# Patient Record
Sex: MALE | Race: WHITE | NOT HISPANIC OR LATINO
[De-identification: names, ages, dates, MRNs, and addresses within clinical notes are randomized per-mention and may not be internally consistent; named-entity substitution may affect disease eponyms.]

---

## 2017-03-08 ENCOUNTER — APPOINTMENT (OUTPATIENT)
Dept: OPHTHALMOLOGY | Facility: CLINIC | Age: 82
End: 2017-03-08

## 2017-09-06 ENCOUNTER — APPOINTMENT (OUTPATIENT)
Dept: OPHTHALMOLOGY | Facility: CLINIC | Age: 82
End: 2017-09-06
Payer: MEDICARE

## 2017-09-06 DIAGNOSIS — H25.13 AGE-RELATED NUCLEAR CATARACT, BILATERAL: ICD-10-CM

## 2017-09-06 PROCEDURE — 92012 INTRM OPH EXAM EST PATIENT: CPT

## 2018-01-03 ENCOUNTER — APPOINTMENT (OUTPATIENT)
Dept: OPHTHALMOLOGY | Facility: CLINIC | Age: 83
End: 2018-01-03
Payer: MEDICARE

## 2018-01-03 PROCEDURE — 92014 COMPRE OPH EXAM EST PT 1/>: CPT

## 2018-03-14 ENCOUNTER — APPOINTMENT (OUTPATIENT)
Dept: OPHTHALMOLOGY | Facility: CLINIC | Age: 83
End: 2018-03-14
Payer: MEDICARE

## 2018-03-14 DIAGNOSIS — H01.025 SQUAMOUS BLEPHARITIS LEFT UPPER EYELID: ICD-10-CM

## 2018-03-14 DIAGNOSIS — H01.022 SQUAMOUS BLEPHARITIS RIGHT UPPER EYELID: ICD-10-CM

## 2018-03-14 DIAGNOSIS — H01.021 SQUAMOUS BLEPHARITIS RIGHT UPPER EYELID: ICD-10-CM

## 2018-03-14 DIAGNOSIS — H01.024 SQUAMOUS BLEPHARITIS LEFT UPPER EYELID: ICD-10-CM

## 2018-03-14 PROCEDURE — 92012 INTRM OPH EXAM EST PATIENT: CPT

## 2018-09-12 ENCOUNTER — APPOINTMENT (OUTPATIENT)
Dept: OPHTHALMOLOGY | Facility: CLINIC | Age: 83
End: 2018-09-12
Payer: MEDICARE

## 2018-09-12 DIAGNOSIS — H25.13 AGE-RELATED NUCLEAR CATARACT, BILATERAL: ICD-10-CM

## 2018-09-12 PROCEDURE — 92012 INTRM OPH EXAM EST PATIENT: CPT

## 2019-03-06 ENCOUNTER — APPOINTMENT (OUTPATIENT)
Dept: OPHTHALMOLOGY | Facility: CLINIC | Age: 84
End: 2019-03-06

## 2020-07-10 ENCOUNTER — INPATIENT (INPATIENT)
Facility: HOSPITAL | Age: 85
LOS: 6 days | Discharge: NURSING, REHAB OR LONG-TERM CARE | DRG: 871 | End: 2020-07-17
Attending: INTERNAL MEDICINE | Admitting: HOSPITALIST
Payer: MEDICARE

## 2020-07-10 VITALS
RESPIRATION RATE: 20 BRPM | TEMPERATURE: 99 F | HEIGHT: 70 IN | WEIGHT: 177.03 LBS | SYSTOLIC BLOOD PRESSURE: 95 MMHG | DIASTOLIC BLOOD PRESSURE: 56 MMHG | OXYGEN SATURATION: 96 % | HEART RATE: 85 BPM

## 2020-07-10 LAB
ALBUMIN SERPL ELPH-MCNC: 2.9 G/DL — LOW (ref 3.3–5)
ALP SERPL-CCNC: 70 U/L — SIGNIFICANT CHANGE UP (ref 40–120)
ALT FLD-CCNC: 46 U/L — HIGH (ref 10–45)
ANION GAP SERPL CALC-SCNC: 15 MMOL/L — SIGNIFICANT CHANGE UP (ref 5–17)
APPEARANCE UR: ABNORMAL
APTT BLD: 32.1 SEC — SIGNIFICANT CHANGE UP (ref 27.5–35.5)
AST SERPL-CCNC: 204 U/L — HIGH (ref 10–40)
BILIRUB SERPL-MCNC: 0.7 MG/DL — SIGNIFICANT CHANGE UP (ref 0.2–1.2)
BILIRUB UR-MCNC: NEGATIVE — SIGNIFICANT CHANGE UP
BUN SERPL-MCNC: 42 MG/DL — HIGH (ref 7–23)
CALCIUM SERPL-MCNC: 8.8 MG/DL — SIGNIFICANT CHANGE UP (ref 8.4–10.5)
CHLORIDE SERPL-SCNC: 101 MMOL/L — SIGNIFICANT CHANGE UP (ref 96–108)
CO2 SERPL-SCNC: 20 MMOL/L — LOW (ref 22–31)
COLOR SPEC: ABNORMAL
CREAT SERPL-MCNC: 1.66 MG/DL — HIGH (ref 0.5–1.3)
DIFF PNL FLD: ABNORMAL
GAS PNL BLDV: SIGNIFICANT CHANGE UP
GLUCOSE SERPL-MCNC: 161 MG/DL — HIGH (ref 70–99)
GLUCOSE UR QL: NEGATIVE — SIGNIFICANT CHANGE UP
HCT VFR BLD CALC: 38.3 % — LOW (ref 39–50)
HGB BLD-MCNC: 12.7 G/DL — LOW (ref 13–17)
INR BLD: 1.5 RATIO — HIGH (ref 0.88–1.16)
KETONES UR-MCNC: NEGATIVE — SIGNIFICANT CHANGE UP
LEUKOCYTE ESTERASE UR-ACNC: ABNORMAL
MCHC RBC-ENTMCNC: 29.6 PG — SIGNIFICANT CHANGE UP (ref 27–34)
MCHC RBC-ENTMCNC: 33.2 GM/DL — SIGNIFICANT CHANGE UP (ref 32–36)
MCV RBC AUTO: 89.3 FL — SIGNIFICANT CHANGE UP (ref 80–100)
NITRITE UR-MCNC: NEGATIVE — SIGNIFICANT CHANGE UP
PH UR: 6 — SIGNIFICANT CHANGE UP (ref 5–8)
PLATELET # BLD AUTO: 87 K/UL — LOW (ref 150–400)
POTASSIUM SERPL-MCNC: 4.8 MMOL/L — SIGNIFICANT CHANGE UP (ref 3.5–5.3)
POTASSIUM SERPL-SCNC: 4.8 MMOL/L — SIGNIFICANT CHANGE UP (ref 3.5–5.3)
PROT SERPL-MCNC: 5.8 G/DL — LOW (ref 6–8.3)
PROT UR-MCNC: ABNORMAL
PROTHROM AB SERPL-ACNC: 16.9 SEC — HIGH (ref 10.6–13.6)
RBC # BLD: 4.29 M/UL — SIGNIFICANT CHANGE UP (ref 4.2–5.8)
RBC # FLD: 14.8 % — HIGH (ref 10.3–14.5)
SODIUM SERPL-SCNC: 136 MMOL/L — SIGNIFICANT CHANGE UP (ref 135–145)
SP GR SPEC: 1.02 — SIGNIFICANT CHANGE UP (ref 1.01–1.02)
UROBILINOGEN FLD QL: NEGATIVE — SIGNIFICANT CHANGE UP
WBC # BLD: 30.07 K/UL — HIGH (ref 3.8–10.5)
WBC # FLD AUTO: 30.07 K/UL — HIGH (ref 3.8–10.5)

## 2020-07-10 PROCEDURE — 99285 EMERGENCY DEPT VISIT HI MDM: CPT | Mod: CS,GC

## 2020-07-10 RX ORDER — CEFEPIME 1 G/1
2000 INJECTION, POWDER, FOR SOLUTION INTRAMUSCULAR; INTRAVENOUS ONCE
Refills: 0 | Status: COMPLETED | OUTPATIENT
Start: 2020-07-10 | End: 2020-07-10

## 2020-07-10 RX ORDER — SODIUM CHLORIDE 9 MG/ML
1000 INJECTION, SOLUTION INTRAVENOUS ONCE
Refills: 0 | Status: COMPLETED | OUTPATIENT
Start: 2020-07-10 | End: 2020-07-10

## 2020-07-10 RX ORDER — SODIUM CHLORIDE 9 MG/ML
1600 INJECTION, SOLUTION INTRAVENOUS ONCE
Refills: 0 | Status: COMPLETED | OUTPATIENT
Start: 2020-07-10 | End: 2020-07-10

## 2020-07-10 RX ORDER — PIPERACILLIN AND TAZOBACTAM 4; .5 G/20ML; G/20ML
3.38 INJECTION, POWDER, LYOPHILIZED, FOR SOLUTION INTRAVENOUS ONCE
Refills: 0 | Status: DISCONTINUED | OUTPATIENT
Start: 2020-07-10 | End: 2020-07-10

## 2020-07-10 RX ORDER — ACETAMINOPHEN 500 MG
650 TABLET ORAL ONCE
Refills: 0 | Status: COMPLETED | OUTPATIENT
Start: 2020-07-10 | End: 2020-07-10

## 2020-07-10 RX ORDER — ACETAMINOPHEN 500 MG
975 TABLET ORAL ONCE
Refills: 0 | Status: DISCONTINUED | OUTPATIENT
Start: 2020-07-10 | End: 2020-07-10

## 2020-07-10 RX ORDER — VANCOMYCIN HCL 1 G
1000 VIAL (EA) INTRAVENOUS ONCE
Refills: 0 | Status: COMPLETED | OUTPATIENT
Start: 2020-07-10 | End: 2020-07-10

## 2020-07-10 RX ADMIN — Medication 650 MILLIGRAM(S): at 22:26

## 2020-07-10 RX ADMIN — CEFEPIME 100 MILLIGRAM(S): 1 INJECTION, POWDER, FOR SOLUTION INTRAMUSCULAR; INTRAVENOUS at 22:27

## 2020-07-10 RX ADMIN — CEFEPIME 2000 MILLIGRAM(S): 1 INJECTION, POWDER, FOR SOLUTION INTRAMUSCULAR; INTRAVENOUS at 23:00

## 2020-07-10 RX ADMIN — Medication 250 MILLIGRAM(S): at 23:04

## 2020-07-10 RX ADMIN — SODIUM CHLORIDE 1000 MILLILITER(S): 9 INJECTION, SOLUTION INTRAVENOUS at 22:25

## 2020-07-10 RX ADMIN — SODIUM CHLORIDE 1000 MILLILITER(S): 9 INJECTION, SOLUTION INTRAVENOUS at 23:25

## 2020-07-10 NOTE — ED PROVIDER NOTE - CARE PLAN
Principal Discharge DX:	Sepsis, due to unspecified organism, unspecified whether acute organ dysfunction present  Secondary Diagnosis:	Urinary tract infection without hematuria, site unspecified

## 2020-07-10 NOTE — ED PROVIDER NOTE - NS ED ROS FT
CONSTITUTIONAL: +Warm  PSYCHIATRIC: no known mental health issues.    Unable to hear does not having hearing aid here, unable to obtain ROS

## 2020-07-10 NOTE — ED ADULT NURSE NOTE - OBJECTIVE STATEMENT
89y male being brought in by EMS s/p fall last evening around 8pm. Pt was found face down on floor by family today after not hearing from patient. Pt lives alone normally independently ambulatory. Pt told family he was down on the ground for hours, unknown if LOC or hit head. Family members noted a changed in mental status after getting to patient was lethargic and altered. Pt is now AOx1 to self, Hard of hearing, pt is poor historian, unknown of any AC use. PERRLA. Pt has full range of motion in all extremities. Breathing is clear and unlabored. Crackles heard in lower lobes CHLOE. Pt is no signs of distress. Bowel sounds present, abdomen is soft nontender. No skin break down noted. Unkown if any COVID or sick contact. 89y male being brought in by EMS s/p fall last evening around 8pm. Pt was found face down on floor by family today after not hearing from patient. Pt lives alone normally independently ambulatory. Pt told family he was down on the ground for hours, unknown if LOC or hit head. Family members noted a changed in mental status after getting to patient was lethargic and altered. Pt is now AOx1 to self, Hard of hearing, pt is poor historian, Pt states he has dementia. Unknown of any AC use. PERRLA. Pt has full range of motion in all extremities. Breathing is clear and unlabored. Crackles heard in lower lobes CHLOE. Pt is no signs of distress. Bowel sounds present, abdomen is soft nontender. No skin break down noted. Unkown if any COVID or sick contact.

## 2020-07-10 NOTE — ED ADULT NURSE REASSESSMENT NOTE - NS ED NURSE REASSESS COMMENT FT1
Septic work up started. Blood cultures drawn. Pt straight cathed for urine culture. Antibiotics started after drawing culture.

## 2020-07-10 NOTE — ED PROVIDER NOTE - PROGRESS NOTE DETAILS
Lea Mancilla MD, PGY3: Patient received at resident sign out. Pt received CT head, no evidence of intracranial bleeding. abx given, admission for UTI, ams. Based on patient's history and physical exam, as well as the results of today's workup, I feel that patient warrants admission to the hospital for further workup/evaluation and continued management. I discussed the findings of today's workup with the patient and addressed the patient's questions and concerns. The patient was agreeable with admission. I spoke with the hospitalist who accepted the patient for admission and subsequently took over the patient's care.

## 2020-07-10 NOTE — ED PROVIDER NOTE - PHYSICAL EXAMINATION
General: Shaking, Alert and oriented to self  HEENT: pupils equal and reactive, normal external ears bilaterally   Cardiac: RRR, no MRG appreciated  Resp: RLL crackle, symmetric chest wall rise  Abd: soft, nontender, nondistended,   : no CVA tenderness  Neuro: Moving all extremities  Skin:  normal color for race, +Warm   MSK: Able to passively and actively range UE and LE without issue

## 2020-07-10 NOTE — ED PROVIDER NOTE - ATTENDING CONTRIBUTION TO CARE
Nemes - 89yo M h/o CAD/stents on ASA, Afib on Eliquis, s/p PPM, HLD, HTN, hearing difficulty, BPH here w/ weakness and AMS as per EMS and daughter. He was on found on the ground unable to get up yesterday, son-in-law went to house and put to bed. Today slept until 1pm, was confused and weak prompting daughter to call EMS. Pt declines any symptoms at this time. VS wnl except febrile to 101, well appearing, in NAD, lungs clear, cardiac wnl, no JVD. DRY mucosae, pink conjunctivae. Abdomen soft/NT, no CVAT. No pedal edema, no calf TTP. Confused but neuro intact. No signs of trauma. Will get sepsis labs, CT head/spine, hydrate, give abx, admit

## 2020-07-10 NOTE — ED PROVIDER NOTE - CLINICAL SUMMARY MEDICAL DECISION MAKING FREE TEXT BOX
Jasbir PGY-3:  89yo M w/ pmhx as described in HPI here w/ weakness, found to be febrile 101F rectally currant presentation likely 2/2 infection, will give empiric abx and IVF, tylenol, cxr/ua/bloodwork for infectious workup. CT head/c-spine as unclear if fall and on AC.   Will admit for further management

## 2020-07-10 NOTE — ED PROVIDER NOTE - OBJECTIVE STATEMENT
89yo M h/o 5x stents on ASA, afib on eliquis, s/p PPM, HLD, HTN, hearing difficulty, BPH here w/ weakness    As per daughter was on ground unable to get up yesterday, son-in-law went to house and put to bed. Today slept until 1pm and was weak prompting daughter to call EMS. +Pt is warm. No N/V/D. Denies head trauma. On AC.    Has no PCP  Social: Lives alone, does all ADL's, does not need assistance when walking month(s)

## 2020-07-11 DIAGNOSIS — M79.602 PAIN IN LEFT ARM: ICD-10-CM

## 2020-07-11 DIAGNOSIS — I25.10 ATHEROSCLEROTIC HEART DISEASE OF NATIVE CORONARY ARTERY WITHOUT ANGINA PECTORIS: ICD-10-CM

## 2020-07-11 DIAGNOSIS — Z98.890 OTHER SPECIFIED POSTPROCEDURAL STATES: Chronic | ICD-10-CM

## 2020-07-11 DIAGNOSIS — N39.0 URINARY TRACT INFECTION, SITE NOT SPECIFIED: ICD-10-CM

## 2020-07-11 DIAGNOSIS — I48.91 UNSPECIFIED ATRIAL FIBRILLATION: ICD-10-CM

## 2020-07-11 DIAGNOSIS — A41.9 SEPSIS, UNSPECIFIED ORGANISM: ICD-10-CM

## 2020-07-11 DIAGNOSIS — Z00.00 ENCOUNTER FOR GENERAL ADULT MEDICAL EXAMINATION WITHOUT ABNORMAL FINDINGS: ICD-10-CM

## 2020-07-11 DIAGNOSIS — D72.9 DISORDER OF WHITE BLOOD CELLS, UNSPECIFIED: ICD-10-CM

## 2020-07-11 DIAGNOSIS — N17.9 ACUTE KIDNEY FAILURE, UNSPECIFIED: ICD-10-CM

## 2020-07-11 DIAGNOSIS — E78.5 HYPERLIPIDEMIA, UNSPECIFIED: ICD-10-CM

## 2020-07-11 DIAGNOSIS — M62.82 RHABDOMYOLYSIS: ICD-10-CM

## 2020-07-11 DIAGNOSIS — I10 ESSENTIAL (PRIMARY) HYPERTENSION: ICD-10-CM

## 2020-07-11 DIAGNOSIS — Z02.9 ENCOUNTER FOR ADMINISTRATIVE EXAMINATIONS, UNSPECIFIED: ICD-10-CM

## 2020-07-11 LAB
ALBUMIN SERPL ELPH-MCNC: 2.6 G/DL — LOW (ref 3.3–5)
ALP SERPL-CCNC: 74 U/L — SIGNIFICANT CHANGE UP (ref 40–120)
ALT FLD-CCNC: 48 U/L — HIGH (ref 10–45)
ANION GAP SERPL CALC-SCNC: 11 MMOL/L — SIGNIFICANT CHANGE UP (ref 5–17)
ANION GAP SERPL CALC-SCNC: 9 MMOL/L — SIGNIFICANT CHANGE UP (ref 5–17)
AST SERPL-CCNC: 176 U/L — HIGH (ref 10–40)
BASOPHILS # BLD AUTO: 0.27 K/UL — HIGH (ref 0–0.2)
BASOPHILS NFR BLD AUTO: 0.9 % — SIGNIFICANT CHANGE UP (ref 0–2)
BILIRUB SERPL-MCNC: 0.5 MG/DL — SIGNIFICANT CHANGE UP (ref 0.2–1.2)
BUN SERPL-MCNC: 42 MG/DL — HIGH (ref 7–23)
BUN SERPL-MCNC: 44 MG/DL — HIGH (ref 7–23)
CALCIUM SERPL-MCNC: 8.1 MG/DL — LOW (ref 8.4–10.5)
CALCIUM SERPL-MCNC: 8.4 MG/DL — SIGNIFICANT CHANGE UP (ref 8.4–10.5)
CHLORIDE SERPL-SCNC: 104 MMOL/L — SIGNIFICANT CHANGE UP (ref 96–108)
CHLORIDE SERPL-SCNC: 105 MMOL/L — SIGNIFICANT CHANGE UP (ref 96–108)
CK SERPL-CCNC: 1337 U/L — HIGH (ref 30–200)
CK SERPL-CCNC: 2838 U/L — HIGH (ref 30–200)
CO2 SERPL-SCNC: 20 MMOL/L — LOW (ref 22–31)
CO2 SERPL-SCNC: 22 MMOL/L — SIGNIFICANT CHANGE UP (ref 22–31)
CREAT SERPL-MCNC: 1.49 MG/DL — HIGH (ref 0.5–1.3)
CREAT SERPL-MCNC: 1.52 MG/DL — HIGH (ref 0.5–1.3)
E COLI DNA BLD POS QL NAA+NON-PROBE: SIGNIFICANT CHANGE UP
EOSINOPHIL # BLD AUTO: 0 K/UL — SIGNIFICANT CHANGE UP (ref 0–0.5)
EOSINOPHIL NFR BLD AUTO: 0 % — SIGNIFICANT CHANGE UP (ref 0–6)
GAS PNL BLDV: SIGNIFICANT CHANGE UP
GLUCOSE SERPL-MCNC: 128 MG/DL — HIGH (ref 70–99)
GLUCOSE SERPL-MCNC: 157 MG/DL — HIGH (ref 70–99)
GRAM STN FLD: SIGNIFICANT CHANGE UP
HCT VFR BLD CALC: 36.5 % — LOW (ref 39–50)
HGB BLD-MCNC: 12.1 G/DL — LOW (ref 13–17)
LYMPHOCYTES # BLD AUTO: 0.78 K/UL — LOW (ref 1–3.3)
LYMPHOCYTES # BLD AUTO: 2.6 % — LOW (ref 13–44)
MAGNESIUM SERPL-MCNC: 1.9 MG/DL — SIGNIFICANT CHANGE UP (ref 1.6–2.6)
MCHC RBC-ENTMCNC: 29.5 PG — SIGNIFICANT CHANGE UP (ref 27–34)
MCHC RBC-ENTMCNC: 33.2 GM/DL — SIGNIFICANT CHANGE UP (ref 32–36)
MCV RBC AUTO: 89 FL — SIGNIFICANT CHANGE UP (ref 80–100)
METHOD TYPE: SIGNIFICANT CHANGE UP
MONOCYTES # BLD AUTO: 1.05 K/UL — HIGH (ref 0–0.9)
MONOCYTES NFR BLD AUTO: 3.5 % — SIGNIFICANT CHANGE UP (ref 2–14)
NEUTROPHILS # BLD AUTO: 21.71 K/UL — HIGH (ref 1.8–7.4)
NEUTROPHILS NFR BLD AUTO: 68.7 % — SIGNIFICANT CHANGE UP (ref 43–77)
NRBC # BLD: 0 /100 WBCS — SIGNIFICANT CHANGE UP (ref 0–0)
PHOSPHATE SERPL-MCNC: 3.4 MG/DL — SIGNIFICANT CHANGE UP (ref 2.5–4.5)
PLATELET # BLD AUTO: 70 K/UL — LOW (ref 150–400)
POTASSIUM SERPL-MCNC: 4.3 MMOL/L — SIGNIFICANT CHANGE UP (ref 3.5–5.3)
POTASSIUM SERPL-MCNC: 4.5 MMOL/L — SIGNIFICANT CHANGE UP (ref 3.5–5.3)
POTASSIUM SERPL-SCNC: 4.3 MMOL/L — SIGNIFICANT CHANGE UP (ref 3.5–5.3)
POTASSIUM SERPL-SCNC: 4.5 MMOL/L — SIGNIFICANT CHANGE UP (ref 3.5–5.3)
PROT SERPL-MCNC: 5.4 G/DL — LOW (ref 6–8.3)
RBC # BLD: 4.1 M/UL — LOW (ref 4.2–5.8)
RBC # FLD: 14.8 % — HIGH (ref 10.3–14.5)
SARS-COV-2 RNA SPEC QL NAA+PROBE: SIGNIFICANT CHANGE UP
SODIUM SERPL-SCNC: 135 MMOL/L — SIGNIFICANT CHANGE UP (ref 135–145)
SODIUM SERPL-SCNC: 136 MMOL/L — SIGNIFICANT CHANGE UP (ref 135–145)
SPECIMEN SOURCE: SIGNIFICANT CHANGE UP
SPECIMEN SOURCE: SIGNIFICANT CHANGE UP
TSH SERPL-MCNC: 2.75 UIU/ML — SIGNIFICANT CHANGE UP (ref 0.27–4.2)
WBC # BLD: 23.85 K/UL — HIGH (ref 3.8–10.5)
WBC # FLD AUTO: 23.85 K/UL — HIGH (ref 3.8–10.5)

## 2020-07-11 PROCEDURE — 71045 X-RAY EXAM CHEST 1 VIEW: CPT | Mod: 26

## 2020-07-11 PROCEDURE — 72125 CT NECK SPINE W/O DYE: CPT | Mod: 26

## 2020-07-11 PROCEDURE — 99223 1ST HOSP IP/OBS HIGH 75: CPT | Mod: GC

## 2020-07-11 PROCEDURE — 72170 X-RAY EXAM OF PELVIS: CPT | Mod: 26

## 2020-07-11 PROCEDURE — 12345: CPT | Mod: NC,GC

## 2020-07-11 PROCEDURE — 73030 X-RAY EXAM OF SHOULDER: CPT | Mod: 26,LT

## 2020-07-11 PROCEDURE — 70450 CT HEAD/BRAIN W/O DYE: CPT | Mod: 26

## 2020-07-11 RX ORDER — TAMSULOSIN HYDROCHLORIDE 0.4 MG/1
0.8 CAPSULE ORAL AT BEDTIME
Refills: 0 | Status: DISCONTINUED | OUTPATIENT
Start: 2020-07-11 | End: 2020-07-17

## 2020-07-11 RX ORDER — PREGABALIN 225 MG/1
1000 CAPSULE ORAL DAILY
Refills: 0 | Status: DISCONTINUED | OUTPATIENT
Start: 2020-07-11 | End: 2020-07-17

## 2020-07-11 RX ORDER — SODIUM CHLORIDE 9 MG/ML
1000 INJECTION, SOLUTION INTRAVENOUS
Refills: 0 | Status: DISCONTINUED | OUTPATIENT
Start: 2020-07-11 | End: 2020-07-17

## 2020-07-11 RX ORDER — SENNA PLUS 8.6 MG/1
2 TABLET ORAL AT BEDTIME
Refills: 0 | Status: DISCONTINUED | OUTPATIENT
Start: 2020-07-11 | End: 2020-07-17

## 2020-07-11 RX ORDER — POLYETHYLENE GLYCOL 3350 17 G/17G
17 POWDER, FOR SOLUTION ORAL
Refills: 0 | Status: DISCONTINUED | OUTPATIENT
Start: 2020-07-11 | End: 2020-07-17

## 2020-07-11 RX ORDER — ASPIRIN/CALCIUM CARB/MAGNESIUM 324 MG
81 TABLET ORAL DAILY
Refills: 0 | Status: DISCONTINUED | OUTPATIENT
Start: 2020-07-11 | End: 2020-07-17

## 2020-07-11 RX ORDER — SIMVASTATIN 20 MG/1
20 TABLET, FILM COATED ORAL AT BEDTIME
Refills: 0 | Status: DISCONTINUED | OUTPATIENT
Start: 2020-07-11 | End: 2020-07-13

## 2020-07-11 RX ORDER — ACETAMINOPHEN 500 MG
1000 TABLET ORAL EVERY 8 HOURS
Refills: 0 | Status: DISCONTINUED | OUTPATIENT
Start: 2020-07-11 | End: 2020-07-17

## 2020-07-11 RX ORDER — LIDOCAINE 4 G/100G
1 CREAM TOPICAL DAILY
Refills: 0 | Status: DISCONTINUED | OUTPATIENT
Start: 2020-07-11 | End: 2020-07-17

## 2020-07-11 RX ORDER — SODIUM CHLORIDE 9 MG/ML
1000 INJECTION, SOLUTION INTRAVENOUS
Refills: 0 | Status: DISCONTINUED | OUTPATIENT
Start: 2020-07-11 | End: 2020-07-11

## 2020-07-11 RX ORDER — CEFTRIAXONE 500 MG/1
1000 INJECTION, POWDER, FOR SOLUTION INTRAMUSCULAR; INTRAVENOUS EVERY 24 HOURS
Refills: 0 | Status: DISCONTINUED | OUTPATIENT
Start: 2020-07-11 | End: 2020-07-15

## 2020-07-11 RX ORDER — TAMSULOSIN HYDROCHLORIDE 0.4 MG/1
0.4 CAPSULE ORAL AT BEDTIME
Refills: 0 | Status: DISCONTINUED | OUTPATIENT
Start: 2020-07-11 | End: 2020-07-11

## 2020-07-11 RX ORDER — APIXABAN 2.5 MG/1
2.5 TABLET, FILM COATED ORAL EVERY 12 HOURS
Refills: 0 | Status: DISCONTINUED | OUTPATIENT
Start: 2020-07-11 | End: 2020-07-17

## 2020-07-11 RX ORDER — FOLIC ACID 0.8 MG
1 TABLET ORAL DAILY
Refills: 0 | Status: DISCONTINUED | OUTPATIENT
Start: 2020-07-11 | End: 2020-07-17

## 2020-07-11 RX ADMIN — PREGABALIN 1000 MICROGRAM(S): 225 CAPSULE ORAL at 09:39

## 2020-07-11 RX ADMIN — LIDOCAINE 1 PATCH: 4 CREAM TOPICAL at 09:37

## 2020-07-11 RX ADMIN — Medication 1000 MILLIGRAM(S): at 00:05

## 2020-07-11 RX ADMIN — LIDOCAINE 1 PATCH: 4 CREAM TOPICAL at 21:27

## 2020-07-11 RX ADMIN — APIXABAN 2.5 MILLIGRAM(S): 2.5 TABLET, FILM COATED ORAL at 18:50

## 2020-07-11 RX ADMIN — Medication 81 MILLIGRAM(S): at 09:39

## 2020-07-11 RX ADMIN — SENNA PLUS 2 TABLET(S): 8.6 TABLET ORAL at 22:19

## 2020-07-11 RX ADMIN — LIDOCAINE 1 PATCH: 4 CREAM TOPICAL at 19:01

## 2020-07-11 RX ADMIN — Medication 1000 MILLIGRAM(S): at 15:38

## 2020-07-11 RX ADMIN — Medication 1 MILLIGRAM(S): at 09:39

## 2020-07-11 RX ADMIN — SODIUM CHLORIDE 150 MILLILITER(S): 9 INJECTION, SOLUTION INTRAVENOUS at 09:36

## 2020-07-11 RX ADMIN — CEFTRIAXONE 100 MILLIGRAM(S): 500 INJECTION, POWDER, FOR SOLUTION INTRAMUSCULAR; INTRAVENOUS at 05:24

## 2020-07-11 RX ADMIN — SODIUM CHLORIDE 1600 MILLILITER(S): 9 INJECTION, SOLUTION INTRAVENOUS at 00:45

## 2020-07-11 RX ADMIN — APIXABAN 2.5 MILLIGRAM(S): 2.5 TABLET, FILM COATED ORAL at 05:24

## 2020-07-11 RX ADMIN — TAMSULOSIN HYDROCHLORIDE 0.8 MILLIGRAM(S): 0.4 CAPSULE ORAL at 22:19

## 2020-07-11 RX ADMIN — POLYETHYLENE GLYCOL 3350 17 GRAM(S): 17 POWDER, FOR SOLUTION ORAL at 18:50

## 2020-07-11 RX ADMIN — SIMVASTATIN 20 MILLIGRAM(S): 20 TABLET, FILM COATED ORAL at 22:20

## 2020-07-11 RX ADMIN — SODIUM CHLORIDE 75 MILLILITER(S): 9 INJECTION, SOLUTION INTRAVENOUS at 05:24

## 2020-07-11 NOTE — H&P ADULT - NSHPLABSRESULTS_GEN_ALL_CORE
12.7   30.07 )-----------( 87       ( 10 Jul 2020 22:19 )             38.3   07-10    136  |  101  |  42<H>  ----------------------------<  161<H>  4.8   |  20<L>  |  1.66<H>    Ca    8.8      10 Jul 2020 22:19    TPro  5.8<L>  /  Alb  2.9<L>  /  TBili  0.7  /  DBili  x   /  AST  204<H>  /  ALT  46<H>  /  AlkPhos  70  07-10    Blood Gas Venous - Lactate (07.11.20 @ 01:56)    Blood Gas Venous - Lactate: 3.7: Elevated lactate. Consider ordering follow-up lactate to trend. mmoL/L      PT/INR - ( 10 Jul 2020 22:19 )   PT: 16.9 sec;   INR: 1.50 ratio         PTT - ( 10 Jul 2020 22:19 )  PTT:32.1 sec 12.7   30.07 )-----------( 87       ( 10 Jul 2020 22:19 )             38.3   07-10    136  |  101  |  42<H>  ----------------------------<  161<H>  4.8   |  20<L>  |  1.66<H>    Ca    8.8      10 Jul 2020 22:19    TPro  5.8<L>  /  Alb  2.9<L>  /  TBili  0.7  /  DBili  x   /  AST  204<H>  /  ALT  46<H>  /  AlkPhos  70  07-10    Blood Gas Venous - Lactate (20 @ 01:56)    Blood Gas Venous - Lactate: 3.7: Elevated lactate. Consider ordering follow-up lactate to trend. mmoL/L    Blood Gas Profile - Venous (20 @ 01:56)    pH, Venous: 7.28    pCO2, Venous: 48 mmHg    pO2, Venous: 28 mmHg    HCO3, Venous: 21 mmol/L    Base Excess, Venous: -4.9 mmol/L    Oxygen Saturation, Venous: 43 %    Total CO2, Venous: 23 mmol/L    Blood Gas Source Venous: Venous    Urinalysis Basic - ( 10 Jul 2020 22:28 )    Color: Dark Yellow / Appearance: Slightly Turbid / S.018 / pH: x  Gluc: x / Ketone: Negative  / Bili: Negative / Urobili: Negative   Blood: x / Protein: 300 mg/dL / Nitrite: Negative   Leuk Esterase: Large / RBC: 4 /hpf / WBC 64 /HPF   Sq Epi: x / Non Sq Epi: 1 /hpf / Bacteria: Many    COVID-19 PCR: NotDetec (10 Jul 2020 22:19)    < from: CT Head No Cont (20 @ 00:35) >    CT Head: No acute intracranial hemorrhage or calvarial fracture.    CT cervical spine: No acute cervical spine fracture or evidence of traumatic malalignment.    < end of copied text >            PT/INR - ( 10 Jul 2020 22:19 )   PT: 16.9 sec;   INR: 1.50 ratio         PTT - ( 10 Jul 2020 22:19 )  PTT:32.1 sec

## 2020-07-11 NOTE — PROGRESS NOTE ADULT - PROBLEM SELECTOR PLAN 4
-unknown if acute or chronic, however likely an acute exacerbation in setting of prolonged time without PO intake, earlier dry oral mucosa, might be exacerbated by rhabdomolysis as well  -on  cc/hr for 6 hours, continue with fluids as needed

## 2020-07-11 NOTE — H&P ADULT - PROBLEM SELECTOR PLAN 4
-on Eliquis (c/w home med) and pacemaker -unknown if acute or chronic, however likely an acute exacerbation in setting of prolonged time without PO intake, earlier dry oral mucosa, might be exacerbated by rhabdomolysis as well  -on  cc/hr for 6 hours, continue with fluids as needed

## 2020-07-11 NOTE — ED ADULT NURSE REASSESSMENT NOTE - NS ED NURSE REASSESS COMMENT FT1
Pt resting comfortably in the stretcher, rectal temp rechecked, pt afebrile. Pt awaiting dispo. Pt given call bell and instructed her is a fall risk and needs to call for assistance if needed. Stretcher is in lowest position, wheels locked, yellow gown on, red socks on

## 2020-07-11 NOTE — PROGRESS NOTE ADULT - ATTENDING COMMENTS
-Patient seen/examined on 7/11/20. Case/plan discussed with the intern and resident as reviewed/edited by me above and in any comments below.  -F/u left shoulder xray. Tylenol PRN.   -C/w CTX for now pending UCx. Increase Flomax for BPH symptoms.   -Hold home ramipril for now given renal dysfunction. Check bladder scans.   -Will need heme consult.   -Conservative diet for now pending formal swallow eval. -Patient seen/examined on 7/11/20. Case/plan discussed with the intern and resident as reviewed/edited by me above and in any comments below.  -F/u left shoulder xray. Tylenol PRN.   -C/w CTX for now pending final blood and UCx. Increase Flomax for BPH symptoms.   -Hold home ramipril for now given renal dysfunction. Check bladder scans.   -Will need heme consult.   -Conservative diet for now pending formal swallow eval.  -Discussed with patient's daughter at bedside.

## 2020-07-11 NOTE — PROGRESS NOTE ADULT - PROBLEM SELECTOR PLAN 2
-leukocytosis 30k, febrile to 101, RR 20-22 in the setting of UTI  -treat UTI as above  -f/u AM blood gas, lactate, CBC w/differential, CMP  -f/u BCx -leukocytosis 30k, febrile to 101, RR 20-22 in the setting of UTI  -treat UTI as above  -trend WBC w/diff  -trend Cr, replete fluids as necessary  -Blood cx x2 positive for gnr

## 2020-07-11 NOTE — H&P ADULT - NSHPREVIEWOFSYSTEMS_GEN_ALL_CORE
REVIEW OF SYSTEMS:    CONSTITUTIONAL: +weakness, denies fevers or endorses chills  EYES/ENT: No visual changes, no throat pain   NECK: No pain  RESPIRATORY: No cough, no shortness of breath  CARDIOVASCULAR: No chest pain or palpitations  GASTROINTESTINAL: No abdominal pain. No nausea, vomiting, no diarrhea or constipation.   BACK: endorses lower back pain after fall  GENITOURINARY: No dysuria, frequency or hematuria  NEURO: no numbness  SKIN: No itching, rashes

## 2020-07-11 NOTE — PROGRESS NOTE ADULT - PROBLEM SELECTOR PLAN 6
-elevated myelocytes, metamyelocytes, and promyelocytes with elevated WBC concerning for possible CML  -trend CBC with diff as infection is treated, if WBC count and myelocytes are persistently elevated despite treatment and improvement of infection, workup for possible malignancy -elevated myelocytes, metamyelocytes, and promyelocytes with elevated WBC concerning for possible CML  -trend CBC with diff as infection is treated, if WBC count and myelocytes are persistently elevated despite treatment and improvement of infection, workup for possible malignancy  - Heme w/u while inpt would be helpful

## 2020-07-11 NOTE — CHART NOTE - NSCHARTNOTEFT_GEN_A_CORE
TO BE COMPLETED WITHIN 6 HOURS OF INITIAL ASSESSMENT:    For use in patients that have 2 sepsis criteria and new organ dysfunction   •	New or increased oxygen requirement  •	Creatinine >2mg/dL  •	Bilirubin>2mg/dL  •	Platelet <100,00/mm3  •	INR >1.5, PTT>60  •	Lactate >2    If patient persistent hypotension (SBP<90) or any lactate >4 then provider evaluation (Physician/PA/NP) within 30 minutes of bolus completion is required.    Vital Signs Last 24 Hrs  T(C): 38.3 (10 Jul 2020 21:45), Max: 38.3 (10 Jul 2020 21:45)  T(F): 101 (10 Jul 2020 21:45), Max: 101 (10 Jul 2020 21:45)  HR: 86 (10 Jul 2020 21:45) (85 - 86)  BP: 105/58 (10 Jul 2020 21:45) (95/56 - 105/58)  BP(mean): --  RR: 20 (10 Jul 2020 21:45) (20 - 20)  SpO2: 98% (10 Jul 2020 21:45) (96% - 98%)  		  LUNGS:  [ X ]Clear bilaterally [  ] Wheeze [  ] Rhonchi [  ] Rales [  ] Crackles; Other:  HEART: [ X ]RRR [  ] No murmur[  ]  Normal S1S2[  ] Tachycardia;  Other:  CAPILLARY REFULL:  	Fingers: [X  ] less than 2 seconds [  ] more than 2 seconds                                           Toes: [  ]  less than 2 seconds [  ] more than 2 seconds   PERIPHERAL PULSES:  Radial: [X  ] Palpable  [  ]  non-palpable                                         Dorsalis Pedis: [  ] Palpable  [  ] non-palpable                                         Posterior Tibial: [  ] Palpable  [  ] non-palpable                                          Other:  SKIN:   [  ]  Diaphoretic  [  ]  mottling  [  ]  Cold extremities  [  ]  Warm [  ]  Dry                      Other:    BEDSIDE ULTRASOUND FINDINGS (IF APPLICABLE):    Labs:  10 Jul 2020 22:19    136    |  101    |  42     ----------------------------<  161    4.8     |  20     |  1.66     Ca    8.8        10 Jul 2020 22:19    TPro  5.8    /  Alb  2.9    /  TBili  0.7    /  DBili  x      /  AST  204    /  ALT  46     /  AlkPhos  70     10 Jul 2020 22:19                          12.7   30.07 )-----------( 87       ( 10 Jul 2020 22:19 )             38.3     PT/INR - ( 10 Jul 2020 22:19 )   PT: 16.9 sec;   INR: 1.50 ratio         PTT - ( 10 Jul 2020 22:19 )  PTT:32.1 sec  Lactate:    Plan (orders must be placed in EMR):     [ X ]  Check Repeat Lactate   [  ]  No change in current plan  [  ]  Start Vasopressors:  [  ]  Repeat Fluid Bolus:  [  ] other:    Care Discussed with Consultants/Other Providers [ ] YES  [ ] NO

## 2020-07-11 NOTE — H&P ADULT - PROBLEM SELECTOR PLAN 1
What Is The Reason For Today's Visit?: Full Body Skin Examination What Is The Reason For Today's Visit? (Being Monitored For X): concerning skin lesions on an annual basis -UA+ in setting of fever, leukocytosis increased lactate  -s/p broad spectrum vanc and cefepime in ED  -c/w 1g ceftriaxone for at least 5 (initiated 7/11)  -f/u UA -UA+ in setting of fever, leukocytosis increased lactate  -s/p broad spectrum vanc and cefepime in ED  -c/w 1g ceftriaxone for at least 5 days (initiated 7/11)  -f/u UA  -f/u post void bladder scan

## 2020-07-11 NOTE — H&P ADULT - PROBLEM SELECTOR PLAN 2
-leukocytosis 30k, febrile to 101, RR 20-22, -leukocytosis 30k, febrile to 101, RR 20-22 in the setting of UTI  -treat UTI  -f/u AM blood gas -leukocytosis 30k, febrile to 101, RR 20-22 in the setting of UTI  -treat UTI  -f/u AM blood gas, lactate  -f/u BCx -leukocytosis 30k, febrile to 101, RR 20-22 in the setting of UTI  -treat UTI as above  -f/u AM blood gas, lactate, CBC w/differential, CMP  -f/u BCx

## 2020-07-11 NOTE — PROGRESS NOTE ADULT - PROBLEM SELECTOR PLAN 1
-UA+ in setting of fever, leukocytosis increased lactate  -s/p broad spectrum vanc and cefepime in ED  -c/w 1g ceftriaxone for at least 5 days (initiated 7/11)  -f/u UA  -f/u post void bladder scan -UA+ in setting of fever, leukocytosis increased lactate  -s/p broad spectrum vanc and cefepime in ED  -c/w 1g ceftriaxone for at least 5 days (initiated 7/11)  -f/u bladder scan, if >300ml need to place Bautista to avoid retention

## 2020-07-11 NOTE — H&P ADULT - PROBLEM SELECTOR PLAN 9
Transitions of Care Status:  1.  Name of PCP:  2.  PCP Contacted on Admission: [ ] Y    [ ] N    3.  PCP contacted at Discharge: [ ] Y    [ ] N    [ ] N/A  4.  Post-Discharge Appointment Date and Location:  5.  Summary of Handoff given to PCP: -on eliquis for afib

## 2020-07-11 NOTE — PROGRESS NOTE ADULT - PROBLEM SELECTOR PLAN 9
-on eliquis for afib -on eliquis for afib  -dysphagia diet, f/u speech and swallow eval   -fall and aspiration precautions

## 2020-07-11 NOTE — H&P ADULT - NSICDXPASTMEDICALHX_GEN_ALL_CORE_FT
PAST MEDICAL HISTORY:  Atrial fibrillation     BPH (benign prostatic hyperplasia)     Coronary artery disease     Hyperlipidemia     Hypertension

## 2020-07-11 NOTE — H&P ADULT - PROBLEM SELECTOR PLAN 5
-c/w home medication -elevated myelocytes, metamyelocytes, and promyelocytes with elevated WBC concerning for possible CML  -trend CBC with diff as infection is treated, if WBC count and myelocytes are persistently elevated despite treatment and improvement of infection, workup for possible malignancy -cardiac w/o benign  -c/o left arm pain after fall  -f/u x-ray of left arm and chest to rule out clavicle, shoulder, or humeral fracture

## 2020-07-11 NOTE — H&P ADULT - NSHPSOCIALHISTORY_GEN_ALL_CORE
Never smoker, denies alcohol or illicit substance use.    Lives alone in house, able to attend to ADLs, drives, strong support network (daughter and son-in-law).   Wife passed away this year.

## 2020-07-11 NOTE — H&P ADULT - NSHPPHYSICALEXAM_GEN_ALL_CORE
PHYSICAL EXAM:  GENERAL: Appears stated age, resting comfortably in bed, confused at times.   HEAD:  Atraumatic, Normocephalic  EYES: EOMI, conjunctiva and sclera clear  NECK:  no lymphadenopathy, no JVD  CHEST/LUNG: CTAB; No wheezes  HEART: Regular rate and rhythm; No murmurs  ABDOMEN: Soft, non-tender, non-distended  EXTREMITIES:  No edema   NEUROLOGY: A&O x 2-3, confused, tangential, no focal deficits.   SKIN: No rashes, diffuse senile purpura (on hands, arms, etc) Vital Signs Last 24 Hrs  T(C): 36.7 (11 Jul 2020 03:12), Max: 38.3 (10 Jul 2020 21:45)  T(F): 98 (11 Jul 2020 03:12), Max: 101 (10 Jul 2020 21:45)  HR: 86 (11 Jul 2020 03:12) (79 - 86)  BP: 125/80 (11 Jul 2020 03:12) (95/56 - 125/80)  BP(mean): --  RR: 18 (11 Jul 2020 03:12) (18 - 22)  SpO2: 95% (11 Jul 2020 03:12) (95% - 98%)    PHYSICAL EXAM:  GENERAL: Appears stated age, resting comfortably in bed, confused at times.   HEAD:  Atraumatic, Normocephalic  EYES: EOMI, conjunctiva and sclera clear  NECK:  no lymphadenopathy, no JVD  CHEST/LUNG: CTAB; No wheezes  HEART: Regular rate and rhythm; No murmurs  ABDOMEN: Soft, non-tender, non-distended  : no suprapubic tenderness  EXTREMITIES:  No edema   NEUROLOGY: A&O x 2-3, confused, tangential, no focal deficits.   SKIN: No rashes, diffuse senile purpura (on hands, arms, etc)

## 2020-07-11 NOTE — PROGRESS NOTE ADULT - SUBJECTIVE AND OBJECTIVE BOX
*************************************  Daniella Navarro M.D. | PGY-1  Department of Internal Medicine  [phone] ([facility]) | [id number] ([facility])  *************************************      Patient is a 89y old  Male who presents with a chief complaint of weakness, fall (2020 02:23)      SUBJECTIVE / OVERNIGHT EVENTS:    MEDICATIONS  (STANDING):  apixaban 2.5 milliGRAM(s) Oral every 12 hours  aspirin  chewable 81 milliGRAM(s) Oral daily  cefTRIAXone   IVPB 1000 milliGRAM(s) IV Intermittent every 24 hours  cyanocobalamin 1000 MICROGram(s) Oral daily  folic acid 1 milliGRAM(s) Oral daily  lactated ringers. 1000 milliLiter(s) (150 mL/Hr) IV Continuous <Continuous>  simvastatin 20 milliGRAM(s) Oral at bedtime  tamsulosin 0.4 milliGRAM(s) Oral at bedtime    MEDICATIONS  (PRN):      CAPILLARY BLOOD GLUCOSE        I&O's Summary    10 Jul 2020 07:01  -  2020 07:00  --------------------------------------------------------  IN: 3200 mL / OUT: 400 mL / NET: 2800 mL        Vital Signs Last 24 Hrs  T(C): 36.7 (2020 08:13), Max: 38.3 (10 Jul 2020 21:45)  T(F): 98.1 (2020 08:13), Max: 101 (10 Jul 2020 21:45)  HR: 69 (2020 08:13) (69 - 89)  BP: 116/57 (2020 08:13) (95/56 - 125/80)  BP(mean): --  RR: 18 (2020 08:13) (18 - 22)  SpO2: 94% (2020 08:13) (94% - 98%)    PHYSICAL EXAM:  GENERAL: NAD, well-developed, well-nourished  HEAD: Atraumatic, Normocephalic  EYES: EOMI, PERRLA, conjunctiva and sclera clear  NECK: Supple, No JVD  CHEST/LUNG: Clear to auscultation bilaterally; No wheezes or crackles  HEART: Normal S1/S2; Regular rate and rhythm; No murmurs, rubs, or gallops  ABDOMEN: Soft, Nontender, Nondistended; Bowel sounds present  EXTREMITIES: 2+ Peripheral Pulses; No clubbing, cyanosis, or edema  PSYCH: A&Ox3  NEUROLOGY: no focal neurologic deficit  SKIN: No rashes or lesions    LABS:                        12.1   23.85 )-----------( 70       ( 2020 07:04 )             36.5      07-11    135  |  104  |  42<H>  ----------------------------<  128<H>  4.5   |  20<L>  |  1.52<H>    Ca    8.1<L>      2020 07:04  Phos  3.4     07-11  Mg     1.9     07-11    TPro  5.4<L>  /  Alb  2.6<L>  /  TBili  0.5  /  DBili  x   /  AST  176<H>  /  ALT  48<H>  /  AlkPhos  74  07-11    PT/INR - ( 10 Jul 2020 22:19 )   PT: 16.9 sec;   INR: 1.50 ratio         PTT - ( 10 Jul 2020 22:19 )  PTT:32.1 sec  CARDIAC MARKERS ( 2020 07:04 )  x     / x     / 2838 U/L / x     / x          Urinalysis Basic - ( 10 Jul 2020 22:28 )    Color: Dark Yellow / Appearance: Slightly Turbid / S.018 / pH: x  Gluc: x / Ketone: Negative  / Bili: Negative / Urobili: Negative   Blood: x / Protein: 300 mg/dL / Nitrite: Negative   Leuk Esterase: Large / RBC: 4 /hpf / WBC 64 /HPF   Sq Epi: x / Non Sq Epi: 1 /hpf / Bacteria: Many        RADIOLOGY & ADDITIONAL TESTS:    Imaging Personally Reviewed:    Consultant(s) Notes Reviewed:      Care Discussed with Consultants/Other Providers: *************************************  Daniella Navarro M.D. | PGY-1  Department of Internal Medicine  [phone] ([facility]) | [id number] ([facility])  *************************************      Patient is a 89y old  Male who presents with a chief complaint of weakness, fall (2020 02:23)      SUBJECTIVE / OVERNIGHT EVENTS: BAILEY events overnight. Pt is complaining of L shoulder pain w/movement    MEDICATIONS  (STANDING):  apixaban 2.5 milliGRAM(s) Oral every 12 hours  aspirin  chewable 81 milliGRAM(s) Oral daily  cefTRIAXone   IVPB 1000 milliGRAM(s) IV Intermittent every 24 hours  cyanocobalamin 1000 MICROGram(s) Oral daily  folic acid 1 milliGRAM(s) Oral daily  lactated ringers. 1000 milliLiter(s) (150 mL/Hr) IV Continuous <Continuous>  simvastatin 20 milliGRAM(s) Oral at bedtime  tamsulosin 0.4 milliGRAM(s) Oral at bedtime    MEDICATIONS  (PRN):      CAPILLARY BLOOD GLUCOSE        I&O's Summary    10 Jul 2020 07:01  -  2020 07:00  --------------------------------------------------------  IN: 3200 mL / OUT: 400 mL / NET: 2800 mL        Vital Signs Last 24 Hrs  T(C): 36.7 (2020 08:13), Max: 38.3 (10 Jul 2020 21:45)  T(F): 98.1 (2020 08:13), Max: 101 (10 Jul 2020 21:45)  HR: 69 (2020 08:13) (69 - 89)  BP: 116/57 (2020 08:13) (95/56 - 125/80)  BP(mean): --  RR: 18 (2020 08:13) (18 - 22)  SpO2: 94% (2020 08:13) (94% - 98%)    PHYSICAL EXAM:  GENERAL: NAD, lying in bed, leaning towards his right side, pillow support for L shoulder  HEAD: Atraumatic, Normocephalic  EYES: EOMI, PERRLA, conjunctiva and sclera clear  NECK: Supple, No JVD  CHEST/LUNG: Clear to auscultation bilaterally; No wheezes or crackles  HEART: Normal S1/S2; Regular rate and rhythm; No murmurs, rubs, or gallops  ABDOMEN: Soft, Nontender, Nondistended; Bowel sounds present  EXTREMITIES: 2+ Peripheral Pulses; No clubbing, cyanosis, or edema  PSYCH: A&Ox3  NEUROLOGY: no focal neurologic deficit  SKIN: No rashes or lesions    LABS:                        12.1   23.85 )-----------( 70       ( 2020 07:04 )             36.5      07-11    135  |  104  |  42<H>  ----------------------------<  128<H>  4.5   |  20<L>  |  1.52<H>    Ca    8.1<L>      2020 07:04  Phos  3.4     07-11  Mg     1.9     07-11    TPro  5.4<L>  /  Alb  2.6<L>  /  TBili  0.5  /  DBili  x   /  AST  176<H>  /  ALT  48<H>  /  AlkPhos  74  07-11    PT/INR - ( 10 Jul 2020 22:19 )   PT: 16.9 sec;   INR: 1.50 ratio         PTT - ( 10 Jul 2020 22:19 )  PTT:32.1 sec  CARDIAC MARKERS ( 2020 07:04 )  x     / x     / 2838 U/L / x     / x          Creatine Kinase, Serum (20 @ 07:04)    Creatine Kinase, Serum: 2838: TEST REPEATED. U/L        Urinalysis Basic - ( 10 Jul 2020 22:28 )    Color: Dark Yellow / Appearance: Slightly Turbid / S.018 / pH: x  Gluc: x / Ketone: Negative  / Bili: Negative / Urobili: Negative   Blood: x / Protein: 300 mg/dL / Nitrite: Negative   Leuk Esterase: Large / RBC: 4 /hpf / WBC 64 /HPF   Sq Epi: x / Non Sq Epi: 1 /hpf / Bacteria: Many    Culture - Blood (20 @ 00:37)    -  Escherichia coli: Detec    Gram Stain:   Growth in aerobic bottle: Gram Negative Rods    Specimen Source: .Blood Blood-Peripheral    Organism: Blood Culture PCR    Culture Results:   Growth in aerobic bottle: Gram Negative Rods  "Due to technical problems, Proteus sp. will Not be reported as part of  the BCID panel until further notice" ***Blood Panel PCR results on this  specimen are available  approximately 3 hours after the Gram stain result.***  Gram stain, PCR, and/or culture results may not always  correspond due to difference in methodologies.  ************************************************************  This PCR assay was performed using [employer].  The following targets are tested for: Enterococcus,  vancomycin resistant enterococci, Listeria monocytogenes,  coagulase negative staphylococci, S. aureus,  methicillin resistant S. aureus, Streptococcus agalactiae  (Group B), S. pneumoniae, S. pyogenes (Group A),  Acinetobacter baumannii, Enterobacter cloacae, E. coli,  Klebsiella oxytoca, K. pneumoniae, Proteus sp.,  Serratia marcescens, Haemophilus influenzae,  Neisseria meningitidis, Pseudomonas aeruginosa, Candida  albicans, C. glabrata, C krusei, C parapsilosis,  C. tropicalis and the KPC resistance gene.    Organism Identification: Blood Culture PCR    Method Type: PCR        RADIOLOGY & ADDITIONAL TESTS:    XR L shoulder: No acute displaced fracture. No dislocation. Mild acromioclavicular arthrosis. Left chest wall pacemaker is visualized.      Imaging Personally Reviewed:    Consultant(s) Notes Reviewed:      Care Discussed with Consultants/Other Providers:

## 2020-07-11 NOTE — H&P ADULT - PROBLEM SELECTOR PLAN 7
A/Ox4. VSS on 1L O2, satting low 90s. Ax1 gb/walker. Ambulated in halls on 2L O2, tolerated well and denied SOB, but was satting 83% upon return to bed, recovered quickly to low 90s. PT consulted today and worked with pt - see note. Up in chair. Using IS independently, reaching 1250. Infrequent dry cough. BM today. Regular diet, good appetite. L PIV SL + Int abx. WBC 15.5. Voiding adequately. Denies pain. LS dim with exp wheezes. Plan for possible discharge home tomorrow, may need O2.   -s/p 5 stents  -c/w aspirin -hold amlodipine in setting of sepsis, continue with flomax to prevent urinary retention -on Eliquis (c/w home med) and pacemaker

## 2020-07-11 NOTE — H&P ADULT - ATTENDING COMMENTS
Pt seen and examined, in addition to above pt c/o L shoulder pain s/p fall->L shoulder TTP on my exam and ROM is limited by pain.  Will obtain L shoulder x-ray and depending on findings may need ortho consult.  EKG personally reviewed-bifascicular block-no older EKG available for comparison.  Case discussed with resident physician.

## 2020-07-11 NOTE — H&P ADULT - PROBLEM SELECTOR PLAN 3
-unknown if acute or chronic, however likely an acute exacerbation in setting of prolonged time without PO intake, earlier dry oral mucosa  -on LR 75cc/hr for 6 hours, continue with fluids as needed -highly likely d/t prolonged time lying on floor, large blood on UA despite no RBCs, significant AST and ALT elevation.    -check CK, start IV fluids 150 cc/hr -highly likely given prolonged time lying on floor, large blood on UA despite few RBCs, significant AST elevation.    -check CK, start IV fluids 150 cc/hr

## 2020-07-11 NOTE — ED ADULT NURSE REASSESSMENT NOTE - NS ED NURSE REASSESS COMMENT FT1
When being repositioned pt expressed pain in left shoulder. No notable hematomas or bruising. Md Mancilla made aware. Will follow up with patient, no further intervention at this time

## 2020-07-11 NOTE — H&P ADULT - HISTORY OF PRESENT ILLNESS
89 year old man, history of afib on eliquis s/p pacemaker, CAD s/p five stents, HTN, HLD, BPH, who presents after fall. Patient states he was walking in his kitchen and suddenly fell, landing on his back. Patient denies dizziness, palpitations, or chest pain prior to the fall, and denies loss of consciousness. Patient was stuck on the floor for several hours. Patient's daughter said he was found     	As per daughter was on ground unable to get up yesterday, son-in-law went to house and put to bed. Today slept until 1pm and was weak prompting daughter to call EMS. +Pt is warm. No N/V/D. Denies head trauma. On AC. 89 year old man, history of afib on eliquis s/p pacemaker, CAD s/p five stents, HTN, HLD, BPH, who presents after fall. Patient states yesterday morning, he was walking in his kitchen and suddenly fell, landing on his back. Patient denies dizziness, palpitations, or chest pain prior to the fall, and denies loss of consciousness. Patient's daughter said he was found awake on the floor, and believed he was down for several hours.  After the fall, patient tolerated PO intake, and slept twelve hours, waking only to drink water. Patient's daughter was concerned by her father's increased lethargy and weakness, noting that he had difficulty standing and balancing, which pro    	As per daughter was on ground unable to get up yesterday, son-in-law went to house and put to bed. Today slept until 1pm and was weak prompting daughter to call EMS. +Pt is warm. No N/V/D. Denies head trauma. On AC. 89 year old man, history of afib on eliquis s/p pacemaker, CAD s/p five stents, HTN, HLD, BPH, who presents after fall. Patient states yesterday morning, he was walking in his kitchen and suddenly fell, landing on his back. Patient denies dizziness, palpitations, or chest pain prior to the fall, and denies loss of consciousness. Patient's daughter said he was found awake on the floor, and believed he was down for several hours.  After the fall, patient tolerated PO intake, and slept twelve hours, waking only to drink water. Patient's daughter was concerned by her father's increased lethargy and weakness, noting that he had difficulty standing and balancing, which prompted her to come to the hospital.   At baseline patient live alone, and is independent in all ADLs. Patient is able to ambulate without assistance. Prior to the fall, patient was in his usual state of health.  On review of systems, patient denies fever, endorses chills, denies chest pain, cough, shortness of breath, abdominal pain, urinary frequency, hematuria, diarrhea    	As per daughter was on ground unable to get up yesterday, son-in-law went to house and put to bed. Today slept until 1pm and was weak prompting daughter to call EMS. +Pt is warm. No N/V/D. Denies head trauma. On AC. 89 year old man, history of afib on eliquis s/p pacemaker, CAD s/p five stents, HTN, HLD, BPH, who presents after fall and increasing weakness. Patient states yesterday morning, he was walking in his kitchen and suddenly fell, landing on his back. Patient denies dizziness, palpitations, or chest pain prior to the fall, and denies loss of consciousness. Patient's daughter said he was found awake on the floor, and believed he was down for several hours.  After the fall, patient tolerated PO intake, and slept twelve hours, waking only to drink water. Patient's daughter was concerned by her father's increased lethargy and weakness, noting that he had difficulty standing and balancing, which prompted her to come to the hospital. At baseline, patient is able to ambulate without assistance. Patient lives alone and independent in all ADLs. Prior to the fall, patient was in his usual state of health.    On review of systems, patient endorses chills, denies fever, chest pain, cough, shortness of breath, nausea, vomiting, abdominal pain, urinary frequency, hematuria, diarrhea.     In the ED, patient was febrile, Tmax 101, HR 79-86, BP /49-58, RR 18-22, O2 sat 98% on RA. Labs significant for WBC 30K, lactate 3.5, UA revealed large leuk esterase and bacteria. CT head and spine revealed no hemorrhage or fracture. Patient was given 2 boluses of LR, and acetaminophen with resolution of fever, as well as 1g vancomycin and 2g cefepime. 89 year old man, history of afib on eliquis s/p pacemaker, CAD s/p five stents, HTN, HLD, BPH, who presents after fall and increasing weakness. Patient states yesterday morning, he was walking in his kitchen and suddenly fell, landing on his back. Patient denies dizziness, palpitations, or chest pain prior to the fall, and denies loss of consciousness. Patient's daughter said he was found awake on the floor, and believed he was down for several hours. Patient's daughter notes he was found in his own feces, no urinary incontinence. After the fall, patient tolerated PO intake, and slept twelve hours, waking only to drink water. Patient's daughter was concerned by her father's increased lethargy and weakness, noting that he had difficulty standing and balancing, which prompted her to come to the hospital. At baseline, patient is able to ambulate without assistance. Patient lives alone and independent in all ADLs. Prior to the fall, patient was in his usual state of health.    On review of systems, patient endorses chills, denies fever, chest pain, cough, shortness of breath, nausea, vomiting, abdominal pain, urinary frequency, hematuria, diarrhea.     In the ED, patient was febrile, Tmax 101, HR 79-86, BP /49-58, RR 18-22, O2 sat 98% on RA. Labs significant for WBC 30K, lactate 3.5, UA revealed large leuk esterase and bacteria. CT head and spine revealed no hemorrhage or fracture. Patient was given 2 boluses of LR, and acetaminophen with resolution of fever, as well as 1g vancomycin and 2g cefepime. 89 year old man, history of afib on eliquis s/p pacemaker, CAD s/p five stents, HTN, HLD, BPH, who presents after fall and increasing weakness. Patient states yesterday morning, he was walking in his kitchen and suddenly fell, landing on his back. Patient denies dizziness, palpitations, or chest pain prior to the fall, and denies loss of consciousness. Patient's daughter said he was found awake on the floor, and believed he was down for several hours. Patient's daughter notes he was found in his own feces, no urinary incontinence. After the fall, patient tolerated PO intake, and slept twelve hours, waking only to drink water. Patient's daughter was concerned by her father's increased lethargy and weakness, noting that he had difficulty standing and balancing, which prompted her to come to the hospital. At baseline, patient is able to ambulate without assistance. Patient lives alone and independent in all ADLs. Prior to the fall, patient was in his usual state of health.    On review of systems, patient endorses chills, left arm pain which worsens with movement (was not present before the fall), denies fever, chest pain, cough, shortness of breath, nausea, vomiting, abdominal pain, urinary frequency, hematuria, diarrhea.     In the ED, patient was febrile, Tmax 101, HR 79-86, BP /49-58, RR 18-22, O2 sat 98% on RA. Labs significant for WBC 30K, lactate 3.5, UA revealed large leuk esterase and bacteria. CT head and spine revealed no hemorrhage or fracture. Patient was given 2 boluses of LR, and acetaminophen with resolution of fever, as well as 1g vancomycin and 2g cefepime.

## 2020-07-11 NOTE — PROGRESS NOTE ADULT - PROBLEM SELECTOR PLAN 5
-cardiac w/o benign  -c/o left arm pain after fall  -f/u x-ray of left arm and chest to rule out clavicle, shoulder, or humeral fracture -cardiac w/u benign  -c/o left arm pain after fall  -XR shoulder 2view shows no fracture/dislocation  -PT consult placed

## 2020-07-11 NOTE — H&P ADULT - ASSESSMENT
89 year old man, history of afib on eliquis s/p pacemaker, CAD s/p five stents, HTN, HLD, BPH, who presents after mechanical fall and sepsis 2/2 UTI. 89 year old man, history of afib on eliquis s/p pacemaker, CAD s/p five stents, HTN, HLD, BPH, who presents after sepsis 2/2 UTI and likely rhabdomyolysis after mechanical fall.

## 2020-07-11 NOTE — H&P ADULT - PROBLEM SELECTOR PLAN 6
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

## 2020-07-11 NOTE — H&P ADULT - PROBLEM SELECTOR PLAN 8
-on eliquis for afib -s/p 5 stents  -c/w aspirin -hold amlodipine in setting of sepsis, continue with flomax to prevent urinary retention

## 2020-07-11 NOTE — PROGRESS NOTE ADULT - ASSESSMENT
89 year old man, history of afib on eliquis s/p pacemaker, CAD s/p five stents, HTN, HLD, BPH, who presents after sepsis 2/2 UTI and likely rhabdomyolysis after mechanical fall. 89 year old man, history of afib on eliquis s/p pacemaker, CAD s/p five stents, HTN, HLD, BPH, who presents after sepsis 2/2 UTI and likely rhabdomyolysis after mechanical fall. Clinically improved, afebrile with decreased WBC, Cr.

## 2020-07-11 NOTE — PROGRESS NOTE ADULT - PROBLEM SELECTOR PLAN 3
-highly likely given prolonged time lying on floor, large blood on UA despite few RBCs, significant AST elevation.    -check CK, start IV fluids 150 cc/hr -highly likely given prolonged time lying on floor, large blood on UA despite few RBCs, significant AST elevation.    -Creatine Kinase, Serum: 2838: TEST REPEATED. U/L (07.11.20 @ 07:04)

## 2020-07-12 LAB
24R-OH-CALCIDIOL SERPL-MCNC: 38.7 NG/ML — SIGNIFICANT CHANGE UP (ref 30–80)
ALBUMIN SERPL ELPH-MCNC: 2.4 G/DL — LOW (ref 3.3–5)
ALP SERPL-CCNC: 88 U/L — SIGNIFICANT CHANGE UP (ref 40–120)
ALT FLD-CCNC: 59 U/L — HIGH (ref 10–45)
ANION GAP SERPL CALC-SCNC: 12 MMOL/L — SIGNIFICANT CHANGE UP (ref 5–17)
AST SERPL-CCNC: 133 U/L — HIGH (ref 10–40)
BILIRUB SERPL-MCNC: 0.4 MG/DL — SIGNIFICANT CHANGE UP (ref 0.2–1.2)
BUN SERPL-MCNC: 43 MG/DL — HIGH (ref 7–23)
CALCIUM SERPL-MCNC: 8.7 MG/DL — SIGNIFICANT CHANGE UP (ref 8.4–10.5)
CHLORIDE SERPL-SCNC: 105 MMOL/L — SIGNIFICANT CHANGE UP (ref 96–108)
CK SERPL-CCNC: 857 U/L — HIGH (ref 30–200)
CO2 SERPL-SCNC: 23 MMOL/L — SIGNIFICANT CHANGE UP (ref 22–31)
CREAT SERPL-MCNC: 1.38 MG/DL — HIGH (ref 0.5–1.3)
GLUCOSE SERPL-MCNC: 131 MG/DL — HIGH (ref 70–99)
HCT VFR BLD CALC: 37.9 % — LOW (ref 39–50)
HGB BLD-MCNC: 12.4 G/DL — LOW (ref 13–17)
MAGNESIUM SERPL-MCNC: 2 MG/DL — SIGNIFICANT CHANGE UP (ref 1.6–2.6)
MCHC RBC-ENTMCNC: 29.3 PG — SIGNIFICANT CHANGE UP (ref 27–34)
MCHC RBC-ENTMCNC: 32.7 GM/DL — SIGNIFICANT CHANGE UP (ref 32–36)
MCV RBC AUTO: 89.6 FL — SIGNIFICANT CHANGE UP (ref 80–100)
NRBC # BLD: 0 /100 WBCS — SIGNIFICANT CHANGE UP (ref 0–0)
PHOSPHATE SERPL-MCNC: 2.7 MG/DL — SIGNIFICANT CHANGE UP (ref 2.5–4.5)
PLATELET # BLD AUTO: 73 K/UL — LOW (ref 150–400)
POTASSIUM SERPL-MCNC: 4.9 MMOL/L — SIGNIFICANT CHANGE UP (ref 3.5–5.3)
POTASSIUM SERPL-SCNC: 4.9 MMOL/L — SIGNIFICANT CHANGE UP (ref 3.5–5.3)
PROT SERPL-MCNC: 5.3 G/DL — LOW (ref 6–8.3)
RBC # BLD: 4.23 M/UL — SIGNIFICANT CHANGE UP (ref 4.2–5.8)
RBC # FLD: 14.9 % — HIGH (ref 10.3–14.5)
SODIUM SERPL-SCNC: 140 MMOL/L — SIGNIFICANT CHANGE UP (ref 135–145)
WBC # BLD: 17.28 K/UL — HIGH (ref 3.8–10.5)
WBC # FLD AUTO: 17.28 K/UL — HIGH (ref 3.8–10.5)

## 2020-07-12 PROCEDURE — 99233 SBSQ HOSP IP/OBS HIGH 50: CPT | Mod: GC

## 2020-07-12 NOTE — PHYSICAL THERAPY INITIAL EVALUATION ADULT - ADDITIONAL COMMENTS
As per CM note: Pt was living alone and was independent in all ADL. Since falling he is very confused and too weak to ambulate. Pt lives in private home with 11 steps to enter alone. Pt very [place].

## 2020-07-12 NOTE — PROGRESS NOTE ADULT - PROBLEM SELECTOR PLAN 2
-leukocytosis 30k, febrile to 101, RR 20-22 in the setting of UTI  -treat UTI as above  -trend WBC w/diff  -trend Cr, replete fluids as necessary  -Blood cx x2 positive for gnr -leukocytosis 30k, febrile to 101, RR 20-22 in the setting of UTI, on admission  -treat UTI as above  -trend WBC w/diff  -trend Cr, replete fluids as necessary  -Blood cx growing E coli

## 2020-07-12 NOTE — PROGRESS NOTE ADULT - PROBLEM SELECTOR PLAN 4
-unknown if acute or chronic, however likely an acute exacerbation in setting of prolonged time without PO intake, earlier dry oral mucosa, might be exacerbated by rhabdomolysis as well  -on  cc/hr for 6 hours, continue with fluids as needed -unknown if acute or chronic, however likely an acute exacerbation in setting of prolonged time without PO intake, earlier dry oral mucosa, might be exacerbated by rhabdomolysis as well  -on  cc/hr -unknown if acute or chronic, however likely an acute exacerbation in setting of prolonged time without PO intake, earlier dry oral mucosa, might be exacerbated by rhabdomyolysis as well  -s/p  cc/hr

## 2020-07-12 NOTE — CONSULT NOTE ADULT - ATTENDING COMMENTS
Admitted s/p fall with Gram negative peg bacteremia with leucocytosis and thrombocytopenia. Pt with B hearing aids but still not able to hear well. Wrote questions on paper for patient to answer. He did not object to us contacting his daughter. Sitting comfortably in chair with ecchymotic areas over extremities. No prior blood work for baseline prior to this admission. Platelet count low with downtrending leukocytosis. We reviewed the peripheral smear with some myelocytes/ metamyelocytes seen in setting of increased neutrophils, decreased platelets with few large platelets, rbc morphology: ashia cells. We will see if prior CBC can be obtained from his PCP for comparison. Would continue to trend blood counts for now.

## 2020-07-12 NOTE — PROGRESS NOTE ADULT - ATTENDING COMMENTS
-Patient seen/examined on 7/12/20. Case/plan discussed with the resident as reviewed/edited by me above and in any comments below.  -F/u final cultures.   -Heme recs appreciated.

## 2020-07-12 NOTE — PROGRESS NOTE ADULT - PROBLEM SELECTOR PLAN 5
-cardiac w/u benign  -c/o left arm pain after fall  -XR shoulder 2view shows no fracture/dislocation  -PT consult placed -cardiac w/u benign  -c/o left arm pain after fall  -XR shoulder 2view shows no fracture/dislocation  -PT consult appreciated -cardiac w/u benign  -c/o left arm pain after fall  -XR shoulder 2view shows no fracture/dislocation  -PT consult appreciated  -Tylenol PRN.

## 2020-07-12 NOTE — PROGRESS NOTE ADULT - PROBLEM SELECTOR PLAN 3
-highly likely given prolonged time lying on floor, large blood on UA despite few RBCs, significant AST elevation.    -Creatine Kinase, Serum: 2838: TEST REPEATED. U/L (07.11.20 @ 07:04) -highly likely given prolonged time lying on floor, large blood on UA despite few RBCs, significant AST elevation.    -Creatine Kinase, Serum: 2838 on admission -highly likely given prolonged time lying on floor, large blood on UA despite few RBCs, significant AST elevation.    -Creatine Kinase, Serum: 2838 on admission  -CK now trending down.

## 2020-07-12 NOTE — PROGRESS NOTE ADULT - PROBLEM SELECTOR PLAN 9
-on eliquis for afib  -dysphagia diet, f/u speech and swallow eval   -fall and aspiration precautions -on eliquis for afib  -dysphagia diet. Patient did not participate during S+S  -fall and aspiration precautions -on eliquis for afib  -dysphagia diet. Swallow eval appreciated.   -fall and aspiration precautions

## 2020-07-12 NOTE — PROGRESS NOTE ADULT - ASSESSMENT
89 year old man, history of afib on eliquis s/p pacemaker, CAD s/p five stents, HTN, HLD, BPH, who presents after sepsis 2/2 UTI and likely rhabdomyolysis after mechanical fall. Clinically improved, afebrile with decreased WBC, Cr.

## 2020-07-12 NOTE — SWALLOW BEDSIDE ASSESSMENT ADULT - SLP GENERAL OBSERVATIONS
Encountered awake/alert OOB in js-chair. Headphones (+). Hearing aids(+). Pt is A0x2. Suspect hearing deficit may be attributing to comprehension issues, at times.

## 2020-07-12 NOTE — PHYSICAL THERAPY INITIAL EVALUATION ADULT - PLANNED THERAPY INTERVENTIONS, PT EVAL
GOAL: Pt will perform 11 stairs with or without U HR as needed within 4weeks./bed mobility training/strengthening/balance training/gait training/transfer training

## 2020-07-12 NOTE — CONSULT NOTE ADULT - ASSESSMENT
** Patient still needs to be staffed with Attending Physician**  Assessment:   1. Reactive Leukocytosis with left shift secondary to UTI; Peripheral smear showing:   3. Thrombocytopenia; unknown baseline (? myelosuppression in setting of active infection)  2. Fall in setting of weakness from ongoing infection; resultant Rhabdomyolysis with JAMIE  3. Afib on Elqiuis 2.5 mg BID  4. CAD    Recommendations:  1. Continue to monitor white count and platelets with daily CBC with diff  2. Recommend repeat outpatient CBC with Diff in 2-4 weeks after resolution of infection  2. Your care for patient's active UTI, Rhabdomyolysis, JAMIE and co-morbidites      Thank you for allowing us to participate in this patient's care. Please call/page if questions.     Dean Monroy, PGY-4  Hematology-Oncology Weekend on-call Fellow   [phone] ([place]) [phone] ([facility]) ** Patient still needs to be staffed with Attending Physician**    Impression:   1. Leukocytosis- reactive with left shift secondary to UTI vs MDS  	- Peripheral smear showing: hypolobulated neutrophils (possibly suggestive of MDS) and increased neutrophils, thrombocytopenia with giant platelets and ashia cells  	- Talked to daughter in detail who states patient completely independent at baseline. She understands that counts could be abnormal in setting of infection but MDS is not completely ruled out. Has no knowledge of prior abnormal counts. Wants workup done in case MDS is possibility.   3. Thrombocytopenia; unknown baseline; ? myelosuppression in setting of active infection vs MDS  2. Fall in setting of weakness from ongoing infection; resultant Rhabdomyolysis with JAMIE  3. Afib on Elqiuis 2.5 mg BID  4. CAD    Recommendations:  1. Continue to monitor white count and platelets with daily CBC with diff  2. Get prior records from patient's primary care, Dr. Perez from [place]  3. If in the next 1-2 days, counts   2. Your care for patient's active UTI, Rhabdomyolysis, JAMIE and co-morbidites      Thank you for allowing us to participate in this patient's care. Please call/page if questions.     Dean Monroy, PGY-4  Hematology-Oncology Weekend on-call Fellow   [phone] ([place]) [phone] ([facility]) Impression:   1. Leukocytosis- reactive with left shift secondary to UTI vs MDS  	- Peripheral smear showing: hypolobulated neutrophils (possibly suggestive of MDS) and increased neutrophils, thrombocytopenia with giant platelets and ashia cells  	- Talked to daughter in detail about goals of care who states patient completely independent at baseline. She understands that counts could be abnormal in setting of infection but MDS is not completely ruled out. Has no knowledge of prior abnormal counts. Wants workup done in case MDS is possibility.   3. Thrombocytopenia; unknown baseline; ? myelosuppression in setting of active infection vs MDS  2. Fall in setting of weakness from ongoing infection; resultant Rhabdomyolysis with JAMIE  3. Afib on Elqiuis 2.5 mg BID  4. CAD    Recommendations:  1. Continue to monitor white count and platelets with daily CBC with diff  2. Get prior records from patient's primary care, Dr. Perez from [place]  3. If in the next 1-2 days, counts continue to show immature cell forms in patient's differential, will pursue further workup for MDS, including peripheral blood flowcytometry +/- BM Bx  2. Your care for patient's active UTI, Rhabdomyolysis, JAMIE and co-morbidites      Thank you for allowing us to participate in this patient's care. Please call/page Hematology team if questions.       Dean Monroy, PGY-4  Hematology-Oncology Weekend on-call Fellow   [phone] ([place]) [phone] ([facility]) Impression:   1. Leukocytosis with elevated promyelocytes, myelocytes and metamyelocytes on Diff- reactive with left shift secondary to UTI vs MDS  	- Peripheral smear showing: hypolobulated neutrophils (possibly suggestive of MDS) and increased neutrophils, thrombocytopenia with giant platelets and ashia cells  	- Talked to daughter in detail about goals of care who states patient completely independent at baseline. She understands that counts could be abnormal in setting of infection but MDS is not completely ruled out. Has no knowledge of prior abnormal counts. Wants workup done in case MDS is possibility.   3. Thrombocytopenia; unknown baseline; ? myelosuppression in setting of active infection vs MDS  2. Fall in setting of weakness from ongoing infection; resultant Rhabdomyolysis with JAMIE  3. Afib on Elqiuis 2.5 mg BID  4. CAD    Recommendations:  1. Continue to monitor white count and platelets with daily CBC with diff  2. Get prior records from patient's primary care, Dr. Perez from [place]  3. If in the next 1-2 days, counts continue to show immature cell forms in patient's differential, will pursue further workup for MDS, including peripheral blood flowcytometry +/- BM Bx  2. Your care for patient's active UTI, Rhabdomyolysis, JAMIE and co-morbidites      Thank you for allowing us to participate in this patient's care. Please call/page Hematology team if questions.       Dean Monroy, PGY-4  Hematology-Oncology Weekend on-call Fellow   [phone] ([place]) [phone] ([facility])

## 2020-07-12 NOTE — SWALLOW BEDSIDE ASSESSMENT ADULT - PHARYNGEAL PHASE
Delayed pharyngeal swallow/Delayed throat clear post oral intake Delayed pharyngeal swallow/suspected

## 2020-07-12 NOTE — PROGRESS NOTE ADULT - SUBJECTIVE AND OBJECTIVE BOX
AUBREY CARRERA  89y Male      Patient is a 89y old  Male who presents with a chief complaint of weakness, fall (12 Jul 2020 08:06)    INTERVAL HPI/OVERNIGHT EVENTS:    VITALS    MEDICATION    PHYSICAL EXAM:    LABS    Consultant(s) Notes Reviewed:  [x ] YES  [ ] NO  Care Discussed with Consultants/Other Providers [ x] YES  [ ] NO    RADIOLOGY & ADDITIONAL TESTS:    Imaging Personally Reviewed:  [x ] YES  [ ] NO AUBREY CARRERA  89y Male      Patient is a 89y old  Male who presents with a chief complaint of weakness, fall (12 Jul 2020 08:06)    INTERVAL HPI/OVERNIGHT EVENTS: No acute events overnight. Blood cx growing E coli, sensitivity pending. L shoulder xray without fracture.    Vital Signs Last 24 Hrs  T(C): 36.4 (12 Jul 2020 13:48), Max: 36.6 (11 Jul 2020 16:54)  T(F): 97.6 (12 Jul 2020 13:48), Max: 97.8 (11 Jul 2020 16:54)  HR: 78 (12 Jul 2020 13:48) (74 - 99)  BP: 111/64 (12 Jul 2020 13:48) (111/64 - 148/76)  BP(mean): --  RR: 18 (12 Jul 2020 13:48) (17 - 18)  SpO2: 95% (12 Jul 2020 13:48) (93% - 95%)    MEDICATIONS  (STANDING):  apixaban 2.5 milliGRAM(s) Oral every 12 hours  aspirin  chewable 81 milliGRAM(s) Oral daily  cefTRIAXone   IVPB 1000 milliGRAM(s) IV Intermittent every 24 hours  cyanocobalamin 1000 MICROGram(s) Oral daily  folic acid 1 milliGRAM(s) Oral daily  lactated ringers. 1000 milliLiter(s) (150 mL/Hr) IV Continuous <Continuous>  lidocaine   Patch 1 Patch Transdermal daily  polyethylene glycol 3350 17 Gram(s) Oral two times a day  senna 2 Tablet(s) Oral at bedtime  simvastatin 20 milliGRAM(s) Oral at bedtime  tamsulosin 0.8 milliGRAM(s) Oral at bedtime    MEDICATIONS  (PRN):  acetaminophen   Tablet .. 1000 milliGRAM(s) Oral every 8 hours PRN Mild Pain (1 - 3), Moderate Pain (4 - 6)    PHYSICAL EXAM:  GENERAL: NAD, well-developed, elderly man, very hard of hearing, pleasant  HEAD:  Atraumatic, Normocephalic  EYES: conjunctiva and sclera clear  NECK: Supple, No JVD  CHEST/LUNG: Clear to auscultation bilaterally; No wheeze, ronchi or rales  HEART: Regular rate and rhythm; No murmurs, rubs, or gallops  ABDOMEN: Soft, Nontender, Nondistended; Bowel sounds present  EXTREMITIES:  2+ Peripheral Pulses, No clubbing, cyanosis, or edema  PSYCH: AAOx3  NEUROLOGY: non-focal  SKIN: No rashes or lesions    LABS  CBC Full  -  ( 12 Jul 2020 06:34 )  WBC Count : 17.28 K/uL  RBC Count : 4.23 M/uL  Hemoglobin : 12.4 g/dL  Hematocrit : 37.9 %  Platelet Count - Automated : 73 K/uL  Mean Cell Volume : 89.6 fl  Mean Cell Hemoglobin : 29.3 pg  Mean Cell Hemoglobin Concentration : 32.7 gm/dL  Auto Neutrophil # : x  Auto Lymphocyte # : x  Auto Monocyte # : x  Auto Eosinophil # : x  Auto Basophil # : x  Auto Neutrophil % : x  Auto Lymphocyte % : x  Auto Monocyte % : x  Auto Eosinophil % : x  Auto Basophil % : x    07-12    140  |  105  |  43<H>  ----------------------------<  131<H>  4.9   |  23  |  1.38<H>    Ca    8.7      12 Jul 2020 06:34  Phos  2.7     07-12  Mg     2.0     07-12    TPro  5.3<L>  /  Alb  2.4<L>  /  TBili  0.4  /  DBili  x   /  AST  133<H>  /  ALT  59<H>  /  AlkPhos  88  07-12      Consultant(s) Notes Reviewed:  [x ] YES  [ ] NO  Care Discussed with Consultants/Other Providers [ x] YES  [ ] NO    RADIOLOGY & ADDITIONAL TESTS:    Imaging Personally Reviewed:  [x ] YES  [ ] NO AUBREY CARRERA  89y Male      Patient is a 89y old  Male who presents with a chief complaint of weakness, fall (12 Jul 2020 08:06)    INTERVAL HPI/OVERNIGHT EVENTS: No acute events overnight. Blood cx growing E coli, sensitivity pending.    Vital Signs Last 24 Hrs  T(C): 36.4 (12 Jul 2020 13:48), Max: 36.6 (11 Jul 2020 16:54)  T(F): 97.6 (12 Jul 2020 13:48), Max: 97.8 (11 Jul 2020 16:54)  HR: 78 (12 Jul 2020 13:48) (74 - 99)  BP: 111/64 (12 Jul 2020 13:48) (111/64 - 148/76)  BP(mean): --  RR: 18 (12 Jul 2020 13:48) (17 - 18)  SpO2: 95% (12 Jul 2020 13:48) (93% - 95%)    MEDICATIONS  (STANDING):  apixaban 2.5 milliGRAM(s) Oral every 12 hours  aspirin  chewable 81 milliGRAM(s) Oral daily  cefTRIAXone   IVPB 1000 milliGRAM(s) IV Intermittent every 24 hours  cyanocobalamin 1000 MICROGram(s) Oral daily  folic acid 1 milliGRAM(s) Oral daily  lactated ringers. 1000 milliLiter(s) (150 mL/Hr) IV Continuous <Continuous>  lidocaine   Patch 1 Patch Transdermal daily  polyethylene glycol 3350 17 Gram(s) Oral two times a day  senna 2 Tablet(s) Oral at bedtime  simvastatin 20 milliGRAM(s) Oral at bedtime  tamsulosin 0.8 milliGRAM(s) Oral at bedtime    MEDICATIONS  (PRN):  acetaminophen   Tablet .. 1000 milliGRAM(s) Oral every 8 hours PRN Mild Pain (1 - 3), Moderate Pain (4 - 6)    PHYSICAL EXAM:  GENERAL: NAD, well-developed, elderly man, very hard of hearing, pleasant  HEAD:  Atraumatic, Normocephalic  EYES: conjunctiva and sclera clear  NECK: Supple, No JVD  CHEST/LUNG: Clear to auscultation bilaterally; No wheeze, ronchi or rales  HEART: Regular rate and rhythm; No murmurs, rubs, or gallops  ABDOMEN: Soft, Nontender, Nondistended; Bowel sounds present  EXTREMITIES:  2+ Peripheral Pulses, No clubbing, cyanosis, or edema  PSYCH: AAOx3  NEUROLOGY: non-focal  SKIN: No rashes or lesions    LABS  CBC Full  -  ( 12 Jul 2020 06:34 )  WBC Count : 17.28 K/uL  RBC Count : 4.23 M/uL  Hemoglobin : 12.4 g/dL  Hematocrit : 37.9 %  Platelet Count - Automated : 73 K/uL  Mean Cell Volume : 89.6 fl  Mean Cell Hemoglobin : 29.3 pg  Mean Cell Hemoglobin Concentration : 32.7 gm/dL  Auto Neutrophil # : x  Auto Lymphocyte # : x  Auto Monocyte # : x  Auto Eosinophil # : x  Auto Basophil # : x  Auto Neutrophil % : x  Auto Lymphocyte % : x  Auto Monocyte % : x  Auto Eosinophil % : x  Auto Basophil % : x    07-12    140  |  105  |  43<H>  ----------------------------<  131<H>  4.9   |  23  |  1.38<H>    Ca    8.7      12 Jul 2020 06:34  Phos  2.7     07-12  Mg     2.0     07-12    TPro  5.3<L>  /  Alb  2.4<L>  /  TBili  0.4  /  DBili  x   /  AST  133<H>  /  ALT  59<H>  /  AlkPhos  88  07-12      Consultant(s) Notes Reviewed:  [x ] YES  [ ] NO  Care Discussed with Consultants/Other Providers [ x] YES  [ ] NO    RADIOLOGY & ADDITIONAL TESTS:    Imaging Personally Reviewed:  [x ] YES  [ ] NO

## 2020-07-12 NOTE — PROGRESS NOTE ADULT - PROBLEM SELECTOR PLAN 1
-UA+ in setting of fever, leukocytosis increased lactate  -s/p broad spectrum vanc and cefepime in ED  -c/w 1g ceftriaxone for at least 5 days (initiated 7/11)  -f/u bladder scan, if >300ml need to place [facility] to avoid retention -UA+ in setting of fever, leukocytosis increased lactate  -s/p broad spectrum vanc and cefepime in ED  -c/w 1g ceftriaxone for at least 5 days (initiated 7/11)  - Blood cx growing E coli, f/u susceptibilities.

## 2020-07-12 NOTE — PHYSICAL THERAPY INITIAL EVALUATION ADULT - STRENGTHENING, PT EVAL
GOAL: Pt will improve LE strength by at least 1/2 MMT grade  to assist with performing functional mobility and ADLs within 4 weeks.

## 2020-07-12 NOTE — SWALLOW BEDSIDE ASSESSMENT ADULT - SWALLOW EVAL: DIAGNOSIS
Oropharyngeal swallow function c/b adequate orientation/reception, prolonged however effective manipulation and transport of bolus, suspected mild delay in the swallow function leading to throat clearing w/ thin liquids. Improvement w/ more viscous consistency.

## 2020-07-12 NOTE — PROGRESS NOTE ADULT - PROBLEM SELECTOR PLAN 6
-elevated myelocytes, metamyelocytes, and promyelocytes with elevated WBC concerning for possible CML  -trend CBC with diff as infection is treated, if WBC count and myelocytes are persistently elevated despite treatment and improvement of infection, workup for possible malignancy  - Heme w/u while inpt would be helpful -elevated myelocytes, metamyelocytes, and promyelocytes with elevated WBC concerning for possible CML  -trend CBC with diff as infection is treated  - Appreciate heme recs: plan for bone marrow bx and flow cytometry. Plan to obtain outpatient CBC -elevated myelocytes, metamyelocytes, and promyelocytes with elevated WBC concerning for MDS  - trend CBC with diff as infection is treated  - Appreciate heme recs: plan for possible bone marrow bx and flow cytometry. Plan to obtain outpatient CBC

## 2020-07-12 NOTE — PHYSICAL THERAPY INITIAL EVALUATION ADULT - IMPAIRMENTS CONTRIBUTING TO GAIT DEVIATIONS, PT EVAL
cognition/impaired motor control/decreased ROM/pain/impaired balance/+knee buckling, decreased endurance/decreased strength

## 2020-07-12 NOTE — PHYSICAL THERAPY INITIAL EVALUATION ADULT - PERTINENT HX OF CURRENT PROBLEM, REHAB EVAL
89 year old man, history of afib on eliquis s/p pacemaker, CAD s/p five stents, HTN, HLD, BPH, who presents after sepsis 2/2 UTI and likely rhabdomyolysis after mechanical fall.

## 2020-07-12 NOTE — SWALLOW BEDSIDE ASSESSMENT ADULT - ORAL PHASE
suspected reduced control of the bolus leading to premature spillage suspected mild reduced control of bolus

## 2020-07-12 NOTE — PHYSICAL THERAPY INITIAL EVALUATION ADULT - IMPAIRED TRANSFERS: SIT/STAND, REHAB EVAL
pain/decreased ROM/cognition/impaired motor control/+knee buckling/impaired balance/decreased strength

## 2020-07-12 NOTE — SWALLOW BEDSIDE ASSESSMENT ADULT - SLP PERTINENT HISTORY OF CURRENT PROBLEM
This is a 89 year old man, history of afib on eliquis s/p pacemaker, CAD s/p five stents, HTN, HLD, BPH, who presents after fall and increasing weakness. Patient states yesterday morning, he was walking in his kitchen and suddenly fell, landing on his back. Patient denies dizziness, palpitations, or chest pain prior to the fall, and denies loss of consciousness. Patient's daughter said he was found awake on the floor, and believed he was down for several hours. Patient's daughter notes he was found in his own feces, no urinary incontinence. After the fall, patient tolerated PO intake, and slept twelve hours, waking only to drink water. Patient's daughter was concerned by her father's increased lethargy and weakness, noting that he had difficulty standing and balancing, which prompted her to come to the hospital. At baseline, patient is able to ambulate without assistance.

## 2020-07-12 NOTE — SWALLOW BEDSIDE ASSESSMENT ADULT - COMMENTS
Patient lives alone and independent in all ADLs. Prior to the fall, patient was in his usual state of health. In the ED, patient was febrile, Tmax 101, HR 79-86, BP /49-58, RR 18-22, O2 sat 98% on RA. Labs significant for WBC 30K, lactate 3.5, UA revealed large leuk esterase and bacteria. CT head and spine revealed no hemorrhage or fracture. Patient was given 2 boluses of LR, and acetaminophen with resolution of fever, as well as 1g vancomycin and 2g cefepime.  X-ray Chest: Thoracic aortic atheromatous changes and ectasia are present. Left-sided AICD in situ.  The cardiomediastinal silhouette is within normal limits for technique.  The lungs are clear. No pleural effusion or pneumothorax. There are degenerative changes of the thoracic spine.  X-ray shoulder: No acute displaced fracture. No dislocation. Mild acromioclavicular arthrosis. Left chest wall pacemaker is visualized.  Dysphagia history; Pt is new to this service. No overt s/s of aspiration Pt refused solids, as he indicated that he has a 'crown' that is loose and he does not want something 'sticky' or 'hard' to chew which may 'bother it.'

## 2020-07-12 NOTE — CONSULT NOTE ADULT - SUBJECTIVE AND OBJECTIVE BOX
*Incomplete note*     HPI:  89 year old man with PMH including AFib on eliquis s/p PPM and CAD s/p PCI who presented on 7/11/20 after a fall and increasing weakness. History mostly obtained from chart review. Prior to this patient was independent at baseline and in usual state of health. Patient was walking in his kitchen and suddenly fell, landing on his back. Patient denied dizziness, palpitations, or chest pain prior to the fall, and denies loss of consciousness. Per H&P, patient's daughter said he was found awake on the floor, and believed he was down for several hours. After the fall, patient tolerated PO intake, and slept twelve hours, waking only to drink water. Patient's daughter was concerned by her father's increased lethargy and weakness, noting that he had difficulty standing and balancing, which prompted her to come to the hospital.     On initial evaluation in the ER, , patient was febrile and labs were significant for WBC 30K, lactate 3.5, UA revealed large leuk esterase and bacteria. Patient has been admitted to the medicine service for management of weakness secondary to a UTI and Rhabdomyolysis.    Hematology team has been consulted to comment on blood work and ensure that leukocytosis is mostly reactive with no malignant process. It is also noted that patient has thrombocytopenia.    Upon my assessment, patient was a difficult historian, possibly because of being extremely hard of hearing. He was able to let me know about the fall and stated he feels like he is having vertigo and bad shoulder pain at site of fall. He wasn't able to elaborate further, tell me about his medical history or who his primary physician is. He was unaware if he has ever been told about abnormal counts.       14 point ROS otherwise negative    PAST MEDICAL & SURGICAL HISTORY:  Atrial fibrillation  Coronary artery disease  Hyperlipidemia  Hypertension  BPH (benign prostatic hyperplasia)  H/O removal of cyst: cyst removal off tailbone  H/O hernia repair: 2018      Allergies    penicillin (Rash)    Intolerances        MEDICATIONS  (STANDING):  apixaban 2.5 milliGRAM(s) Oral every 12 hours  aspirin  chewable 81 milliGRAM(s) Oral daily  cefTRIAXone   IVPB 1000 milliGRAM(s) IV Intermittent every 24 hours  cyanocobalamin 1000 MICROGram(s) Oral daily  folic acid 1 milliGRAM(s) Oral daily  lactated ringers. 1000 milliLiter(s) (150 mL/Hr) IV Continuous <Continuous>  lidocaine   Patch 1 Patch Transdermal daily  polyethylene glycol 3350 17 Gram(s) Oral two times a day  senna 2 Tablet(s) Oral at bedtime  simvastatin 20 milliGRAM(s) Oral at bedtime  tamsulosin 0.8 milliGRAM(s) Oral at bedtime    MEDICATIONS  (PRN):  acetaminophen   Tablet .. 1000 milliGRAM(s) Oral every 8 hours PRN Mild Pain (1 - 3), Moderate Pain (4 - 6)      FAMILY HISTORY:  No pertinent family history in first degree relatives      SOCIAL HISTORY: (from H&P)  Lives alone in house, able to attend to ADLs, drives, strong support network (daughter and son-in-law).   Wife passed away this year.        VITALS:   T(F): 97.6 (07-12-20 @ 05:15), Max: 98.2 (07-11-20 @ 13:44)  HR: 87 (07-12-20 @ 05:15)  BP: 123/74 (07-12-20 @ 05:15)  RR: 18 (07-12-20 @ 05:15)  SpO2: 93% (07-12-20 @ 05:15)  Wt(kg): --    PHYSICAL EXAM    GENERAL: Frail appearing elderly male with multiple ecchymosis on limbs  HEAD:  Atraumatic, Normocephalic  EYES: conjunctiva and sclera clear  NECK: Supple, No JVD  CHEST/LUNG: Clear to auscultation bilaterally; No wheeze  HEART: Regular rate and rhythm; No murmurs, rubs, or gallops  ABDOMEN: Soft, Nontender, Nondistended; Bowel sounds present  EXTREMITIES:  2+ Peripheral Pulses, No clubbing, cyanosis, or edema  NEUROLOGY: non-focal  SKIN: Multiple ecchymoses and bruises on B/L UE    LABS:                         12.4   17.28 )-----------( 73       ( 12 Jul 2020 06:34 )             37.9     07-12    140  |  105  |  43<H>  ----------------------------<  131<H>  4.9   |  23  |  1.38<H>    Ca    8.7      12 Jul 2020 06:34  Phos  2.7     07-12  Mg     2.0     07-12    TPro  5.3<L>  /  Alb  2.4<L>  /  TBili  0.4  /  DBili  x   /  AST  133<H>  /  ALT  59<H>  /  AlkPhos  88  07-12    Magnesium, Serum: 2.0 mg/dL (07-12 @ 06:34)  Phosphorus Level, Serum: 2.7 mg/dL (07-12 @ 06:34)    PT/INR - ( 10 Jul 2020 22:19 )   PT: 16.9 sec;   INR: 1.50 ratio         PTT - ( 10 Jul 2020 22:19 )  PTT:32.1 sec  .Blood Blood-Peripheral  07-11 @ 00:37   Growth in aerobic bottle: Gram Negative Rods  Growth in anaerobic bottle: Gram Negative Rods  --  Blood Culture PCR          IMAGING: *Incomplete note*     HPI:  89 year old man with PMH including AFib on eliquis s/p PPM and CAD s/p PCI who presented on 7/11/20 after a fall and increasing weakness. History mostly obtained from chart review. Prior to this patient was independent at baseline and in usual state of health. Patient was walking in his kitchen and suddenly fell, landing on his back. Patient denied dizziness, palpitations, or chest pain prior to the fall, and denies loss of consciousness. Per H&P, patient's daughter said he was found awake on the floor, and believed he was down for several hours. After the fall, patient tolerated PO intake, and slept twelve hours, waking only to drink water. Patient's daughter was concerned by her father's increased lethargy and weakness, noting that he had difficulty standing and balancing, which prompted her to come to the hospital.     On initial evaluation in the ER, , patient was febrile and labs were significant for WBC 30K, lactate 3.5, UA revealed large leuk esterase and bacteria. Patient has been admitted to the medicine service for management of weakness secondary to a UTI and Rhabdomyolysis.    Hematology team has been consulted to comment on blood work and ensure that leukocytosis is mostly reactive with no malignant process. It is also noted that patient has thrombocytopenia.    Upon my assessment, patient was a difficult historian, possibly because of being extremely hard of hearing. He was able to let me know about the fall and stated he feels like he is having vertigo and bad shoulder pain at site of fall. He wasn't able to elaborate further, tell me about his medical history or who his primary physician is. He was unaware if he has ever been told about abnormal counts.       14 point ROS otherwise negative    PAST MEDICAL & SURGICAL HISTORY:  Atrial fibrillation  Coronary artery disease  Hyperlipidemia  Hypertension  BPH (benign prostatic hyperplasia)  H/O removal of cyst: cyst removal off tailbone  H/O hernia repair: 2018      Allergies    penicillin (Rash)    Intolerances        MEDICATIONS  (STANDING):  apixaban 2.5 milliGRAM(s) Oral every 12 hours  aspirin  chewable 81 milliGRAM(s) Oral daily  cefTRIAXone   IVPB 1000 milliGRAM(s) IV Intermittent every 24 hours  cyanocobalamin 1000 MICROGram(s) Oral daily  folic acid 1 milliGRAM(s) Oral daily  lactated ringers. 1000 milliLiter(s) (150 mL/Hr) IV Continuous <Continuous>  lidocaine   Patch 1 Patch Transdermal daily  polyethylene glycol 3350 17 Gram(s) Oral two times a day  senna 2 Tablet(s) Oral at bedtime  simvastatin 20 milliGRAM(s) Oral at bedtime  tamsulosin 0.8 milliGRAM(s) Oral at bedtime    MEDICATIONS  (PRN):  acetaminophen   Tablet .. 1000 milliGRAM(s) Oral every 8 hours PRN Mild Pain (1 - 3), Moderate Pain (4 - 6)      FAMILY HISTORY:  No pertinent family history in first degree relatives      SOCIAL HISTORY: (from H&P)  Lives alone in house, able to attend to ADLs, drives, strong support network (daughter and son-in-law).   Wife passed away this year.        VITALS:   T(F): 97.6 (07-12-20 @ 05:15), Max: 98.2 (07-11-20 @ 13:44)  HR: 87 (07-12-20 @ 05:15)  BP: 123/74 (07-12-20 @ 05:15)  RR: 18 (07-12-20 @ 05:15)  SpO2: 93% (07-12-20 @ 05:15)  Wt(kg): --    PHYSICAL EXAM    GENERAL: Frail appearing elderly male with multiple ecchymosis on limbs  HEAD:  Atraumatic, Normocephalic  EYES: conjunctiva and sclera clear  NECK: Supple, No JVD  CHEST/LUNG: Clear to auscultation bilaterally; No wheeze  HEART: Regular rate and rhythm; No murmurs, rubs, or gallops  ABDOMEN: Soft, Nontender, Nondistended; Bowel sounds present  EXTREMITIES:  2+ Peripheral Pulses, No clubbing, cyanosis, or edema  NEUROLOGY: non-focal  SKIN: Multiple ecchymoses and bruises on B/L UE    LABS:                         12.4   17.28 )-----------( 73       ( 12 Jul 2020 06:34 )             37.9     07-12    140  |  105  |  43<H>  ----------------------------<  131<H>  4.9   |  23  |  1.38<H>    Ca    8.7      12 Jul 2020 06:34  Phos  2.7     07-12  Mg     2.0     07-12    TPro  5.3<L>  /  Alb  2.4<L>  /  TBili  0.4  /  DBili  x   /  AST  133<H>  /  ALT  59<H>  /  AlkPhos  88  07-12    Magnesium, Serum: 2.0 mg/dL (07-12 @ 06:34)  Phosphorus Level, Serum: 2.7 mg/dL (07-12 @ 06:34)    PT/INR - ( 10 Jul 2020 22:19 )   PT: 16.9 sec;   INR: 1.50 ratio         PTT - ( 10 Jul 2020 22:19 )  PTT:32.1 sec  .Blood Blood-Peripheral  07-11 @ 00:37   Growth in aerobic bottle: Gram Negative Rods  Growth in anaerobic bottle: Gram Negative Rods  --  Blood Culture PCR    Peripheral smear:  - Neutrophilia with hypolobulated neutrophils (Pseudo pelger-huet cells)  - Thrombocytopenia; giant platelets seen  - Dyer cells       IMAGING: HPI:  89 year old man with PMH including AFib on eliquis s/p PPM and CAD s/p PCI who presented on 7/11/20 after a fall and increasing weakness. History mostly obtained from chart review. Prior to this patient was independent at baseline and in usual state of health. Patient was walking in his kitchen and suddenly fell, landing on his back. Patient denied dizziness, palpitations, or chest pain prior to the fall, and denies loss of consciousness. Per H&P, patient's daughter said he was found awake on the floor, and believed he was down for several hours. After the fall, patient tolerated PO intake, and slept twelve hours, waking only to drink water. Patient's daughter was concerned by her father's increased lethargy and weakness, noting that he had difficulty standing and balancing, which prompted her to come to the hospital.     On initial evaluation in the ER, , patient was febrile and labs were significant for WBC 30K, lactate 3.5, UA revealed large leuk esterase and bacteria. Patient has been admitted to the medicine service for management of weakness secondary to a UTI and Rhabdomyolysis.    Hematology team has been consulted to comment on blood work and ensure that leukocytosis is mostly reactive with no malignant process. It is also noted that patient has thrombocytopenia.    Upon my assessment, patient was a difficult historian, possibly because of being extremely hard of hearing. He was able to let me know about the fall and stated he feels like he is having vertigo and bad shoulder pain at site of fall. He wasn't able to elaborate further, tell me about his medical history or who his primary physician is. He was unaware if he has ever been told about abnormal counts.       14 point ROS otherwise negative    PAST MEDICAL & SURGICAL HISTORY:  Atrial fibrillation  Coronary artery disease  Hyperlipidemia  Hypertension  BPH (benign prostatic hyperplasia)  H/O removal of cyst: cyst removal off tailbone  H/O hernia repair: 2018      Allergies    penicillin (Rash)    Intolerances        MEDICATIONS  (STANDING):  apixaban 2.5 milliGRAM(s) Oral every 12 hours  aspirin  chewable 81 milliGRAM(s) Oral daily  cefTRIAXone   IVPB 1000 milliGRAM(s) IV Intermittent every 24 hours  cyanocobalamin 1000 MICROGram(s) Oral daily  folic acid 1 milliGRAM(s) Oral daily  lactated ringers. 1000 milliLiter(s) (150 mL/Hr) IV Continuous <Continuous>  lidocaine   Patch 1 Patch Transdermal daily  polyethylene glycol 3350 17 Gram(s) Oral two times a day  senna 2 Tablet(s) Oral at bedtime  simvastatin 20 milliGRAM(s) Oral at bedtime  tamsulosin 0.8 milliGRAM(s) Oral at bedtime    MEDICATIONS  (PRN):  acetaminophen   Tablet .. 1000 milliGRAM(s) Oral every 8 hours PRN Mild Pain (1 - 3), Moderate Pain (4 - 6)      FAMILY HISTORY:  No pertinent family history in first degree relatives      SOCIAL HISTORY: (from H&P)  Lives alone in house, able to attend to ADLs, drives, strong support network (daughter and son-in-law).   Wife passed away this year.        VITALS:   T(F): 97.6 (07-12-20 @ 05:15), Max: 98.2 (07-11-20 @ 13:44)  HR: 87 (07-12-20 @ 05:15)  BP: 123/74 (07-12-20 @ 05:15)  RR: 18 (07-12-20 @ 05:15)  SpO2: 93% (07-12-20 @ 05:15)  Wt(kg): --    PHYSICAL EXAM    GENERAL: Frail appearing elderly male with multiple ecchymosis on limbs  HEAD:  Atraumatic, Normocephalic  EYES: conjunctiva and sclera clear  NECK: Supple, No JVD  CHEST/LUNG: Clear to auscultation bilaterally; No wheeze  HEART: Regular rate and rhythm; No murmurs, rubs, or gallops  ABDOMEN: Soft, Nontender, Nondistended; Bowel sounds present  EXTREMITIES:  2+ Peripheral Pulses, No clubbing, cyanosis, or edema  NEUROLOGY: non-focal  SKIN: Multiple ecchymoses and bruises on B/L UE    LABS:                         12.4   17.28 )-----------( 73       ( 12 Jul 2020 06:34 )             37.9     07-12    140  |  105  |  43<H>  ----------------------------<  131<H>  4.9   |  23  |  1.38<H>    Ca    8.7      12 Jul 2020 06:34  Phos  2.7     07-12  Mg     2.0     07-12    TPro  5.3<L>  /  Alb  2.4<L>  /  TBili  0.4  /  DBili  x   /  AST  133<H>  /  ALT  59<H>  /  AlkPhos  88  07-12    Magnesium, Serum: 2.0 mg/dL (07-12 @ 06:34)  Phosphorus Level, Serum: 2.7 mg/dL (07-12 @ 06:34)    PT/INR - ( 10 Jul 2020 22:19 )   PT: 16.9 sec;   INR: 1.50 ratio         PTT - ( 10 Jul 2020 22:19 )  PTT:32.1 sec  .Blood Blood-Peripheral  07-11 @ 00:37   Growth in aerobic bottle: Gram Negative Rods  Growth in anaerobic bottle: Gram Negative Rods  --  Blood Culture PCR    Peripheral smear:  - Neutrophilia with hypolobulated neutrophils (Pseudo pelger-huet cells)  - Thrombocytopenia; giant platelets seen  - Ana cells       IMAGING:

## 2020-07-12 NOTE — PHYSICAL THERAPY INITIAL EVALUATION ADULT - GENERAL OBSERVATIONS, REHAB EVAL
t seen for 45min PT Eval. Pt s/p fall, xray neg. Pt rec'd semi supine in bed in NAD, c/o L shoulder pain, +IV, A&Ox1-2, confused. Pt willing to work with PT.

## 2020-07-12 NOTE — PROGRESS NOTE ADULT - PROBLEM SELECTOR PLAN 8
-hold amlodipine in setting of sepsis, continue with flomax to prevent urinary retention -hold amlodipine in setting of sepsis. Increased flomax.

## 2020-07-13 LAB
-  AMIKACIN: SIGNIFICANT CHANGE UP
-  AMOXICILLIN/CLAVULANIC ACID: SIGNIFICANT CHANGE UP
-  AMPICILLIN/SULBACTAM: SIGNIFICANT CHANGE UP
-  AMPICILLIN: SIGNIFICANT CHANGE UP
-  AZTREONAM: SIGNIFICANT CHANGE UP
-  CEFAZOLIN: SIGNIFICANT CHANGE UP
-  CEFEPIME: SIGNIFICANT CHANGE UP
-  CEFOXITIN: SIGNIFICANT CHANGE UP
-  CEFTRIAXONE: SIGNIFICANT CHANGE UP
-  CIPROFLOXACIN: SIGNIFICANT CHANGE UP
-  ERTAPENEM: SIGNIFICANT CHANGE UP
-  GENTAMICIN: SIGNIFICANT CHANGE UP
-  IMIPENEM: SIGNIFICANT CHANGE UP
-  LEVOFLOXACIN: SIGNIFICANT CHANGE UP
-  MEROPENEM: SIGNIFICANT CHANGE UP
-  NITROFURANTOIN: SIGNIFICANT CHANGE UP
-  PIPERACILLIN/TAZOBACTAM: SIGNIFICANT CHANGE UP
-  TIGECYCLINE: SIGNIFICANT CHANGE UP
-  TOBRAMYCIN: SIGNIFICANT CHANGE UP
-  TRIMETHOPRIM/SULFAMETHOXAZOLE: SIGNIFICANT CHANGE UP
ALBUMIN SERPL ELPH-MCNC: 2.5 G/DL — LOW (ref 3.3–5)
ALP SERPL-CCNC: 148 U/L — HIGH (ref 40–120)
ALT FLD-CCNC: 111 U/L — HIGH (ref 10–45)
ANION GAP SERPL CALC-SCNC: 10 MMOL/L — SIGNIFICANT CHANGE UP (ref 5–17)
AST SERPL-CCNC: 132 U/L — HIGH (ref 10–40)
BASOPHILS # BLD AUTO: 0.02 K/UL — SIGNIFICANT CHANGE UP (ref 0–0.2)
BASOPHILS NFR BLD AUTO: 0.1 % — SIGNIFICANT CHANGE UP (ref 0–2)
BILIRUB SERPL-MCNC: 0.5 MG/DL — SIGNIFICANT CHANGE UP (ref 0.2–1.2)
BUN SERPL-MCNC: 43 MG/DL — HIGH (ref 7–23)
CALCIUM SERPL-MCNC: 9.1 MG/DL — SIGNIFICANT CHANGE UP (ref 8.4–10.5)
CHLORIDE SERPL-SCNC: 105 MMOL/L — SIGNIFICANT CHANGE UP (ref 96–108)
CK SERPL-CCNC: 299 U/L — HIGH (ref 30–200)
CO2 SERPL-SCNC: 24 MMOL/L — SIGNIFICANT CHANGE UP (ref 22–31)
CREAT SERPL-MCNC: 1.23 MG/DL — SIGNIFICANT CHANGE UP (ref 0.5–1.3)
CULTURE RESULTS: SIGNIFICANT CHANGE UP
EOSINOPHIL # BLD AUTO: 0.03 K/UL — SIGNIFICANT CHANGE UP (ref 0–0.5)
EOSINOPHIL NFR BLD AUTO: 0.2 % — SIGNIFICANT CHANGE UP (ref 0–6)
GLUCOSE SERPL-MCNC: 184 MG/DL — HIGH (ref 70–99)
HCT VFR BLD CALC: 37.3 % — LOW (ref 39–50)
HGB BLD-MCNC: 12.5 G/DL — LOW (ref 13–17)
IMM GRANULOCYTES NFR BLD AUTO: 0.5 % — SIGNIFICANT CHANGE UP (ref 0–1.5)
LACTATE SERPL-SCNC: 1.7 MMOL/L — SIGNIFICANT CHANGE UP (ref 0.7–2)
LDH SERPL L TO P-CCNC: 310 U/L — HIGH (ref 50–242)
LYMPHOCYTES # BLD AUTO: 0.44 K/UL — LOW (ref 1–3.3)
LYMPHOCYTES # BLD AUTO: 2.6 % — LOW (ref 13–44)
MAGNESIUM SERPL-MCNC: 2.3 MG/DL — SIGNIFICANT CHANGE UP (ref 1.6–2.6)
MCHC RBC-ENTMCNC: 29.3 PG — SIGNIFICANT CHANGE UP (ref 27–34)
MCHC RBC-ENTMCNC: 33.5 GM/DL — SIGNIFICANT CHANGE UP (ref 32–36)
MCV RBC AUTO: 87.4 FL — SIGNIFICANT CHANGE UP (ref 80–100)
METHOD TYPE: SIGNIFICANT CHANGE UP
MONOCYTES # BLD AUTO: 0.85 K/UL — SIGNIFICANT CHANGE UP (ref 0–0.9)
MONOCYTES NFR BLD AUTO: 5 % — SIGNIFICANT CHANGE UP (ref 2–14)
NEUTROPHILS # BLD AUTO: 15.74 K/UL — HIGH (ref 1.8–7.4)
NEUTROPHILS NFR BLD AUTO: 91.6 % — HIGH (ref 43–77)
NRBC # BLD: 0 /100 WBCS — SIGNIFICANT CHANGE UP (ref 0–0)
ORGANISM # SPEC MICROSCOPIC CNT: SIGNIFICANT CHANGE UP
ORGANISM # SPEC MICROSCOPIC CNT: SIGNIFICANT CHANGE UP
PHOSPHATE SERPL-MCNC: 2.2 MG/DL — LOW (ref 2.5–4.5)
PLATELET # BLD AUTO: 89 K/UL — LOW (ref 150–400)
POTASSIUM SERPL-MCNC: 4.4 MMOL/L — SIGNIFICANT CHANGE UP (ref 3.5–5.3)
POTASSIUM SERPL-SCNC: 4.4 MMOL/L — SIGNIFICANT CHANGE UP (ref 3.5–5.3)
PROT SERPL-MCNC: 6.1 G/DL — SIGNIFICANT CHANGE UP (ref 6–8.3)
RBC # BLD: 4.27 M/UL — SIGNIFICANT CHANGE UP (ref 4.2–5.8)
RBC # FLD: 15.2 % — HIGH (ref 10.3–14.5)
SARS-COV-2 IGG SERPL QL IA: NEGATIVE — SIGNIFICANT CHANGE UP
SARS-COV-2 IGM SERPL IA-ACNC: <0.1 INDEX — SIGNIFICANT CHANGE UP
SODIUM SERPL-SCNC: 139 MMOL/L — SIGNIFICANT CHANGE UP (ref 135–145)
SPECIMEN SOURCE: SIGNIFICANT CHANGE UP
WBC # BLD: 17.17 K/UL — HIGH (ref 3.8–10.5)
WBC # FLD AUTO: 17.17 K/UL — HIGH (ref 3.8–10.5)

## 2020-07-13 PROCEDURE — 99233 SBSQ HOSP IP/OBS HIGH 50: CPT | Mod: GC

## 2020-07-13 RX ORDER — AMLODIPINE BESYLATE 2.5 MG/1
5 TABLET ORAL ONCE
Refills: 0 | Status: COMPLETED | OUTPATIENT
Start: 2020-07-13 | End: 2020-07-13

## 2020-07-13 RX ADMIN — Medication 62.5 MILLIMOLE(S): at 08:21

## 2020-07-13 RX ADMIN — AMLODIPINE BESYLATE 5 MILLIGRAM(S): 2.5 TABLET ORAL at 06:09

## 2020-07-13 NOTE — PROGRESS NOTE ADULT - PROBLEM SELECTOR PLAN 4
-unknown if acute or chronic, however likely an acute exacerbation in setting of prolonged time without PO intake, earlier dry oral mucosa, might be exacerbated by rhabdomyolysis as well  -s/p  cc/hr -unknown if acute or chronic, however likely an acute exacerbation in setting of prolonged time without PO intake, earlier dry oral mucosa, might be exacerbated by rhabdomyolysis as well  -s/p  cc/hr  -monitor outputs

## 2020-07-13 NOTE — PROGRESS NOTE ADULT - PROBLEM SELECTOR PLAN 3
-highly likely given prolonged time lying on floor, large blood on UA despite few RBCs, significant AST elevation.    -Creatine Kinase, Serum: 2838 on admission  -CK now trending down. -highly likely given prolonged time lying on floor, large blood on UA despite few RBCs, significant AST elevation.    -Creatine Kinase, Serum: 2838 on admission  -CK continues to trending down.

## 2020-07-13 NOTE — CHART NOTE - NSCHARTNOTEFT_GEN_A_CORE
This is a 89 year old man, history of afib on eliquis s/p pacemaker, CAD s/p five stents, HTN, HLD, BPH, who presents after fall and increasing weakness. Patient states yesterday morning, he was walking in his kitchen and suddenly fell, landing on his back. Patient denies dizziness, palpitations, or chest pain prior to the fall, and denies loss of consciousness. Patient's daughter said he was found awake on the floor, and believed he was down for several hours. Patient's daughter notes he was found in his own feces, no urinary incontinence. After the fall, patient tolerated PO intake, and slept twelve hours, waking only to drink water. Patient's daughter was concerned by her father's increased lethargy and weakness, noting that he had difficulty standing and balancing, which prompted her to come to the hospital. At baseline, patient is able to ambulate without assistance. Patient lives alone and independent in all ADLs. Prior to the fall, patient was in his usual state of health. In the ED, patient was febrile, Tmax 101, HR 79-86, BP /49-58, RR 18-22, O2 sat 98% on RA. Labs significant for WBC 30K, lactate 3.5, UA revealed large leuk esterase and bacteria. CT head and spine revealed no hemorrhage or fracture. Patient was given 2 boluses of LR, and acetaminophen with resolution of fever, as well as 1g vancomycin and 2g cefepime.  X-ray Chest: Thoracic aortic atheromatous changes and ectasia are present. Left-sided AICD in situ.  The cardiomediastinal silhouette is within normal limits for technique.  The lungs are clear. No pleural effusion or pneumothorax. There are degenerative changes of the thoracic spine.  X-ray shoulder: No acute displaced fracture. No dislocation. Mild acromioclavicular arthrosis. Left chest wall pacemaker is visualized.  Dysphagia history; Pt is new to this service. BSE completed 7/12 with recommendations for nectar thick liquids/ dysphagia 1 solids/puree. Pt did not trial solids w/ SLP, as he was concerned about his 'cap coming out' with solids. Daughter and SLP set up time for re-assessment 7/13.     Re-assessment for swallowing ; Daughter present.     Ronnie was encountered resting in bed, awoke easily to verbal stimuli. Hearing aids in place; working. He remained alert and cooperative throughout. Offered pt thin liquids, nectar thick liquids, and regular solids/cookies x4. He self present all consistencies without assistance. Orientation/reception and labial seal for cup drinking was effective. No anterior spillage. Daughter indicated at times that pt will "slurp" and not get adequate labial seal, which she attributes to 'coughing sometimes'.  Pt accepted solid trials following support from daughter. Pt did express concern for his dentition (cap becoming lose w/ solids), however he denied any difficulty during mastication. Eventually effective mastication/manipulation. Of note, pt with verbosity during po trials, with need for instruction, from daughter and slp, to chew/swallow prior to speaking. This instruction was partially effective.  Pharyngeal phase c/b suspected mild delay in swallow function, however no s/s of aspiration with 4oz of liquids consumed. Pt drank via single and serial sips (3 oz cup) with no s/s of aspiration, no change in RR, and no change in vocal quality. Can no rule out silent aspiration or micro-aspirations at the bedside. Discussion was held w/ the patient and his daughter, Catherine, with regards to possible penetration/aspiration and if instrumental assessment would be a route they would like to taking during h-course. As per daughter, instrumental assessment is not something they wish to pursue, nor do they wish to modify his diet.  Pt presents with an sophia-pharyngeal dysphagia. Purposeful proactive rounding reinforced and 5 Ps addressed. Pt left in no distress. Call placed to team to discuss wishes of family and patient. D/w CHELA Webster.        Given pt and family wishes. This service will sign off. Contact information was provided to patient/daughter.   Please re-consult for change in GOC.     Recommendations:  Regular solids/ thin liquids  Monitor tolerance   Encourage single sips and bites   NO STRAWS  Aspiration precautions   Will need tray prep and intermittent supervision w/ meals  Meds w/ applesauce- not water/thin liquids    Aby Castañeda MS CCC-SLP pager [phone]

## 2020-07-13 NOTE — PROGRESS NOTE ADULT - ASSESSMENT
89 year old man, history of afib on eliquis s/p pacemaker, CAD s/p five stents, HTN, HLD, BPH, who presents after sepsis 2/2 UTI and likely rhabdomyolysis after mechanical fall. Clinically improved, afebrile with decreased WBC, Cr. NPO, with non-oral nutrition/hydration/medications. 89 year old man, history of afib on eliquis s/p pacemaker, CAD s/p five stents, HTN, HLD, BPH, who presents after sepsis 2/2 UTI and likely rhabdomyolysis after mechanical fall. Clinically improved, afebrile with decreasing WBC, CK.

## 2020-07-13 NOTE — PROGRESS NOTE ADULT - PROBLEM SELECTOR PLAN 1
-UA+ in setting of fever, leukocytosis increased lactate  -s/p broad spectrum vanc and cefepime in ED  -c/w 1g ceftriaxone for at least 5 days (initiated 7/11)  - Blood cx growing E coli, f/u susceptibilities. -UA+ in setting of fever, leukocytosis increased lactate  -s/p broad spectrum vanc and cefepime in ED  -c/w 1g ceftriaxone for at least 5 days (initiated 7/11)  - Blood cx growing E coli.  - Repeat blood cx, f/u susceptibilities.

## 2020-07-13 NOTE — PROGRESS NOTE ADULT - PROBLEM SELECTOR PLAN 6
-elevated myelocytes, metamyelocytes, and promyelocytes with elevated WBC concerning for MDS  - trend CBC with diff as infection is treated  - Appreciate heme recs: plan for possible bone marrow bx and flow cytometry. Plan to obtain outpatient CBC -elevated myelocytes, metamyelocytes, and promyelocytes with elevated WBC concerning for MDS  - Diff today normalized.   - Appreciate heme recs: plan for possible bone marrow bx and flow cytometry. Plan to obtain outpatient CBC

## 2020-07-13 NOTE — PROGRESS NOTE ADULT - SUBJECTIVE AND OBJECTIVE BOX
*************************************  Daniella Navarro M.D. | PGY-1  Department of Internal Medicine  [phone] ([facility]) | [address] ([facility])  *************************************      Patient is a 89y old  Male who presents with a chief complaint of weakness, fall (12 Jul 2020 14:25)      SUBJECTIVE / OVERNIGHT EVENTS:    MEDICATIONS  (STANDING):  apixaban 2.5 milliGRAM(s) Oral every 12 hours  aspirin  chewable 81 milliGRAM(s) Oral daily  cefTRIAXone   IVPB 1000 milliGRAM(s) IV Intermittent every 24 hours  cyanocobalamin 1000 MICROGram(s) Oral daily  folic acid 1 milliGRAM(s) Oral daily  lactated ringers. 1000 milliLiter(s) (150 mL/Hr) IV Continuous <Continuous>  lidocaine   Patch 1 Patch Transdermal daily  polyethylene glycol 3350 17 Gram(s) Oral two times a day  senna 2 Tablet(s) Oral at bedtime  simvastatin 20 milliGRAM(s) Oral at bedtime  tamsulosin 0.8 milliGRAM(s) Oral at bedtime    MEDICATIONS  (PRN):  acetaminophen   Tablet .. 1000 milliGRAM(s) Oral every 8 hours PRN Mild Pain (1 - 3), Moderate Pain (4 - 6)      CAPILLARY BLOOD GLUCOSE        I&O's Summary    12 Jul 2020 07:01  -  13 Jul 2020 07:00  --------------------------------------------------------  IN: 790 mL / OUT: 0 mL / NET: 790 mL        Vital Signs Last 24 Hrs  T(C): 36.6 (13 Jul 2020 05:20), Max: 36.7 (12 Jul 2020 18:37)  T(F): 97.9 (13 Jul 2020 05:20), Max: 98 (12 Jul 2020 18:37)  HR: 93 (13 Jul 2020 05:20) (76 - 99)  BP: 156/83 (13 Jul 2020 05:20) (111/64 - 156/83)  BP(mean): --  RR: 17 (13 Jul 2020 05:20) (17 - 18)  SpO2: 93% (13 Jul 2020 05:20) (93% - 95%)    PHYSICAL EXAM:  GENERAL: NAD, well-developed, well-nourished  HEAD: Atraumatic, Normocephalic  EYES: EOMI, PERRLA, conjunctiva and sclera clear  NECK: Supple, No JVD  CHEST/LUNG: Clear to auscultation bilaterally; No wheezes or crackles  HEART: Normal S1/S2; Regular rate and rhythm; No murmurs, rubs, or gallops  ABDOMEN: Soft, Nontender, Nondistended; Bowel sounds present  EXTREMITIES: 2+ Peripheral Pulses; No clubbing, cyanosis, or edema  PSYCH: A&Ox3  NEUROLOGY: no focal neurologic deficit  SKIN: No rashes or lesions    LABS:                        12.4   17.28 )-----------( 73       ( 12 Jul 2020 06:34 )             37.9      07-12    140  |  105  |  43<H>  ----------------------------<  131<H>  4.9   |  23  |  1.38<H>    Ca    8.7      12 Jul 2020 06:34  Phos  2.7     07-12  Mg     2.0     07-12    TPro  5.3<L>  /  Alb  2.4<L>  /  TBili  0.4  /  DBili  x   /  AST  133<H>  /  ALT  59<H>  /  AlkPhos  88  07-12      CARDIAC MARKERS ( 12 Jul 2020 06:34 )  x     / x     / 857 U/L / x     / x      CARDIAC MARKERS ( 11 Jul 2020 19:13 )  x     / x     / 1337 U/L / x     / x              RADIOLOGY & ADDITIONAL TESTS:    Imaging Personally Reviewed:    Consultant(s) Notes Reviewed:      Care Discussed with Consultants/Other Providers: *************************************  Daniella Navarro M.D. | PGY-1  Department of Internal Medicine  [phone] ([facility]) | [id number] ([facility])  *************************************      Patient is a 89y old  Male who presents with a chief complaint of weakness, fall (12 Jul 2020 14:25)      SUBJECTIVE / OVERNIGHT EVENTS: BAILEY overnight.     MEDICATIONS  (STANDING):  apixaban 2.5 milliGRAM(s) Oral every 12 hours  aspirin  chewable 81 milliGRAM(s) Oral daily  cefTRIAXone   IVPB 1000 milliGRAM(s) IV Intermittent every 24 hours  cyanocobalamin 1000 MICROGram(s) Oral daily  folic acid 1 milliGRAM(s) Oral daily  lactated ringers. 1000 milliLiter(s) (150 mL/Hr) IV Continuous <Continuous>  lidocaine   Patch 1 Patch Transdermal daily  polyethylene glycol 3350 17 Gram(s) Oral two times a day  senna 2 Tablet(s) Oral at bedtime  simvastatin 20 milliGRAM(s) Oral at bedtime  tamsulosin 0.8 milliGRAM(s) Oral at bedtime    MEDICATIONS  (PRN):  acetaminophen   Tablet .. 1000 milliGRAM(s) Oral every 8 hours PRN Mild Pain (1 - 3), Moderate Pain (4 - 6)      CAPILLARY BLOOD GLUCOSE        I&O's Summary    12 Jul 2020 07:01  -  13 Jul 2020 07:00  --------------------------------------------------------  IN: 790 mL / OUT: 0 mL / NET: 790 mL        Vital Signs Last 24 Hrs  T(C): 36.6 (13 Jul 2020 05:20), Max: 36.7 (12 Jul 2020 18:37)  T(F): 97.9 (13 Jul 2020 05:20), Max: 98 (12 Jul 2020 18:37)  HR: 93 (13 Jul 2020 05:20) (76 - 99)  BP: 156/83 (13 Jul 2020 05:20) (111/64 - 156/83)  BP(mean): --  RR: 17 (13 Jul 2020 05:20) (17 - 18)  SpO2: 93% (13 Jul 2020 05:20) (93% - 95%)    PHYSICAL EXAM:  GENERAL: NAD, well-developed, well-nourished  HEAD: Atraumatic, Normocephalic  EYES: EOMI, PERRLA, conjunctiva and sclera clear  NECK: Supple, No JVD  CHEST/LUNG: Clear to auscultation bilaterally; No wheezes or crackles  HEART: Normal S1/S2; Regular rate and rhythm; No murmurs, rubs, or gallops  ABDOMEN: Soft, Nontender, Nondistended; Bowel sounds present  EXTREMITIES: 2+ Peripheral Pulses; No clubbing, cyanosis, or edema. L shoulder still TTP, but improved.   PSYCH: A&Ox3  NEUROLOGY: no focal neurologic deficit  SKIN: No rashes or lesions    LABS:                        12.4   17.28 )-----------( 73       ( 12 Jul 2020 06:34 )             37.9      07-12    140  |  105  |  43<H>  ----------------------------<  131<H>  4.9   |  23  |  1.38<H>    Ca    8.7      12 Jul 2020 06:34  Phos  2.7     07-12  Mg     2.0     07-12    TPro  5.3<L>  /  Alb  2.4<L>  /  TBili  0.4  /  DBili  x   /  AST  133<H>  /  ALT  59<H>  /  AlkPhos  88  07-12      CARDIAC MARKERS ( 12 Jul 2020 06:34 )  x     / x     / 857 U/L / x     / x      CARDIAC MARKERS ( 11 Jul 2020 19:13 )  x     / x     / 1337 U/L / x     / x              RADIOLOGY & ADDITIONAL TESTS:    Imaging Personally Reviewed:    Consultant(s) Notes Reviewed:      Care Discussed with Consultants/Other Providers:

## 2020-07-13 NOTE — PROGRESS NOTE ADULT - PROBLEM SELECTOR PLAN 8
-hold amlodipine in setting of sepsis. Increased flomax. -amlodipine 5mg x1 given overnight for /83. Hold in the setting of sepsis.   -Increased flomax.

## 2020-07-13 NOTE — PROGRESS NOTE ADULT - PROBLEM SELECTOR PLAN 5
-cardiac w/u benign  -c/o left arm pain after fall  -XR shoulder 2view shows no fracture/dislocation  -PT consult appreciated  -Tylenol PRN.

## 2020-07-13 NOTE — PROGRESS NOTE ADULT - PROBLEM SELECTOR PLAN 9
-on eliquis for afib  -dysphagia diet. Swallow eval appreciated.   -fall and aspiration precautions -on eliquis for afib  -regular diet   -fall and aspiration precautions -on eliquis for afib  -regular diet   -fall and aspiration precautions  -PT recommends CHENG, need to discuss w/daughter.

## 2020-07-13 NOTE — PROGRESS NOTE ADULT - ATTENDING COMMENTS
UTI with bacteremia- pansensitive E coli- repeat blood cultures pending- cont CTX  PT eval recommending CHENG

## 2020-07-13 NOTE — PROGRESS NOTE ADULT - PROBLEM SELECTOR PLAN 2
-leukocytosis 30k, febrile to 101, RR 20-22 in the setting of UTI, on admission  -treat UTI as above  -trend WBC w/diff  -trend Cr, replete fluids as necessary  -Blood cx growing E coli -leukocytosis 30k, febrile to 101, RR 20-22 in the setting of UTI, on admission  -treat UTI as above  -trend WBC  -trend Cr, replete fluids as necessary  -Blood cx growing E coli

## 2020-07-14 LAB
ALBUMIN SERPL ELPH-MCNC: 2.4 G/DL — LOW (ref 3.3–5)
ALP SERPL-CCNC: 170 U/L — HIGH (ref 40–120)
ALT FLD-CCNC: 111 U/L — HIGH (ref 10–45)
ANION GAP SERPL CALC-SCNC: 11 MMOL/L — SIGNIFICANT CHANGE UP (ref 5–17)
AST SERPL-CCNC: 91 U/L — HIGH (ref 10–40)
BASE EXCESS BLDV CALC-SCNC: 0.1 MMOL/L — SIGNIFICANT CHANGE UP (ref -2–2)
BASOPHILS # BLD AUTO: 0.02 K/UL — SIGNIFICANT CHANGE UP (ref 0–0.2)
BASOPHILS NFR BLD AUTO: 0.2 % — SIGNIFICANT CHANGE UP (ref 0–2)
BILIRUB SERPL-MCNC: 0.5 MG/DL — SIGNIFICANT CHANGE UP (ref 0.2–1.2)
BUN SERPL-MCNC: 38 MG/DL — HIGH (ref 7–23)
CALCIUM SERPL-MCNC: 8.9 MG/DL — SIGNIFICANT CHANGE UP (ref 8.4–10.5)
CHLORIDE SERPL-SCNC: 107 MMOL/L — SIGNIFICANT CHANGE UP (ref 96–108)
CK SERPL-CCNC: 181 U/L — SIGNIFICANT CHANGE UP (ref 30–200)
CO2 BLDV-SCNC: 25 MMOL/L — SIGNIFICANT CHANGE UP (ref 22–30)
CO2 SERPL-SCNC: 22 MMOL/L — SIGNIFICANT CHANGE UP (ref 22–31)
CREAT SERPL-MCNC: 1.04 MG/DL — SIGNIFICANT CHANGE UP (ref 0.5–1.3)
EOSINOPHIL # BLD AUTO: 0.03 K/UL — SIGNIFICANT CHANGE UP (ref 0–0.5)
EOSINOPHIL NFR BLD AUTO: 0.3 % — SIGNIFICANT CHANGE UP (ref 0–6)
GAS PNL BLDV: SIGNIFICANT CHANGE UP
GLUCOSE BLDC GLUCOMTR-MCNC: 177 MG/DL — HIGH (ref 70–99)
GLUCOSE SERPL-MCNC: 196 MG/DL — HIGH (ref 70–99)
HCO3 BLDV-SCNC: 24 MMOL/L — SIGNIFICANT CHANGE UP (ref 21–29)
HCT VFR BLD CALC: 37.3 % — LOW (ref 39–50)
HGB BLD-MCNC: 12.2 G/DL — LOW (ref 13–17)
IMM GRANULOCYTES NFR BLD AUTO: 0.6 % — SIGNIFICANT CHANGE UP (ref 0–1.5)
INR BLD: 1.11 RATIO — SIGNIFICANT CHANGE UP (ref 0.88–1.16)
LACTATE SERPL-SCNC: 1.1 MMOL/L — SIGNIFICANT CHANGE UP (ref 0.7–2)
LYMPHOCYTES # BLD AUTO: 0.25 K/UL — LOW (ref 1–3.3)
LYMPHOCYTES # BLD AUTO: 2.2 % — LOW (ref 13–44)
MAGNESIUM SERPL-MCNC: 2.3 MG/DL — SIGNIFICANT CHANGE UP (ref 1.6–2.6)
MCHC RBC-ENTMCNC: 29 PG — SIGNIFICANT CHANGE UP (ref 27–34)
MCHC RBC-ENTMCNC: 32.7 GM/DL — SIGNIFICANT CHANGE UP (ref 32–36)
MCV RBC AUTO: 88.6 FL — SIGNIFICANT CHANGE UP (ref 80–100)
MONOCYTES # BLD AUTO: 0.81 K/UL — SIGNIFICANT CHANGE UP (ref 0–0.9)
MONOCYTES NFR BLD AUTO: 7.3 % — SIGNIFICANT CHANGE UP (ref 2–14)
NEUTROPHILS # BLD AUTO: 9.94 K/UL — HIGH (ref 1.8–7.4)
NEUTROPHILS NFR BLD AUTO: 89.4 % — HIGH (ref 43–77)
NRBC # BLD: 0 /100 WBCS — SIGNIFICANT CHANGE UP (ref 0–0)
PCO2 BLDV: 38 MMHG — SIGNIFICANT CHANGE UP (ref 35–50)
PH BLDV: 7.42 — SIGNIFICANT CHANGE UP (ref 7.35–7.45)
PHOSPHATE SERPL-MCNC: 2.7 MG/DL — SIGNIFICANT CHANGE UP (ref 2.5–4.5)
PLATELET # BLD AUTO: 92 K/UL — LOW (ref 150–400)
PO2 BLDV: 64 MMHG — HIGH (ref 25–45)
POTASSIUM SERPL-MCNC: 3.9 MMOL/L — SIGNIFICANT CHANGE UP (ref 3.5–5.3)
POTASSIUM SERPL-SCNC: 3.9 MMOL/L — SIGNIFICANT CHANGE UP (ref 3.5–5.3)
PROT SERPL-MCNC: 5.5 G/DL — LOW (ref 6–8.3)
PROTHROM AB SERPL-ACNC: 13.1 SEC — SIGNIFICANT CHANGE UP (ref 10.6–13.6)
RBC # BLD: 4.21 M/UL — SIGNIFICANT CHANGE UP (ref 4.2–5.8)
RBC # FLD: 15.1 % — HIGH (ref 10.3–14.5)
SAO2 % BLDV: 92 % — HIGH (ref 67–88)
SODIUM SERPL-SCNC: 140 MMOL/L — SIGNIFICANT CHANGE UP (ref 135–145)
WBC # BLD: 11.12 K/UL — HIGH (ref 3.8–10.5)
WBC # FLD AUTO: 11.12 K/UL — HIGH (ref 3.8–10.5)

## 2020-07-14 PROCEDURE — 99233 SBSQ HOSP IP/OBS HIGH 50: CPT | Mod: GC

## 2020-07-14 PROCEDURE — 93010 ELECTROCARDIOGRAM REPORT: CPT

## 2020-07-14 PROCEDURE — 99291 CRITICAL CARE FIRST HOUR: CPT

## 2020-07-14 RX ORDER — SODIUM CHLORIDE 9 MG/ML
1000 INJECTION INTRAMUSCULAR; INTRAVENOUS; SUBCUTANEOUS ONCE
Refills: 0 | Status: DISCONTINUED | OUTPATIENT
Start: 2020-07-14 | End: 2020-07-17

## 2020-07-14 RX ORDER — LANOLIN ALCOHOL/MO/W.PET/CERES
3 CREAM (GRAM) TOPICAL AT BEDTIME
Refills: 0 | Status: DISCONTINUED | OUTPATIENT
Start: 2020-07-14 | End: 2020-07-14

## 2020-07-14 RX ORDER — METOPROLOL TARTRATE 50 MG
12.5 TABLET ORAL
Refills: 0 | Status: DISCONTINUED | OUTPATIENT
Start: 2020-07-14 | End: 2020-07-14

## 2020-07-14 RX ORDER — METOPROLOL TARTRATE 50 MG
12.5 TABLET ORAL EVERY 12 HOURS
Refills: 0 | Status: DISCONTINUED | OUTPATIENT
Start: 2020-07-14 | End: 2020-07-17

## 2020-07-14 RX ADMIN — Medication 12.5 MILLIGRAM(S): at 17:32

## 2020-07-14 NOTE — PROGRESS NOTE ADULT - ATTENDING COMMENTS
Metabolic encephalopathy due to UTI with bacteremia- pansensitive E coli- repeat blood cultures prelim negative - cont CTX  Will cont treatment for 7 days from negative blood culture - will transition to oral at discharge  AF with RVR- resolved- start low dose beta-blocker  PT eval recommending CHENG

## 2020-07-14 NOTE — PROGRESS NOTE ADULT - PROBLEM SELECTOR PLAN 2
-leukocytosis 30k, febrile to 101, RR 20-22 in the setting of UTI, on admission  -treat UTI as above  -trend WBC  -trend Cr, replete fluids as necessary  -Blood cx growing E coli

## 2020-07-14 NOTE — RAPID RESPONSE TEAM SUMMARY - NSSITUATIONBACKGROUNDRRT_GEN_ALL_CORE
89 year old man, history of afib on eliquis s/p pacemaker, CAD s/p five stents, HTN, HLD, BPH, who presents after sepsis 2/2 UTI and likely rhabdomyolysis after mechanical fall. RRT called for afib with RVR. Patient mentating but appears lethargic. HR 140s to 150s with SBP in 140s. Rectal temp 99.1F. EKG showed Afib with RVR. Gave metoprolol 5mg IVP x 2 with improvement in HR to 120s. Patient placed on tele. Gave 500cc bolus of LR. Mental status and lethargy improved after improvement in HR. Started on 12.5mg BID PO Metoprolol for rate control with hold parameters. On ABG pO2 64 with SpO2 high 80s to low 90s placed on 2L NC. Continue antibiotics (Ceftriaxone) for GNR bacteremia. If needed may need to increase rate control therapy with goal HR < 110. Nursing staff agreeable to plan.

## 2020-07-14 NOTE — PROGRESS NOTE ADULT - PROBLEM SELECTOR PLAN 9
-on eliquis for afib  -regular diet   -fall and aspiration precautions  -PT recommends CHENG, need to discuss w/daughter.

## 2020-07-14 NOTE — PROGRESS NOTE ADULT - PROBLEM SELECTOR PLAN 3
-highly likely given prolonged time lying on floor, large blood on UA despite few RBCs, significant AST elevation.    -Creatine Kinase, Serum: 2838 on admission  -CK continues to trending down. -highly likely given prolonged time lying on floor, large blood on UA despite few RBCs, significant AST elevation.    -Creatine Kinase, Serum: 2838 on admission   -CK continues to trending down.  -zocor held.

## 2020-07-14 NOTE — PROGRESS NOTE ADULT - PROBLEM SELECTOR PLAN 8
-amlodipine 5mg x1 given overnight for /83. Hold in the setting of sepsis.   -Increased flomax. -continue to hold amlodipine  -per daughter, pt also takes ramipril 10mg qd. can hold for now.   -Increased flomax.

## 2020-07-14 NOTE — PROGRESS NOTE ADULT - SUBJECTIVE AND OBJECTIVE BOX
*************************************  Daniella Navarro M.D. | PGY-1  Department of Internal Medicine  [phone] ([facility]) | [address] ([facility])  *************************************      Patient is a 89y old  Male who presents with a chief complaint of weakness, fall (13 Jul 2020 07:48)      SUBJECTIVE / OVERNIGHT EVENTS:    MEDICATIONS  (STANDING):  apixaban 2.5 milliGRAM(s) Oral every 12 hours  aspirin  chewable 81 milliGRAM(s) Oral daily  cefTRIAXone   IVPB 1000 milliGRAM(s) IV Intermittent every 24 hours  cyanocobalamin 1000 MICROGram(s) Oral daily  folic acid 1 milliGRAM(s) Oral daily  lactated ringers. 1000 milliLiter(s) (150 mL/Hr) IV Continuous <Continuous>  lidocaine   Patch 1 Patch Transdermal daily  metoprolol succinate ER 12.5 milliGRAM(s) Oral two times a day  polyethylene glycol 3350 17 Gram(s) Oral two times a day  senna 2 Tablet(s) Oral at bedtime  sodium chloride 0.9% Bolus 1000 milliLiter(s) IV Bolus once  tamsulosin 0.8 milliGRAM(s) Oral at bedtime    MEDICATIONS  (PRN):  acetaminophen   Tablet .. 1000 milliGRAM(s) Oral every 8 hours PRN Mild Pain (1 - 3), Moderate Pain (4 - 6)      CAPILLARY BLOOD GLUCOSE      POCT Blood Glucose.: 177 mg/dL (14 Jul 2020 06:06)    I&O's Summary    12 Jul 2020 07:01  -  13 Jul 2020 07:00  --------------------------------------------------------  IN: 790 mL / OUT: 0 mL / NET: 790 mL    13 Jul 2020 07:01  -  14 Jul 2020 06:58  --------------------------------------------------------  IN: 660 mL / OUT: 100 mL / NET: 560 mL        Vital Signs Last 24 Hrs  T(C): 36.7 (14 Jul 2020 06:00), Max: 36.8 (13 Jul 2020 14:24)  T(F): 98 (14 Jul 2020 06:00), Max: 98.3 (13 Jul 2020 14:24)  HR: 109 (14 Jul 2020 06:46) (80 - 145)  BP: 145/77 (14 Jul 2020 06:00) (133/72 - 150/84)  BP(mean): --  RR: 16 (14 Jul 2020 06:46) (16 - 18)  SpO2: 97% (14 Jul 2020 06:46) (94% - 97%)    PHYSICAL EXAM:  GENERAL: NAD, well-developed, well-nourished  HEAD: Atraumatic, Normocephalic  EYES: EOMI, PERRLA, conjunctiva and sclera clear  NECK: Supple, No JVD  CHEST/LUNG: Clear to auscultation bilaterally; No wheezes or crackles  HEART: Normal S1/S2; Regular rate and rhythm; No murmurs, rubs, or gallops  ABDOMEN: Soft, Nontender, Nondistended; Bowel sounds present  EXTREMITIES: 2+ Peripheral Pulses; No clubbing, cyanosis, or edema  PSYCH: A&Ox3  NEUROLOGY: no focal neurologic deficit  SKIN: No rashes or lesions    LABS:                        12.5   17.17 )-----------( 89       ( 13 Jul 2020 08:54 )             37.3      07-13    139  |  105  |  43<H>  ----------------------------<  184<H>  4.4   |  24  |  1.23    Ca    9.1      13 Jul 2020 07:16  Phos  2.2     07-13  Mg     2.3     07-13    TPro  6.1  /  Alb  2.5<L>  /  TBili  0.5  /  DBili  x   /  AST  132<H>  /  ALT  111<H>  /  AlkPhos  148<H>  07-13      CARDIAC MARKERS ( 13 Jul 2020 07:16 )  x     / x     / 299 U/L / x     / x              RADIOLOGY & ADDITIONAL TESTS:    Imaging Personally Reviewed:    Consultant(s) Notes Reviewed:      Care Discussed with Consultants/Other Providers: *************************************  Daniella Navarro M.D. | PGY-1  Department of Internal Medicine  [phone] ([facility]) | [address] ([facility])  *************************************      Patient is a 89y old  Male who presents with a chief complaint of weakness, fall (13 Jul 2020 07:48)      SUBJECTIVE / OVERNIGHT EVENTS: RRT called in AM due to afib w/rvr on telemetry. IV metoprolol given (5mg push x2), w/1L bolus.     MEDICATIONS  (STANDING):  apixaban 2.5 milliGRAM(s) Oral every 12 hours  aspirin  chewable 81 milliGRAM(s) Oral daily  cefTRIAXone   IVPB 1000 milliGRAM(s) IV Intermittent every 24 hours  cyanocobalamin 1000 MICROGram(s) Oral daily  folic acid 1 milliGRAM(s) Oral daily  lactated ringers. 1000 milliLiter(s) (150 mL/Hr) IV Continuous <Continuous>  lidocaine   Patch 1 Patch Transdermal daily  metoprolol succinate ER 12.5 milliGRAM(s) Oral two times a day  polyethylene glycol 3350 17 Gram(s) Oral two times a day  senna 2 Tablet(s) Oral at bedtime  sodium chloride 0.9% Bolus 1000 milliLiter(s) IV Bolus once  tamsulosin 0.8 milliGRAM(s) Oral at bedtime    MEDICATIONS  (PRN):  acetaminophen   Tablet .. 1000 milliGRAM(s) Oral every 8 hours PRN Mild Pain (1 - 3), Moderate Pain (4 - 6)      CAPILLARY BLOOD GLUCOSE      POCT Blood Glucose.: 177 mg/dL (14 Jul 2020 06:06)    I&O's Summary    12 Jul 2020 07:01  -  13 Jul 2020 07:00  --------------------------------------------------------  IN: 790 mL / OUT: 0 mL / NET: 790 mL    13 Jul 2020 07:01  -  14 Jul 2020 06:58  --------------------------------------------------------  IN: 660 mL / OUT: 100 mL / NET: 560 mL        Vital Signs Last 24 Hrs  T(C): 36.7 (14 Jul 2020 06:00), Max: 36.8 (13 Jul 2020 14:24)  T(F): 98 (14 Jul 2020 06:00), Max: 98.3 (13 Jul 2020 14:24)  HR: 109 (14 Jul 2020 06:46) (80 - 145)  BP: 145/77 (14 Jul 2020 06:00) (133/72 - 150/84)  BP(mean): --  RR: 16 (14 Jul 2020 06:46) (16 - 18)  SpO2: 97% (14 Jul 2020 06:46) (94% - 97%)    PHYSICAL EXAM:  GENERAL: NAD, well-developed, well-nourished  HEAD: Atraumatic, Normocephalic  EYES: EOMI, PERRLA, conjunctiva and sclera clear  NECK: Supple, No JVD  CHEST/LUNG: Clear to auscultation bilaterally; No wheezes or crackles  HEART: Normal S1/S2; Irregularly irregular rate and rhythm; No murmurs, rubs, or gallops  ABDOMEN: Soft, Nontender, Nondistended; Bowel sounds present  EXTREMITIES: 2+ Peripheral Pulses; No clubbing, cyanosis, or edema  PSYCH: A&Ox3  NEUROLOGY: no focal neurologic deficit  SKIN: No rashes or lesions    LABS:                        12.5   17.17 )-----------( 89       ( 13 Jul 2020 08:54 )             37.3      07-13    139  |  105  |  43<H>  ----------------------------<  184<H>  4.4   |  24  |  1.23    Ca    9.1      13 Jul 2020 07:16  Phos  2.2     07-13  Mg     2.3     07-13    TPro  6.1  /  Alb  2.5<L>  /  TBili  0.5  /  DBili  x   /  AST  132<H>  /  ALT  111<H>  /  AlkPhos  148<H>  07-13      CARDIAC MARKERS ( 13 Jul 2020 07:16 )  x     / x     / 299 U/L / x     / x              RADIOLOGY & ADDITIONAL TESTS:    Imaging Personally Reviewed:    Consultant(s) Notes Reviewed:      Care Discussed with Consultants/Other Providers:

## 2020-07-14 NOTE — PROGRESS NOTE ADULT - PROBLEM SELECTOR PLAN 7
-on Eliquis (c/w home med) and pacemaker -on Eliquis (c/w home med) and pacemaker  -pt is not on rate-control medication currently, likely the cause of his episode this morning  -start metoprolol tartrate 12.5mg bid, titrate to 25mg bid as tolerated  -pt sees Dr. Sherman Brian ([facility]) for outpt cardio

## 2020-07-14 NOTE — CDI QUERY NOTE - NSCDIOTHERTXTBX_GEN_ALL_CORE_HH
- Per daughter, pt lives alone, independent, walks without assistance, pt was found down on ground after suspected fall  - pt admitted w/sepsis 2/2 UTI, rhabdo, JAMIE  - ER noted pt here w/weakness and AMS, RN notes documented: per family, pt had change in mental status, A&Ox1, pt stated he has dementia  - H&P noted pt A&Ox 2-3, confused at times  - 7/12 and 7/13 notes indicate the pt is A&Ox3  - 7/14 RRT called for rapid AF, -150's, pt lethargic. Mental status and lethargy improved after improvement in HR  - on admission: pt febrile, Cr- 1.66, BCx/UCx + E. Coli    Based on your judgement and the clinical findings above, please further specify the AMS:    > AMS due to sepsis encephalopathy  > AMS due to dementia  > AMS due to dementia and sepsis encephalopathy  > Other, please specify_______  > Clinically undetermined

## 2020-07-14 NOTE — PROGRESS NOTE ADULT - PROBLEM SELECTOR PLAN 4
-unknown if acute or chronic, however likely an acute exacerbation in setting of prolonged time without PO intake, earlier dry oral mucosa, might be exacerbated by rhabdomyolysis as well  -s/p  cc/hr  -monitor outputs

## 2020-07-14 NOTE — PROGRESS NOTE ADULT - PROBLEM SELECTOR PLAN 1
-UA+ in setting of fever, leukocytosis increased lactate  -s/p broad spectrum vanc and cefepime in ED  -c/w 1g ceftriaxone for at least 5 days (initiated 7/11)  - Blood cx growing E coli.  - Repeat blood cx, f/u susceptibilities. -UA+ in setting of fever, leukocytosis increased lactate  -s/p broad spectrum vanc and cefepime in ED  -c/w 1g ceftriaxone for at least 5 days (initiated 7/11)  - Blood cx growing E coli, pan-sensitive  - Repeat blood cx 7/13: NGTD

## 2020-07-14 NOTE — PROGRESS NOTE ADULT - PROBLEM SELECTOR PLAN 6
-elevated myelocytes, metamyelocytes, and promyelocytes with elevated WBC concerning for MDS  - Diff today normalized.   - Appreciate heme recs: plan for possible bone marrow bx and flow cytometry. Plan to obtain outpatient CBC

## 2020-07-14 NOTE — PROGRESS NOTE ADULT - ASSESSMENT
89 year old man, history of afib on eliquis s/p pacemaker, CAD s/p five stents, HTN, HLD, BPH, who presents after sepsis 2/2 UTI and likely rhabdomyolysis after mechanical fall. Clinically improved, afebrile with decreasing WBC, CK. 89 year old man, history of afib on eliquis s/p pacemaker, CAD s/p five stents, HTN, HLD, BPH, who presents after sepsis 2/2 UTI and likely rhabdomyolysis after mechanical fall. Clinically improved, afebrile with decreasing WBC, CK. Today w/episode of afib w/rvr.

## 2020-07-14 NOTE — CHART NOTE - NSCHARTNOTEFT_GEN_A_CORE
I personally provided 40 minutes of critical care for the patient. See RRT sheet for details. I personally provided 40 minutes of critical care for the patient. See RRT sheet for details.    In short:    89M with PMHx of atrial fibrillation (on Eliquis and with PPM), CAD, HTN, and BPH initially presented to [facility] with concern for UTI and rhabdomyolysis after suffering mechanical fall. Patient has been on Ceftriaxone and urine cultures are growing pan-sensitive E. Coli and Blood cultures are growing gram negative rods (sensitivities & speciation are pending). Patient has been afebrile and his white count has slowly decreased. There is also concern for MDS given thrombocytopenia and hematology was consulted.     Rapid called for atrial fibrillation with RVR with rates in the 140-150s. BP was approximately 140s/60-70s, not tachypneic with appropriate saturation. . Patient the night before agitated, restless, possible sundowning, but did not get anything for sedation. Patient is baseline hard of hearing, but lethargic during rapid. Given overall change, decision made to push metoprolol 5 mg IV to break RVR. 12 lead EKG prior showing afib in 140s. After pushing metoprolol HR improved to 120s. It started creeping back up again so another 5 was pushed. Patient mentation improved and kept stating he had to urinate.     Overall, possibly his infection is driving his atrial fibrillation. He was not on any AV lisa blocking agent during this admission. I think his hemodynamics are favorable, so decision made to start standing metoprolol at low dose. Patient placed on telemetry for further monitoring. Continue with ceftriaxone as he has defervesced nicely. His CK is downtrending, his creatinine is improving, but transaminitis remains lateral. Follow up blood culture sensitivities. Follow up I personally provided 40 minutes of critical care for the patient. See RRT sheet for details.    In short:    89M with PMHx of atrial fibrillation (on Eliquis and with PPM), CAD, HTN, and BPH initially presented to [facility] with concern for UTI and rhabdomyolysis after suffering mechanical fall. Patient has been on Ceftriaxone and urine cultures are growing pan-sensitive E. Coli and Blood cultures are growing gram negative rods (sensitivities & speciation are pending). Patient has been afebrile and his white count has slowly decreased. There is also concern for MDS given thrombocytopenia and hematology was consulted.     Rapid called for atrial fibrillation with RVR with rates in the 140-150s. BP was approximately 140s/60-70s, not tachypneic with appropriate saturation. . Patient the night before agitated, restless, possible sundowning, but did not get anything for sedation. Patient is baseline hard of hearing, but lethargic during rapid. Given overall change, decision made to push metoprolol 5 mg IV to break RVR. 12 lead EKG prior showing afib in 140s. After pushing metoprolol HR improved to 120s. It started creeping back up again so another 5 was pushed. Patient mentation improved and kept stating he had to urinate.     Overall, possibly his infection is driving his atrial fibrillation. He was not on any AV lisa blocking agent during this admission. I think his hemodynamics are favorable, so decision made to start standing metoprolol at low dose. Patient placed on telemetry for further monitoring. Continue with ceftriaxone as he has defervesced nicely. His CK is downtrending, his creatinine is improving, but transaminitis remains lateral. Follow up blood culture sensitivities. Follow up repeat blood cultures done on 7/13 to ensure clearance. I would do 10-14 days of antibiotics from first cleared cultures given bacteremia. Monitor hemodynamics. Frequently orient patient. He seems to sundown, would try to maintain diurnal cycle.     Plan discussed with MAR.

## 2020-07-15 LAB
-  AMIKACIN: SIGNIFICANT CHANGE UP
-  AMPICILLIN/SULBACTAM: SIGNIFICANT CHANGE UP
-  AMPICILLIN: SIGNIFICANT CHANGE UP
-  AZTREONAM: SIGNIFICANT CHANGE UP
-  CEFAZOLIN: SIGNIFICANT CHANGE UP
-  CEFEPIME: SIGNIFICANT CHANGE UP
-  CEFOXITIN: SIGNIFICANT CHANGE UP
-  CEFTRIAXONE: SIGNIFICANT CHANGE UP
-  CIPROFLOXACIN: SIGNIFICANT CHANGE UP
-  ERTAPENEM: SIGNIFICANT CHANGE UP
-  GENTAMICIN: SIGNIFICANT CHANGE UP
-  IMIPENEM: SIGNIFICANT CHANGE UP
-  LEVOFLOXACIN: SIGNIFICANT CHANGE UP
-  MEROPENEM: SIGNIFICANT CHANGE UP
-  PIPERACILLIN/TAZOBACTAM: SIGNIFICANT CHANGE UP
-  TOBRAMYCIN: SIGNIFICANT CHANGE UP
-  TRIMETHOPRIM/SULFAMETHOXAZOLE: SIGNIFICANT CHANGE UP
ANION GAP SERPL CALC-SCNC: 9 MMOL/L — SIGNIFICANT CHANGE UP (ref 5–17)
BUN SERPL-MCNC: 33 MG/DL — HIGH (ref 7–23)
CALCIUM SERPL-MCNC: 9.1 MG/DL — SIGNIFICANT CHANGE UP (ref 8.4–10.5)
CHLORIDE SERPL-SCNC: 107 MMOL/L — SIGNIFICANT CHANGE UP (ref 96–108)
CO2 SERPL-SCNC: 27 MMOL/L — SIGNIFICANT CHANGE UP (ref 22–31)
CREAT SERPL-MCNC: 1.12 MG/DL — SIGNIFICANT CHANGE UP (ref 0.5–1.3)
CULTURE RESULTS: SIGNIFICANT CHANGE UP
GLUCOSE SERPL-MCNC: 174 MG/DL — HIGH (ref 70–99)
HCT VFR BLD CALC: 40.8 % — SIGNIFICANT CHANGE UP (ref 39–50)
HGB BLD-MCNC: 13.4 G/DL — SIGNIFICANT CHANGE UP (ref 13–17)
MAGNESIUM SERPL-MCNC: 2.2 MG/DL — SIGNIFICANT CHANGE UP (ref 1.6–2.6)
MCHC RBC-ENTMCNC: 28.9 PG — SIGNIFICANT CHANGE UP (ref 27–34)
MCHC RBC-ENTMCNC: 32.8 GM/DL — SIGNIFICANT CHANGE UP (ref 32–36)
MCV RBC AUTO: 87.9 FL — SIGNIFICANT CHANGE UP (ref 80–100)
METHOD TYPE: SIGNIFICANT CHANGE UP
NRBC # BLD: 0 /100 WBCS — SIGNIFICANT CHANGE UP (ref 0–0)
ORGANISM # SPEC MICROSCOPIC CNT: SIGNIFICANT CHANGE UP
PHOSPHATE SERPL-MCNC: 2.3 MG/DL — LOW (ref 2.5–4.5)
PLATELET # BLD AUTO: 130 K/UL — LOW (ref 150–400)
POTASSIUM SERPL-MCNC: 4 MMOL/L — SIGNIFICANT CHANGE UP (ref 3.5–5.3)
POTASSIUM SERPL-SCNC: 4 MMOL/L — SIGNIFICANT CHANGE UP (ref 3.5–5.3)
RBC # BLD: 4.64 M/UL — SIGNIFICANT CHANGE UP (ref 4.2–5.8)
RBC # FLD: 15.2 % — HIGH (ref 10.3–14.5)
SARS-COV-2 RNA SPEC QL NAA+PROBE: SIGNIFICANT CHANGE UP
SODIUM SERPL-SCNC: 143 MMOL/L — SIGNIFICANT CHANGE UP (ref 135–145)
SPECIMEN SOURCE: SIGNIFICANT CHANGE UP
WBC # BLD: 8.11 K/UL — SIGNIFICANT CHANGE UP (ref 3.8–10.5)
WBC # FLD AUTO: 8.11 K/UL — SIGNIFICANT CHANGE UP (ref 3.8–10.5)

## 2020-07-15 PROCEDURE — 99233 SBSQ HOSP IP/OBS HIGH 50: CPT | Mod: GC

## 2020-07-15 RX ORDER — CIPROFLOXACIN LACTATE 400MG/40ML
500 VIAL (ML) INTRAVENOUS EVERY 12 HOURS
Refills: 0 | Status: DISCONTINUED | OUTPATIENT
Start: 2020-07-15 | End: 2020-07-15

## 2020-07-15 RX ORDER — CEFPODOXIME PROXETIL 100 MG
200 TABLET ORAL EVERY 12 HOURS
Refills: 0 | Status: DISCONTINUED | OUTPATIENT
Start: 2020-07-15 | End: 2020-07-17

## 2020-07-15 RX ADMIN — Medication 200 MILLIGRAM(S): at 17:16

## 2020-07-15 RX ADMIN — Medication 12.5 MILLIGRAM(S): at 06:35

## 2020-07-15 RX ADMIN — Medication 12.5 MILLIGRAM(S): at 17:14

## 2020-07-15 NOTE — PROGRESS NOTE ADULT - ASSESSMENT
89 year old man, history of afib on eliquis s/p pacemaker, CAD s/p five stents, HTN, HLD, BPH, who presents after sepsis 2/2 UTI and likely rhabdomyolysis after mechanical fall. Clinically improved, afebrile with decreasing WBC, CK. Today w/episode of afib w/rvr. 89 year old man, history of afib on eliquis s/p pacemaker, CAD s/p five stents, HTN, HLD, BPH, who presents after sepsis 2/2 UTI and likely rhabdomyolysis after mechanical fall. Clinically improved, afebrile with decreasing WBC, CK. Clinically stable.

## 2020-07-15 NOTE — PROGRESS NOTE ADULT - SUBJECTIVE AND OBJECTIVE BOX
*************************************  Daniella Navarro M.D. | PGY-1  Department of Internal Medicine  [phone] ([facility]) | [id number] ([facility])  *************************************      Patient is a 89y old  Male who presents with a chief complaint of weakness, fall (14 Jul 2020 06:57)      SUBJECTIVE / OVERNIGHT EVENTS:    MEDICATIONS  (STANDING):  apixaban 2.5 milliGRAM(s) Oral every 12 hours  aspirin  chewable 81 milliGRAM(s) Oral daily  cefTRIAXone   IVPB 1000 milliGRAM(s) IV Intermittent every 24 hours  cyanocobalamin 1000 MICROGram(s) Oral daily  folic acid 1 milliGRAM(s) Oral daily  lactated ringers. 1000 milliLiter(s) (150 mL/Hr) IV Continuous <Continuous>  lidocaine   Patch 1 Patch Transdermal daily  metoprolol tartrate 12.5 milliGRAM(s) Oral every 12 hours  polyethylene glycol 3350 17 Gram(s) Oral two times a day  senna 2 Tablet(s) Oral at bedtime  sodium chloride 0.9% Bolus 1000 milliLiter(s) IV Bolus once  tamsulosin 0.8 milliGRAM(s) Oral at bedtime    MEDICATIONS  (PRN):  acetaminophen   Tablet .. 1000 milliGRAM(s) Oral every 8 hours PRN Mild Pain (1 - 3), Moderate Pain (4 - 6)      CAPILLARY BLOOD GLUCOSE        I&O's Summary    14 Jul 2020 07:01  -  15 Jul 2020 07:00  --------------------------------------------------------  IN: 620 mL / OUT: 0 mL / NET: 620 mL        Vital Signs Last 24 Hrs  T(C): 37.4 (15 Jul 2020 06:38), Max: 37.4 (15 Jul 2020 06:38)  T(F): 99.4 (15 Jul 2020 06:38), Max: 99.4 (15 Jul 2020 06:38)  HR: 86 (15 Jul 2020 06:38) (86 - 100)  BP: 150/75 (15 Jul 2020 06:38) (128/72 - 150/75)  BP(mean): --  RR: 16 (15 Jul 2020 06:38) (16 - 18)  SpO2: 93% (15 Jul 2020 06:38) (92% - 96%)    PHYSICAL EXAM:  GENERAL: NAD, well-developed, well-nourished  HEAD: Atraumatic, Normocephalic  EYES: EOMI, PERRLA, conjunctiva and sclera clear  NECK: Supple, No JVD  CHEST/LUNG: Clear to auscultation bilaterally; No wheezes or crackles  HEART: Normal S1/S2; Regular rate and rhythm; No murmurs, rubs, or gallops  ABDOMEN: Soft, Nontender, Nondistended; Bowel sounds present  EXTREMITIES: 2+ Peripheral Pulses; No clubbing, cyanosis, or edema  PSYCH: A&Ox3  NEUROLOGY: no focal neurologic deficit  SKIN: No rashes or lesions    LABS:                        12.2   11.12 )-----------( 92       ( 14 Jul 2020 06:55 )             37.3      07-14    140  |  107  |  38<H>  ----------------------------<  196<H>  3.9   |  22  |  1.04    Ca    8.9      14 Jul 2020 06:46  Phos  2.7     07-14  Mg     2.3     07-14    TPro  5.5<L>  /  Alb  2.4<L>  /  TBili  0.5  /  DBili  x   /  AST  91<H>  /  ALT  111<H>  /  AlkPhos  170<H>  07-14    PT/INR - ( 14 Jul 2020 06:55 )   PT: 13.1 sec;   INR: 1.11 ratio           CARDIAC MARKERS ( 14 Jul 2020 06:46 )  x     / x     / 181 U/L / x     / x      CARDIAC MARKERS ( 13 Jul 2020 07:16 )  x     / x     / 299 U/L / x     / x              RADIOLOGY & ADDITIONAL TESTS:    Imaging Personally Reviewed:    Consultant(s) Notes Reviewed:      Care Discussed with Consultants/Other Providers: *************************************  Daniella Navarro M.D. | PGY-1  Department of Internal Medicine  [phone] ([facility]) | [address] ([facility])  *************************************      Patient is a 89y old  Male who presents with a chief complaint of weakness, fall (14 Jul 2020 06:57)      SUBJECTIVE / OVERNIGHT EVENTS: BAILEY o/n. Pt has no complaints today.     MEDICATIONS  (STANDING):  apixaban 2.5 milliGRAM(s) Oral every 12 hours  aspirin  chewable 81 milliGRAM(s) Oral daily  cefTRIAXone   IVPB 1000 milliGRAM(s) IV Intermittent every 24 hours  cyanocobalamin 1000 MICROGram(s) Oral daily  folic acid 1 milliGRAM(s) Oral daily  lactated ringers. 1000 milliLiter(s) (150 mL/Hr) IV Continuous <Continuous>  lidocaine   Patch 1 Patch Transdermal daily  metoprolol tartrate 12.5 milliGRAM(s) Oral every 12 hours  polyethylene glycol 3350 17 Gram(s) Oral two times a day  senna 2 Tablet(s) Oral at bedtime  sodium chloride 0.9% Bolus 1000 milliLiter(s) IV Bolus once  tamsulosin 0.8 milliGRAM(s) Oral at bedtime    MEDICATIONS  (PRN):  acetaminophen   Tablet .. 1000 milliGRAM(s) Oral every 8 hours PRN Mild Pain (1 - 3), Moderate Pain (4 - 6)      CAPILLARY BLOOD GLUCOSE        I&O's Summary    14 Jul 2020 07:01  -  15 Jul 2020 07:00  --------------------------------------------------------  IN: 620 mL / OUT: 0 mL / NET: 620 mL        Vital Signs Last 24 Hrs  T(C): 37.4 (15 Jul 2020 06:38), Max: 37.4 (15 Jul 2020 06:38)  T(F): 99.4 (15 Jul 2020 06:38), Max: 99.4 (15 Jul 2020 06:38)  HR: 86 (15 Jul 2020 06:38) (86 - 100)  BP: 150/75 (15 Jul 2020 06:38) (128/72 - 150/75)  BP(mean): --  RR: 16 (15 Jul 2020 06:38) (16 - 18)  SpO2: 93% (15 Jul 2020 06:38) (92% - 96%)    PHYSICAL EXAM:  GENERAL: NAD, well-developed, well-nourished  HEAD: Atraumatic, Normocephalic  EYES: EOMI, PERRLA, conjunctiva and sclera clear  NECK: Supple, No JVD  CHEST/LUNG: Clear to auscultation bilaterally; No wheezes or crackles  HEART: Normal S1/S2; Regular rate and rhythm; No murmurs, rubs, or gallops  ABDOMEN: Soft, Nontender, Nondistended; Bowel sounds present  EXTREMITIES: 2+ Peripheral Pulses; No clubbing, cyanosis, or edema  PSYCH: A&Ox3  NEUROLOGY: no focal neurologic deficit  SKIN: No rashes or lesions    LABS:                        12.2   11.12 )-----------( 92       ( 14 Jul 2020 06:55 )             37.3      07-14    140  |  107  |  38<H>  ----------------------------<  196<H>  3.9   |  22  |  1.04    Ca    8.9      14 Jul 2020 06:46  Phos  2.7     07-14  Mg     2.3     07-14    TPro  5.5<L>  /  Alb  2.4<L>  /  TBili  0.5  /  DBili  x   /  AST  91<H>  /  ALT  111<H>  /  AlkPhos  170<H>  07-14    PT/INR - ( 14 Jul 2020 06:55 )   PT: 13.1 sec;   INR: 1.11 ratio           CARDIAC MARKERS ( 14 Jul 2020 06:46 )  x     / x     / 181 U/L / x     / x      CARDIAC MARKERS ( 13 Jul 2020 07:16 )  x     / x     / 299 U/L / x     / x        7/13 blood cx NGTD      RADIOLOGY & ADDITIONAL TESTS:    Imaging Personally Reviewed:    Consultant(s) Notes Reviewed:      Care Discussed with Consultants/Other Providers:

## 2020-07-15 NOTE — PROGRESS NOTE ADULT - PROBLEM SELECTOR PLAN 6
-elevated myelocytes, metamyelocytes, and promyelocytes with elevated WBC concerning for MDS  - Diff today normalized.   - Appreciate heme recs: plan for possible bone marrow bx and flow cytometry. Plan to obtain outpatient CBC this admission

## 2020-07-15 NOTE — PROGRESS NOTE ADULT - PROBLEM SELECTOR PLAN 1
-UA+ in setting of fever, leukocytosis increased lactate  -s/p broad spectrum vanc and cefepime in ED  -c/w 1g ceftriaxone for at least 5 days (initiated 7/11)  - Blood cx growing E coli, pan-sensitive  - Repeat blood cx 7/13: NGTD -UA+ in setting of fever, leukocytosis increased lactate  -s/p broad spectrum vanc and cefepime in ED  - Blood cx growing E coli, pan-sensitive  - Repeat blood cx 7/13 showing NGTD. Switch to PO cefpodoxime (Vantin) 200mg po bid.

## 2020-07-15 NOTE — PROGRESS NOTE ADULT - PROBLEM SELECTOR PLAN 8
-continue to hold amlodipine  -per daughter, pt also takes ramipril 10mg qd. can hold for now.   -Increased flomax.

## 2020-07-15 NOTE — PROGRESS NOTE ADULT - PROBLEM SELECTOR PLAN 3
-highly likely given prolonged time lying on floor, large blood on UA despite few RBCs, significant AST elevation.    -Creatine Kinase, Serum: 2838 on admission   -CK continues to trending down.  -zocor held.

## 2020-07-15 NOTE — PHARMACOTHERAPY INTERVENTION NOTE - COMMENTS
AUBREY CARRERA, 89y Male with E. coli bacteremia, positive culture on 7/11, repeat BCx neg to date from 7/13, likely urine source per team, UCx sensitivities showing ceftriaxone and cipro sensitive.    Recommendation(s):  1) Patient's recent EKG showing prolonged QTc (518 ms), would suggest D/C cipro and change to cefpodoxime 200 mg PO BID to complete 10 day course  2) Can give first dose cefpodoxime starting tomorrow morning (patient received dose of ceftriaxone today in AM)    With kind regards,  Lucian Horne, ValorieD  PGY-2 Infectious Diseases Pharmacy Resident  Spectra [phone]  .

## 2020-07-15 NOTE — PROGRESS NOTE ADULT - PROBLEM SELECTOR PLAN 10
Transitions of Care Status:  1.  Name of PCP:  2.  PCP Contacted on Admission: [ ] Y    [ ] N    3.  PCP contacted at Discharge: [ ] Y    [ ] N    [ ] N/A  4.  Post-Discharge Appointment Date and Location:  5.  Summary of Handoff given to PCP: Plan to discharge to [facility]. Daughter requests Friday transfer at earliest.   Transitions of Care Status:  1.  Name of PCP:  2.  PCP Contacted on Admission: [ ] Y    [ ] N    3.  PCP contacted at Discharge: [ ] Y    [ ] N    [ ] N/A  4.  Post-Discharge Appointment Date and Location:  5.  Summary of Handoff given to PCP:

## 2020-07-15 NOTE — PROGRESS NOTE ADULT - ATTENDING COMMENTS
Metabolic encephalopathy due to UTI with bacteremia- pansensitive E coli with negative repeat blood cultures  Switch to PO Vantin to complete 7 days from negative blood cultures  AF with RVR- resolved- cont low dose beta-blocker  D/C to [facility] when bed available

## 2020-07-15 NOTE — PROGRESS NOTE ADULT - PROBLEM SELECTOR PLAN 7
-on Eliquis (c/w home med) and pacemaker  -pt is not on rate-control medication currently, likely the cause of his episode this morning  -start metoprolol tartrate 12.5mg bid, titrate to 25mg bid as tolerated  -pt sees Dr. Sherman Brian ([place]) for outpt cardio -on Eliquis (c/w home med) and pacemaker  -continue metoprolol tartrate 12.5mg bid, titrate to 25mg bid as tolerated  -pt sees Dr. Sherman Brian ([place]) for outpt cardio

## 2020-07-15 NOTE — PROGRESS NOTE ADULT - PROBLEM SELECTOR PLAN 2
-leukocytosis 30k, febrile to 101, RR 20-22 in the setting of UTI, on admission  -treat UTI as above  -trend WBC  -trend Cr, replete fluids as necessary  -Blood cx growing E coli -leukocytosis 30k, febrile to 101, RR 20-22 in the setting of UTI, on admission, blood cx growing E.coli  -treat UTI as above  -trend WBC  -trend Cr, replete fluids as necessary

## 2020-07-16 ENCOUNTER — TRANSCRIPTION ENCOUNTER (OUTPATIENT)
Age: 85
End: 2020-07-16

## 2020-07-16 DIAGNOSIS — R06.02 SHORTNESS OF BREATH: ICD-10-CM

## 2020-07-16 LAB
ANION GAP SERPL CALC-SCNC: 11 MMOL/L — SIGNIFICANT CHANGE UP (ref 5–17)
BUN SERPL-MCNC: 30 MG/DL — HIGH (ref 7–23)
CALCIUM SERPL-MCNC: 8.5 MG/DL — SIGNIFICANT CHANGE UP (ref 8.4–10.5)
CHLORIDE SERPL-SCNC: 104 MMOL/L — SIGNIFICANT CHANGE UP (ref 96–108)
CO2 SERPL-SCNC: 25 MMOL/L — SIGNIFICANT CHANGE UP (ref 22–31)
CREAT SERPL-MCNC: 0.97 MG/DL — SIGNIFICANT CHANGE UP (ref 0.5–1.3)
GLUCOSE SERPL-MCNC: 174 MG/DL — HIGH (ref 70–99)
HCT VFR BLD CALC: 36.4 % — LOW (ref 39–50)
HGB BLD-MCNC: 12 G/DL — LOW (ref 13–17)
MCHC RBC-ENTMCNC: 28.9 PG — SIGNIFICANT CHANGE UP (ref 27–34)
MCHC RBC-ENTMCNC: 33 GM/DL — SIGNIFICANT CHANGE UP (ref 32–36)
MCV RBC AUTO: 87.7 FL — SIGNIFICANT CHANGE UP (ref 80–100)
NRBC # BLD: 0 /100 WBCS — SIGNIFICANT CHANGE UP (ref 0–0)
PHOSPHATE SERPL-MCNC: 3.1 MG/DL — SIGNIFICANT CHANGE UP (ref 2.5–4.5)
PLATELET # BLD AUTO: 172 K/UL — SIGNIFICANT CHANGE UP (ref 150–400)
POTASSIUM SERPL-MCNC: 4.1 MMOL/L — SIGNIFICANT CHANGE UP (ref 3.5–5.3)
POTASSIUM SERPL-SCNC: 4.1 MMOL/L — SIGNIFICANT CHANGE UP (ref 3.5–5.3)
RBC # BLD: 4.15 M/UL — LOW (ref 4.2–5.8)
RBC # FLD: 15 % — HIGH (ref 10.3–14.5)
SODIUM SERPL-SCNC: 140 MMOL/L — SIGNIFICANT CHANGE UP (ref 135–145)
WBC # BLD: 12.06 K/UL — HIGH (ref 3.8–10.5)
WBC # FLD AUTO: 12.06 K/UL — HIGH (ref 3.8–10.5)

## 2020-07-16 PROCEDURE — 71045 X-RAY EXAM CHEST 1 VIEW: CPT | Mod: 26

## 2020-07-16 PROCEDURE — 99233 SBSQ HOSP IP/OBS HIGH 50: CPT | Mod: GC

## 2020-07-16 RX ORDER — SENNA PLUS 8.6 MG/1
2 TABLET ORAL
Qty: 0 | Refills: 0 | DISCHARGE
Start: 2020-07-16

## 2020-07-16 RX ORDER — ACETAMINOPHEN 500 MG
2 TABLET ORAL
Qty: 0 | Refills: 0 | DISCHARGE
Start: 2020-07-16

## 2020-07-16 RX ORDER — POLYETHYLENE GLYCOL 3350 17 G/17G
17 POWDER, FOR SOLUTION ORAL
Qty: 0 | Refills: 0 | DISCHARGE
Start: 2020-07-16

## 2020-07-16 RX ORDER — LIDOCAINE 4 G/100G
1 CREAM TOPICAL
Qty: 0 | Refills: 0 | DISCHARGE
Start: 2020-07-16

## 2020-07-16 RX ORDER — CEFPODOXIME PROXETIL 100 MG
1 TABLET ORAL
Qty: 0 | Refills: 0 | DISCHARGE
Start: 2020-07-16 | End: 2020-07-20

## 2020-07-16 RX ADMIN — Medication 12.5 MILLIGRAM(S): at 18:13

## 2020-07-16 RX ADMIN — Medication 200 MILLIGRAM(S): at 06:10

## 2020-07-16 RX ADMIN — Medication 12.5 MILLIGRAM(S): at 06:09

## 2020-07-16 RX ADMIN — Medication 200 MILLIGRAM(S): at 18:14

## 2020-07-16 NOTE — PROGRESS NOTE ADULT - PROBLEM SELECTOR PLAN 3
-highly likely given prolonged time lying on floor, large blood on UA despite few RBCs, significant AST elevation.    -Creatine Kinase, Serum: 2838 on admission   -CK continues to trending down.  -zocor held. -highly likely given prolonged time lying on floor, large blood on UA despite few RBCs, significant AST elevation.    -Creatine Kinase, Serum: 2838 on admission   -CK continues to trending down. Currently normalized.  -zocor held inpatient -leukocytosis 30k, febrile to 101, RR 20-22 in the setting of UTI, on admission, blood cx growing E.coli  -treat UTI as above  -trend WBC  -trend Cr, replete fluids as necessary  - Improved

## 2020-07-16 NOTE — PROGRESS NOTE ADULT - PROBLEM SELECTOR PLAN 2
-leukocytosis 30k, febrile to 101, RR 20-22 in the setting of UTI, on admission, blood cx growing E.coli  -treat UTI as above  -trend WBC  -trend Cr, replete fluids as necessary -leukocytosis 30k, febrile to 101, RR 20-22 in the setting of UTI, on admission, blood cx growing E.coli  -treat UTI as above  -trend WBC  -trend Cr, replete fluids as necessary  - Improved - Patient has been placed on regular diet per patient's request, knowing the risk of possible aspiration  - Noted to have intermittent SOB while speaking  - S/p ceftriaxone, currently being treated with Vantin  - Monitor for VS

## 2020-07-16 NOTE — CHART NOTE - NSCHARTNOTEFT_GEN_A_CORE
HEMATOLOGY FELLOW NOTE    89M w/ AFib on eliquis s/p PPM and CAD s/p PCI who presented on 7/11/20 after a fall and increasing weakness. Hematology consulted for leukocytosis and thrombocytopenia.  Given findings of E. coli bacteremia now on treatment with cefpodoxime and then improvement in CBC, likely all reactive.   Peripheral smear reviewed at time of consult, showing: hypolobulated neutrophils, increased neutrophils, thrombocytopenia with giant platelets and ashia cells.     Hematology to sign off at this time, please reconsult with new questions.     Germania Menendez MD  Hematology-Oncology Fellow, PGY-6  pager: [phone]  After 5pm or on weekends, please page the on-call fellow.

## 2020-07-16 NOTE — PROGRESS NOTE ADULT - SUBJECTIVE AND OBJECTIVE BOX
*************************************  Ricky Malone M.D., Ph.D. | PGY-3  Department of Internal Medicine  [phone] ([facility]) | [id number] ([facility])  *************************************      Patient is a 89y old  Male who presents with a chief complaint of weakness, fall (16 Jul 2020 07:17)      SUBJECTIVE / OVERNIGHT EVENTS:  No acute overnight event notified. Patient was seen and examined at bedside. Wishes to go home. Does not recall if he aspirated. No diarrhea. Denies fever, chills, nausea, vomiting, chest pain, coughs, or dizziness.    MEDICATIONS  (STANDING):  apixaban 2.5 milliGRAM(s) Oral every 12 hours  aspirin  chewable 81 milliGRAM(s) Oral daily  cefpodoxime 200 milliGRAM(s) Oral every 12 hours  cyanocobalamin 1000 MICROGram(s) Oral daily  folic acid 1 milliGRAM(s) Oral daily  lactated ringers. 1000 milliLiter(s) (150 mL/Hr) IV Continuous <Continuous>  lidocaine   Patch 1 Patch Transdermal daily  metoprolol tartrate 12.5 milliGRAM(s) Oral every 12 hours  polyethylene glycol 3350 17 Gram(s) Oral two times a day  senna 2 Tablet(s) Oral at bedtime  sodium chloride 0.9% Bolus 1000 milliLiter(s) IV Bolus once  tamsulosin 0.8 milliGRAM(s) Oral at bedtime    MEDICATIONS  (PRN):  acetaminophen   Tablet .. 1000 milliGRAM(s) Oral every 8 hours PRN Mild Pain (1 - 3), Moderate Pain (4 - 6)      CAPILLARY BLOOD GLUCOSE        I&O's Summary    15 Jul 2020 07:01  -  16 Jul 2020 07:00  --------------------------------------------------------  IN: 1360 mL / OUT: 0 mL / NET: 1360 mL        Vital Signs Last 24 Hrs  T(C): 37 (16 Jul 2020 04:59), Max: 37.2 (15 Jul 2020 17:11)  T(F): 98.6 (16 Jul 2020 04:59), Max: 99 (15 Jul 2020 17:11)  HR: 96 (16 Jul 2020 04:59) (73 - 96)  BP: 133/65 (16 Jul 2020 04:59) (133/65 - 148/68)  BP(mean): --  RR: 18 (16 Jul 2020 04:59) (17 - 18)  SpO2: 93% (16 Jul 2020 04:59) (93% - 95%)    PHYSICAL EXAM:  GENERAL: NAD, well-developed, well-nourished  HEAD: Atraumatic, Normocephalic  EYES: EOMI, PERRLA, conjunctiva and sclera clear  NECK: Supple, No JVD  CHEST/LUNG: Clear to auscultation bilaterally; No wheezes or crackles  HEART: Normal S1/S2; Regular rate and rhythm; No murmurs, rubs, or gallops  ABDOMEN: Soft, Nontender, Nondistended; Bowel sounds present  EXTREMITIES: 2+ Peripheral Pulses; No clubbing, cyanosis, or edema  PSYCH: A&Ox3  NEUROLOGY: no focal neurologic deficit  SKIN: No rashes or lesions    LABS:                        12.0   12.06 )-----------( 172      ( 16 Jul 2020 06:35 )             36.4      07-16    140  |  104  |  30<H>  ----------------------------<  174<H>  4.1   |  25  |  0.97    Ca    8.5      16 Jul 2020 06:33  Phos  3.1     07-16  Mg     2.2     07-15                RADIOLOGY & ADDITIONAL TESTS:    Imaging Personally Reviewed:    Consultant(s) Notes Reviewed:      Care Discussed with Consultants/Other Providers: *************************************  Ricky Malone M.D., Ph.D. | PGY-3  Department of Internal Medicine  [phone] ([facility]) | [address] ([facility])  *************************************      Patient is a 89y old  Male who presents with a chief complaint of weakness, fall (16 Jul 2020 07:17)      SUBJECTIVE / OVERNIGHT EVENTS:  No acute overnight event notified. Patient was seen and examined at bedside. Wishes to go home. Does not recall if he aspirated, however patient appears more short of breath when speaking. No diarrhea. Denies fever, chills, nausea, vomiting, chest pain, coughs, or dizziness.    MEDICATIONS  (STANDING):  apixaban 2.5 milliGRAM(s) Oral every 12 hours  aspirin  chewable 81 milliGRAM(s) Oral daily  cefpodoxime 200 milliGRAM(s) Oral every 12 hours  cyanocobalamin 1000 MICROGram(s) Oral daily  folic acid 1 milliGRAM(s) Oral daily  lactated ringers. 1000 milliLiter(s) (150 mL/Hr) IV Continuous <Continuous>  lidocaine   Patch 1 Patch Transdermal daily  metoprolol tartrate 12.5 milliGRAM(s) Oral every 12 hours  polyethylene glycol 3350 17 Gram(s) Oral two times a day  senna 2 Tablet(s) Oral at bedtime  sodium chloride 0.9% Bolus 1000 milliLiter(s) IV Bolus once  tamsulosin 0.8 milliGRAM(s) Oral at bedtime    MEDICATIONS  (PRN):  acetaminophen   Tablet .. 1000 milliGRAM(s) Oral every 8 hours PRN Mild Pain (1 - 3), Moderate Pain (4 - 6)      CAPILLARY BLOOD GLUCOSE        I&O's Summary    15 Jul 2020 07:01  -  16 Jul 2020 07:00  --------------------------------------------------------  IN: 1360 mL / OUT: 0 mL / NET: 1360 mL        Vital Signs Last 24 Hrs  T(C): 37 (16 Jul 2020 04:59), Max: 37.2 (15 Jul 2020 17:11)  T(F): 98.6 (16 Jul 2020 04:59), Max: 99 (15 Jul 2020 17:11)  HR: 96 (16 Jul 2020 04:59) (73 - 96)  BP: 133/65 (16 Jul 2020 04:59) (133/65 - 148/68)  BP(mean): --  RR: 18 (16 Jul 2020 04:59) (17 - 18)  SpO2: 93% (16 Jul 2020 04:59) (93% - 95%)    PHYSICAL EXAM:  GENERAL: NAD, well-developed, well-nourished; intermittent pauses when speaking  HEAD: Atraumatic, Normocephalic  EYES: EOMI, PERRLA, conjunctiva and sclera clear  NECK: Supple, No JVD  CHEST/LUNG: Clear to auscultation bilaterally; No wheezes or crackles  HEART: Normal S1/S2; Regular rate and rhythm; No murmurs, rubs, or gallops  ABDOMEN: Soft, Nontender, Nondistended; Bowel sounds present  EXTREMITIES: 2+ Peripheral Pulses; No clubbing, cyanosis, or edema  PSYCH: A&Ox3, irritable mood  NEUROLOGY: no focal neurologic deficit  SKIN: No rashes or lesions    LABS:                        12.0   12.06 )-----------( 172      ( 16 Jul 2020 06:35 )             36.4      07-16    140  |  104  |  30<H>  ----------------------------<  174<H>  4.1   |  25  |  0.97    Ca    8.5      16 Jul 2020 06:33  Phos  3.1     07-16  Mg     2.2     07-15                RADIOLOGY & ADDITIONAL TESTS:    Imaging Personally Reviewed:    Consultant(s) Notes Reviewed:      Care Discussed with Consultants/Other Providers:

## 2020-07-16 NOTE — DISCHARGE NOTE PROVIDER - NSDCMRMEDTOKEN_GEN_ALL_CORE_FT
acetaminophen 500 mg oral tablet: 2 tab(s) orally every 8 hours, As needed, Mild Pain (1 - 3), Moderate Pain (4 - 6)  amLODIPine 5 mg oral tablet: 1 tab(s) orally once a day  aspirin 81 mg oral tablet, chewable: 1 tab(s) orally once a day  cefpodoxime 200 mg oral tablet: 1 tab(s) orally every 12 hours until 7/20  Eliquis 2.5 mg oral tablet: 1 tab(s) orally 2 times a day  Flomax 0.4 mg oral capsule: 1 cap(s) orally once a day  folic acid 0.4 mg oral tablet: 1 tab(s) orally once a day  lidocaine 5% topical film: Apply topically to affected area once a day  Metoprolol Tartrate 25 mg oral tablet: 0.5 tab(s) orally every 12 hours  polyethylene glycol 3350 oral powder for reconstitution: 17 gram(s) orally 2 times a day  senna oral tablet: 2 tab(s) orally once a day (at bedtime)  simvastatin 20 mg oral tablet: 1 tab(s) orally once a day (at bedtime)  Vitamin B12 50 mcg oral tablet: 1 tab(s) orally once a day

## 2020-07-16 NOTE — PROGRESS NOTE ADULT - PROBLEM SELECTOR PLAN 5
-cardiac w/u benign  -c/o left arm pain after fall  -XR shoulder 2view shows no fracture/dislocation  -PT consult appreciated  -Tylenol PRN. -elevated myelocytes, metamyelocytes, and promyelocytes with elevated WBC concerning for MDS  - Diff today normalized.   - Appreciate heme recs. Likely reactive, improved with Abx. No further intervention. -cardiac w/u benign  -c/o left arm pain after fall  -XR shoulder 2view shows no fracture/dislocation  -PT consult appreciated  -Tylenol PRN.  - Currently improved.

## 2020-07-16 NOTE — DISCHARGE NOTE PROVIDER - HOSPITAL COURSE
HPI:    89 year old man, history of afib on eliquis s/p pacemaker, CAD s/p five stents, HTN, HLD, BPH, who presents after fall and increasing weakness. Patient states that, the morning prior to presentation, he was walking in his kitchen and suddenly fell, landing on his back. Patient denies dizziness, palpitations, or chest pain prior to the fall, and denies loss of consciousness. Patient's daughter said he was found awake on the floor, and believed he was down for several hours. Patient's daughter notes he was found in his own feces, no urinary incontinence. After the fall, patient tolerated PO intake, and slept twelve hours, waking only to drink water. Patient's daughter was concerned by her father's increased lethargy and weakness, noting that he had difficulty standing and balancing, which prompted her to come to the hospital. At baseline, patient is able to ambulate without assistance. Patient lives alone and independent in all ADLs. Prior to the fall, patient was in his usual state of health. On review of systems, patient endorses chills, left arm pain which worsens with movement (was not present before the fall), denies fever, chest pain, cough, shortness of breath, nausea, vomiting, abdominal pain, urinary frequency, hematuria, diarrhea.         Hospital Course:    In the ED, patient was febrile, Tmax 101, HR 79-86, BP /49-58, RR 18-22, O2 sat 98% on RA. Labs significant for WBC 30K, lactate 3.5, UA revealed large leuk esterase and bacteria. CT head and spine revealed no hemorrhage or fracture. Patient was given 2 boluses of LR, and acetaminophen with resolution of fever, as well as 1g vancomycin and 2g cefepime. Patient was admitted for medicine floor for further management. Blood culture and urine culture grew pansensitive E coli. Patient was continued on ceftriaxone. CK was found to be 2838, for which patient was given IVF hydration. CK downtrended appropriately, and WNL to the date of discharge. Diet was liberalized per patient's request. His course was complicated by an RRT for afib w/ RVR, for which metoprolol tartrate was started. Patient's repeat BCx was negative, and ABx was switched to Vantin. PT recommended [facility]. Patient was deemed medically stable for discharge and was sent to [facility].

## 2020-07-16 NOTE — DISCHARGE NOTE PROVIDER - CARE PROVIDER_API CALL
Sherman Brina  CARDIOLOGY  [address] [place] [address]  Phone: [phone]  Fax: [phone]  Follow Up Time:

## 2020-07-16 NOTE — PROGRESS NOTE ADULT - ASSESSMENT
89 year old man, history of afib on eliquis s/p pacemaker, CAD s/p five stents, HTN, HLD, BPH, who presents after sepsis 2/2 UTI and likely rhabdomyolysis after mechanical fall. Clinically improved, afebrile with decreasing WBC, CK. Clinically stable. 89 year old man, history of afib on eliquis s/p pacemaker, CAD s/p five stents, HTN, HLD, BPH, who presents after sepsis 2/2 UTI and likely rhabdomyolysis after mechanical fall. Clinically improved, afebrile with decreasing WBC and CK, today with elevated WBC.

## 2020-07-16 NOTE — PROGRESS NOTE ADULT - PROBLEM SELECTOR PLAN 1
-UA+ in setting of fever, leukocytosis increased lactate  -s/p broad spectrum vanc and cefepime in ED  - Blood cx growing E coli, pan-sensitive  - Repeat blood cx 7/13 showing NGTD. Switch to PO cefpodoxime (Vantin) 200mg po bid.

## 2020-07-16 NOTE — PROGRESS NOTE ADULT - PROBLEM SELECTOR PLAN 7
-on Eliquis (c/w home med) and pacemaker  -continue metoprolol tartrate 12.5mg bid, titrate to 25mg bid as tolerated  -pt sees Dr. Sherman Brian ([place]) for outpt cardio -continue to hold amlodipine  -per daughter, pt also takes ramipril 10mg qd. can hold for now.   -Increased flomax. -on Eliquis (c/w home med) and pacemaker  -continue metoprolol tartrate 12.5mg bid, titrate to 25mg bid as tolerated  -pt sees Dr. Sherman Brian ([place]) for outpt cardio

## 2020-07-16 NOTE — PROGRESS NOTE ADULT - PROBLEM SELECTOR PLAN 4
-unknown if acute or chronic, however likely an acute exacerbation in setting of prolonged time without PO intake, earlier dry oral mucosa, might be exacerbated by rhabdomyolysis as well  -s/p  cc/hr  -monitor outputs -cardiac w/u benign  -c/o left arm pain after fall  -XR shoulder 2view shows no fracture/dislocation  -PT consult appreciated  -Tylenol PRN.  - Currently improved. -highly likely given prolonged time lying on floor, large blood on UA despite few RBCs, significant AST elevation.    -Creatine Kinase, Serum: 2838 on admission   -CK continues to trending down. Currently normalized.  -zocor held inpatient

## 2020-07-16 NOTE — PROGRESS NOTE ADULT - ATTENDING COMMENTS
Metabolic encephalopathy due to UTI with bacteremia- pansensitive E coli with negative repeat blood cultures  Switch to PO Vantin to complete 7 days from negative blood cultures  AF with RVR- resolved- cont low dose beta-blocker  D/C to [facility] when bed available- likely 7/17

## 2020-07-16 NOTE — PROGRESS NOTE ADULT - PROBLEM SELECTOR PLAN 8
-continue to hold amlodipine  -per daughter, pt also takes ramipril 10mg qd. can hold for now.   -Increased flomax. -on eliquis for afib  -regular diet   -fall and aspiration precautions  -PT recommends [facility], need to discuss w/daughter.

## 2020-07-16 NOTE — PROGRESS NOTE ADULT - PROBLEM SELECTOR PLAN 9
-on eliquis for afib  -regular diet   -fall and aspiration precautions  -PT recommends [facility], need to discuss w/daughter. Plan to discharge to [facility]. Daughter requests Friday transfer at earliest.   Transitions of Care Status:  1.  Name of PCP:  2.  PCP Contacted on Admission: [ ] Y    [ ] N    3.  PCP contacted at Discharge: [ ] Y    [ ] N    [ ] N/A  4.  Post-Discharge Appointment Date and Location:  5.  Summary of Handoff given to PCP:

## 2020-07-16 NOTE — DISCHARGE NOTE PROVIDER - NSDCCPCAREPLAN_GEN_ALL_CORE_FT
PRINCIPAL DISCHARGE DIAGNOSIS  Diagnosis: Sepsis due to urinary tract infection  Assessment and Plan of Treatment: You presented with urinary tract infection that infected your blood. You were treated with IV antibiotics, switched to oral antibiotics. Please continue to take Vantin after discharge as prescribed until 7/20/20. Please follow up with your primary physician within 1-2 weeks of discharge.      SECONDARY DISCHARGE DIAGNOSES  Diagnosis: Rhabdomyolysis  Assessment and Plan of Treatment: You presented with signs of muscle damange secondary from a fall and being on the floor for a prolonged period of time prior to hospitalization. You were treated with IV fluid hydration. Please follow up with your primary physician within 1-2 weeks of discharge.    Diagnosis: Arm pain, left  Assessment and Plan of Treatment: You presented with severe arm pain, likely from a fall before coming to the hospital. X-rays revealed no fracture or dislocation. You were treated with lidocaine patch and Tylenol. Please follow up with your primary care physician within 1-2 weeks of discharge.    Diagnosis: Atrial fibrillation  Assessment and Plan of Treatment: You presented with history of atrial fibrillation, previously not on rate control. Your hospitalization was complicated by an episode of transient atrial fibrillation. You were started on metoprolol tartrate twice a day. Please take as prescribed. Also continue with Eliquis. Please follow up with your primary care physician within 1-2 weeks of discharge.

## 2020-07-16 NOTE — PROGRESS NOTE ADULT - PROBLEM SELECTOR PLAN 6
-elevated myelocytes, metamyelocytes, and promyelocytes with elevated WBC concerning for MDS  - Diff today normalized.   - Appreciate heme recs: plan for possible bone marrow bx and flow cytometry. Plan to obtain outpatient CBC -on Eliquis (c/w home med) and pacemaker  -continue metoprolol tartrate 12.5mg bid, titrate to 25mg bid as tolerated  -pt sees Dr. Sherman Brian ([place]) for outpt cardio -elevated myelocytes, metamyelocytes, and promyelocytes with elevated WBC concerning for MDS  - Diff today normalized.   - Appreciate heme recs. Likely reactive, improved with Abx. No further intervention.

## 2020-07-16 NOTE — PROGRESS NOTE ADULT - PROBLEM SELECTOR PLAN 10
Plan to discharge to [facility]. Daughter requests Friday transfer at earliest.   Transitions of Care Status:  1.  Name of PCP:  2.  PCP Contacted on Admission: [ ] Y    [ ] N    3.  PCP contacted at Discharge: [ ] Y    [ ] N    [ ] N/A  4.  Post-Discharge Appointment Date and Location:  5.  Summary of Handoff given to PCP: Plan to discharge to [facility]. Daughter requests Friday transfer at earliest.   Transitions of Care Status:  1.  Name of PCP:  2.  PCP Contacted on Admission: [ ] Y    [ ] N    3.  PCP contacted at Discharge: [ ] Y    [ ] N    [ ] N/A  4.  Post-Discharge Appointment Date and Location:  5.  Summary of Handoff given to PCP:

## 2020-07-17 ENCOUNTER — TRANSCRIPTION ENCOUNTER (OUTPATIENT)
Age: 85
End: 2020-07-17

## 2020-07-17 VITALS
HEART RATE: 70 BPM | RESPIRATION RATE: 18 BRPM | OXYGEN SATURATION: 94 % | TEMPERATURE: 98 F | DIASTOLIC BLOOD PRESSURE: 55 MMHG | SYSTOLIC BLOOD PRESSURE: 120 MMHG

## 2020-07-17 LAB
ALBUMIN SERPL ELPH-MCNC: 2.3 G/DL — LOW (ref 3.3–5)
ALP SERPL-CCNC: 132 U/L — HIGH (ref 40–120)
ALT FLD-CCNC: 116 U/L — HIGH (ref 10–45)
ANION GAP SERPL CALC-SCNC: 9 MMOL/L — SIGNIFICANT CHANGE UP (ref 5–17)
AST SERPL-CCNC: 52 U/L — HIGH (ref 10–40)
BASOPHILS # BLD AUTO: 0 K/UL — SIGNIFICANT CHANGE UP (ref 0–0.2)
BASOPHILS NFR BLD AUTO: 0 % — SIGNIFICANT CHANGE UP (ref 0–2)
BILIRUB SERPL-MCNC: 0.5 MG/DL — SIGNIFICANT CHANGE UP (ref 0.2–1.2)
BUN SERPL-MCNC: 34 MG/DL — HIGH (ref 7–23)
CALCIUM SERPL-MCNC: 8.5 MG/DL — SIGNIFICANT CHANGE UP (ref 8.4–10.5)
CHLORIDE SERPL-SCNC: 103 MMOL/L — SIGNIFICANT CHANGE UP (ref 96–108)
CO2 SERPL-SCNC: 25 MMOL/L — SIGNIFICANT CHANGE UP (ref 22–31)
CREAT SERPL-MCNC: 1.19 MG/DL — SIGNIFICANT CHANGE UP (ref 0.5–1.3)
CULTURE RESULTS: SIGNIFICANT CHANGE UP
EOSINOPHIL # BLD AUTO: 0 K/UL — SIGNIFICANT CHANGE UP (ref 0–0.5)
EOSINOPHIL NFR BLD AUTO: 0 % — SIGNIFICANT CHANGE UP (ref 0–6)
GLUCOSE SERPL-MCNC: 226 MG/DL — HIGH (ref 70–99)
HCT VFR BLD CALC: 33.7 % — LOW (ref 39–50)
HGB BLD-MCNC: 11.2 G/DL — LOW (ref 13–17)
LYMPHOCYTES # BLD AUTO: 0.32 K/UL — LOW (ref 1–3.3)
LYMPHOCYTES # BLD AUTO: 2.6 % — LOW (ref 13–44)
MAGNESIUM SERPL-MCNC: 2.1 MG/DL — SIGNIFICANT CHANGE UP (ref 1.6–2.6)
MCHC RBC-ENTMCNC: 29.2 PG — SIGNIFICANT CHANGE UP (ref 27–34)
MCHC RBC-ENTMCNC: 33.2 GM/DL — SIGNIFICANT CHANGE UP (ref 32–36)
MCV RBC AUTO: 88 FL — SIGNIFICANT CHANGE UP (ref 80–100)
MONOCYTES # BLD AUTO: 0.53 K/UL — SIGNIFICANT CHANGE UP (ref 0–0.9)
MONOCYTES NFR BLD AUTO: 4.3 % — SIGNIFICANT CHANGE UP (ref 2–14)
NEUTROPHILS # BLD AUTO: 11.1 K/UL — HIGH (ref 1.8–7.4)
NEUTROPHILS NFR BLD AUTO: 89.6 % — HIGH (ref 43–77)
PHOSPHATE SERPL-MCNC: 3.3 MG/DL — SIGNIFICANT CHANGE UP (ref 2.5–4.5)
PLATELET # BLD AUTO: 226 K/UL — SIGNIFICANT CHANGE UP (ref 150–400)
POTASSIUM SERPL-MCNC: 4.1 MMOL/L — SIGNIFICANT CHANGE UP (ref 3.5–5.3)
POTASSIUM SERPL-SCNC: 4.1 MMOL/L — SIGNIFICANT CHANGE UP (ref 3.5–5.3)
PROT SERPL-MCNC: 5.4 G/DL — LOW (ref 6–8.3)
RBC # BLD: 3.83 M/UL — LOW (ref 4.2–5.8)
RBC # FLD: 15 % — HIGH (ref 10.3–14.5)
SODIUM SERPL-SCNC: 137 MMOL/L — SIGNIFICANT CHANGE UP (ref 135–145)
SPECIMEN SOURCE: SIGNIFICANT CHANGE UP
WBC # BLD: 12.39 K/UL — HIGH (ref 3.8–10.5)
WBC # FLD AUTO: 12.39 K/UL — HIGH (ref 3.8–10.5)

## 2020-07-17 PROCEDURE — 87186 SC STD MICRODIL/AGAR DIL: CPT

## 2020-07-17 PROCEDURE — 71045 X-RAY EXAM CHEST 1 VIEW: CPT

## 2020-07-17 PROCEDURE — 82306 VITAMIN D 25 HYDROXY: CPT

## 2020-07-17 PROCEDURE — 86769 SARS-COV-2 COVID-19 ANTIBODY: CPT

## 2020-07-17 PROCEDURE — 87086 URINE CULTURE/COLONY COUNT: CPT

## 2020-07-17 PROCEDURE — 82550 ASSAY OF CK (CPK): CPT

## 2020-07-17 PROCEDURE — 80053 COMPREHEN METABOLIC PANEL: CPT

## 2020-07-17 PROCEDURE — 93005 ELECTROCARDIOGRAM TRACING: CPT | Mod: XU

## 2020-07-17 PROCEDURE — 82962 GLUCOSE BLOOD TEST: CPT

## 2020-07-17 PROCEDURE — 82565 ASSAY OF CREATININE: CPT

## 2020-07-17 PROCEDURE — 97116 GAIT TRAINING THERAPY: CPT

## 2020-07-17 PROCEDURE — 83615 LACTATE (LD) (LDH) ENZYME: CPT

## 2020-07-17 PROCEDURE — 85730 THROMBOPLASTIN TIME PARTIAL: CPT

## 2020-07-17 PROCEDURE — 97162 PT EVAL MOD COMPLEX 30 MIN: CPT

## 2020-07-17 PROCEDURE — 87150 DNA/RNA AMPLIFIED PROBE: CPT

## 2020-07-17 PROCEDURE — 82803 BLOOD GASES ANY COMBINATION: CPT

## 2020-07-17 PROCEDURE — 84132 ASSAY OF SERUM POTASSIUM: CPT

## 2020-07-17 PROCEDURE — 70450 CT HEAD/BRAIN W/O DYE: CPT

## 2020-07-17 PROCEDURE — 96365 THER/PROPH/DIAG IV INF INIT: CPT | Mod: XU

## 2020-07-17 PROCEDURE — 82947 ASSAY GLUCOSE BLOOD QUANT: CPT

## 2020-07-17 PROCEDURE — 73030 X-RAY EXAM OF SHOULDER: CPT

## 2020-07-17 PROCEDURE — 72125 CT NECK SPINE W/O DYE: CPT

## 2020-07-17 PROCEDURE — 87040 BLOOD CULTURE FOR BACTERIA: CPT

## 2020-07-17 PROCEDURE — 92526 ORAL FUNCTION THERAPY: CPT

## 2020-07-17 PROCEDURE — 96367 TX/PROPH/DG ADDL SEQ IV INF: CPT | Mod: XU

## 2020-07-17 PROCEDURE — U0003: CPT

## 2020-07-17 PROCEDURE — 81001 URINALYSIS AUTO W/SCOPE: CPT

## 2020-07-17 PROCEDURE — 80048 BASIC METABOLIC PNL TOTAL CA: CPT

## 2020-07-17 PROCEDURE — 84295 ASSAY OF SERUM SODIUM: CPT

## 2020-07-17 PROCEDURE — 82330 ASSAY OF CALCIUM: CPT

## 2020-07-17 PROCEDURE — 84100 ASSAY OF PHOSPHORUS: CPT

## 2020-07-17 PROCEDURE — 85610 PROTHROMBIN TIME: CPT

## 2020-07-17 PROCEDURE — 83605 ASSAY OF LACTIC ACID: CPT

## 2020-07-17 PROCEDURE — 99239 HOSP IP/OBS DSCHRG MGMT >30: CPT

## 2020-07-17 PROCEDURE — 97530 THERAPEUTIC ACTIVITIES: CPT

## 2020-07-17 PROCEDURE — 51701 INSERT BLADDER CATHETER: CPT

## 2020-07-17 PROCEDURE — 85014 HEMATOCRIT: CPT

## 2020-07-17 PROCEDURE — 99285 EMERGENCY DEPT VISIT HI MDM: CPT | Mod: 25

## 2020-07-17 PROCEDURE — 84443 ASSAY THYROID STIM HORMONE: CPT

## 2020-07-17 PROCEDURE — 92610 EVALUATE SWALLOWING FUNCTION: CPT

## 2020-07-17 PROCEDURE — 85027 COMPLETE CBC AUTOMATED: CPT

## 2020-07-17 PROCEDURE — 83735 ASSAY OF MAGNESIUM: CPT

## 2020-07-17 PROCEDURE — 82435 ASSAY OF BLOOD CHLORIDE: CPT

## 2020-07-17 PROCEDURE — 72170 X-RAY EXAM OF PELVIS: CPT

## 2020-07-17 RX ADMIN — Medication 12.5 MILLIGRAM(S): at 05:53

## 2020-07-17 RX ADMIN — Medication 200 MILLIGRAM(S): at 05:53

## 2020-07-17 NOTE — DISCHARGE NOTE NURSING/CASE MANAGEMENT/SOCIAL WORK - PATIENT PORTAL LINK FT
You can access the FollowMyHealth Patient Portal offered by [facility] by registering at the following website: [email or web address]. By joining [facility]’s FollowMyHealth portal, you will also be able to view your health information using other applications (apps) compatible with our system.

## 2020-07-17 NOTE — PROGRESS NOTE ADULT - REASON FOR ADMISSION
weakness, fall

## 2020-07-17 NOTE — PROGRESS NOTE ADULT - PROBLEM SELECTOR PLAN 2
- Patient has been placed on regular diet per patient's request, knowing the risk of possible aspiration  - Noted to have intermittent SOB while speaking  - S/p ceftriaxone, currently being treated with Vantin  - Monitor for VS

## 2020-07-17 NOTE — PROGRESS NOTE ADULT - ASSESSMENT
89 year old man, history of afib on eliquis s/p pacemaker, CAD s/p five stents, HTN, HLD, BPH, who presents after sepsis 2/2 UTI and likely rhabdomyolysis after mechanical fall. Clinically improved, afebrile with decreasing WBC and CK, today with elevated WBC. 89 year old man, history of afib on eliquis s/p pacemaker, CAD s/p five stents, HTN, HLD, BPH, who presents after sepsis 2/2 UTI and likely rhabdomyolysis after mechanical fall. Clinically improved, afebrile with decreasing WBC and CK. On PO Abx, WBC not significantly increased from yesterday.

## 2020-07-17 NOTE — PROGRESS NOTE ADULT - ATTENDING COMMENTS
Metabolic encephalopathy due to UTI with bacteremia- pansensitive E coli with negative repeat blood cultures  Continue PO Vantin to complete 7 days from negative blood cultures  AF with RVR- resolved- cont low dose beta-blocker  D/C to [facility] today- 40 minutes spent discharging the patient

## 2020-07-17 NOTE — PROGRESS NOTE ADULT - SUBJECTIVE AND OBJECTIVE BOX
*************************************  Daniella Navarro M.D. | PGY-1  Department of Internal Medicine  [phone] ([facility]) | [id number] ([facility])  *************************************      Patient is a 89y old  Male who presents with a chief complaint of Sepsis secondary to UTI, rhabdomyolysis (16 Jul 2020 12:50)      SUBJECTIVE / OVERNIGHT EVENTS:    MEDICATIONS  (STANDING):  apixaban 2.5 milliGRAM(s) Oral every 12 hours  aspirin  chewable 81 milliGRAM(s) Oral daily  cefpodoxime 200 milliGRAM(s) Oral every 12 hours  cyanocobalamin 1000 MICROGram(s) Oral daily  folic acid 1 milliGRAM(s) Oral daily  lactated ringers. 1000 milliLiter(s) (150 mL/Hr) IV Continuous <Continuous>  lidocaine   Patch 1 Patch Transdermal daily  metoprolol tartrate 12.5 milliGRAM(s) Oral every 12 hours  polyethylene glycol 3350 17 Gram(s) Oral two times a day  senna 2 Tablet(s) Oral at bedtime  sodium chloride 0.9% Bolus 1000 milliLiter(s) IV Bolus once  tamsulosin 0.8 milliGRAM(s) Oral at bedtime    MEDICATIONS  (PRN):  acetaminophen   Tablet .. 1000 milliGRAM(s) Oral every 8 hours PRN Mild Pain (1 - 3), Moderate Pain (4 - 6)      CAPILLARY BLOOD GLUCOSE        I&O's Summary    16 Jul 2020 07:01  -  17 Jul 2020 07:00  --------------------------------------------------------  IN: 740 mL / OUT: 0 mL / NET: 740 mL        Vital Signs Last 24 Hrs  T(C): 36.9 (17 Jul 2020 05:50), Max: 37.8 (16 Jul 2020 10:10)  T(F): 98.4 (17 Jul 2020 05:50), Max: 100 (16 Jul 2020 10:10)  HR: 77 (17 Jul 2020 05:50) (77 - 97)  BP: 155/63 (17 Jul 2020 05:50) (126/70 - 155/63)  BP(mean): --  RR: 18 (17 Jul 2020 05:50) (18 - 18)  SpO2: 94% (17 Jul 2020 05:50) (93% - 95%)    PHYSICAL EXAM:  GENERAL: NAD, well-developed, well-nourished  HEAD: Atraumatic, Normocephalic  EYES: EOMI, PERRLA, conjunctiva and sclera clear  NECK: Supple, No JVD  CHEST/LUNG: Clear to auscultation bilaterally; No wheezes or crackles  HEART: Normal S1/S2; Regular rate and rhythm; No murmurs, rubs, or gallops  ABDOMEN: Soft, Nontender, Nondistended; Bowel sounds present  EXTREMITIES: 2+ Peripheral Pulses; No clubbing, cyanosis, or edema  PSYCH: A&Ox3  NEUROLOGY: no focal neurologic deficit  SKIN: No rashes or lesions    LABS:                        11.2   12.39 )-----------( 226      ( 17 Jul 2020 06:02 )             33.7      07-17    137  |  103  |  34<H>  ----------------------------<  226<H>  4.1   |  25  |  1.19    Ca    8.5      17 Jul 2020 06:02  Phos  3.3     07-17  Mg     2.1     07-17    TPro  5.4<L>  /  Alb  2.3<L>  /  TBili  0.5  /  DBili  x   /  AST  52<H>  /  ALT  116<H>  /  AlkPhos  132<H>  07-17              RADIOLOGY & ADDITIONAL TESTS:    Imaging Personally Reviewed:    Consultant(s) Notes Reviewed:      Care Discussed with Consultants/Other Providers: *************************************  Daniella Navarro M.D. | PGY-1  Department of Internal Medicine  [phone] ([facility]) | [id number] ([facility])  *************************************      SUBJECTIVE / OVERNIGHT EVENTS:  No acute events overnight. Patient had 1 large soft bowel movement this morning. Patient complained of a "rough transfer" complaining of mild back pain but overall feels well.     MEDICATIONS  (STANDING):  apixaban 2.5 milliGRAM(s) Oral every 12 hours  aspirin  chewable 81 milliGRAM(s) Oral daily  cefpodoxime 200 milliGRAM(s) Oral every 12 hours  cyanocobalamin 1000 MICROGram(s) Oral daily  folic acid 1 milliGRAM(s) Oral daily  lactated ringers. 1000 milliLiter(s) (150 mL/Hr) IV Continuous <Continuous>  lidocaine   Patch 1 Patch Transdermal daily  metoprolol tartrate 12.5 milliGRAM(s) Oral every 12 hours  polyethylene glycol 3350 17 Gram(s) Oral two times a day  senna 2 Tablet(s) Oral at bedtime  sodium chloride 0.9% Bolus 1000 milliLiter(s) IV Bolus once  tamsulosin 0.8 milliGRAM(s) Oral at bedtime    MEDICATIONS  (PRN):  acetaminophen   Tablet .. 1000 milliGRAM(s) Oral every 8 hours PRN Mild Pain (1 - 3), Moderate Pain (4 - 6)      CAPILLARY BLOOD GLUCOSE        I&O's Summary    16 Jul 2020 07:01  -  17 Jul 2020 07:00  --------------------------------------------------------  IN: 840 mL / OUT: 0 mL / NET: 840 mL    17 Jul 2020 07:01  -  17 Jul 2020 11:16  --------------------------------------------------------  IN: 250 mL / OUT: 0 mL / NET: 250 mL        Vital Signs Last 24 Hrs  T(C): 36.9 (17 Jul 2020 10:45), Max: 37 (16 Jul 2020 14:24)  T(F): 98.5 (17 Jul 2020 10:45), Max: 98.6 (16 Jul 2020 14:24)  HR: 79 (17 Jul 2020 10:45) (77 - 97)  BP: 153/66 (17 Jul 2020 10:45) (126/70 - 155/63)  BP(mean): --  RR: 18 (17 Jul 2020 10:45) (18 - 18)  SpO2: 94% (17 Jul 2020 10:45) (94% - 95%)    PHYSICAL EXAM:  GENERAL: NAD, well-developed, well-nourished  HEAD: Atraumatic, Normocephalic  EYES: EOMI, PERRLA, conjunctiva and sclera clear  NECK: Supple, No JVD  CHEST/LUNG: Clear to auscultation bilaterally; No wheezes or crackles  HEART: Normal S1/S2; Regular rate and rhythm; No murmurs, rubs, or gallops  ABDOMEN: Soft, Nontender, Nondistended; Bowel sounds present  EXTREMITIES: 2+ Peripheral Pulses; No clubbing, cyanosis, or edema. Patient's left arm appeared mildly swollen but patient reported no pain. 5Ps negative.  PSYCH: A&Ox3  NEUROLOGY: no focal neurologic deficit  SKIN: No rashes or lesions    LABS:                        11.2   12.39 )-----------( 226      ( 17 Jul 2020 06:02 )             33.7      07-17    137  |  103  |  34<H>  ----------------------------<  226<H>  4.1   |  25  |  1.19    Ca    8.5      17 Jul 2020 06:02  Phos  3.3     07-17  Mg     2.1     07-17    TPro  5.4<L>  /  Alb  2.3<L>  /  TBili  0.5  /  DBili  x   /  AST  52<H>  /  ALT  116<H>  /  AlkPhos  132<H>  07-17              RADIOLOGY & ADDITIONAL TESTS:    Imaging Personally Reviewed:    Consultant(s) Notes Reviewed:      Care Discussed with Consultants/Other Providers:

## 2020-07-17 NOTE — PROGRESS NOTE ADULT - PROBLEM SELECTOR PLAN 4
-highly likely given prolonged time lying on floor, large blood on UA despite few RBCs, significant AST elevation.    -Creatine Kinase, Serum: 2838 on admission   -CK continues to trending down. Currently normalized.  -zocor held inpatient

## 2020-07-17 NOTE — PROGRESS NOTE ADULT - PROBLEM SELECTOR PLAN 6
-elevated myelocytes, metamyelocytes, and promyelocytes with elevated WBC concerning for MDS  - Diff today normalized.   - Appreciate heme recs. Likely reactive, improved with Abx. No further intervention.

## 2020-07-17 NOTE — MEDICAL STUDENT PROGRESS NOTE(EDUCATION) - SUBJECTIVE AND OBJECTIVE BOX
Patient is a 89y old  Male who presents with a chief complaint of weakness, fall (17 Jul 2020 07:50)      SUBJECTIVE / OVERNIGHT EVENTS:  No acute events overnight. Patient had 1 large soft bowel movement this morning. Patient complained of a "rough transfer" complaining of mild back pain but overall feels well.     MEDICATIONS  (STANDING):  apixaban 2.5 milliGRAM(s) Oral every 12 hours  aspirin  chewable 81 milliGRAM(s) Oral daily  cefpodoxime 200 milliGRAM(s) Oral every 12 hours  cyanocobalamin 1000 MICROGram(s) Oral daily  folic acid 1 milliGRAM(s) Oral daily  lactated ringers. 1000 milliLiter(s) (150 mL/Hr) IV Continuous <Continuous>  lidocaine   Patch 1 Patch Transdermal daily  metoprolol tartrate 12.5 milliGRAM(s) Oral every 12 hours  polyethylene glycol 3350 17 Gram(s) Oral two times a day  senna 2 Tablet(s) Oral at bedtime  sodium chloride 0.9% Bolus 1000 milliLiter(s) IV Bolus once  tamsulosin 0.8 milliGRAM(s) Oral at bedtime    MEDICATIONS  (PRN):  acetaminophen   Tablet .. 1000 milliGRAM(s) Oral every 8 hours PRN Mild Pain (1 - 3), Moderate Pain (4 - 6)      CAPILLARY BLOOD GLUCOSE        I&O's Summary    16 Jul 2020 07:01  -  17 Jul 2020 07:00  --------------------------------------------------------  IN: 840 mL / OUT: 0 mL / NET: 840 mL    17 Jul 2020 07:01  -  17 Jul 2020 11:16  --------------------------------------------------------  IN: 250 mL / OUT: 0 mL / NET: 250 mL        Vital Signs Last 24 Hrs  T(C): 36.9 (17 Jul 2020 10:45), Max: 37 (16 Jul 2020 14:24)  T(F): 98.5 (17 Jul 2020 10:45), Max: 98.6 (16 Jul 2020 14:24)  HR: 79 (17 Jul 2020 10:45) (77 - 97)  BP: 153/66 (17 Jul 2020 10:45) (126/70 - 155/63)  BP(mean): --  RR: 18 (17 Jul 2020 10:45) (18 - 18)  SpO2: 94% (17 Jul 2020 10:45) (94% - 95%)    PHYSICAL EXAM:  GENERAL: NAD, well-developed, well-nourished  HEAD: Atraumatic, Normocephalic  EYES: EOMI, PERRLA, conjunctiva and sclera clear  NECK: Supple, No JVD  CHEST/LUNG: Clear to auscultation bilaterally; No wheezes or crackles  HEART: Normal S1/S2; Regular rate and rhythm; No murmurs, rubs, or gallops  ABDOMEN: Soft, Nontender, Nondistended; Bowel sounds present  EXTREMITIES: 2+ Peripheral Pulses; No clubbing, cyanosis, or edema. Patient's left arm appeared mildly swollen but patient reported no pain. 5Ps negative.  PSYCH: A&Ox3  NEUROLOGY: no focal neurologic deficit  SKIN: No rashes or lesions    LABS:                        11.2   12.39 )-----------( 226      ( 17 Jul 2020 06:02 )             33.7      07-17    137  |  103  |  34<H>  ----------------------------<  226<H>  4.1   |  25  |  1.19    Ca    8.5      17 Jul 2020 06:02  Phos  3.3     07-17  Mg     2.1     07-17    TPro  5.4<L>  /  Alb  2.3<L>  /  TBili  0.5  /  DBili  x   /  AST  52<H>  /  ALT  116<H>  /  AlkPhos  132<H>  07-17              RADIOLOGY & ADDITIONAL TESTS:    Imaging Personally Reviewed:    Consultant(s) Notes Reviewed:      Care Discussed with Consultants/Other Providers:

## 2020-07-17 NOTE — PROGRESS NOTE ADULT - PROBLEM SELECTOR PLAN 1
-UA+ in setting of fever, leukocytosis increased lactate  -s/p broad spectrum vanc and cefepime in ED  - Blood cx growing E coli, pan-sensitive  - Repeat blood cx 7/13 showing NGTD. Switch to PO cefpodoxime (Vantin) 200mg po bid. -UA+ in setting of fever, leukocytosis increased lactate  -s/p broad spectrum vanc and cefepime in ED  - Blood cx growing E coli, pan-sensitive  - Repeat blood cx 7/13 showing NGTD. Switch to PO cefpodoxime (Vantin) 200mg po bid x7d (till 7/20)

## 2020-07-17 NOTE — PROGRESS NOTE ADULT - I WAS PHYSICALLY PRESENT FOR THE KEY PORTIONS OF THE EVALUATION AND MANAGEMENT (E/M) SERVICE PROVIDED.  I AGREE WITH THE ABOVE HISTORY, PHYSICAL, AND PLAN WHICH I HAVE REVIEWED AND EDITED WHERE APPROPRIATE
supervision
Statement Selected

## 2020-07-17 NOTE — PROGRESS NOTE ADULT - PROBLEM SELECTOR PLAN 10
Plan to discharge to [facility]. Daughter requests Friday transfer at earliest.   Transitions of Care Status:  1.  Name of PCP:  2.  PCP Contacted on Admission: [ ] Y    [ ] N    3.  PCP contacted at Discharge: [ ] Y    [ ] N    [ ] N/A  4.  Post-Discharge Appointment Date and Location:  5.  Summary of Handoff given to PCP:

## 2020-07-17 NOTE — MEDICAL STUDENT PROGRESS NOTE(EDUCATION) - NS MD HP STUD ASPLAN PLAN FT
Problem/Plan - 1:  ·  Problem: Urinary tract infection.  Plan: -UA+ in setting of fever, leukocytosis increased lactate  -s/p broad spectrum vanc and cefepime in ED  - Blood cx growing E coli, pan-sensitive  - Repeat blood cx 7/13 showing NGTD. Switch to PO cefpodoxime (Vantin) 200mg po bid.      Problem/Plan - 2:  ·  Problem: Shortness of breath.  Plan: - Patient has been placed on regular diet per patient's request, knowing the risk of possible aspiration  - Noted to have intermittent SOB while speaking  - S/p ceftriaxone, currently being treated with Vantin  - Monitor for VS.      Problem/Plan - 3:  ·  Problem: Sepsis.  Plan: -leukocytosis 30k, febrile to 101, RR 20-22 in the setting of UTI, on admission, blood cx growing E.coli  -treat UTI as above  -trend WBC  -trend Cr, replete fluids as necessary  - Improved.      Problem/Plan - 4:  ·  Problem: Rhabdomyolysis.  Plan: -highly likely given prolonged time lying on floor, large blood on UA despite few RBCs, significant AST elevation.    -Creatine Kinase, Serum: 2838 on admission   -CK continues to trending down. Currently normalized.  -zocor held inpatient.      Problem/Plan - 5:  ·  Problem: Arm pain, left.  Plan: -cardiac w/u benign  -c/o left arm pain after fall  -XR shoulder 2view shows no fracture/dislocation  -PT consult appreciated  -Tylenol PRN.  - Currently improved.      Problem/Plan - 6:  Problem: Abnormal white blood cell. Plan: -elevated myelocytes, metamyelocytes, and promyelocytes with elevated WBC concerning for MDS  - Diff today normalized.   - Appreciate heme recs. Likely reactive, improved with Abx. No further intervention.     Problem/Plan - 7:  ·  Problem: Atrial fibrillation.  Plan: -on Eliquis (c/w home med) and pacemaker  -continue metoprolol tartrate 12.5mg bid, titrate to 25mg bid as tolerated  -pt sees Dr. Sherman Brian ([place]) for outpt cardio.      Problem/Plan - 8:  ·  Problem: Hypertension.  Plan: -continue to hold amlodipine  -per daughter, pt also takes ramipril 10mg qd. can hold for now.   -Increased flomax.      Problem/Plan - 9:  ·  Problem: Preventative health care.  Plan: -on eliquis for afib  -regular diet   -fall and aspiration precautions  -PT recommends [facility], need to discuss w/daughter.      Problem/Plan - 10:  Problem: Discharge planning issues. Plan; Plan to discharge to [facility]. Daughter requests Friday transfer at earliest.   Transitions of Care Status:  1.  Name of PCP:  2.  PCP Contacted on Admission: [ ] Y    [ ] N    3.  PCP contacted at Discharge: [ ] Y    [ ] N    [ ] N/A  4.  Post-Discharge Appointment Date and Location:  5.  Summary of Handoff given to PCP:

## 2020-07-17 NOTE — PROGRESS NOTE ADULT - PROBLEM SELECTOR PLAN 7
-on Eliquis (c/w home med) and pacemaker  -continue metoprolol tartrate 12.5mg bid, titrate to 25mg bid as tolerated  -pt sees Dr. Sherman Brian ([place]) for outpt cardio

## 2020-07-17 NOTE — PROGRESS NOTE ADULT - PROBLEM SELECTOR PLAN 3
-leukocytosis 30k, febrile to 101, RR 20-22 in the setting of UTI, on admission, blood cx growing E.coli  -treat UTI as above  -trend WBC  -trend Cr, replete fluids as necessary  - Improved

## 2020-07-17 NOTE — PROGRESS NOTE ADULT - PROBLEM SELECTOR PLAN 5
-cardiac w/u benign  -c/o left arm pain after fall  -XR shoulder 2view shows no fracture/dislocation  -PT consult appreciated  -Tylenol PRN.  - Currently improved.

## 2020-07-18 LAB
CULTURE RESULTS: SIGNIFICANT CHANGE UP
CULTURE RESULTS: SIGNIFICANT CHANGE UP
SPECIMEN SOURCE: SIGNIFICANT CHANGE UP
SPECIMEN SOURCE: SIGNIFICANT CHANGE UP

## 2020-07-21 ENCOUNTER — RESULT REVIEW (OUTPATIENT)
Age: 85
End: 2020-07-21

## 2020-07-22 ENCOUNTER — RESULT REVIEW (OUTPATIENT)
Age: 85
End: 2020-07-22

## 2020-07-22 ENCOUNTER — OUTPATIENT (OUTPATIENT)
Dept: OUTPATIENT SERVICES | Facility: HOSPITAL | Age: 85
LOS: 1 days | End: 2020-07-22
Payer: MEDICARE

## 2020-07-22 DIAGNOSIS — Z98.890 OTHER SPECIFIED POSTPROCEDURAL STATES: Chronic | ICD-10-CM

## 2020-07-22 DIAGNOSIS — I82.403 ACUTE EMBOLISM AND THROMBOSIS OF UNSPECIFIED DEEP VEINS OF LOWER EXTREMITY, BILATERAL: ICD-10-CM

## 2020-07-22 PROCEDURE — 93970 EXTREMITY STUDY: CPT | Mod: 26

## 2020-07-22 PROCEDURE — 93970 EXTREMITY STUDY: CPT

## 2020-07-24 ENCOUNTER — OUTPATIENT (OUTPATIENT)
Dept: OUTPATIENT SERVICES | Facility: HOSPITAL | Age: 85
LOS: 1 days | End: 2020-07-24
Payer: MEDICARE

## 2020-07-24 ENCOUNTER — RESULT REVIEW (OUTPATIENT)
Age: 85
End: 2020-07-24

## 2020-07-24 ENCOUNTER — APPOINTMENT (OUTPATIENT)
Dept: ULTRASOUND IMAGING | Facility: HOSPITAL | Age: 85
End: 2020-07-24
Payer: MEDICARE

## 2020-07-24 DIAGNOSIS — Z98.890 OTHER SPECIFIED POSTPROCEDURAL STATES: Chronic | ICD-10-CM

## 2020-07-24 DIAGNOSIS — Z00.8 ENCOUNTER FOR OTHER GENERAL EXAMINATION: ICD-10-CM

## 2020-07-24 DIAGNOSIS — I82.403 ACUTE EMBOLISM AND THROMBOSIS OF UNSPECIFIED DEEP VEINS OF LOWER EXTREMITY, BILATERAL: ICD-10-CM

## 2020-07-24 PROCEDURE — 76770 US EXAM ABDO BACK WALL COMP: CPT | Mod: 26

## 2020-07-24 PROCEDURE — 76770 US EXAM ABDO BACK WALL COMP: CPT

## 2020-08-06 ENCOUNTER — INPATIENT (INPATIENT)
Facility: HOSPITAL | Age: 85
LOS: 7 days | Discharge: NURSING, REHAB OR LONG-TERM CARE | DRG: 871 | End: 2020-08-14
Attending: HOSPITALIST | Admitting: HOSPITALIST
Payer: MEDICARE

## 2020-08-06 VITALS
DIASTOLIC BLOOD PRESSURE: 59 MMHG | RESPIRATION RATE: 18 BRPM | OXYGEN SATURATION: 93 % | SYSTOLIC BLOOD PRESSURE: 119 MMHG | HEART RATE: 90 BPM

## 2020-08-06 DIAGNOSIS — A41.9 SEPSIS, UNSPECIFIED ORGANISM: ICD-10-CM

## 2020-08-06 DIAGNOSIS — Z98.890 OTHER SPECIFIED POSTPROCEDURAL STATES: Chronic | ICD-10-CM

## 2020-08-06 LAB
ALBUMIN SERPL ELPH-MCNC: 3.2 G/DL — LOW (ref 3.3–5)
ALP SERPL-CCNC: 89 U/L — SIGNIFICANT CHANGE UP (ref 40–120)
ALT FLD-CCNC: 29 U/L — SIGNIFICANT CHANGE UP (ref 10–45)
ANION GAP SERPL CALC-SCNC: 11 MMOL/L — SIGNIFICANT CHANGE UP (ref 5–17)
APPEARANCE UR: ABNORMAL
APTT BLD: 26.7 SEC — LOW (ref 27.5–35.5)
AST SERPL-CCNC: 20 U/L — SIGNIFICANT CHANGE UP (ref 10–40)
BACTERIA # UR AUTO: ABNORMAL
BASE EXCESS BLDV CALC-SCNC: 7.8 MMOL/L — HIGH (ref -2–2)
BASOPHILS # BLD AUTO: 0.07 K/UL — SIGNIFICANT CHANGE UP (ref 0–0.2)
BASOPHILS NFR BLD AUTO: 0.3 % — SIGNIFICANT CHANGE UP (ref 0–2)
BILIRUB SERPL-MCNC: 0.3 MG/DL — SIGNIFICANT CHANGE UP (ref 0.2–1.2)
BILIRUB UR-MCNC: NEGATIVE — SIGNIFICANT CHANGE UP
BUN SERPL-MCNC: 25 MG/DL — HIGH (ref 7–23)
CA-I SERPL-SCNC: 1.2 MMOL/L — SIGNIFICANT CHANGE UP (ref 1.12–1.3)
CALCIUM SERPL-MCNC: 9.7 MG/DL — SIGNIFICANT CHANGE UP (ref 8.4–10.5)
CHLORIDE BLDV-SCNC: 107 MMOL/L — SIGNIFICANT CHANGE UP (ref 96–108)
CHLORIDE SERPL-SCNC: 102 MMOL/L — SIGNIFICANT CHANGE UP (ref 96–108)
CO2 BLDV-SCNC: 35 MMOL/L — HIGH (ref 22–30)
CO2 SERPL-SCNC: 28 MMOL/L — SIGNIFICANT CHANGE UP (ref 22–31)
COLOR SPEC: YELLOW — SIGNIFICANT CHANGE UP
CREAT SERPL-MCNC: 0.96 MG/DL — SIGNIFICANT CHANGE UP (ref 0.5–1.3)
DIFF PNL FLD: NEGATIVE — SIGNIFICANT CHANGE UP
EOSINOPHIL # BLD AUTO: 0.01 K/UL — SIGNIFICANT CHANGE UP (ref 0–0.5)
EOSINOPHIL NFR BLD AUTO: 0 % — SIGNIFICANT CHANGE UP (ref 0–6)
GAS PNL BLDV: 135 MMOL/L — SIGNIFICANT CHANGE UP (ref 135–145)
GAS PNL BLDV: SIGNIFICANT CHANGE UP
GLUCOSE BLDV-MCNC: 157 MG/DL — HIGH (ref 70–99)
GLUCOSE SERPL-MCNC: 168 MG/DL — HIGH (ref 70–99)
GLUCOSE UR QL: NEGATIVE — SIGNIFICANT CHANGE UP
HCO3 BLDV-SCNC: 33 MMOL/L — HIGH (ref 21–29)
HCT VFR BLD CALC: 35 % — LOW (ref 39–50)
HCT VFR BLDA CALC: 28 % — LOW (ref 39–50)
HGB BLD CALC-MCNC: 9.2 G/DL — LOW (ref 13–17)
HGB BLD-MCNC: 11.1 G/DL — LOW (ref 13–17)
HYALINE CASTS # UR AUTO: 2 /LPF — SIGNIFICANT CHANGE UP (ref 0–2)
IMM GRANULOCYTES NFR BLD AUTO: 0.7 % — SIGNIFICANT CHANGE UP (ref 0–1.5)
INR BLD: 1.29 RATIO — HIGH (ref 0.88–1.16)
KETONES UR-MCNC: NEGATIVE — SIGNIFICANT CHANGE UP
LACTATE BLDV-MCNC: 1.5 MMOL/L — SIGNIFICANT CHANGE UP (ref 0.7–2)
LEUKOCYTE ESTERASE UR-ACNC: ABNORMAL
LYMPHOCYTES # BLD AUTO: 0.86 K/UL — LOW (ref 1–3.3)
LYMPHOCYTES # BLD AUTO: 3.7 % — LOW (ref 13–44)
MCHC RBC-ENTMCNC: 28.5 PG — SIGNIFICANT CHANGE UP (ref 27–34)
MCHC RBC-ENTMCNC: 31.7 GM/DL — LOW (ref 32–36)
MCV RBC AUTO: 90 FL — SIGNIFICANT CHANGE UP (ref 80–100)
MONOCYTES # BLD AUTO: 1.12 K/UL — HIGH (ref 0–0.9)
MONOCYTES NFR BLD AUTO: 4.8 % — SIGNIFICANT CHANGE UP (ref 2–14)
NEUTROPHILS # BLD AUTO: 20.95 K/UL — HIGH (ref 1.8–7.4)
NEUTROPHILS NFR BLD AUTO: 90.5 % — HIGH (ref 43–77)
NITRITE UR-MCNC: POSITIVE
NRBC # BLD: 0 /100 WBCS — SIGNIFICANT CHANGE UP (ref 0–0)
PCO2 BLDV: 52 MMHG — HIGH (ref 35–50)
PH BLDV: 7.42 — SIGNIFICANT CHANGE UP (ref 7.35–7.45)
PH UR: 6.5 — SIGNIFICANT CHANGE UP (ref 5–8)
PLATELET # BLD AUTO: 274 K/UL — SIGNIFICANT CHANGE UP (ref 150–400)
PO2 BLDV: 31 MMHG — SIGNIFICANT CHANGE UP (ref 25–45)
POTASSIUM BLDV-SCNC: 4.5 MMOL/L — SIGNIFICANT CHANGE UP (ref 3.5–5.3)
POTASSIUM SERPL-MCNC: 4.8 MMOL/L — SIGNIFICANT CHANGE UP (ref 3.5–5.3)
POTASSIUM SERPL-SCNC: 4.8 MMOL/L — SIGNIFICANT CHANGE UP (ref 3.5–5.3)
PROT SERPL-MCNC: 6.5 G/DL — SIGNIFICANT CHANGE UP (ref 6–8.3)
PROT UR-MCNC: 100 — SIGNIFICANT CHANGE UP
PROTHROM AB SERPL-ACNC: 15.2 SEC — HIGH (ref 10.6–13.6)
RBC # BLD: 3.89 M/UL — LOW (ref 4.2–5.8)
RBC # FLD: 14.1 % — SIGNIFICANT CHANGE UP (ref 10.3–14.5)
RBC CASTS # UR COMP ASSIST: 1 /HPF — SIGNIFICANT CHANGE UP (ref 0–4)
SAO2 % BLDV: 57 % — LOW (ref 67–88)
SARS-COV-2 RNA SPEC QL NAA+PROBE: SIGNIFICANT CHANGE UP
SODIUM SERPL-SCNC: 141 MMOL/L — SIGNIFICANT CHANGE UP (ref 135–145)
SP GR SPEC: 1.01 — SIGNIFICANT CHANGE UP (ref 1.01–1.02)
UROBILINOGEN FLD QL: NEGATIVE — SIGNIFICANT CHANGE UP
WBC # BLD: 23.17 K/UL — HIGH (ref 3.8–10.5)
WBC # FLD AUTO: 23.17 K/UL — HIGH (ref 3.8–10.5)
WBC UR QL: 24 /HPF — HIGH (ref 0–5)

## 2020-08-06 PROCEDURE — 99291 CRITICAL CARE FIRST HOUR: CPT | Mod: CS,GC

## 2020-08-06 PROCEDURE — 71045 X-RAY EXAM CHEST 1 VIEW: CPT | Mod: 26

## 2020-08-06 PROCEDURE — 93010 ELECTROCARDIOGRAM REPORT: CPT | Mod: GC

## 2020-08-06 RX ORDER — CEFEPIME 1 G/1
2000 INJECTION, POWDER, FOR SOLUTION INTRAMUSCULAR; INTRAVENOUS ONCE
Refills: 0 | Status: COMPLETED | OUTPATIENT
Start: 2020-08-06 | End: 2020-08-06

## 2020-08-06 RX ORDER — L.ACIDOPH/B.ANIMALIS/B.LONGUM 15B CELL
1 CAPSULE ORAL
Qty: 0 | Refills: 0 | DISCHARGE

## 2020-08-06 RX ORDER — VANCOMYCIN HCL 1 G
1000 VIAL (EA) INTRAVENOUS ONCE
Refills: 0 | Status: COMPLETED | OUTPATIENT
Start: 2020-08-06 | End: 2020-08-06

## 2020-08-06 RX ORDER — ACETAMINOPHEN 500 MG
650 TABLET ORAL ONCE
Refills: 0 | Status: COMPLETED | OUTPATIENT
Start: 2020-08-06 | End: 2020-08-06

## 2020-08-06 RX ORDER — SODIUM CHLORIDE 9 MG/ML
1000 INJECTION INTRAMUSCULAR; INTRAVENOUS; SUBCUTANEOUS ONCE
Refills: 0 | Status: COMPLETED | OUTPATIENT
Start: 2020-08-06 | End: 2020-08-06

## 2020-08-06 RX ORDER — SIMVASTATIN 20 MG/1
1 TABLET, FILM COATED ORAL
Qty: 0 | Refills: 0 | DISCHARGE

## 2020-08-06 RX ORDER — PREGABALIN 225 MG/1
1 CAPSULE ORAL
Qty: 0 | Refills: 0 | DISCHARGE

## 2020-08-06 RX ADMIN — Medication 650 MILLIGRAM(S): at 18:05

## 2020-08-06 RX ADMIN — SODIUM CHLORIDE 1000 MILLILITER(S): 9 INJECTION INTRAMUSCULAR; INTRAVENOUS; SUBCUTANEOUS at 18:05

## 2020-08-06 RX ADMIN — SODIUM CHLORIDE 1000 MILLILITER(S): 9 INJECTION INTRAMUSCULAR; INTRAVENOUS; SUBCUTANEOUS at 18:43

## 2020-08-06 RX ADMIN — CEFEPIME 2000 MILLIGRAM(S): 1 INJECTION, POWDER, FOR SOLUTION INTRAMUSCULAR; INTRAVENOUS at 18:27

## 2020-08-06 RX ADMIN — Medication 250 MILLIGRAM(S): at 18:27

## 2020-08-06 RX ADMIN — CEFEPIME 100 MILLIGRAM(S): 1 INJECTION, POWDER, FOR SOLUTION INTRAMUSCULAR; INTRAVENOUS at 18:05

## 2020-08-06 NOTE — ED ADULT NURSE NOTE - OBJECTIVE STATEMENT
Pt on stretcher, alert and oriented x 1.  Pt BIBA from [facility] while being treated for urosepsis, poor PO intake and increase temp.  Pt unable to answer questions other than his name.  Pt respirations even and unlabored.  Abdomen soft, non tender.  Skin hot, dry, and appropriate color for age and race.  Pt has pressure ulcer stage 2 on sacrum.  Attending notified. Pt on stretcher, alert and oriented x 1.  Pt BIBA from [facility] while being treated for urosepsis, poor PO intake and increase temp.  Pt unable to answer questions other than his name.  Pt respirations even and unlabored.  Abdomen soft, non tender.  Skin hot, dry, and appropriate color for age and race.  Pt has pressure ulcer stage 2 on sacrum.  Attending notified.  Code sepsis workup in progress.

## 2020-08-06 NOTE — ED PROVIDER NOTE - PHYSICAL EXAMINATION
GENERAL: Awake, alert, NAD  HEENT: NC/AT, dry mucous membranes  LUNGS: CTAB, no wheezes or crackles   CARDIAC: RRR, no m/r/g  ABDOMEN: Soft, normal BS, non tender, non distended, no rebound, no guarding  EXT: No edema, no calf tenderness, 2+ DP pulses bilaterally, no deformities.  NEURO: A&Ox1. Moving all extremities.  SKIN: Warm and dry. No rash.  PSYCH: Normal affect.

## 2020-08-06 NOTE — ED PROVIDER NOTE - CLINICAL SUMMARY MEDICAL DECISION MAKING FREE TEXT BOX
4 y/o male with hx of afib on eliquis, CAD s/p five stents, HTN, HLD, presents to the ED from [facility] rehab with fever. Patient febrile to 102, mildly hypoxic to 95% on RA. Vitals otherwise stable. Patient meets sepsis criteria given fever and leukocytosis. Will cover with broad spectrum abx, fluids, check labs and CXR. Likely recurrent UTI. Reassess following labs and imaging. Fred Sibley DO

## 2020-08-06 NOTE — ED PROVIDER NOTE - PROGRESS NOTE DETAILS
Attending MD Irizarry. Pt's labs/urine etc c/w UTI with sepsis. Pt received broad spectrum abxs.  Dr. Sibley has spoken with pt's daughter who makes his healthcare decisions in attempt to ascertain goals of care however daughter states she doesn't believe in documenting DNR/DNI but would probably not want him intubated. Of note, pt is not DNR/DNI at this time.  Would strongly recommend reeducation of pt's daughter re: goals of care not being a blanket permission to kill her father as this is daughter's concern.  Pt is extremely elderly with poor baseline.  May be beneficial to involve palliative inpt.  Pt admitted to medicine in hemodynamically stable condition at this time.

## 2020-08-06 NOTE — ED PROVIDER NOTE - CRITICAL CARE PROVIDED
conducted a detailed discussion of DNR status/consult w/ pt's family directly relating to pts condition/direct patient care (not related to procedure)

## 2020-08-06 NOTE — ED PROVIDER NOTE - ATTENDING CONTRIBUTION TO CARE
Attending MD Irizarry.  Agree with above.  Pt is a 89 yo male with pmhx of afib, pacer, CAD, HTN who was d/c'd 7/16 s/p urosepsis.  Now presents with fever/inc lethargy.  Pt at baseline A & O x 1.  Pt febrile to 102 rectally.  Pt d/c'd back to rehab on cefpodoxime and completed course at that time. Pt has a stage 1-2 sacral decub which is dressed with superficial skin breakdown without surrounding erythema.  Pt altered, noted to be hypoxic to 88% but improves to 95+ on 2L NC.  On review of previous medical records pt noted to require 2L NC for 96% O2 during last admission for urosepsis.  Breath sounds largely unremarkable today with limited cooperation with exam 2/2 confusion/baseline limited mental status.  CXR largely unchanged from previous and non-actionable.  No strong suspicion for COVID-19.  Planned admission for likely UTI, COVID swab to be reflexively sent.  Communication with pt's care team re: use of PPE precautions as pt is mildly hypoxic/tachypneic and likely uro-sepsis however will presume COVID until UTI confirmed/COVID swab results.

## 2020-08-06 NOTE — CHART NOTE - NSCHARTNOTEFT_GEN_A_CORE
Reji Ugarte PGY-3  MAR  Dept. Internal Medicine    TO BE COMPLETED WITHIN 6 HOURS OF INITIAL ASSESSMENT:    For use in patients that have 2 sepsis criteria and new organ dysfunction   •	New or increased oxygen requirement  •	Creatinine >2mg/dL  •	Bilirubin>2mg/dL  •	Platelet <100,00/mm3  •	INR >1.5, PTT>60  •	Lactate >2    If patient persistent hypotension (SBP<90) or any lactate >4 then provider evaluation (Physician/PA/NP) within 30 minutes of bolus completion is required.    Vital Signs Last 24 Hrs  T(C): 36.7 (06 Aug 2020 19:38), Max: 38.9 (06 Aug 2020 17:31)  T(F): 98 (06 Aug 2020 19:38), Max: 102 (06 Aug 2020 17:31)  HR: 81 (06 Aug 2020 19:38) (81 - 99)  BP: 131/52 (06 Aug 2020 19:38) (118/60 - 142/64)  BP(mean): 69 (06 Aug 2020 19:38) (69 - 69)  RR: 20 (06 Aug 2020 19:38) (18 - 26)  SpO2: 99% (06 Aug 2020 19:38) (88% - 99%)  		  LUNGS:  [ x ]Clear bilaterally [  ] Wheeze [  ] Rhonchi [  ] Rales [  ] Crackles; Other:  HEART: [ x ]RRR [  ] No murmur[  ]  Normal S1S2[  ] Tachycardia;  Other:  CAPILLARY REFiLL:  	Fingers: [  ] less than 2 seconds [  ] more than 2 seconds                                           Toes: [  ]  less than 2 seconds [  ] more than 2 seconds   PERIPHERAL PULSES:  Radial: [  ] Palpable  [  ]  non-palpable                                         Dorsalis Pedis: [  ] Palpable  [  ] non-palpable                                         Posterior Tibial: [  ] Palpable  [  ] non-palpable                                          Other:  SKIN:   [  ]  Diaphoretic  [  ]  mottling  [  ]  Cold extremities  [  ]  Warm [  ]  Dry                      Other:    BEDSIDE ULTRASOUND FINDINGS (IF APPLICABLE):    Labs:  06 Aug 2020 18:05    141    |  102    |  25     ----------------------------<  168    4.8     |  28     |  0.96     Ca    9.7        06 Aug 2020 18:05    TPro  6.5    /  Alb  3.2    /  TBili  0.3    /  DBili  x      /  AST  20     /  ALT  29     /  AlkPhos  89     06 Aug 2020 18:05                          11.1   23.17 )-----------( 274      ( 06 Aug 2020 18:05 )             35.0     PT/INR - ( 06 Aug 2020 18:05 )   PT: 15.2 sec;   INR: 1.29 ratio         PTT - ( 06 Aug 2020 18:05 )  PTT:26.7 sec  Lactate:    Plan (orders must be placed in EMR):     [  ]  Check Repeat Lactate   [ x ]  No change in current plan  [  ]  Start Vasopressors:  [  ]  Repeat Fluid Bolus:  [  ] other:    Care Discussed with Consultants/Other Providers [ ] YES  [ ] NO

## 2020-08-06 NOTE — ED PROVIDER NOTE - OBJECTIVE STATEMENT
89 y/o male with hx of afib on eliquis, CAD s/p five stents, HTN, HLD, [facility]. Patient recently admitted in July for urosepsis and discharged on cefpodoxime which reportedly was finished. Now presenting with fevers and increased altered mental status although reportedly is at current baseline A&Ox1. Currently incontinent in a diaper. Patient denies any current complaints, hx is limited secondary to patient's mental status.

## 2020-08-06 NOTE — ED PROVIDER NOTE - CRITICAL CARE INDICATION, MLM
patient was critically ill... Patient was critically ill with a high probability of imminent or life threatening deterioration.  Sepsis, DNR discussion with family by phone.

## 2020-08-06 NOTE — ED ADULT NURSE NOTE - NSIMPLEMENTINTERV_GEN_ALL_ED
Implemented All Fall Risk Interventions:  [place] to call system. Call bell, personal items and telephone within reach. Instruct patient to call for assistance. Room bathroom lighting operational. Non-slip footwear when patient is off stretcher. Physically safe environment: no spills, clutter or unnecessary equipment. Stretcher in lowest position, wheels locked, appropriate side rails in place. Provide visual cue, wrist band, yellow gown, etc. Monitor gait and stability. Monitor for mental status changes and reorient to person, place, and time. Review medications for side effects contributing to fall risk. Reinforce activity limits and safety measures with patient and family.

## 2020-08-06 NOTE — ED ADULT NURSE NOTE - ED STAT RN HANDOFF DETAILS
Report given to CHELA Staley.  Pt alert and oriented to self. stretcher side rails up, and in lowest locked position.  Safety maintained.

## 2020-08-07 DIAGNOSIS — Z02.9 ENCOUNTER FOR ADMINISTRATIVE EXAMINATIONS, UNSPECIFIED: ICD-10-CM

## 2020-08-07 DIAGNOSIS — E78.5 HYPERLIPIDEMIA, UNSPECIFIED: ICD-10-CM

## 2020-08-07 DIAGNOSIS — A41.9 SEPSIS, UNSPECIFIED ORGANISM: ICD-10-CM

## 2020-08-07 DIAGNOSIS — N40.0 BENIGN PROSTATIC HYPERPLASIA WITHOUT LOWER URINARY TRACT SYMPTOMS: ICD-10-CM

## 2020-08-07 DIAGNOSIS — I48.91 UNSPECIFIED ATRIAL FIBRILLATION: ICD-10-CM

## 2020-08-07 DIAGNOSIS — I25.10 ATHEROSCLEROTIC HEART DISEASE OF NATIVE CORONARY ARTERY WITHOUT ANGINA PECTORIS: ICD-10-CM

## 2020-08-07 DIAGNOSIS — M25.512 PAIN IN LEFT SHOULDER: ICD-10-CM

## 2020-08-07 DIAGNOSIS — N39.0 URINARY TRACT INFECTION, SITE NOT SPECIFIED: ICD-10-CM

## 2020-08-07 DIAGNOSIS — Z29.9 ENCOUNTER FOR PROPHYLACTIC MEASURES, UNSPECIFIED: ICD-10-CM

## 2020-08-07 DIAGNOSIS — I10 ESSENTIAL (PRIMARY) HYPERTENSION: ICD-10-CM

## 2020-08-07 LAB
ANION GAP SERPL CALC-SCNC: 11 MMOL/L — SIGNIFICANT CHANGE UP (ref 5–17)
APTT BLD: 28 SEC — SIGNIFICANT CHANGE UP (ref 27.5–35.5)
BASOPHILS # BLD AUTO: 0.1 K/UL — SIGNIFICANT CHANGE UP (ref 0–0.2)
BASOPHILS NFR BLD AUTO: 0.5 % — SIGNIFICANT CHANGE UP (ref 0–2)
BUN SERPL-MCNC: 23 MG/DL — SIGNIFICANT CHANGE UP (ref 7–23)
CALCIUM SERPL-MCNC: 9.5 MG/DL — SIGNIFICANT CHANGE UP (ref 8.4–10.5)
CHLORIDE SERPL-SCNC: 102 MMOL/L — SIGNIFICANT CHANGE UP (ref 96–108)
CO2 SERPL-SCNC: 27 MMOL/L — SIGNIFICANT CHANGE UP (ref 22–31)
CREAT SERPL-MCNC: 0.82 MG/DL — SIGNIFICANT CHANGE UP (ref 0.5–1.3)
EOSINOPHIL # BLD AUTO: 0.39 K/UL — SIGNIFICANT CHANGE UP (ref 0–0.5)
EOSINOPHIL NFR BLD AUTO: 2 % — SIGNIFICANT CHANGE UP (ref 0–6)
GLUCOSE SERPL-MCNC: 181 MG/DL — HIGH (ref 70–99)
HCT VFR BLD CALC: 32.5 % — LOW (ref 39–50)
HGB BLD-MCNC: 10 G/DL — LOW (ref 13–17)
IMM GRANULOCYTES NFR BLD AUTO: 0.6 % — SIGNIFICANT CHANGE UP (ref 0–1.5)
INR BLD: 1.25 RATIO — HIGH (ref 0.88–1.16)
LYMPHOCYTES # BLD AUTO: 0.95 K/UL — LOW (ref 1–3.3)
LYMPHOCYTES # BLD AUTO: 4.8 % — LOW (ref 13–44)
MAGNESIUM SERPL-MCNC: 2.1 MG/DL — SIGNIFICANT CHANGE UP (ref 1.6–2.6)
MCHC RBC-ENTMCNC: 28.2 PG — SIGNIFICANT CHANGE UP (ref 27–34)
MCHC RBC-ENTMCNC: 30.8 GM/DL — LOW (ref 32–36)
MCV RBC AUTO: 91.8 FL — SIGNIFICANT CHANGE UP (ref 80–100)
MONOCYTES # BLD AUTO: 0.9 K/UL — SIGNIFICANT CHANGE UP (ref 0–0.9)
MONOCYTES NFR BLD AUTO: 4.6 % — SIGNIFICANT CHANGE UP (ref 2–14)
NEUTROPHILS # BLD AUTO: 17.25 K/UL — HIGH (ref 1.8–7.4)
NEUTROPHILS NFR BLD AUTO: 87.5 % — HIGH (ref 43–77)
NRBC # BLD: 0 /100 WBCS — SIGNIFICANT CHANGE UP (ref 0–0)
PHOSPHATE SERPL-MCNC: 3.3 MG/DL — SIGNIFICANT CHANGE UP (ref 2.5–4.5)
PLATELET # BLD AUTO: 221 K/UL — SIGNIFICANT CHANGE UP (ref 150–400)
POTASSIUM SERPL-MCNC: 4.7 MMOL/L — SIGNIFICANT CHANGE UP (ref 3.5–5.3)
POTASSIUM SERPL-SCNC: 4.7 MMOL/L — SIGNIFICANT CHANGE UP (ref 3.5–5.3)
PROTHROM AB SERPL-ACNC: 14.7 SEC — HIGH (ref 10.6–13.6)
RBC # BLD: 3.54 M/UL — LOW (ref 4.2–5.8)
RBC # FLD: 14.1 % — SIGNIFICANT CHANGE UP (ref 10.3–14.5)
SODIUM SERPL-SCNC: 140 MMOL/L — SIGNIFICANT CHANGE UP (ref 135–145)
WBC # BLD: 19.7 K/UL — HIGH (ref 3.8–10.5)
WBC # FLD AUTO: 19.7 K/UL — HIGH (ref 3.8–10.5)

## 2020-08-07 PROCEDURE — 99223 1ST HOSP IP/OBS HIGH 75: CPT | Mod: GC

## 2020-08-07 PROCEDURE — 71250 CT THORAX DX C-: CPT | Mod: 26

## 2020-08-07 PROCEDURE — 12345: CPT | Mod: NC,GC

## 2020-08-07 RX ORDER — TAMSULOSIN HYDROCHLORIDE 0.4 MG/1
0.4 CAPSULE ORAL AT BEDTIME
Refills: 0 | Status: DISCONTINUED | OUTPATIENT
Start: 2020-08-07 | End: 2020-08-14

## 2020-08-07 RX ORDER — MEROPENEM 1 G/30ML
1000 INJECTION INTRAVENOUS EVERY 12 HOURS
Refills: 0 | Status: DISCONTINUED | OUTPATIENT
Start: 2020-08-07 | End: 2020-08-10

## 2020-08-07 RX ORDER — ACETAMINOPHEN 500 MG
975 TABLET ORAL EVERY 4 HOURS
Refills: 0 | Status: DISCONTINUED | OUTPATIENT
Start: 2020-08-07 | End: 2020-08-08

## 2020-08-07 RX ORDER — SENNA PLUS 8.6 MG/1
2 TABLET ORAL AT BEDTIME
Refills: 0 | Status: DISCONTINUED | OUTPATIENT
Start: 2020-08-07 | End: 2020-08-14

## 2020-08-07 RX ORDER — CEFTRIAXONE 500 MG/1
INJECTION, POWDER, FOR SOLUTION INTRAMUSCULAR; INTRAVENOUS
Refills: 0 | Status: DISCONTINUED | OUTPATIENT
Start: 2020-08-07 | End: 2020-08-07

## 2020-08-07 RX ORDER — CEFTRIAXONE 500 MG/1
1000 INJECTION, POWDER, FOR SOLUTION INTRAMUSCULAR; INTRAVENOUS ONCE
Refills: 0 | Status: COMPLETED | OUTPATIENT
Start: 2020-08-07 | End: 2020-08-07

## 2020-08-07 RX ORDER — FINASTERIDE 5 MG/1
5 TABLET, FILM COATED ORAL DAILY
Refills: 0 | Status: DISCONTINUED | OUTPATIENT
Start: 2020-08-07 | End: 2020-08-14

## 2020-08-07 RX ORDER — MEROPENEM 1 G/30ML
1000 INJECTION INTRAVENOUS EVERY 8 HOURS
Refills: 0 | Status: DISCONTINUED | OUTPATIENT
Start: 2020-08-07 | End: 2020-08-07

## 2020-08-07 RX ORDER — METOPROLOL TARTRATE 50 MG
12.5 TABLET ORAL EVERY 12 HOURS
Refills: 0 | Status: DISCONTINUED | OUTPATIENT
Start: 2020-08-07 | End: 2020-08-08

## 2020-08-07 RX ORDER — APIXABAN 2.5 MG/1
2.5 TABLET, FILM COATED ORAL
Refills: 0 | Status: DISCONTINUED | OUTPATIENT
Start: 2020-08-07 | End: 2020-08-14

## 2020-08-07 RX ORDER — SODIUM CHLORIDE 9 MG/ML
500 INJECTION INTRAMUSCULAR; INTRAVENOUS; SUBCUTANEOUS
Refills: 0 | Status: DISCONTINUED | OUTPATIENT
Start: 2020-08-07 | End: 2020-08-13

## 2020-08-07 RX ORDER — ATORVASTATIN CALCIUM 80 MG/1
10 TABLET, FILM COATED ORAL AT BEDTIME
Refills: 0 | Status: DISCONTINUED | OUTPATIENT
Start: 2020-08-07 | End: 2020-08-14

## 2020-08-07 RX ORDER — ASPIRIN/CALCIUM CARB/MAGNESIUM 324 MG
81 TABLET ORAL DAILY
Refills: 0 | Status: DISCONTINUED | OUTPATIENT
Start: 2020-08-07 | End: 2020-08-14

## 2020-08-07 RX ORDER — POLYETHYLENE GLYCOL 3350 17 G/17G
17 POWDER, FOR SOLUTION ORAL
Refills: 0 | Status: DISCONTINUED | OUTPATIENT
Start: 2020-08-07 | End: 2020-08-14

## 2020-08-07 RX ORDER — AMLODIPINE BESYLATE 2.5 MG/1
5 TABLET ORAL DAILY
Refills: 0 | Status: DISCONTINUED | OUTPATIENT
Start: 2020-08-07 | End: 2020-08-14

## 2020-08-07 RX ADMIN — CEFTRIAXONE 100 MILLIGRAM(S): 500 INJECTION, POWDER, FOR SOLUTION INTRAMUSCULAR; INTRAVENOUS at 05:38

## 2020-08-07 RX ADMIN — Medication 81 MILLIGRAM(S): at 13:37

## 2020-08-07 RX ADMIN — ATORVASTATIN CALCIUM 10 MILLIGRAM(S): 80 TABLET, FILM COATED ORAL at 21:30

## 2020-08-07 RX ADMIN — APIXABAN 2.5 MILLIGRAM(S): 2.5 TABLET, FILM COATED ORAL at 05:38

## 2020-08-07 RX ADMIN — FINASTERIDE 5 MILLIGRAM(S): 5 TABLET, FILM COATED ORAL at 17:30

## 2020-08-07 RX ADMIN — APIXABAN 2.5 MILLIGRAM(S): 2.5 TABLET, FILM COATED ORAL at 17:25

## 2020-08-07 RX ADMIN — SODIUM CHLORIDE 50 MILLILITER(S): 9 INJECTION INTRAMUSCULAR; INTRAVENOUS; SUBCUTANEOUS at 13:37

## 2020-08-07 RX ADMIN — AMLODIPINE BESYLATE 5 MILLIGRAM(S): 2.5 TABLET ORAL at 05:38

## 2020-08-07 RX ADMIN — SENNA PLUS 2 TABLET(S): 8.6 TABLET ORAL at 21:30

## 2020-08-07 RX ADMIN — MEROPENEM 100 MILLIGRAM(S): 1 INJECTION INTRAVENOUS at 17:30

## 2020-08-07 RX ADMIN — Medication 12.5 MILLIGRAM(S): at 05:38

## 2020-08-07 RX ADMIN — Medication 1 APPLICATION(S): at 17:25

## 2020-08-07 RX ADMIN — POLYETHYLENE GLYCOL 3350 17 GRAM(S): 17 POWDER, FOR SOLUTION ORAL at 17:25

## 2020-08-07 RX ADMIN — POLYETHYLENE GLYCOL 3350 17 GRAM(S): 17 POWDER, FOR SOLUTION ORAL at 05:38

## 2020-08-07 RX ADMIN — TAMSULOSIN HYDROCHLORIDE 0.4 MILLIGRAM(S): 0.4 CAPSULE ORAL at 21:30

## 2020-08-07 NOTE — PROGRESS NOTE ADULT - PROBLEM SELECTOR PLAN 5
-BP stable in ED; went up to 178/71 when patient came to floor   -continue with home amlodipine with hold parameters of SBP <120

## 2020-08-07 NOTE — PROGRESS NOTE ADULT - SUBJECTIVE AND OBJECTIVE BOX
PROGRESS NOTE:   Authored by Dr. Tiffany Medeiros MD, Pager [phone] [facility], [address] [place]     Patient is a 89y old  Male who presents with a chief complaint of SEPSIS (07 Aug 2020 01:39)      SUBJECTIVE / OVERNIGHT EVENTS: No events overnight.    ADDITIONAL REVIEW OF SYSTEMS: Denies fevers, chills, n/v.    MEDICATIONS  (STANDING):  amLODIPine   Tablet 5 milliGRAM(s) Oral daily  apixaban 2.5 milliGRAM(s) Oral two times a day  aspirin  chewable 81 milliGRAM(s) Oral daily  atorvastatin 10 milliGRAM(s) Oral at bedtime  cefTRIAXone   IVPB      metoprolol tartrate 12.5 milliGRAM(s) Oral every 12 hours  polyethylene glycol 3350 17 Gram(s) Oral two times a day  senna 2 Tablet(s) Oral at bedtime  silver sulfADIAZINE 1% Cream 1 Application(s) Topical two times a day  tamsulosin 0.4 milliGRAM(s) Oral at bedtime    MEDICATIONS  (PRN):  acetaminophen   Tablet .. 975 milliGRAM(s) Oral every 4 hours PRN Moderate Pain (4 - 6), Severe Pain (7 - 10)      CAPILLARY BLOOD GLUCOSE        I&O's Summary      PHYSICAL EXAM:  Vital Signs Last 24 Hrs  T(C): 36.4 (07 Aug 2020 04:52), Max: 38.9 (06 Aug 2020 17:31)  T(F): 97.6 (07 Aug 2020 04:52), Max: 102 (06 Aug 2020 17:31)  HR: 65 (07 Aug 2020 04:52) (64 - 99)  BP: 139/62 (07 Aug 2020 04:52) (118/60 - 178/71)  BP(mean): 69 (06 Aug 2020 19:38) (69 - 69)  RR: 18 (07 Aug 2020 04:52) (18 - 26)  SpO2: 98% (07 Aug 2020 04:52) (88% - 100%)    CONSTITUTIONAL: NAD, lying in bed comfortably  RESPIRATORY: Normal respiratory effort; CTABL  CARDIOVASCULAR: Regular rate and rhythm, normal S1 and S2, no murmur/rub/gallop  ABDOMEN: Soft, nondistended, nontender to palpation, normoactive bowel sounds, no rebound/guarding  MUSCLOSKELETAL: no joint swelling or tenderness to palpation, FROM all extremities  NEURO: AAOx3 to person, place, and time, full and equal strength all extremities   EXTREMITIES: no pedal edema    LABS:                        11.1   23.17 )-----------( 274      ( 06 Aug 2020 18:05 )             35.0     08-06    141  |  102  |  25<H>  ----------------------------<  168<H>  4.8   |  28  |  0.96    Ca    9.7      06 Aug 2020 18:05    TPro  6.5  /  Alb  3.2<L>  /  TBili  0.3  /  DBili  x   /  AST  20  /  ALT  29  /  AlkPhos  89  08-06    PT/INR - ( 06 Aug 2020 18:05 )   PT: 15.2 sec;   INR: 1.29 ratio         PTT - ( 06 Aug 2020 18:05 )  PTT:26.7 sec      Urinalysis Basic - ( 06 Aug 2020 18:15 )    Color: Yellow / Appearance: Slightly Turbid / S.015 / pH: x  Gluc: x / Ketone: Negative  / Bili: Negative / Urobili: Negative   Blood: x / Protein: 100 / Nitrite: Positive   Leuk Esterase: Large / RBC: 1 /hpf / WBC 24 /HPF   Sq Epi: x / Non Sq Epi: x / Bacteria: Many          RADIOLOGY & ADDITIONAL TESTS: PROGRESS NOTE:   Authored by Dr. Tiffany Medeiros MD, Pager [phone] [facility], [id number] [facility]     Patient is a 89y old  Male who presents with a chief complaint of SEPSIS (07 Aug 2020 01:39)      SUBJECTIVE / OVERNIGHT EVENTS: No events overnight. Pt seen this AM, reports L shoulder pain. Denies dysuria or pain elsewhere.     ADDITIONAL REVIEW OF SYSTEMS: Denies fevers, chills.    MEDICATIONS  (STANDING):  amLODIPine   Tablet 5 milliGRAM(s) Oral daily  apixaban 2.5 milliGRAM(s) Oral two times a day  aspirin  chewable 81 milliGRAM(s) Oral daily  atorvastatin 10 milliGRAM(s) Oral at bedtime  cefTRIAXone   IVPB      metoprolol tartrate 12.5 milliGRAM(s) Oral every 12 hours  polyethylene glycol 3350 17 Gram(s) Oral two times a day  senna 2 Tablet(s) Oral at bedtime  silver sulfADIAZINE 1% Cream 1 Application(s) Topical two times a day  tamsulosin 0.4 milliGRAM(s) Oral at bedtime    MEDICATIONS  (PRN):  acetaminophen   Tablet .. 975 milliGRAM(s) Oral every 4 hours PRN Moderate Pain (4 - 6), Severe Pain (7 - 10)      CAPILLARY BLOOD GLUCOSE        I&O's Summary      PHYSICAL EXAM:  Vital Signs Last 24 Hrs  T(C): 36.4 (07 Aug 2020 04:52), Max: 38.9 (06 Aug 2020 17:31)  T(F): 97.6 (07 Aug 2020 04:52), Max: 102 (06 Aug 2020 17:31)  HR: 65 (07 Aug 2020 04:52) (64 - 99)  BP: 139/62 (07 Aug 2020 04:52) (118/60 - 178/71)  BP(mean): 69 (06 Aug 2020 19:38) (69 - 69)  RR: 18 (07 Aug 2020 04:52) (18 - 26)  SpO2: 98% (07 Aug 2020 04:52) (88% - 100%)    CONSTITUTIONAL: NAD, lying in bed comfortably  RESPIRATORY: Normal respiratory effort; mild crackles lung bases b/l  CARDIOVASCULAR: Regular rate and rhythm, normal S1 and S2, no murmur/rub/gallop  ABDOMEN: Soft, nondistended, nontender to palpation, normoactive bowel sounds, no rebound/guarding  MUSCLOSKELETAL: L shoulder tender to palpation, painful to abduction. Other extremities FROM.  NEURO: alert, full and equal strength all extremities   EXTREMITIES: no pedal edema    LABS:                        11.1   23.17 )-----------( 274      ( 06 Aug 2020 18:05 )             35.0     08-06    141  |  102  |  25<H>  ----------------------------<  168<H>  4.8   |  28  |  0.96    Ca    9.7      06 Aug 2020 18:05    TPro  6.5  /  Alb  3.2<L>  /  TBili  0.3  /  DBili  x   /  AST  20  /  ALT  29  /  AlkPhos  89  08-06    PT/INR - ( 06 Aug 2020 18:05 )   PT: 15.2 sec;   INR: 1.29 ratio         PTT - ( 06 Aug 2020 18:05 )  PTT:26.7 sec      Urinalysis Basic - ( 06 Aug 2020 18:15 )    Color: Yellow / Appearance: Slightly Turbid / S.015 / pH: x  Gluc: x / Ketone: Negative  / Bili: Negative / Urobili: Negative   Blood: x / Protein: 100 / Nitrite: Positive   Leuk Esterase: Large / RBC: 1 /hpf / WBC 24 /HPF   Sq Epi: x / Non Sq Epi: x / Bacteria: Many

## 2020-08-07 NOTE — H&P ADULT - ATTENDING COMMENTS
Pt here with sepsis 2/2 UTI and ? hypoxemia. Will treat as above, and recheck sats on room air to clarify.  Case discussed with resident physician

## 2020-08-07 NOTE — H&P ADULT - PROBLEM SELECTOR PLAN 5
COPD Coordinator Progress Note:     Stephanie Grady has a past medical history significant for COPD.   The patient uses NO supplemental oxygen per minute at home.  Patient is getting ready to discharge and only wanted paper information which I provided.    Pulmonologist: Dr. Storey     Pulmonary Function:   Last PFT Date: 1/22/2020      Result % Predicted   FEV1 .67 L 47 %   FVC 1.13 L 58 %   FEV1/FVC 59      Knowledge of Disease:  She acknowledges her lung disease.  The basics of COPD have been verbally conveyed to the patient.  The patient has been given [employer] patient education material.     Inhaled Medication:  She uses the following inhalers at home:     Smoking Cessation:  She is a former smoker.  The patient has been strongly urged to quit smoking.     Pulmonary Rehab discussed with patient and she is not interested in participating.     Other:  The following topics require reinforcement:  .      A coordinator will visit the patient a second time.  Should a change occur, a coordinator is available at [phone] on an as-needed basis.         -c/w asa, statin -BP stable in ED; went up to 178/71 when patient came to floor   -continue with home amlodipine with hold parameters of SBP <120

## 2020-08-07 NOTE — MEDICAL STUDENT PROGRESS NOTE(EDUCATION) - NS MD HP STUD ASPLAN PLAN FT
Assessment and Plan:   · Assessment		  Patient is a 89 year old male with history Atrial Fibrillation with implanted pacemaker, CAD with multiple stents, Hypertension, Hyperlipidemia, BPH, who comes into the ED with sepsis likely secondary to UTI with an episode of hypoxia requiring 2L NC, as well as left sided shoulder pain.     Problem/Plan - 1:  ·  Problem: Sepsis.  Plan: -likely in the setting of UTI   -complicated by an episode of hypoxia in the ED with O2 sat of 88%, without known cause   -hypoxia could be related to atelectasis, in the setting of sepsis, AMS, etc. will continue to monitor patient for now on 2L NC and wean patient off when able to do so  - f/u CT chest  -BCx, UCx pending   - CTX discontinued. start jayson (8/7-).      Problem/Plan - 2:  ·  Problem: UTI (urinary tract infection).  Plan: -UA revealed large leuk esterase and +ve for nitrite. WBC in urine elevated at 24   - start jayson (8/7-)  - post void bladder scan, will place escalona if necessary   -UCx pending.      Problem/Plan - 3:  ·  Problem: Left shoulder pain.  Plan: -likely chronic from his recent fall and admission in july   -xray at that time reviewed; no acute fracture   -pain control with tylenol PRN   - PT/OT eval in [facility].      Problem/Plan - 4:  ·  Problem: Atrial fibrillation.  Plan: -c/w home eliquis  -c.w home metoprolol.      Problem/Plan - 5:  ·  Problem: Hypertension.  Plan: -BP stable in ED; went up to 178/71 when patient came to floor   -continue with home amlodipine with hold parameters of SBP <120.      Problem/Plan - 6:  Problem: Coronary artery disease. Plan: -c/w asa, statin.     Problem/Plan - 7:  ·  Problem: BPH (benign prostatic hyperplasia).  Plan: -continue with flomax.      Problem/Plan - 8:  ·  Problem: Hyperlipidemia.  Plan: -c/w statin.      Problem/Plan - 9:  ·  Problem: DVT prophylaxis.  Plan: -on home dose of eliquis.

## 2020-08-07 NOTE — H&P ADULT - ASSESSMENT
Patient is a 89 year old male with history Atrial Fibrillation with implanted pacemaker, CAD with multiple stents, Hypertension, Hyperlipidemia, Benign Prostatic Hyperplasia, who comes into the ED with sepsis likely secondary to UTI with associated acute respiratory failure, as well as left sided shoulder pain. Patient is a 89 year old male with history Atrial Fibrillation with implanted pacemaker, CAD with multiple stents, Hypertension, Hyperlipidemia, Benign Prostatic Hyperplasia, who comes into the ED with sepsis likely secondary to UTI with an episode of hypoxia requiring 2L NC, as well as left sided shoulder pain.

## 2020-08-07 NOTE — H&P ADULT - PROBLEM SELECTOR PLAN 10
PCP: Sherman Brian   Contacted: [x] N [ ] Y   Discharge: likely back to rehab facility once clinically stable

## 2020-08-07 NOTE — H&P ADULT - PROBLEM SELECTOR PLAN 2
-UA revealed large leuk esterase and +ve for nitrite   -WBC in urine elevated at 24   -ceftriaxone   -bladder scan q8>  -UCx pending, tailor abx once they are back -UA revealed large leuk esterase and +ve for nitrite   -WBC in urine elevated at 24   -ceftriaxone   -bladder scan in AM after patient voids, will place escalona if necessary   -UCx pending, tailor abx once they are back

## 2020-08-07 NOTE — PROGRESS NOTE ADULT - ATTENDING COMMENTS
is a 89 year old male with history Atrial Fibrillation with implanted pacemaker, CAD with multiple stents, Hypertension, Hyperlipidemia, BPH, who comes into the ED with sepsis likely secondary to UTI with an episode of hypoxia    Seen, examined the patient with house staff this am  Sitting in bed, no acute SOB, chest pain, remains afebrile  - Reviewed labs, imaging, WBC 16->23->19, CXR no consolidation    Septic w UTI, low suspicion of PNA/aspiration  - would c/s IV Meropenem, CT chest ordered to r/o PNA  - c/w Xarelto, Statin  - PT eval is a 89 year old male with history Atrial Fibrillation with implanted pacemaker, CAD with multiple stents, Hypertension, Hyperlipidemia, BPH, who comes into the ED with sepsis likely secondary to UTI with an episode of hypoxia    Seen, examined the patient with house staff this am  Sitting in bed, no acute SOB, chest pain, remains afebrile  - Reviewed labs, imaging, WBC 16->23->19, CXR no consolidation    Septic w UTI, low suspicion of PNA/aspiration    f/u blood and urine c/s ( last urine c/s E.coli)  - would c/s IV Meropenem, CT chest ordered to r/o PNA  - c/w Xarelto, Statin  - PT eval

## 2020-08-07 NOTE — H&P ADULT - PROBLEM SELECTOR PLAN 1
-likely in the setting of UTI   -complicated by an episode of acute respiratory failure   -was given cefepime and vancomycin in the ED, as well as 1L NS bolus   -tylenol suppository for fever   -BCx, UCx pending   -empiric ceftriaxone on the floors -likely in the setting of UTI   -complicated by an episode of hypoxia in the ED with O2 sat of 88%, without known cause   -hypoxia could be related to atelectasis, in the setting of sepsis, AMS, etc. will continue to monitor patient for now on 2L NC and wean patient off when able to do so, if patient has more episodes of decompensation, can consider more rigorous workup such as CT  -was given cefepime and vancomycin in the ED, as well as 1L NS bolus   -tylenol suppository for fever   -BCx, UCx pending   -empiric ceftriaxone for coverage

## 2020-08-07 NOTE — PROGRESS NOTE ADULT - PROBLEM SELECTOR PLAN 3
-likely chronic from his recent fall and admission in july   -xray at that time reviewed; no acute fracture   -pain control with tylenol PRN   -can consider PT eval -likely chronic from his recent fall and admission in july   -xray at that time reviewed; no acute fracture   -pain control with tylenol PRN   - PT eval

## 2020-08-07 NOTE — PROGRESS NOTE ADULT - PROBLEM SELECTOR PLAN 2
-UA revealed large leuk esterase and +ve for nitrite   -WBC in urine elevated at 24   -ceftriaxone   -bladder scan in AM after patient voids, will place escalona if necessary   -UCx pending, tailor abx once they are back -UA revealed large leuk esterase and +ve for nitrite. WBC in urine elevated at 24   - start jayson (8/7-)  - post void bladder scan, will place escalona if necessary   -UCx pending

## 2020-08-07 NOTE — MEDICAL STUDENT PROGRESS NOTE(EDUCATION) - SUBJECTIVE AND OBJECTIVE BOX
Subjective    Summary:   Mr. Faith is a pleasant 89 year old male who has a history of Atrial Fibrillation with implanted pacemaker, CAD with multiple stents, Hypertension, Hyperlipidemia, BPH on flomax, who presents to the ED from his outpatient rehabilitation facility called [facility] with a fever and elevated WBC count, but the patient's chief complaint is "left shoulder pain" (s/p fall 2/2 sepsis last month).    -no acute overnight events.  The patient feels well and reports that he is "very athletic".  He complains of L shoulder pain (s/p fall 2/2 sepsis/UTI in July).  The patient endorses some vertigo.  When asked about burning on urination he states "I'm used to it".  He denies throat pain, fever, chills, SOB, chest pain, pain elsewhere.  Difficult to determine if he is AMS or just extremely hard of hearing.     Objective   PHYSICAL EXAM:  Vital Signs Last 24 Hrs  T(C): 36.4 (07 Aug 2020 04:52), Max: 38.9 (06 Aug 2020 17:31)  T(F): 97.6 (07 Aug 2020 04:52), Max: 102 (06 Aug 2020 17:31)  HR: 65 (07 Aug 2020 04:52) (64 - 99)  BP: 139/62 (07 Aug 2020 04:52) (118/60 - 178/71)  BP(mean): 69 (06 Aug 2020 19:38) (69 - 69)  RR: 18 (07 Aug 2020 04:52) (18 - 26)  SpO2: 98% (07 Aug 2020 04:52) (88% - 100%)    CONSTITUTIONAL: NAD, lying in bed comfortably  RESPIRATORY: Normal respiratory effort; mild crackles lung bases b/l  CARDIOVASCULAR: Regular rate and rhythm, normal S1 and S2, no murmur/rub/gallop  ABDOMEN: Soft, nondistended, nontender to palpation, normoactive bowel sounds, no rebound/guarding  MUSCLOSKELETAL: L shoulder tender to palpation, painful to abduction. Other extremities FROM.  NEURO: alert, full and equal strength all extremities   EXTREMITIES: no pedal edema    LABS                      10.0   19.70 )-----------( 221      ( 07 Aug 2020 07:25 )             32.5     08-07    140  |  102  |  23  ----------------------------<  181<H>  4.7   |  27  |  0.82    Ca    9.5      07 Aug 2020 07:25  Phos  3.3     08-07  Mg     2.1     08-07    TPro  6.5  /  Alb  3.2<L>  /  TBili  0.3  /  DBili  x   /  AST  20  /  ALT  29  /  AlkPhos  89  08-06      < from: Xray Shoulder 2 Views, Left (20 @ 12:55) >  No acute displaced fracture. No dislocation. Mild acromioclavicular arthrosis. Left chest wall pacemaker is visualized.    < end of copied text >    Urinalysis Basic - ( 06 Aug 2020 18:15 )    Color: Yellow / Appearance: Slightly Turbid / S.015 / pH: x  Gluc: x / Ketone: Negative  / Bili: Negative / Urobili: Negative   Blood: x / Protein: 100 / Nitrite: Positive   Leuk Esterase: Large / RBC: 1 /hpf / WBC 24 /HPF   Sq Epi: x / Non Sq Epi: x / Bacteria: Many Subjective    Summary:   Mr. Faith is a pleasant 89 year old male who has a history of Atrial Fibrillation with implanted pacemaker, CAD with multiple stents, Hypertension, Hyperlipidemia, BPH on flomax, who presents to the ED from his outpatient rehabilitation facility called [facility] with a fever and elevated WBC count, but the patient's chief complaint is "left shoulder pain" (s/p fall 2/2 sepsis last month).    -no acute overnight events.  The patient feels well and reports that he is "very athletic".  He complains of L shoulder pain (s/p fall 2/2 sepsis/UTI in July).  The patient endorses some vertigo.  When asked about burning on urination he states "I'm used to it".  He denies throat pain, fever, chills, SOB, chest pain, pain elsewhere.  Difficult to determine if he is AMS or just extremely hard of hearing.     Objective   PHYSICAL EXAM:  Vital Signs Last 24 Hrs  T(C): 36.4 (07 Aug 2020 04:52), Max: 38.9 (06 Aug 2020 17:31)  T(F): 97.6 (07 Aug 2020 04:52), Max: 102 (06 Aug 2020 17:31)  HR: 65 (07 Aug 2020 04:52) (64 - 99)  BP: 139/62 (07 Aug 2020 04:52) (118/60 - 178/71)  BP(mean): 69 (06 Aug 2020 19:38) (69 - 69)  RR: 18 (07 Aug 2020 04:52) (18 - 26)  SpO2: 98% (07 Aug 2020 04:52) (88% - 100%)    CONSTITUTIONAL: NAD, lying in bed comfortably  RESPIRATORY: Normal respiratory effort; mild crackles lung bases b/l  CARDIOVASCULAR: Regular rate and rhythm, normal S1 and S2, no murmur/rub/gallop  ABDOMEN: Soft, nondistended, nontender to palpation, normoactive bowel sounds, no rebound/guarding  MUSCLOSKELETAL: L shoulder tender to palpation, painful to abduction. Other extremities FROM.  NEURO: alert, full and equal strength all extremities   EXTREMITIES: no pedal edema    LABS                      10.0   19.70 )-----------( 221      ( 07 Aug 2020 07:25 )             32.5     08-07    140  |  102  |  23  ----------------------------<  181<H>  4.7   |  27  |  0.82    Ca    9.5      07 Aug 2020 07:25  Phos  3.3     08-07  Mg     2.1     08-07    TPro  6.5  /  Alb  3.2<L>  /  TBili  0.3  /  DBili  x   /  AST  20  /  ALT  29  /  AlkPhos  89  08-06    PT/INR - ( 06 Aug 2020 18:05 )   PT: 15.2 sec;   INR: 1.29 ratio         PTT - ( 06 Aug 2020 18:05 )  PTT:26.7 sec    < from: Xray Shoulder 2 Views, Left (20 @ 12:55) >  No acute displaced fracture. No dislocation. Mild acromioclavicular arthrosis. Left chest wall pacemaker is visualized.    < end of copied text >    Urinalysis Basic - ( 06 Aug 2020 18:15 )    Color: Yellow / Appearance: Slightly Turbid / S.015 / pH: x  Gluc: x / Ketone: Negative  / Bili: Negative / Urobili: Negative   Blood: x / Protein: 100 / Nitrite: Positive   Leuk Esterase: Large / RBC: 1 /hpf / WBC 24 /HPF   Sq Epi: x / Non Sq Epi: x / Bacteria: Many

## 2020-08-07 NOTE — PHYSICAL THERAPY INITIAL EVALUATION ADULT - PRECAUTIONS/LIMITATIONS, REHAB EVAL
CONTINUED: During that hospitalization, pt was septic, had rhabdomylysis and had a UTI. In the ED, pt was found to be febrile and tachypneic, desaturated to 88% on room air. CXR shows an 8mm nodule in the lateral right lung unchanged from 7/11/2020. Xray of L shoulder from last admission shows no acute fx, mild acromioclavicular arthrosis. CONTINUED: During that hospitalization, pt was septic, had rhabdomylysis and had a UTI. In the ED, pt was found to be febrile and tachypneic, desaturated to 88% on room air. CXR shows an 8mm nodule in the lateral right lung unchanged from 7/11/2020. Xray of L shoulder from last admission shows no acute fx, mild acromioclavicular arthrosis./fall precautions

## 2020-08-07 NOTE — H&P ADULT - PROBLEM SELECTOR PLAN 3
-c/w home eliquis  -c.w home metroprolol -likely chronic from his recent fall and admission in july   -xray at that time reviewed; no acute fracture   -pain control with tylenol PRN   -can consider PT eval

## 2020-08-07 NOTE — PHYSICAL THERAPY INITIAL EVALUATION ADULT - ADDITIONAL COMMENTS
Pt lives in a  +one flight of stairs to enter and one flight to bedroom with chair lift. Pt was admitted from [facility] where he was walking with a RW after a recent hospitalization. Prior to last hospitalization, pt was walking without an AD. Pt has walk-in shower +grab bars and shower chairs if needed.

## 2020-08-07 NOTE — MEDICAL STUDENT PROGRESS NOTE(EDUCATION) - NS MD HP STUD ASPLAN ASSES FT
Mr. Faith is a pleasant 89 year old male who has a history of Atrial Fibrillation with implanted pacemaker, CAD with multiple stents, Hypertension, Hyperlipidemia, BPH on flomax, who presents to the ED from his outpatient rehabilitation facility called [facility] with a fever and elevated WBC count, but the patient's chief complaint is "left shoulder pain" (s/p fall 2/2 sepsis last month).  The patient has sepsis likely 2/2 UTI.  UA was found to be positive for nitrites, large leukocyte esterase, and 24 WBC.

## 2020-08-07 NOTE — H&P ADULT - NSHPPHYSICALEXAM_GEN_ALL_CORE
PHYSICAL EXAM:  Vital Signs Last 24 Hrs  T(C): 36.7 (08-07-20 @ 01:18)  T(F): 98.1 (08-07-20 @ 01:18), Max: 102 (08-06-20 @ 17:31)  HR: 64 (08-07-20 @ 01:18) (64 - 99)  BP: 145/60 (08-07-20 @ 01:18)  BP(mean): 69 (08-06-20 @ 19:38) (69 - 69)  RR: 20 (08-07-20 @ 01:18) (18 - 26)  SpO2: 97% (08-07-20 @ 01:18) (88% - 100%)  Wt(kg): --    Constitutional: NAD, awake and alert   EYES: EOMI  ENT:  very hard of hearing, no tonsillar exudates   Neck: Soft and supple , no thyromegaly   Respiratory: Breath sounds are clear bilaterally, No wheezing, rales or rhonchi  Cardiovascular: RRR, normal S1/S2, no murmurs or rubs    Gastrointestinal: Bowel Sounds present, soft, nontender, nondistended, no guarding, no rebound  Extremities: No cyanosis or clubbing; warm to touch  Vascular: 2+ peripheral pulses lower ex  Neurological: No focal deficits,  sensation to light touch intact in all extremities,   Musculoskeletal: painful to palpation of anterior and lateral deltoid. abduction past 90 degrees is painful to patient   Skin: No rashes  Psych: no depression or anhedonia, AAOx3

## 2020-08-07 NOTE — H&P ADULT - PROBLEM SELECTOR PLAN 4
-BP stable in ED   -will hold amlodipine in the setting of sepsis, primary team can reintroduce as needed -c/w home eliquis  -c.w home metoprolol

## 2020-08-07 NOTE — PROGRESS NOTE ADULT - PROBLEM SELECTOR PLAN 1
-likely in the setting of UTI   -complicated by an episode of hypoxia in the ED with O2 sat of 88%, without known cause   -hypoxia could be related to atelectasis, in the setting of sepsis, AMS, etc. will continue to monitor patient for now on 2L NC and wean patient off when able to do so, if patient has more episodes of decompensation, can consider more rigorous workup such as CT  -was given cefepime and vancomycin in the ED, as well as 1L NS bolus   -tylenol suppository for fever   -BCx, UCx pending   -empiric ceftriaxone for coverage -likely in the setting of UTI   -complicated by an episode of hypoxia in the ED with O2 sat of 88%, without known cause   -hypoxia could be related to atelectasis, in the setting of sepsis, AMS, etc. will continue to monitor patient for now on 2L NC and wean patient off when able to do so  - f/u CT chest  -BCx, UCx pending   - CTX discontinued. start jayson (8/7-)

## 2020-08-07 NOTE — H&P ADULT - HISTORY OF PRESENT ILLNESS
Mr. FaithRonnie, is a pleasant 89 year old male who has a history of Atrial Fibrillation with implanted pacemaker, CAD with multiple stents, Hypertension, Hyperlipidemia, Benign Prostatic Hyperplasia, who presents to the [facility]'s Emergency Department from his outpatient rehabilitation facility called [facility] with a fever and elevated WBC count. Ronnie Arzate, is a pleasant 89 year old male who has a history of Atrial Fibrillation with implanted pacemaker, CAD with multiple stents, Hypertension, Hyperlipidemia, Benign Prostatic Hyperplasia, who presents to the [facility]'s Emergency Department from his outpatient rehabilitation facility called [facility] with a fever and elevated WBC count. Of note, this patient is EXTREMELY hard of hearing as he says his hearing aid batteries are old. Despite his elevated temperature WBC count, this patient's chief complaint is "left shoulder pain."    Of note, this patient was recently admitted to the hospital in July of 2020 secondary to a fall. At the time, patient was septic, had rhabdomyolysis, and had a UTI. The patient was admitted from July 11th to July 17th and was treated with ceftriaxone, with subsequent switch to cefpodoxime which was completed on July 20th. The patient's blood cultures at that time grew E. Coli, and so did the Urine cultures. Repeat cultures were negative. Patient was d/c back to [facility] rehab after that admission.     Patient states his shoulder pain has been ongoing since his last admission. At that time Xray did not reveal and acute fractures and the patient was seen by PT and treated with Tylenol for pain and it improved. On this admission, patient says he has pain to touch of the shoulder and when he is moving around. He has mobility in the arm and can adduct and abduct the arm, although abduction past 90 degrees causes pain.     The patient denies any other symptoms including fevers, headaches, shortness of breath, chest pain, or abdominal pain. He denies urinary frequency or dysuria. Ronnie Arzate, is a pleasant 89 year old male who has a history of Atrial Fibrillation with implanted pacemaker, CAD with multiple stents, Hypertension, Hyperlipidemia, Benign Prostatic Hyperplasia, who presents to the [facility]'s Emergency Department from his outpatient rehabilitation facility called [facility] with a fever and elevated WBC count. Of note, this patient is EXTREMELY hard of hearing as he says his hearing aid batteries are old. Despite his elevated temperature WBC count, this patient's chief complaint is "left shoulder pain."    Of note, this patient was recently admitted to the hospital in July of 2020 secondary to a fall. At the time, patient was septic, had rhabdomyolysis, and had a UTI. The patient was admitted from July 11th to July 17th and was treated with ceftriaxone, with subsequent switch to cefpodoxime which was completed on July 20th. The patient's blood cultures at that time grew E. Coli, and so did the Urine cultures. Repeat cultures were negative. Patient was d/c back to [facility] rehab after that admission.     Patient states his shoulder pain has been ongoing since his last admission. At that time Xray did not reveal and acute fractures and the patient was seen by PT and treated with Tylenol for pain and it improved. On this admission, patient says he has pain to touch of the shoulder and when he is moving around. He has mobility in the arm and can adduct and abduct the arm, although abduction past 90 degrees causes pain.     The patient denies any other symptoms including fevers, headaches, shortness of breath, chest pain, or abdominal pain. He denies urinary frequency or dysuria. He was febrile in the ED up to 102, BP ranged from 125-142 systolic and 57-74 diastolic. Patients heart rate remained stable but he was tachypneic up to 26 and desaturated to 88%, so he was started on 2L NC oxygen.

## 2020-08-07 NOTE — H&P ADULT - NSHPLABSRESULTS_GEN_ALL_CORE
LABS:                        11.1   23.17 )-----------( 274      ( 06 Aug 2020 18:05 )             35.0     08-06    141  |  102  |  25<H>  ----------------------------<  168<H>  4.8   |  28  |  0.96    Ca    9.7      06 Aug 2020 18:05    TPro  6.5  /  Alb  3.2<L>  /  TBili  0.3  /  DBili  x   /  AST  20  /  ALT  29  /  AlkPhos  89  08-06    PT/INR - ( 06 Aug 2020 18:05 )   PT: 15.2 sec;   INR: 1.29 ratio         PTT - ( 06 Aug 2020 18:05 )  PTT:26.7 sec      Urinalysis Basic - ( 06 Aug 2020 18:15 )    Color: Yellow / Appearance: Slightly Turbid / S.015 / pH: x  Gluc: x / Ketone: Negative  / Bili: Negative / Urobili: Negative   Blood: x / Protein: 100 / Nitrite: Positive   Leuk Esterase: Large / RBC: 1 /hpf / WBC 24 /HPF   Sq Epi: x / Non Sq Epi: x / Bacteria: Many        RADIOLOGY & ADDITIONAL TESTS:    Imaging Personally Reviewed:    Consultant(s) Notes Reviewed:      Care Discussed with Consultants/Other Providers: LABS:                        11.1   23.17 )-----------( 274      ( 06 Aug 2020 18:05 )             35.0     08-06    141  |  102  |  25<H>  ----------------------------<  168<H>  4.8   |  28  |  0.96    Ca    9.7      06 Aug 2020 18:05    TPro  6.5  /  Alb  3.2<L>  /  TBili  0.3  /  DBili  x   /  AST  20  /  ALT  29  /  AlkPhos  89  08-06    PT/INR - ( 06 Aug 2020 18:05 )   PT: 15.2 sec;   INR: 1.29 ratio         PTT - ( 06 Aug 2020 18:05 )  PTT:26.7 sec      Urinalysis Basic - ( 06 Aug 2020 18:15 )    Color: Yellow / Appearance: Slightly Turbid / S.015 / pH: x  Gluc: x / Ketone: Negative  / Bili: Negative / Urobili: Negative   Blood: x / Protein: 100 / Nitrite: Positive   Leuk Esterase: Large / RBC: 1 /hpf / WBC 24 /HPF   Sq Epi: x / Non Sq Epi: x / Bacteria: Many        RADIOLOGY & ADDITIONAL TESTS:    Imaging Personally Reviewed:  Yes     X-Ray revealed 8mm nodular opacity in the right mid-lung. No focal consolidation.     EKG:   Sinus rhythm with PVC   Rate of 88   Right Bundle Branch Block, LAFB

## 2020-08-08 LAB
ANION GAP SERPL CALC-SCNC: 12 MMOL/L — SIGNIFICANT CHANGE UP (ref 5–17)
BUN SERPL-MCNC: 29 MG/DL — HIGH (ref 7–23)
CALCIUM SERPL-MCNC: 9.1 MG/DL — SIGNIFICANT CHANGE UP (ref 8.4–10.5)
CHLORIDE SERPL-SCNC: 100 MMOL/L — SIGNIFICANT CHANGE UP (ref 96–108)
CO2 SERPL-SCNC: 27 MMOL/L — SIGNIFICANT CHANGE UP (ref 22–31)
CREAT SERPL-MCNC: 1 MG/DL — SIGNIFICANT CHANGE UP (ref 0.5–1.3)
GLUCOSE SERPL-MCNC: 156 MG/DL — HIGH (ref 70–99)
HCT VFR BLD CALC: 32.9 % — LOW (ref 39–50)
HGB BLD-MCNC: 10.3 G/DL — LOW (ref 13–17)
MAGNESIUM SERPL-MCNC: 2.1 MG/DL — SIGNIFICANT CHANGE UP (ref 1.6–2.6)
MCHC RBC-ENTMCNC: 28.9 PG — SIGNIFICANT CHANGE UP (ref 27–34)
MCHC RBC-ENTMCNC: 31.3 GM/DL — LOW (ref 32–36)
MCV RBC AUTO: 92.2 FL — SIGNIFICANT CHANGE UP (ref 80–100)
NRBC # BLD: 0 /100 WBCS — SIGNIFICANT CHANGE UP (ref 0–0)
PHOSPHATE SERPL-MCNC: 2.6 MG/DL — SIGNIFICANT CHANGE UP (ref 2.5–4.5)
PLATELET # BLD AUTO: 228 K/UL — SIGNIFICANT CHANGE UP (ref 150–400)
POTASSIUM SERPL-MCNC: 4.5 MMOL/L — SIGNIFICANT CHANGE UP (ref 3.5–5.3)
POTASSIUM SERPL-SCNC: 4.5 MMOL/L — SIGNIFICANT CHANGE UP (ref 3.5–5.3)
RBC # BLD: 3.57 M/UL — LOW (ref 4.2–5.8)
RBC # FLD: 14 % — SIGNIFICANT CHANGE UP (ref 10.3–14.5)
SODIUM SERPL-SCNC: 139 MMOL/L — SIGNIFICANT CHANGE UP (ref 135–145)
WBC # BLD: 15.52 K/UL — HIGH (ref 3.8–10.5)
WBC # FLD AUTO: 15.52 K/UL — HIGH (ref 3.8–10.5)

## 2020-08-08 PROCEDURE — 99233 SBSQ HOSP IP/OBS HIGH 50: CPT | Mod: GC

## 2020-08-08 RX ORDER — METOPROLOL TARTRATE 50 MG
6.25 TABLET ORAL
Refills: 0 | Status: DISCONTINUED | OUTPATIENT
Start: 2020-08-08 | End: 2020-08-08

## 2020-08-08 RX ORDER — ACETAMINOPHEN 500 MG
650 TABLET ORAL EVERY 6 HOURS
Refills: 0 | Status: DISCONTINUED | OUTPATIENT
Start: 2020-08-08 | End: 2020-08-14

## 2020-08-08 RX ADMIN — FINASTERIDE 5 MILLIGRAM(S): 5 TABLET, FILM COATED ORAL at 12:55

## 2020-08-08 RX ADMIN — AMLODIPINE BESYLATE 5 MILLIGRAM(S): 2.5 TABLET ORAL at 05:30

## 2020-08-08 RX ADMIN — APIXABAN 2.5 MILLIGRAM(S): 2.5 TABLET, FILM COATED ORAL at 17:53

## 2020-08-08 RX ADMIN — POLYETHYLENE GLYCOL 3350 17 GRAM(S): 17 POWDER, FOR SOLUTION ORAL at 05:31

## 2020-08-08 RX ADMIN — POLYETHYLENE GLYCOL 3350 17 GRAM(S): 17 POWDER, FOR SOLUTION ORAL at 17:53

## 2020-08-08 RX ADMIN — ATORVASTATIN CALCIUM 10 MILLIGRAM(S): 80 TABLET, FILM COATED ORAL at 21:19

## 2020-08-08 RX ADMIN — Medication 81 MILLIGRAM(S): at 12:55

## 2020-08-08 RX ADMIN — MEROPENEM 100 MILLIGRAM(S): 1 INJECTION INTRAVENOUS at 05:29

## 2020-08-08 RX ADMIN — MEROPENEM 100 MILLIGRAM(S): 1 INJECTION INTRAVENOUS at 17:53

## 2020-08-08 RX ADMIN — APIXABAN 2.5 MILLIGRAM(S): 2.5 TABLET, FILM COATED ORAL at 05:30

## 2020-08-08 RX ADMIN — Medication 12.5 MILLIGRAM(S): at 05:30

## 2020-08-08 RX ADMIN — TAMSULOSIN HYDROCHLORIDE 0.4 MILLIGRAM(S): 0.4 CAPSULE ORAL at 21:19

## 2020-08-08 RX ADMIN — Medication 1 APPLICATION(S): at 21:18

## 2020-08-08 NOTE — PROGRESS NOTE ADULT - ATTENDING COMMENTS
Patient seen and examined today. I agree with the above findings, assessment, and plan with the following additions and exceptions:     Continue meropenem for complicated UTI. Pneumonia is possible although lower on our ddx.   Reduce home BB dose in setting of asymptomatic bradycardia. HR reduced to 30s at one point.   IS and OOBTC with assistance daily.   F/u all cx data.   Rest of plan as above.     Dr. Jorge Coleman, DO  Attending Physician  Division of Hospital Medicine  [facility]  Pager:  [phone] Patient seen and examined today. I agree with the above findings, assessment, and plan with the following additions and exceptions:     Continue meropenem for complicated UTI. Pneumonia is possible although lower on our ddx.   Hold home BB for now given bradycardia to 39.   IS and OOBTC with assistance daily.   F/u all cx data.   Rest of plan as above.     Dr. Jorge Coleman, DO  Attending Physician  Division of Hospital Medicine  [facility]  Pager:  [phone]

## 2020-08-08 NOTE — PROGRESS NOTE ADULT - PROBLEM SELECTOR PLAN 2
-UA revealed large leuk esterase and +ve for nitrite. WBC in urine elevated at 24. UCx >100k E.coli   - start jayson (8/7-)  - post void bladder scan, will place escalona if necessary -UA revealed large leuk esterase and +ve for nitrite. WBC in urine elevated at 24. UCx >100k E.coli   - start jayson (8/7-)

## 2020-08-08 NOTE — PROGRESS NOTE ADULT - SUBJECTIVE AND OBJECTIVE BOX
PROGRESS NOTE:   Authored by Dr. Tiffany Medeiros MD, Pager [phone] [facility], [id number] [place]     Patient is a 89y old  Male who presents with a chief complaint of SEPSIS (07 Aug 2020 07:03)      SUBJECTIVE / OVERNIGHT EVENTS: No events overnight.    ADDITIONAL REVIEW OF SYSTEMS: Denies fevers, chills, n/v.    MEDICATIONS  (STANDING):  amLODIPine   Tablet 5 milliGRAM(s) Oral daily  apixaban 2.5 milliGRAM(s) Oral two times a day  aspirin  chewable 81 milliGRAM(s) Oral daily  atorvastatin 10 milliGRAM(s) Oral at bedtime  finasteride 5 milliGRAM(s) Oral daily  meropenem  IVPB 1000 milliGRAM(s) IV Intermittent every 12 hours  metoprolol tartrate 12.5 milliGRAM(s) Oral every 12 hours  polyethylene glycol 3350 17 Gram(s) Oral two times a day  senna 2 Tablet(s) Oral at bedtime  silver sulfADIAZINE 1% Cream 1 Application(s) Topical two times a day  sodium chloride 0.9%. 500 milliLiter(s) (50 mL/Hr) IV Continuous <Continuous>  tamsulosin 0.4 milliGRAM(s) Oral at bedtime    MEDICATIONS  (PRN):  acetaminophen   Tablet .. 975 milliGRAM(s) Oral every 4 hours PRN Moderate Pain (4 - 6), Severe Pain (7 - 10)      CAPILLARY BLOOD GLUCOSE        I&O's Summary    07 Aug 2020 07:01  -  08 Aug 2020 07:00  --------------------------------------------------------  IN: 590 mL / OUT: 0 mL / NET: 590 mL        PHYSICAL EXAM:  Vital Signs Last 24 Hrs  T(C): 36.3 (08 Aug 2020 05:17), Max: 36.9 (07 Aug 2020 14:23)  T(F): 97.4 (08 Aug 2020 05:17), Max: 98.4 (07 Aug 2020 14:23)  HR: 67 (08 Aug 2020 05:33) (39 - 67)  BP: 162/63 (08 Aug 2020 05:17) (134/70 - 162/63)  BP(mean): --  RR: 18 (08 Aug 2020 05:17) (18 - 18)  SpO2: 97% (08 Aug 2020 05:17) (97% - 98%)    CONSTITUTIONAL: NAD, lying in bed comfortably  RESPIRATORY: Normal respiratory effort; mild crackles lung bases b/l  CARDIOVASCULAR: Regular rate and rhythm, normal S1 and S2, no murmur/rub/gallop  ABDOMEN: Soft, nondistended, nontender to palpation, normoactive bowel sounds, no rebound/guarding  MUSCLOSKELETAL: L shoulder tender to palpation, painful to abduction. Other extremities FROM.  NEURO: alert, full and equal strength all extremities   EXTREMITIES: no pedal edema    LABS:                        10.0   19.70 )-----------( 221      ( 07 Aug 2020 07:25 )             32.5     08-07    140  |  102  |  23  ----------------------------<  181<H>  4.7   |  27  |  0.82    Ca    9.5      07 Aug 2020 07:25  Phos  3.3     08-  Mg     2.1     08-07    TPro  6.5  /  Alb  3.2<L>  /  TBili  0.3  /  DBili  x   /  AST  20  /  ALT  29  /  AlkPhos  89  08-06    PT/INR - ( 07 Aug 2020 07:26 )   PT: 14.7 sec;   INR: 1.25 ratio         PTT - ( 07 Aug 2020 07:26 )  PTT:28.0 sec      Urinalysis Basic - ( 06 Aug 2020 18:15 )    Color: Yellow / Appearance: Slightly Turbid / S.015 / pH: x  Gluc: x / Ketone: Negative  / Bili: Negative / Urobili: Negative   Blood: x / Protein: 100 / Nitrite: Positive   Leuk Esterase: Large / RBC: 1 /hpf / WBC 24 /HPF   Sq Epi: x / Non Sq Epi: x / Bacteria: Many        Culture - Urine (collected 07 Aug 2020 00:54)  Source: .Urine Catheterized  Preliminary Report (08 Aug 2020 07:23):    >100,000 CFU/ml Escherichia coli    Culture - Blood (collected 07 Aug 2020 00:28)  Source: .Blood Blood-Peripheral  Preliminary Report (08 Aug 2020 01:02):    No growth to date.    Culture - Blood (collected 07 Aug 2020 00:28)  Source: .Blood Blood-Peripheral  Preliminary Report (08 Aug 2020 01:02):    No growth to date.    Culture - Blood (collected 06 Aug 2020 17:23)  Source: .Blood PERCUTANEOUS (BLOOD)  Preliminary Report (07 Aug 2020 18:02):    No growth to date.        RADIOLOGY & ADDITIONAL TESTS:  < from: CT Chest No Cont (20 @ 15:16) >  LINES AND TUBES: Left-sided pacemaker with leads in place in the right atrium and right ventricle.  LUNGS AND LARGE AIRWAYS: Patent central airways. No focalconsolidations or opacification. Previously mentioned right lung nodule is not appreciated in this study. In the left lower lobe is a linear and nodular opacity (series 3 image 74). Linear atelectasis/scarring in the lingula.  PLEURA: Small bilateralpleural effusions, right greater than left. No pneumothorax.  VESSELS: Coronary artery calcifications. The aorta is ectatic. Aortic calcifications. SMA calcification.  HEART: Heart size is normal. No pericardial effusion.  MEDIASTINUM AND DAVIS: No lymphadenopathy.  CHEST WALL AND LOWER NECK: Within normal limits.  VISUALIZED UPPER ABDOMEN: Within normal limits.  BONES: Diffuse degenerative disease    IMPRESSION:    1.  Small bilateral pleural effusions.  2.  Linear and nodular opacity in the leftlower lobe is of uncertain etiology.    < end of copied text > PROGRESS NOTE:   Authored by Dr. Tiffany Medeiros MD, Pager [phone] [facility], [id number] [facility]     Patient is a 89y old  Male who presents with a chief complaint of SEPSIS (07 Aug 2020 07:03)      SUBJECTIVE / OVERNIGHT EVENTS: No events overnight. Pt seen this AM in good spirits. Reports L shoulder pain is improved. Denies dysuria.     ADDITIONAL REVIEW OF SYSTEMS: Denies fevers.    MEDICATIONS  (STANDING):  amLODIPine   Tablet 5 milliGRAM(s) Oral daily  apixaban 2.5 milliGRAM(s) Oral two times a day  aspirin  chewable 81 milliGRAM(s) Oral daily  atorvastatin 10 milliGRAM(s) Oral at bedtime  finasteride 5 milliGRAM(s) Oral daily  meropenem  IVPB 1000 milliGRAM(s) IV Intermittent every 12 hours  metoprolol tartrate 12.5 milliGRAM(s) Oral every 12 hours  polyethylene glycol 3350 17 Gram(s) Oral two times a day  senna 2 Tablet(s) Oral at bedtime  silver sulfADIAZINE 1% Cream 1 Application(s) Topical two times a day  sodium chloride 0.9%. 500 milliLiter(s) (50 mL/Hr) IV Continuous <Continuous>  tamsulosin 0.4 milliGRAM(s) Oral at bedtime    MEDICATIONS  (PRN):  acetaminophen   Tablet .. 975 milliGRAM(s) Oral every 4 hours PRN Moderate Pain (4 - 6), Severe Pain (7 - 10)      CAPILLARY BLOOD GLUCOSE        I&O's Summary    07 Aug 2020 07:01  -  08 Aug 2020 07:00  --------------------------------------------------------  IN: 590 mL / OUT: 0 mL / NET: 590 mL        PHYSICAL EXAM:  Vital Signs Last 24 Hrs  T(C): 36.3 (08 Aug 2020 05:17), Max: 36.9 (07 Aug 2020 14:23)  T(F): 97.4 (08 Aug 2020 05:17), Max: 98.4 (07 Aug 2020 14:23)  HR: 67 (08 Aug 2020 05:33) (39 - 67)  BP: 162/63 (08 Aug 2020 05:17) (134/70 - 162/63)  BP(mean): --  RR: 18 (08 Aug 2020 05:17) (18 - 18)  SpO2: 97% (08 Aug 2020 05:17) (97% - 98%)    CONSTITUTIONAL: NAD, lying in bed comfortably  RESPIRATORY: Normal respiratory effort; mild crackles lung bases b/l  CARDIOVASCULAR: Regular rate and rhythm, normal S1 and S2, no murmur/rub/gallop  ABDOMEN: Soft, nondistended, nontender to palpation, normoactive bowel sounds, no rebound/guarding  MUSCLOSKELETAL: L shoulder no TTP, refused ROM testing reporting "give it time". Other extremities FROM.   NEURO: alert, 5/5  strength b/l.  EXTREMITIES: no pedal edema    LABS:                        10.0   19.70 )-----------( 221      ( 07 Aug 2020 07:25 )             32.5     08-07    140  |  102  |  23  ----------------------------<  181<H>  4.7   |  27  |  0.82    Ca    9.5      07 Aug 2020 07:25  Phos  3.3     08-07  Mg     2.1     08-07    TPro  6.5  /  Alb  3.2<L>  /  TBili  0.3  /  DBili  x   /  AST  20  /  ALT  29  /  AlkPhos  89  08-06    PT/INR - ( 07 Aug 2020 07:26 )   PT: 14.7 sec;   INR: 1.25 ratio         PTT - ( 07 Aug 2020 07:26 )  PTT:28.0 sec      Urinalysis Basic - ( 06 Aug 2020 18:15 )    Color: Yellow / Appearance: Slightly Turbid / S.015 / pH: x  Gluc: x / Ketone: Negative  / Bili: Negative / Urobili: Negative   Blood: x / Protein: 100 / Nitrite: Positive   Leuk Esterase: Large / RBC: 1 /hpf / WBC 24 /HPF   Sq Epi: x / Non Sq Epi: x / Bacteria: Many        Culture - Urine (collected 07 Aug 2020 00:54)  Source: .Urine Catheterized  Preliminary Report (08 Aug 2020 07:23):    >100,000 CFU/ml Escherichia coli    Culture - Blood (collected 07 Aug 2020 00:28)  Source: .Blood Blood-Peripheral  Preliminary Report (08 Aug 2020 01:02):    No growth to date.    Culture - Blood (collected 07 Aug 2020 00:28)  Source: .Blood Blood-Peripheral  Preliminary Report (08 Aug 2020 01:02):    No growth to date.    Culture - Blood (collected 06 Aug 2020 17:23)  Source: .Blood PERCUTANEOUS (BLOOD)  Preliminary Report (07 Aug 2020 18:02):    No growth to date.        RADIOLOGY & ADDITIONAL TESTS:    < from: CT Chest No Cont (20 @ 15:16) >  LINES AND TUBES: Left-sided pacemaker with leads in place in the right atrium and right ventricle.  LUNGS AND LARGE AIRWAYS: Patent central airways. No focalconsolidations or opacification. Previously mentioned right lung nodule is not appreciated in this study. In the left lower lobe is a linear and nodular opacity (series 3 image 74). Linear atelectasis/scarring in the lingula.  PLEURA: Small bilateralpleural effusions, right greater than left. No pneumothorax.  VESSELS: Coronary artery calcifications. The aorta is ectatic. Aortic calcifications. SMA calcification.  HEART: Heart size is normal. No pericardial effusion.  MEDIASTINUM AND DAVIS: No lymphadenopathy.  CHEST WALL AND LOWER NECK: Within normal limits.  VISUALIZED UPPER ABDOMEN: Within normal limits.  BONES: Diffuse degenerative disease    IMPRESSION:    1.  Small bilateral pleural effusions.  2.  Linear and nodular opacity in the leftlower lobe is of uncertain etiology.    < end of copied text > PROGRESS NOTE:   Authored by Dr. Tiffany Medeiros MD, Pager [phone] [facility], [id number] [facility]     Patient is a 89y old  Male who presents with a chief complaint of SEPSIS (07 Aug 2020 07:03)      SUBJECTIVE / OVERNIGHT EVENTS: No events overnight. Pt seen this AM in good spirits. Reports L shoulder pain is improved. Denies dysuria.     ADDITIONAL REVIEW OF SYSTEMS: Denies fevers.  12 point ros negative except for above    MEDICATIONS  (STANDING):  amLODIPine   Tablet 5 milliGRAM(s) Oral daily  apixaban 2.5 milliGRAM(s) Oral two times a day  aspirin  chewable 81 milliGRAM(s) Oral daily  atorvastatin 10 milliGRAM(s) Oral at bedtime  finasteride 5 milliGRAM(s) Oral daily  meropenem  IVPB 1000 milliGRAM(s) IV Intermittent every 12 hours  metoprolol tartrate 12.5 milliGRAM(s) Oral every 12 hours  polyethylene glycol 3350 17 Gram(s) Oral two times a day  senna 2 Tablet(s) Oral at bedtime  silver sulfADIAZINE 1% Cream 1 Application(s) Topical two times a day  sodium chloride 0.9%. 500 milliLiter(s) (50 mL/Hr) IV Continuous <Continuous>  tamsulosin 0.4 milliGRAM(s) Oral at bedtime    MEDICATIONS  (PRN):  acetaminophen   Tablet .. 975 milliGRAM(s) Oral every 4 hours PRN Moderate Pain (4 - 6), Severe Pain (7 - 10)      CAPILLARY BLOOD GLUCOSE        I&O's Summary    07 Aug 2020 07:01  -  08 Aug 2020 07:00  --------------------------------------------------------  IN: 590 mL / OUT: 0 mL / NET: 590 mL        PHYSICAL EXAM:  Vital Signs Last 24 Hrs  T(C): 36.3 (08 Aug 2020 05:17), Max: 36.9 (07 Aug 2020 14:23)  T(F): 97.4 (08 Aug 2020 05:17), Max: 98.4 (07 Aug 2020 14:23)  HR: 67 (08 Aug 2020 05:33) (39 - 67)  BP: 162/63 (08 Aug 2020 05:17) (134/70 - 162/63)  BP(mean): --  RR: 18 (08 Aug 2020 05:17) (18 - 18)  SpO2: 97% (08 Aug 2020 05:17) (97% - 98%)    CONSTITUTIONAL: NAD, lying in bed comfortably  RESPIRATORY: Normal respiratory effort; mild crackles lung bases b/l  CARDIOVASCULAR: Regular rate and rhythm, normal S1 and S2, no murmur/rub/gallop  ABDOMEN: Soft, nondistended, nontender to palpation, normoactive bowel sounds, no rebound/guarding  MUSCLOSKELETAL: L shoulder no TTP, refused ROM testing reporting "give it time". Other extremities FROM.   NEURO: alert, 5/5  strength b/l.. Moving all ext. Following commands.   EXTREMITIES: no pedal edema    LABS:                        10.0   19.70 )-----------( 221      ( 07 Aug 2020 07:25 )             32.5     08-07    140  |  102  |  23  ----------------------------<  181<H>  4.7   |  27  |  0.82    Ca    9.5      07 Aug 2020 07:25  Phos  3.3     08-07  Mg     2.1     08-07    TPro  6.5  /  Alb  3.2<L>  /  TBili  0.3  /  DBili  x   /  AST  20  /  ALT  29  /  AlkPhos  89  08-06    PT/INR - ( 07 Aug 2020 07:26 )   PT: 14.7 sec;   INR: 1.25 ratio         PTT - ( 07 Aug 2020 07:26 )  PTT:28.0 sec      Urinalysis Basic - ( 06 Aug 2020 18:15 )    Color: Yellow / Appearance: Slightly Turbid / S.015 / pH: x  Gluc: x / Ketone: Negative  / Bili: Negative / Urobili: Negative   Blood: x / Protein: 100 / Nitrite: Positive   Leuk Esterase: Large / RBC: 1 /hpf / WBC 24 /HPF   Sq Epi: x / Non Sq Epi: x / Bacteria: Many        Culture - Urine (collected 07 Aug 2020 00:54)  Source: .Urine Catheterized  Preliminary Report (08 Aug 2020 07:23):    >100,000 CFU/ml Escherichia coli    Culture - Blood (collected 07 Aug 2020 00:28)  Source: .Blood Blood-Peripheral  Preliminary Report (08 Aug 2020 01:02):    No growth to date.    Culture - Blood (collected 07 Aug 2020 00:28)  Source: .Blood Blood-Peripheral  Preliminary Report (08 Aug 2020 01:02):    No growth to date.    Culture - Blood (collected 06 Aug 2020 17:23)  Source: .Blood PERCUTANEOUS (BLOOD)  Preliminary Report (07 Aug 2020 18:02):    No growth to date.

## 2020-08-08 NOTE — DIETITIAN INITIAL EVALUATION ADULT. - ADD RECOMMEND
1) Provide assistance and encouragement at mealtimes as needed 2) Monitor PO intake, weight, labs, skin, GI status, diet

## 2020-08-08 NOTE — DIETITIAN INITIAL EVALUATION ADULT. - OTHER INFO
Patient visited, extremely hard-of-hearing and unable to answer questions posed by RD at this time. Noted to be confused. Limited subjective information obtained from patient at this time.     Per flowsheets, patient consumed 75% of breakfast this morning and 90% of dinner last night (8/7). Noted pt with fecal incontinence. No BMs since admission (8/6) per chart. NKFA per chart. Per H&P, patient taking folic acid and vitamin B12 supplements PTA + Probiotic formula.     Pt noted with difficulties swallowing. Of note, patient seen on last admission by Speech Pathologist (7/13/2020). At this time, pt presented with sophia-pharyngeal dysphagia. "As per daughter, instrumental assessment is not something they wish to pursue, nor do they wish to modify his diet." Will continue to monitor tolerance to current diet.

## 2020-08-08 NOTE — DIETITIAN INITIAL EVALUATION ADULT. - PHYSICAL APPEARANCE
other (specify)/Nutrition-focused physical exam declined by pt at this time. No overt signs of muscle or fat wasting observed. Ht: 70 inches (177.8 cm) Wt: 143.9 pounds (65.4 kg)  BMI: 20.7 kg/m2  IBW: 166 pounds +/-10% %IBW: 87%  Skin: no pressure injuries per flowsheets   Edema: no edema per flowsheets

## 2020-08-08 NOTE — DIETITIAN INITIAL EVALUATION ADULT. - REASON INDICATOR FOR ASSESSMENT
Consult received for pressure injury stage 2 or > (noted currently without pressure injury per chart). Source: comprehensive chart review, patient. Pt is a 89 yo male with PMH of afib with pacemaker, CAD with stents, HTN, HLD, BPH, who presented with fever and elevated WBC, admitted 8/6 with sepsis likely secondary to UTI with episode of hypoxia, as well as L sided shoulder pain.

## 2020-08-08 NOTE — PROGRESS NOTE ADULT - PROBLEM SELECTOR PLAN 5
-BP stable in ED; went up to 178/71 when patient came to floor   -continue with home amlodipine with hold parameters of SBP <120 -continue with home amlodipine with hold parameters of SBP <120

## 2020-08-08 NOTE — PROGRESS NOTE ADULT - PROBLEM SELECTOR PLAN 1
-likely in the setting of UTI   -complicated by an episode of hypoxia in the ED with O2 sat of 88%, without known cause   -hypoxia could be related to atelectasis, in the setting of sepsis, AMS, etc. will continue to monitor patient for now on 2L NC and wean patient off when able to do so  - CT chest: No focal consolidations, small b/l pleural effusions, linear and nodular opacity in LLL unknown eitiology  - BCx: NGTD. UCx >100k E.coli  - CTX discontinued. start jayson (8/7-) -likely in the setting of UTI   -complicated by an episode of hypoxia in the ED with O2 sat of 88%, without known cause. hypoxia could be related to atelectasis, in the setting of sepsis, AMS, etc. Now satting well on RA.  - CT chest: No focal consolidations, small b/l pleural effusions, linear and nodular opacity in LLL unknown eitiology  - BCx: NGTD. UCx >100k E.coli  - CTX discontinued. start jayson (8/7-) -likely in the setting of UTI   -complicated by an episode of hypoxia in the ED with O2 sat of 88%, without known cause. hypoxia could be related to atelectasis and b/l effusions, in the setting of sepsis, AMS, etc. Now satting well on RA.  - CT chest: No focal consolidations, small b/l pleural effusions, linear and nodular opacity in LLL unknown eitiology  - BCx: NGTD. UCx >100k E.coli  - CTX discontinued. start jayson (8/7-)

## 2020-08-08 NOTE — DIETITIAN INITIAL EVALUATION ADULT. - PROBLEM SELECTOR PLAN 1
-likely in the setting of UTI   -complicated by an episode of hypoxia in the ED with O2 sat of 88%, without known cause   -hypoxia could be related to atelectasis, in the setting of sepsis, AMS, etc. will continue to monitor patient for now on 2L NC and wean patient off when able to do so, if patient has more episodes of decompensation, can consider more rigorous workup such as CT  -was given cefepime and vancomycin in the ED, as well as 1L NS bolus   -tylenol suppository for fever   -BCx, UCx pending   -empiric ceftriaxone for coverage

## 2020-08-08 NOTE — DIETITIAN INITIAL EVALUATION ADULT. - PROBLEM SELECTOR PLAN 3
-likely chronic from his recent fall and admission in july   -xray at that time reviewed; no acute fracture   -pain control with tylenol PRN   -can consider PT eval

## 2020-08-08 NOTE — PROGRESS NOTE ADULT - PROBLEM SELECTOR PLAN 3
-likely chronic from his recent fall and admission in july   -xray at that time reviewed; no acute fracture   -pain control with tylenol PRN   - PT eval: CHENG - likely chronic from his recent fall and admission in July. Now pain improving.   - xray at that time reviewed; no acute fracture   - pain control with tylenol PRN   - PT eval: CHENG

## 2020-08-08 NOTE — DIETITIAN INITIAL EVALUATION ADULT. - PROBLEM SELECTOR PLAN 2
-UA revealed large leuk esterase and +ve for nitrite   -WBC in urine elevated at 24   -ceftriaxone   -bladder scan in AM after patient voids, will place escalona if necessary   -UCx pending, tailor abx once they are back

## 2020-08-08 NOTE — DIETITIAN INITIAL EVALUATION ADULT. - PROBLEM SELECTOR PLAN 10
Pt is a 44year old female admitted to [facility]. Patient presents with:  R/o Rom     Pt is [id number] intra-uterine pregnancy. History obtained, consents signed. Oriented to room, staff, and plan of care. PCP: Sherman Brian   Contacted: [x] N [ ] Y   Discharge: likely back to rehab facility once clinically stable

## 2020-08-09 ENCOUNTER — TRANSCRIPTION ENCOUNTER (OUTPATIENT)
Age: 85
End: 2020-08-09

## 2020-08-09 LAB
-  AMIKACIN: SIGNIFICANT CHANGE UP
-  AMOXICILLIN/CLAVULANIC ACID: SIGNIFICANT CHANGE UP
-  AMPICILLIN/SULBACTAM: SIGNIFICANT CHANGE UP
-  AMPICILLIN: SIGNIFICANT CHANGE UP
-  AZTREONAM: SIGNIFICANT CHANGE UP
-  CEFAZOLIN: SIGNIFICANT CHANGE UP
-  CEFEPIME: SIGNIFICANT CHANGE UP
-  CEFOXITIN: SIGNIFICANT CHANGE UP
-  CEFTRIAXONE: SIGNIFICANT CHANGE UP
-  CIPROFLOXACIN: SIGNIFICANT CHANGE UP
-  ERTAPENEM: SIGNIFICANT CHANGE UP
-  GENTAMICIN: SIGNIFICANT CHANGE UP
-  IMIPENEM: SIGNIFICANT CHANGE UP
-  LEVOFLOXACIN: SIGNIFICANT CHANGE UP
-  MEROPENEM: SIGNIFICANT CHANGE UP
-  NITROFURANTOIN: SIGNIFICANT CHANGE UP
-  PIPERACILLIN/TAZOBACTAM: SIGNIFICANT CHANGE UP
-  TIGECYCLINE: SIGNIFICANT CHANGE UP
-  TOBRAMYCIN: SIGNIFICANT CHANGE UP
-  TRIMETHOPRIM/SULFAMETHOXAZOLE: SIGNIFICANT CHANGE UP
ANION GAP SERPL CALC-SCNC: 11 MMOL/L — SIGNIFICANT CHANGE UP (ref 5–17)
BUN SERPL-MCNC: 23 MG/DL — SIGNIFICANT CHANGE UP (ref 7–23)
CALCIUM SERPL-MCNC: 9.6 MG/DL — SIGNIFICANT CHANGE UP (ref 8.4–10.5)
CHLORIDE SERPL-SCNC: 104 MMOL/L — SIGNIFICANT CHANGE UP (ref 96–108)
CO2 SERPL-SCNC: 28 MMOL/L — SIGNIFICANT CHANGE UP (ref 22–31)
CREAT SERPL-MCNC: 0.89 MG/DL — SIGNIFICANT CHANGE UP (ref 0.5–1.3)
CULTURE RESULTS: SIGNIFICANT CHANGE UP
GLUCOSE SERPL-MCNC: 142 MG/DL — HIGH (ref 70–99)
HCT VFR BLD CALC: 32.5 % — LOW (ref 39–50)
HGB BLD-MCNC: 10.2 G/DL — LOW (ref 13–17)
MAGNESIUM SERPL-MCNC: 2.1 MG/DL — SIGNIFICANT CHANGE UP (ref 1.6–2.6)
MCHC RBC-ENTMCNC: 28.3 PG — SIGNIFICANT CHANGE UP (ref 27–34)
MCHC RBC-ENTMCNC: 31.4 GM/DL — LOW (ref 32–36)
MCV RBC AUTO: 90.3 FL — SIGNIFICANT CHANGE UP (ref 80–100)
METHOD TYPE: SIGNIFICANT CHANGE UP
NRBC # BLD: 0 /100 WBCS — SIGNIFICANT CHANGE UP (ref 0–0)
ORGANISM # SPEC MICROSCOPIC CNT: SIGNIFICANT CHANGE UP
ORGANISM # SPEC MICROSCOPIC CNT: SIGNIFICANT CHANGE UP
PHOSPHATE SERPL-MCNC: 2.3 MG/DL — LOW (ref 2.5–4.5)
PLATELET # BLD AUTO: 238 K/UL — SIGNIFICANT CHANGE UP (ref 150–400)
POTASSIUM SERPL-MCNC: 4.4 MMOL/L — SIGNIFICANT CHANGE UP (ref 3.5–5.3)
POTASSIUM SERPL-SCNC: 4.4 MMOL/L — SIGNIFICANT CHANGE UP (ref 3.5–5.3)
RBC # BLD: 3.6 M/UL — LOW (ref 4.2–5.8)
RBC # FLD: 13.9 % — SIGNIFICANT CHANGE UP (ref 10.3–14.5)
SODIUM SERPL-SCNC: 143 MMOL/L — SIGNIFICANT CHANGE UP (ref 135–145)
SPECIMEN SOURCE: SIGNIFICANT CHANGE UP
WBC # BLD: 10.55 K/UL — HIGH (ref 3.8–10.5)
WBC # FLD AUTO: 10.55 K/UL — HIGH (ref 3.8–10.5)

## 2020-08-09 PROCEDURE — 99232 SBSQ HOSP IP/OBS MODERATE 35: CPT | Mod: GC

## 2020-08-09 RX ORDER — SODIUM,POTASSIUM PHOSPHATES 278-250MG
1 POWDER IN PACKET (EA) ORAL ONCE
Refills: 0 | Status: COMPLETED | OUTPATIENT
Start: 2020-08-09 | End: 2020-08-09

## 2020-08-09 RX ADMIN — FINASTERIDE 5 MILLIGRAM(S): 5 TABLET, FILM COATED ORAL at 12:20

## 2020-08-09 RX ADMIN — SENNA PLUS 2 TABLET(S): 8.6 TABLET ORAL at 22:28

## 2020-08-09 RX ADMIN — APIXABAN 2.5 MILLIGRAM(S): 2.5 TABLET, FILM COATED ORAL at 05:51

## 2020-08-09 RX ADMIN — Medication 1 PACKET(S): at 18:36

## 2020-08-09 RX ADMIN — AMLODIPINE BESYLATE 5 MILLIGRAM(S): 2.5 TABLET ORAL at 05:51

## 2020-08-09 RX ADMIN — Medication 1 APPLICATION(S): at 12:20

## 2020-08-09 RX ADMIN — APIXABAN 2.5 MILLIGRAM(S): 2.5 TABLET, FILM COATED ORAL at 18:21

## 2020-08-09 RX ADMIN — MEROPENEM 100 MILLIGRAM(S): 1 INJECTION INTRAVENOUS at 18:22

## 2020-08-09 RX ADMIN — ATORVASTATIN CALCIUM 10 MILLIGRAM(S): 80 TABLET, FILM COATED ORAL at 22:28

## 2020-08-09 RX ADMIN — MEROPENEM 100 MILLIGRAM(S): 1 INJECTION INTRAVENOUS at 05:50

## 2020-08-09 RX ADMIN — TAMSULOSIN HYDROCHLORIDE 0.4 MILLIGRAM(S): 0.4 CAPSULE ORAL at 22:28

## 2020-08-09 RX ADMIN — Medication 81 MILLIGRAM(S): at 12:20

## 2020-08-09 RX ADMIN — Medication 1 APPLICATION(S): at 18:22

## 2020-08-09 NOTE — DISCHARGE NOTE PROVIDER - HOSPITAL COURSE
HPI:    Ronnie Arzate, is a pleasant 89 year old male who has a history of Atrial Fibrillation with implanted pacemaker, CAD with multiple stents, Hypertension, Hyperlipidemia, Benign Prostatic Hyperplasia, who presents to the [facility]'s Emergency Department from his outpatient rehabilitation facility called [facility] with a fever and elevated WBC count. Of note, this patient is EXTREMELY hard of hearing as he says his hearing aid batteries are old. Despite his elevated temperature WBC count, this patient's chief complaint is "left shoulder pain."        Of note, this patient was recently admitted to the hospital in July of 2020 secondary to a fall. At the time, patient was septic, had rhabdomyolysis, and had a UTI. The patient was admitted from July 11th to July 17th and was treated with ceftriaxone, with subsequent switch to cefpodoxime which was completed on July 20th. The patient's blood cultures at that time grew E. Coli, and so did the Urine cultures. Repeat cultures were negative. Patient was d/c back to [facility] rehab after that admission.         Patient states his shoulder pain has been ongoing since his last admission. At that time Xray did not reveal and acute fractures and the patient was seen by PT and treated with Tylenol for pain and it improved. On this admission, patient says he has pain to touch of the shoulder and when he is moving around. He has mobility in the arm and can adduct and abduct the arm, although abduction past 90 degrees causes pain.         The patient denies any other symptoms including fevers, headaches, shortness of breath, chest pain, or abdominal pain. He denies urinary frequency or dysuria. He was febrile in the ED up to 102, BP ranged from 125-142 systolic and 57-74 diastolic. Patients heart rate remained stable but he was tachypneic up to 26 and desaturated to 88%, so he was started on 2L NC oxygen. (07 Aug 2020 01:39)        Hospital course: Pt was admitted for sepsis likely 2/2 UTI. CTX was discontinued and pt was started on meropenem for _____day course. CT chest performed revealed small b/l pleural effusions however no consolidations seen. Ucx grew >100k E. coli, Bcx with no growth. NC was discontinued and pt was satting well on RA. L shoulder pain improved with PT and Tylenol prn. HPI:    Ronnie Arzate, is a pleasant 89 year old male who has a history of Atrial Fibrillation with implanted pacemaker, CAD with multiple stents, Hypertension, Hyperlipidemia, Benign Prostatic Hyperplasia, who presents to the [facility]'s Emergency Department from his outpatient rehabilitation facility called [facility] with a fever and elevated WBC count. Of note, this patient is EXTREMELY hard of hearing as he says his hearing aid batteries are old. Despite his elevated temperature WBC count, this patient's chief complaint is "left shoulder pain."        Of note, this patient was recently admitted to the hospital in July of 2020 secondary to a fall. At the time, patient was septic, had rhabdomyolysis, and had a UTI. The patient was admitted from July 11th to July 17th and was treated with ceftriaxone, with subsequent switch to cefpodoxime which was completed on July 20th. The patient's blood cultures at that time grew E. Coli, and so did the Urine cultures. Repeat cultures were negative. Patient was d/c back to [facility] rehab after that admission.         Patient states his shoulder pain has been ongoing since his last admission. At that time Xray did not reveal and acute fractures and the patient was seen by PT and treated with Tylenol for pain and it improved. On this admission, patient says he has pain to touch of the shoulder and when he is moving around. He has mobility in the arm and can adduct and abduct the arm, although abduction past 90 degrees causes pain.         The patient denies any other symptoms including fevers, headaches, shortness of breath, chest pain, or abdominal pain. He denies urinary frequency or dysuria. He was febrile in the ED up to 102, BP ranged from 125-142 systolic and 57-74 diastolic. Patients heart rate remained stable but he was tachypneic up to 26 and desaturated to 88%, so he was started on 2L NC oxygen. (07 Aug 2020 01:39)        Hospital course: Pt was admitted for sepsis likely 2/2 UTI. CTX was discontinued and pt was started on meropenem for 5 day course. Switched to Ceftin 500 BID to complete 8/13. CT chest performed revealed small b/l pleural effusions however no consolidations seen. Ucx grew >100k E. coli, Bcx with no growth. NC was discontinued and pt was satting well on RA. L shoulder pain improved with PT and Tylenol prn.         At the time of discharge, the patient was hemodynamically stable, was tolerating PO diet, was voiding urine and passing stool, was ambulating, and was comfortable with adequate pain control. The patient was instructed to follow up with outpatient urology within 1 weeks after discharge from the hospital. The patient and family felt comfortable with discharge. The patient was discharged to [facility]. The patient had no other issues. HPI:    Ronnie Arzate, is a pleasant 89 year old male who has a history of Atrial Fibrillation with implanted pacemaker, CAD with multiple stents, Hypertension, Hyperlipidemia, Benign Prostatic Hyperplasia, who presents to the [facility]'s Emergency Department from his outpatient rehabilitation facility called [facility] with a fever and elevated WBC count. Of note, this patient is EXTREMELY hard of hearing as he says his hearing aid batteries are old. Despite his elevated temperature WBC count, this patient's chief complaint is "left shoulder pain."        Of note, this patient was recently admitted to the hospital in July of 2020 secondary to a fall. At the time, patient was septic, had rhabdomyolysis, and had a UTI. The patient was admitted from July 11th to July 17th and was treated with ceftriaxone, with subsequent switch to cefpodoxime which was completed on July 20th. The patient's blood cultures at that time grew E. Coli, and so did the Urine cultures. Repeat cultures were negative. Patient was d/c back to [facility] rehab after that admission.         Patient states his shoulder pain has been ongoing since his last admission. At that time Xray did not reveal and acute fractures and the patient was seen by PT and treated with Tylenol for pain and it improved. On this admission, patient says he has pain to touch of the shoulder and when he is moving around. He has mobility in the arm and can adduct and abduct the arm, although abduction past 90 degrees causes pain.         The patient denies any other symptoms including fevers, headaches, shortness of breath, chest pain, or abdominal pain. He denies urinary frequency or dysuria. He was febrile in the ED up to 102, BP ranged from 125-142 systolic and 57-74 diastolic. Patients heart rate remained stable but he was tachypneic up to 26 and desaturated to 88%, so he was started on 2L NC oxygen. (07 Aug 2020 01:39)        Hospital course: Pt was admitted for sepsis likely 2/2 UTI. CTX was discontinued and pt was started on meropenem for 5 day course. Switched to Ceftin 500 BID to complete 8/13. CT chest performed revealed small b/l pleural effusions however no consolidations seen. Ucx grew >100k E. coli, Bcx with no growth. NC was discontinued and pt was satting well on RA. L shoulder pain improved with PT and Tylenol prn.         At the time of discharge, the patient was hemodynamically stable, was tolerating PO diet, was voiding urine and passing stool, was ambulating, and was comfortable with adequate pain control. The patient was instructed to follow up with outpatient urology within 1 weeks after discharge from the hospital. The patient and family felt comfortable with discharge. The patient was discharged to [facility]. The patient had no other issues.          Attending addendum- 8/28/20, 11am    - In ED the patient had increased altered mental status,  this was secondary to acute metabolic encephalopathy due to sepsis.    - Her O2 sat was 88% but improves to 95+ on 2L NC." This acute hypoxic respiratory failure was related to sepsis on admission    - She had a stage 1-2 sacral decub which is dressed with superficial skin breakdown without surrounding erythema due to skin changes secondary to incontinence. There was hyperpigmentation of   the skin on the b/l buttocks with a partial thickness skin loss noted as per wound care.

## 2020-08-09 NOTE — DISCHARGE NOTE PROVIDER - NSDCCPCAREPLAN_GEN_ALL_CORE_FT
PRINCIPAL DISCHARGE DIAGNOSIS  Diagnosis: Sepsis  Assessment and Plan of Treatment:       SECONDARY DISCHARGE DIAGNOSES  Diagnosis: UTI (urinary tract infection)  Assessment and Plan of Treatment: PRINCIPAL DISCHARGE DIAGNOSIS  Diagnosis: Sepsis  Assessment and Plan of Treatment: Sepsis is a serious condition that occurs when the body overreacts to an infection. It is also called systemic inflammatory response syndrome (SIRS) with infection. An infection is usually caused by bacteria that attack the body. The body's defense system normally fights off infection within the affected body part. With sepsis, the body overreacts and causes symptoms to occur throughout the body. This leads to uncontrolled and widespread inflammation and clotting in small blood vessels. Blood flow to different body parts decreases and may lead to organ failure. Sepsis requires immediate treatment. You were treated during admission with IV antibiotics. Your blood pressure and clinical status has improved and you have been cleared for discharge. Please continue to take your antibiotics as prescribed.      SECONDARY DISCHARGE DIAGNOSES  Diagnosis: UTI (urinary tract infection)  Assessment and Plan of Treatment: A urinary tract infection (UTI) is caused by bacteria that get inside your urinary tract. Most bacteria that enter your urinary tract come out when you urinate. If the bacteria stay in your urinary tract, you may get an infection. Your urinary tract includes your kidneys, ureters, bladder, and urethra. Urine is made in your kidneys, and it flows from the ureters to the bladder. Urine leaves the bladder through the urethra. A UTI is more common in your lower urinary tract, which includes your bladder and urethra.  Contact your healthcare provider if:  •You have a mild fever.  •You do not feel better after 2 days of taking antibiotics.  •You are vomiting.  •You have new symptoms, such as blood or pus in your urine.  •You have questions or concerns about your condition or care.  You are being discharged on 3 days of antibiotics. Please finish your course of antibiotics as prescribed. Please follow up with the urology clinic. Information for the clinic is included in this packet. PRINCIPAL DISCHARGE DIAGNOSIS  Diagnosis: Sepsis  Assessment and Plan of Treatment: Sepsis is a serious condition that occurs when the body overreacts to an infection. It is also called systemic inflammatory response syndrome (SIRS) with infection. An infection is usually caused by bacteria that attack the body. The body's defense system normally fights off infection within the affected body part. With sepsis, the body overreacts and causes symptoms to occur throughout the body. This leads to uncontrolled and widespread inflammation and clotting in small blood vessels. Blood flow to different body parts decreases and may lead to organ failure. Sepsis requires immediate treatment. You were treated during admission with IV antibiotics. Your blood pressure and clinical status has improved and you have been cleared for discharge. Please continue to take your antibiotics as prescribed. You should take a total of 2 weeks of antibiotics. Your antibiotic prescription is set to be discontinued on 8/21/2020.      SECONDARY DISCHARGE DIAGNOSES  Diagnosis: UTI (urinary tract infection)  Assessment and Plan of Treatment: A urinary tract infection (UTI) is caused by bacteria that get inside your urinary tract. Most bacteria that enter your urinary tract come out when you urinate. If the bacteria stay in your urinary tract, you may get an infection. Your urinary tract includes your kidneys, ureters, bladder, and urethra. Urine is made in your kidneys, and it flows from the ureters to the bladder. Urine leaves the bladder through the urethra. A UTI is more common in your lower urinary tract, which includes your bladder and urethra.  Contact your healthcare provider if:  •You have a mild fever.  •You do not feel better after 2 days of taking antibiotics.  •You are vomiting.  •You have new symptoms, such as blood or pus in your urine.  •You have questions or concerns about your condition or care.  You are being discharged on antibiotics. Please finish your course of antibiotics as prescribed, ending on 8/21/2020. Please follow up with the urology clinic. Information for the clinic is included in this packet.

## 2020-08-09 NOTE — PROGRESS NOTE ADULT - PROBLEM SELECTOR PLAN 1
-likely in the setting of UTI   -complicated by an episode of hypoxia in the ED with O2 sat of 88%, without known cause. hypoxia could be related to atelectasis and b/l effusions, in the setting of sepsis, AMS, etc. Now satting well on RA.  - CT chest: No focal consolidations, small b/l pleural effusions, linear and nodular opacity in LLL unknown eitiology  - BCx: NGTD. UCx >100k E.coli  - CTX discontinued. start jayson (8/7-)

## 2020-08-09 NOTE — PROGRESS NOTE ADULT - SUBJECTIVE AND OBJECTIVE BOX
Dr. Tommie Duque  Internal Medicine PGY-3  Pager# [phone]    Patient is a 89y old  Male who presents with a chief complaint of SEPSIS (08 Aug 2020 07:28)      SUBJECTIVE / OVERNIGHT EVENTS: No acute events overnight. Patient denies n/v/f/c/h/d.     MEDICATIONS  (STANDING):  amLODIPine   Tablet 5 milliGRAM(s) Oral daily  apixaban 2.5 milliGRAM(s) Oral two times a day  aspirin  chewable 81 milliGRAM(s) Oral daily  atorvastatin 10 milliGRAM(s) Oral at bedtime  finasteride 5 milliGRAM(s) Oral daily  meropenem  IVPB 1000 milliGRAM(s) IV Intermittent every 12 hours  polyethylene glycol 3350 17 Gram(s) Oral two times a day  senna 2 Tablet(s) Oral at bedtime  silver sulfADIAZINE 1% Cream 1 Application(s) Topical two times a day  sodium chloride 0.9%. 500 milliLiter(s) (50 mL/Hr) IV Continuous <Continuous>  tamsulosin 0.4 milliGRAM(s) Oral at bedtime    MEDICATIONS  (PRN):  acetaminophen   Tablet .. 650 milliGRAM(s) Oral every 6 hours PRN Moderate Pain (4 - 6)      Vital Signs Last 24 Hrs  T(C): 36.6 (09 Aug 2020 05:29), Max: 36.9 (08 Aug 2020 14:33)  T(F): 97.8 (09 Aug 2020 05:29), Max: 98.4 (08 Aug 2020 14:33)  HR: 91 (09 Aug 2020 05:29) (67 - 91)  BP: 137/71 (09 Aug 2020 05:29) (130/59 - 151/58)  BP(mean): --  RR: 18 (09 Aug 2020 05:29) (18 - 18)  SpO2: 94% (09 Aug 2020 05:29) (91% - 98%)  CAPILLARY BLOOD GLUCOSE        I&O's Summary    08 Aug 2020 07:01  -  09 Aug 2020 07:00  --------------------------------------------------------  IN: 970 mL / OUT: 0 mL / NET: 970 mL        PHYSICAL EXAM:  GENERAL: NAD, well-developed  HEAD:  Atraumatic, Normocephalic  EYES: EOMI, PERRLA, conjunctiva and sclera clear  NECK: Supple, No JVD  CHEST/LUNG: Clear to auscultation bilaterally; No wheeze  HEART: Regular rate and rhythm; No murmurs, rubs, or gallops  ABDOMEN: Soft, Nontender, Nondistended; Bowel sounds present  EXTREMITIES:  2+ Peripheral Pulses, No clubbing, cyanosis, or edema  PSYCH: AAOx3  NEUROLOGY: non-focal  SKIN: No rashes or lesions    LABS:                        10.2   10.55 )-----------( 238      ( 09 Aug 2020 07:20 )             32.5     08-09    143  |  104  |  23  ----------------------------<  142<H>  4.4   |  28  |  0.89    Ca    9.6      09 Aug 2020 07:20  Phos  2.3     08-09  Mg     2.1     08-09                RADIOLOGY & ADDITIONAL TESTS:    Imaging Personally Reviewed:    Consultant(s) Notes Reviewed:      Care Discussed with Consultants/Other Providers: Dr. Tommie Duque  Internal Medicine PGY-3  Pager# [phone]    Patient is a 89y old  Male who presents with a chief complaint of SEPSIS (08 Aug 2020 07:28)      SUBJECTIVE / OVERNIGHT EVENTS: No acute events overnight. Patient denies n/v/f/c/h/d.   12 point ros negative except for above    MEDICATIONS  (STANDING):  amLODIPine   Tablet 5 milliGRAM(s) Oral daily  apixaban 2.5 milliGRAM(s) Oral two times a day  aspirin  chewable 81 milliGRAM(s) Oral daily  atorvastatin 10 milliGRAM(s) Oral at bedtime  finasteride 5 milliGRAM(s) Oral daily  meropenem  IVPB 1000 milliGRAM(s) IV Intermittent every 12 hours  polyethylene glycol 3350 17 Gram(s) Oral two times a day  senna 2 Tablet(s) Oral at bedtime  silver sulfADIAZINE 1% Cream 1 Application(s) Topical two times a day  sodium chloride 0.9%. 500 milliLiter(s) (50 mL/Hr) IV Continuous <Continuous>  tamsulosin 0.4 milliGRAM(s) Oral at bedtime    MEDICATIONS  (PRN):  acetaminophen   Tablet .. 650 milliGRAM(s) Oral every 6 hours PRN Moderate Pain (4 - 6)      Vital Signs Last 24 Hrs  T(C): 36.6 (09 Aug 2020 05:29), Max: 36.9 (08 Aug 2020 14:33)  T(F): 97.8 (09 Aug 2020 05:29), Max: 98.4 (08 Aug 2020 14:33)  HR: 91 (09 Aug 2020 05:29) (67 - 91)  BP: 137/71 (09 Aug 2020 05:29) (130/59 - 151/58)  BP(mean): --  RR: 18 (09 Aug 2020 05:29) (18 - 18)  SpO2: 94% (09 Aug 2020 05:29) (91% - 98%)  CAPILLARY BLOOD GLUCOSE        I&O's Summary    08 Aug 2020 07:01  -  09 Aug 2020 07:00  --------------------------------------------------------  IN: 970 mL / OUT: 0 mL / NET: 970 mL        PHYSICAL EXAM:  GENERAL: NAD, well-developed  HEAD:  Atraumatic, Normocephalic  EYES: EOMI, PERRLA, conjunctiva and sclera clear  NECK: Supple, No JVD  CHEST/LUNG: Clear to auscultation bilaterally; No wheeze  HEART: Regular rate and rhythm; No murmurs, rubs, or gallops  ABDOMEN: Soft, Nontender, Nondistended; Bowel sounds present  EXTREMITIES:  2+ Peripheral Pulses, No clubbing, cyanosis, or edema  PSYCH: AAOx3  NEUROLOGY: moving all ext. following commands.  SKIN: No rashes or lesions    LABS:                        10.2   10.55 )-----------( 238      ( 09 Aug 2020 07:20 )             32.5     08-09    143  |  104  |  23  ----------------------------<  142<H>  4.4   |  28  |  0.89    Ca    9.6      09 Aug 2020 07:20  Phos  2.3     08-09  Mg     2.1     08-09

## 2020-08-09 NOTE — PROGRESS NOTE ADULT - PROBLEM SELECTOR PLAN 2
-UA revealed large leuk esterase and +ve for nitrite. WBC in urine elevated at 24. UCx >100k E.coli   - start jayson (8/7-)  - UCx- E.coli pending sensitivities

## 2020-08-09 NOTE — PROGRESS NOTE ADULT - PROBLEM SELECTOR PLAN 3
- likely chronic from his recent fall and admission in July. Now pain improving.   - xray at that time reviewed; no acute fracture   - pain control with tylenol PRN   - PT eval: CHENG

## 2020-08-09 NOTE — DISCHARGE NOTE PROVIDER - NSDCFUADDAPPT_GEN_ALL_CORE_FT
[facility]rology  Address: [address] [place] [address], [place] [id number]  Phone: [phone]  Please call the above number upon discharge to set up an appointment within 1 week of discharge.

## 2020-08-09 NOTE — DISCHARGE NOTE PROVIDER - NSFOLLOWUPCLINICS_GEN_ALL_ED_FT
[facility]  Urology  [address] [place] [address] - [address] [place] [address]  Phone: [phone]  Fax:   Follow Up Time: 1 week

## 2020-08-09 NOTE — PROGRESS NOTE ADULT - PROBLEM SELECTOR PLAN 4
-c/w home Eliquis  -d/c metoprolol given bradycardia -c/w home Eliquis  -d/c metoprolol given bradycardia  -Monitor HR today for reflex tachycardia while off.

## 2020-08-09 NOTE — DISCHARGE NOTE PROVIDER - NSDCMRMEDTOKEN_GEN_ALL_CORE_FT
acetaminophen 325 mg oral tablet: 3 tab(s) (975mg) orally every 4 hours, As Needed - 3), Moderate Pain (4 - 6)  amLODIPine 5 mg oral tablet: 1 tab(s) orally once a day  aspirin 81 mg oral tablet, chewable: 1 tab(s) orally once a day  Eliquis 2.5 mg oral tablet: 1 tab(s) orally 2 times a day  Flomax 0.4 mg oral capsule: 1 cap(s) orally once a day  folic acid 0.4 mg oral tablet: 1 tab(s) orally once a day  lidocaine 4% topical film: Apply topically to affected area once a day  Lipitor 10 mg oral tablet: 1 tab(s) orally once a day (at bedtime)  Metoprolol Tartrate 25 mg oral tablet: 0.5 tab(s) orally every 12 hours  polyethylene glycol 3350 oral powder for reconstitution: 17 gram(s) orally 2 times a day  Probiotic Formula oral capsule: 1 cap(s) orally 2 times a day  senna oral tablet: 2 tab(s) orally once a day (at bedtime)  silver sulfADIAZINE 1% topical cream: Apply topically to affected area 2 times a day  Vitamin B12 100 mcg oral tablet: 0.5 tab(s) (50mcg) orally once a day acetaminophen 325 mg oral tablet: 3 tab(s) (975mg) orally every 4 hours, As Needed - 3), Moderate Pain (4 - 6)  amLODIPine 5 mg oral tablet: 1 tab(s) orally once a day  aspirin 81 mg oral tablet, chewable: 1 tab(s) orally once a day  cefuroxime 500 mg oral tablet: 1 tab(s) orally every 12 hours  Eliquis 2.5 mg oral tablet: 1 tab(s) orally 2 times a day  finasteride 5 mg oral tablet: 1 tab(s) orally once a day  Flomax 0.4 mg oral capsule: 1 cap(s) orally once a day  folic acid 0.4 mg oral tablet: 1 tab(s) orally once a day  Lipitor 10 mg oral tablet: 1 tab(s) orally once a day (at bedtime)  Metoprolol Tartrate 25 mg oral tablet: 0.5 tab(s) orally every 12 hours  polyethylene glycol 3350 oral powder for reconstitution: 17 gram(s) orally 2 times a day  senna oral tablet: 2 tab(s) orally once a day (at bedtime)  Vitamin B12 100 mcg oral tablet: 0.5 tab(s) (50mcg) orally once a day acetaminophen 325 mg oral tablet: 3 tab(s) (975mg) orally every 4 hours, As Needed - 3), Moderate Pain (4 - 6)  amLODIPine 5 mg oral tablet: 1 tab(s) orally once a day  aspirin 81 mg oral tablet, chewable: 1 tab(s) orally once a day  Eliquis 2.5 mg oral tablet: 1 tab(s) orally 2 times a day  finasteride 5 mg oral tablet: 1 tab(s) orally once a day  Flomax 0.4 mg oral capsule: 1 cap(s) orally once a day  folic acid 0.4 mg oral tablet: 1 tab(s) orally once a day  Lipitor 10 mg oral tablet: 1 tab(s) orally once a day (at bedtime)  polyethylene glycol 3350 oral powder for reconstitution: 17 gram(s) orally 2 times a day  senna oral tablet: 2 tab(s) orally once a day (at bedtime)  sulfamethoxazole-trimethoprim 800 mg-160 mg oral tablet: 1 tab(s) orally 2 times a day  Vitamin B12 100 mcg oral tablet: 0.5 tab(s) (50mcg) orally once a day

## 2020-08-10 LAB
ANION GAP SERPL CALC-SCNC: 10 MMOL/L — SIGNIFICANT CHANGE UP (ref 5–17)
BUN SERPL-MCNC: 20 MG/DL — SIGNIFICANT CHANGE UP (ref 7–23)
CALCIUM SERPL-MCNC: 9.1 MG/DL — SIGNIFICANT CHANGE UP (ref 8.4–10.5)
CHLORIDE SERPL-SCNC: 102 MMOL/L — SIGNIFICANT CHANGE UP (ref 96–108)
CO2 SERPL-SCNC: 29 MMOL/L — SIGNIFICANT CHANGE UP (ref 22–31)
CREAT SERPL-MCNC: 0.85 MG/DL — SIGNIFICANT CHANGE UP (ref 0.5–1.3)
GLUCOSE SERPL-MCNC: 148 MG/DL — HIGH (ref 70–99)
HCT VFR BLD CALC: 31.8 % — LOW (ref 39–50)
HGB BLD-MCNC: 10 G/DL — LOW (ref 13–17)
MCHC RBC-ENTMCNC: 28.2 PG — SIGNIFICANT CHANGE UP (ref 27–34)
MCHC RBC-ENTMCNC: 31.4 GM/DL — LOW (ref 32–36)
MCV RBC AUTO: 89.8 FL — SIGNIFICANT CHANGE UP (ref 80–100)
NRBC # BLD: 0 /100 WBCS — SIGNIFICANT CHANGE UP (ref 0–0)
PLATELET # BLD AUTO: 236 K/UL — SIGNIFICANT CHANGE UP (ref 150–400)
POTASSIUM SERPL-MCNC: 4.3 MMOL/L — SIGNIFICANT CHANGE UP (ref 3.5–5.3)
POTASSIUM SERPL-SCNC: 4.3 MMOL/L — SIGNIFICANT CHANGE UP (ref 3.5–5.3)
RBC # BLD: 3.54 M/UL — LOW (ref 4.2–5.8)
RBC # FLD: 13.8 % — SIGNIFICANT CHANGE UP (ref 10.3–14.5)
SODIUM SERPL-SCNC: 141 MMOL/L — SIGNIFICANT CHANGE UP (ref 135–145)
WBC # BLD: 7.9 K/UL — SIGNIFICANT CHANGE UP (ref 3.8–10.5)
WBC # FLD AUTO: 7.9 K/UL — SIGNIFICANT CHANGE UP (ref 3.8–10.5)

## 2020-08-10 PROCEDURE — 99232 SBSQ HOSP IP/OBS MODERATE 35: CPT

## 2020-08-10 RX ORDER — METOPROLOL TARTRATE 50 MG
12.5 TABLET ORAL EVERY 12 HOURS
Refills: 0 | Status: DISCONTINUED | OUTPATIENT
Start: 2020-08-10 | End: 2020-08-13

## 2020-08-10 RX ORDER — CEFUROXIME AXETIL 250 MG
500 TABLET ORAL EVERY 12 HOURS
Refills: 0 | Status: DISCONTINUED | OUTPATIENT
Start: 2020-08-10 | End: 2020-08-10

## 2020-08-10 RX ORDER — MEROPENEM 1 G/30ML
1000 INJECTION INTRAVENOUS EVERY 12 HOURS
Refills: 0 | Status: DISCONTINUED | OUTPATIENT
Start: 2020-08-10 | End: 2020-08-11

## 2020-08-10 RX ADMIN — Medication 81 MILLIGRAM(S): at 10:57

## 2020-08-10 RX ADMIN — FINASTERIDE 5 MILLIGRAM(S): 5 TABLET, FILM COATED ORAL at 10:56

## 2020-08-10 RX ADMIN — APIXABAN 2.5 MILLIGRAM(S): 2.5 TABLET, FILM COATED ORAL at 05:28

## 2020-08-10 RX ADMIN — Medication 12.5 MILLIGRAM(S): at 17:28

## 2020-08-10 RX ADMIN — TAMSULOSIN HYDROCHLORIDE 0.4 MILLIGRAM(S): 0.4 CAPSULE ORAL at 23:05

## 2020-08-10 RX ADMIN — MEROPENEM 100 MILLIGRAM(S): 1 INJECTION INTRAVENOUS at 17:29

## 2020-08-10 RX ADMIN — ATORVASTATIN CALCIUM 10 MILLIGRAM(S): 80 TABLET, FILM COATED ORAL at 23:05

## 2020-08-10 RX ADMIN — MEROPENEM 100 MILLIGRAM(S): 1 INJECTION INTRAVENOUS at 05:28

## 2020-08-10 RX ADMIN — Medication 1 APPLICATION(S): at 17:32

## 2020-08-10 RX ADMIN — Medication 1 APPLICATION(S): at 05:29

## 2020-08-10 RX ADMIN — APIXABAN 2.5 MILLIGRAM(S): 2.5 TABLET, FILM COATED ORAL at 17:28

## 2020-08-10 RX ADMIN — AMLODIPINE BESYLATE 5 MILLIGRAM(S): 2.5 TABLET ORAL at 05:28

## 2020-08-10 NOTE — MEDICAL STUDENT PROGRESS NOTE(EDUCATION) - NS MD HP STUD ASPLAN PLAN FT
Problem/Plan - 1:  ·  Problem: Sepsis.  Plan: -likely in the setting of UTI   -complicated by an episode of hypoxia in the ED with O2 sat of 88%, without known cause. hypoxia could be related to atelectasis and b/l effusions, in the setting of sepsis, AMS, etc. Now satting well on RA.  - CT chest: No focal consolidations, small b/l pleural effusions, linear and nodular opacity in LLL unknown eitiology  - BCx: NGTD. UCx >100k E.coli  - CTX discontinued. start jayson (8/7-8/11).      Problem/Plan - 2:  ·  Problem: UTI (urinary tract infection).  Plan: -UA revealed large leuk esterase and +ve for nitrite. WBC in urine elevated at 24. UCx >100k E.coli   - start jayson (8/7-8/11)  - UCx- E.coli pansensitive  - c/w meropenam.      Problem/Plan - 3:  ·  Problem: Left shoulder pain.  Plan: - likely chronic from his recent fall and admission in July. Now pain improving.   - xray at that time reviewed; no acute fracture   - pain control with tylenol PRN   - PT/OT eval: [facility].      Problem/Plan - 4:  ·  Problem: Atrial fibrillation.  Plan: -c/w home Eliquis  -metoprolol 12.5 BID restarted with hold parameters  -Monitor HR (went down to 39 over the weekend and metoprolol was discontinued until today)     Problem/Plan - 5:  ·  Problem: Hypertension.  Plan: -continue with home amlodipine with hold parameters of SBP <120.      Problem/Plan - 6:  Problem: Coronary artery disease. Plan: -c/w asa, statin.     Problem/Plan - 7:  ·  Problem: BPH (benign prostatic hyperplasia).  Plan: -continue with flomax.      Problem/Plan - 8:  ·  Problem: Hyperlipidemia.  Plan: -c/w statin.      Problem/Plan - 9:  ·  Problem: DVT prophylaxis.  Plan: -on home dose of eliquis.

## 2020-08-10 NOTE — PROGRESS NOTE ADULT - SUBJECTIVE AND OBJECTIVE BOX
PROGRESS NOTE:   Authored by Concha Cole MD    Patient is a 89y old  Male who presents with a chief complaint of SEPSIS (09 Aug 2020 13:06)      SUBJECTIVE / OVERNIGHT EVENTS:    ADDITIONAL REVIEW OF SYSTEMS:    MEDICATIONS  (STANDING):  amLODIPine   Tablet 5 milliGRAM(s) Oral daily  apixaban 2.5 milliGRAM(s) Oral two times a day  aspirin  chewable 81 milliGRAM(s) Oral daily  atorvastatin 10 milliGRAM(s) Oral at bedtime  finasteride 5 milliGRAM(s) Oral daily  meropenem  IVPB 1000 milliGRAM(s) IV Intermittent every 12 hours  polyethylene glycol 3350 17 Gram(s) Oral two times a day  senna 2 Tablet(s) Oral at bedtime  silver sulfADIAZINE 1% Cream 1 Application(s) Topical two times a day  sodium chloride 0.9%. 500 milliLiter(s) (50 mL/Hr) IV Continuous <Continuous>  tamsulosin 0.4 milliGRAM(s) Oral at bedtime    MEDICATIONS  (PRN):  acetaminophen   Tablet .. 650 milliGRAM(s) Oral every 6 hours PRN Moderate Pain (4 - 6)      CAPILLARY BLOOD GLUCOSE        I&O's Summary    09 Aug 2020 07:01  -  10 Aug 2020 07:00  --------------------------------------------------------  IN: 300 mL / OUT: 0 mL / NET: 300 mL        PHYSICAL EXAM:  Vital Signs Last 24 Hrs  T(C): 36.8 (10 Aug 2020 05:22), Max: 36.8 (09 Aug 2020 12:35)  T(F): 98.3 (10 Aug 2020 05:22), Max: 98.3 (10 Aug 2020 05:22)  HR: 64 (10 Aug 2020 05:22) (64 - 82)  BP: 148/60 (10 Aug 2020 05:22) (117/57 - 148/60)  BP(mean): --  RR: 18 (10 Aug 2020 05:22) (18 - 20)  SpO2: 94% (10 Aug 2020 05:22) (94% - 98%)    GENERAL: No acute distress, well-developed  HEAD:  Atraumatic, Normocephalic  EYES: EOMI, PERRLA, conjunctiva and sclera clear  NECK: Supple, no lymphadenopathy, no JVD  CHEST/LUNG: CTAB; No wheezes, rales, or rhonchi  HEART: Regular rate and rhythm; No murmurs, rubs, or gallops  ABDOMEN: Soft, non-tender, non-distended; normal bowel sounds, no organomegaly  EXTREMITIES:  2+ peripheral pulses b/l, No clubbing, cyanosis, or edema  NEUROLOGY: A&O x 3, no focal deficits  SKIN: No rashes or lesions    LABS:                        10.0   7.90  )-----------( 236      ( 10 Aug 2020 06:56 )             31.8     08-10    141  |  102  |  20  ----------------------------<  148<H>  4.3   |  29  |  0.85    Ca    9.1      10 Aug 2020 06:56  Phos  2.3     08-09  Mg     2.1     08-09                  RADIOLOGY & ADDITIONAL TESTS:  Results Reviewed:   Imaging Personally Reviewed:  Electrocardiogram Personally Reviewed:    COORDINATION OF CARE:  Care Discussed with Consultants/Other Providers [Y/N]:  Prior or Outpatient Records Reviewed [Y/N]: PROGRESS NOTE:   Authored by Concha Cole MD    Patient is a 89y old  Male who presents with a chief complaint of SEPSIS (09 Aug 2020 13:06)      SUBJECTIVE / OVERNIGHT EVENTS: NAEO. Denies headaches, F/C, dizziness, syncope, CP/SOB, N/V, abdominal pain, dysuria, changes in BM, peripheral swelling, skin changes, new joint aches. Tolerating PO, ambulatory.       MEDICATIONS  (STANDING):  amLODIPine   Tablet 5 milliGRAM(s) Oral daily  apixaban 2.5 milliGRAM(s) Oral two times a day  aspirin  chewable 81 milliGRAM(s) Oral daily  atorvastatin 10 milliGRAM(s) Oral at bedtime  finasteride 5 milliGRAM(s) Oral daily  meropenem  IVPB 1000 milliGRAM(s) IV Intermittent every 12 hours  polyethylene glycol 3350 17 Gram(s) Oral two times a day  senna 2 Tablet(s) Oral at bedtime  silver sulfADIAZINE 1% Cream 1 Application(s) Topical two times a day  sodium chloride 0.9%. 500 milliLiter(s) (50 mL/Hr) IV Continuous <Continuous>  tamsulosin 0.4 milliGRAM(s) Oral at bedtime    MEDICATIONS  (PRN):  acetaminophen   Tablet .. 650 milliGRAM(s) Oral every 6 hours PRN Moderate Pain (4 - 6)      CAPILLARY BLOOD GLUCOSE        I&O's Summary    09 Aug 2020 07:01  -  10 Aug 2020 07:00  --------------------------------------------------------  IN: 300 mL / OUT: 0 mL / NET: 300 mL        PHYSICAL EXAM:  Vital Signs Last 24 Hrs  T(C): 36.8 (10 Aug 2020 05:22), Max: 36.8 (09 Aug 2020 12:35)  T(F): 98.3 (10 Aug 2020 05:22), Max: 98.3 (10 Aug 2020 05:22)  HR: 64 (10 Aug 2020 05:22) (64 - 82)  BP: 148/60 (10 Aug 2020 05:22) (117/57 - 148/60)  BP(mean): --  RR: 18 (10 Aug 2020 05:22) (18 - 20)  SpO2: 94% (10 Aug 2020 05:22) (94% - 98%)    GENERAL: NAD, well-developed  HEAD:  Atraumatic, Normocephalic  EYES: EOMI, PERRLA, conjunctiva and sclera clear  NECK: Supple, No JVD  CHEST/LUNG: Clear to auscultation bilaterally; No wheeze  HEART: Regular rate and rhythm; No murmurs, rubs, or gallops  ABDOMEN: Soft, Nontender, Nondistended; Bowel sounds present  EXTREMITIES:  2+ Peripheral Pulses, No clubbing, cyanosis, or edema  PSYCH: AAOx3  NEUROLOGY: moving all ext. following commands.  SKIN: No rashes or lesions    LABS:                        10.0   7.90  )-----------( 236      ( 10 Aug 2020 06:56 )             31.8     08-10    141  |  102  |  20  ----------------------------<  148<H>  4.3   |  29  |  0.85    Ca    9.1      10 Aug 2020 06:56  Phos  2.3     08-09  Mg     2.1     08-09                  RADIOLOGY & ADDITIONAL TESTS:  Results Reviewed:   Imaging Personally Reviewed:  Electrocardiogram Personally Reviewed:    COORDINATION OF CARE:  Care Discussed with Consultants/Other Providers [Y/N]:  Prior or Outpatient Records Reviewed [Y/N]:

## 2020-08-10 NOTE — PROGRESS NOTE ADULT - ATTENDING COMMENTS
89 year old male with history Atrial Fibrillation with implanted pacemaker, CAD with multiple stents, Hypertension, Hyperlipidemia, BPH, who comes into the ED with sepsis likely secondary to UTI with an episode of hypoxia    Seen, examined the patient with house staff this am  Sitting in bed, no acute SOB, chest pain, remains afebrile  - Reviewed labs, imaging, WBC 16->23->19, CXR no consolidation    Septic w UTI, low suspicion of PNA/aspiration    blood c/s neg, urine c/s grew E. Coli( last urine c/s E.coli)  - would c/s IV Meropenem, CT chest showed small B/L pleural effusion, left atelectasis, doubt Pna  - c/w Xarelto, Statin  - PT in progress, d/c planning

## 2020-08-10 NOTE — PROGRESS NOTE ADULT - PROBLEM SELECTOR PLAN 2
-UA revealed large leuk esterase and +ve for nitrite. WBC in urine elevated at 24. UCx >100k E.coli   - start jayson (8/7-)  - UCx- E.coli pansensitive  - c/w meropenam

## 2020-08-10 NOTE — MEDICAL STUDENT PROGRESS NOTE(EDUCATION) - SUBJECTIVE AND OBJECTIVE BOX
Summary:   Mr. Faith is a lively 89 year old male who has a history of Atrial Fibrillation with implanted pacemaker, CAD with multiple stents, Hypertension, Hyperlipidemia, BPH on flomax, who presents to the ED from his outpatient rehabilitation facility called [facility] with a fever and elevated WBC count, but the patient's chief complaint is "left shoulder pain" (s/p fall 2/2 sepsis last month).    -no acute overnight events.  The patient feels well.  He continues to complain of L shoulder pain (s/p fall 2/2 sepsis/UTI in July) but thinks it is starting to improve.  He denies SOB and is comfortable on room air; he denies chest pain and pain elsewhere.  Mental status seems improved today from Friday.  Daughter had stated on Friday that he is was not quite back to his baseline mental status (though she states he is always goofy and likes to make jokes, which he continues to do).      PHYSICAL EXAM:  Vital Signs Last 24 Hrs  T(C): 36.8 (10 Aug 2020 05:22), Max: 36.8 (09 Aug 2020 12:35)  T(F): 98.3 (10 Aug 2020 05:22), Max: 98.3 (10 Aug 2020 05:22)  HR: 64 (10 Aug 2020 05:22) (64 - 82)  BP: 148/60 (10 Aug 2020 05:22) (117/57 - 148/60)  BP(mean): --  RR: 18 (10 Aug 2020 05:22) (18 - 20)  SpO2: 94% (10 Aug 2020 05:22) (94% - 98%)    GENERAL: sitting in bed comfortably; unlabored breathing on RA  CHEST/LUNG: Could not auscultate due to inability to get patient to sit up (he could not hear me/understand and kept trying to slide off the bed).   HEART: Regular rate and rhythm; No murmurs, rubs, or gallops  ABDOMEN: Soft, Nontender, Nondistended; Bowel sounds present  EXTREMITIES:  No clubbing, cyanosis, or edema; L shoulder limited range of motion due to pain; pain not made worse by palpation   PSYCH: orientated to person and place; difficult to determine AMS vs. difficulty hearing   NEUROLOGY: moving all ext. following commands.  SKIN: No rashes or lesions    LABS:                        10.0   7.90  )-----------( 236      ( 10 Aug 2020 06:56 )             31.8     08-10    141  |  102  |  20  ----------------------------<  148<H>  4.3   |  29  |  0.85    Ca    9.1      10 Aug 2020 06:56  Phos  2.3     08-09  Mg     2.1     08-09    < from: CT Chest No Cont (08.07.20 @ 15:16) >  1.  Small bilateral pleural effusions.  2.  Linear and nodular opacity in the leftlower lobe is of uncertain etiology.    < end of copied text >    Urinalysis + Microscopic Examination (08.06.20 @ 18:15)    Specific Gravity: 1.015    Protein, Urine: 100    Urine Appearance: Slightly Turbid    Urobilinogen: Negative    Color: Yellow    Glucose Qualitative, Urine: Negative    Blood, Urine: Negative    Bilirubin: Negative    Ketone - Urine: Negative    Nitrite: Positive    pH Urine: 6.5    Leukocyte Esterase Concentration: Large    Red Blood Cell - Urine: 1 /hpf    White Blood Cell - Urine: 24 /HPF    Hyaline Casts: 2 /lpf    Bacteria: Many      Specimen Source: .Urine Catheterized    Culture Results:   >100,000 CFU/ml Escherichia coli    Organism Identification: Escherichia coli    Organism: Escherichia coli    Method Type: KRYSTIAN    -Sensitivity: pansensitive    Culture - Blood (08.07.20 @ 00:28)    Specimen Source: .Blood Blood-Peripheral    Culture Results:   No growth to date. Summary:   Mr. Faith is a lively 89 year old male who has a history of Atrial Fibrillation with implanted pacemaker, CAD with multiple stents, Hypertension, Hyperlipidemia, BPH on flomax, who presents to the ED from his outpatient rehabilitation facility called [facility] with a fever and elevated WBC count, but the patient's chief complaint is "left shoulder pain" (s/p fall 2/2 sepsis last month).    -no acute overnight events.  The patient feels well.  He continues to complain of L shoulder pain (s/p fall 2/2 sepsis/UTI in July) but thinks it is starting to improve.  He denies SOB and is comfortable on room air; he denies chest pain and pain elsewhere.  It is difficult to communicate with him, as he is extremely hard of hearing; complete ROS was not completed due to this issue.  Mental status seems improved today from Friday.  Daughter had stated on Friday that he is was not quite back to his baseline mental status (though she states he is always goofy and likes to make jokes, which he continues to do).      PHYSICAL EXAM:  Vital Signs Last 24 Hrs  T(C): 36.8 (10 Aug 2020 05:22), Max: 36.8 (09 Aug 2020 12:35)  T(F): 98.3 (10 Aug 2020 05:22), Max: 98.3 (10 Aug 2020 05:22)  HR: 64 (10 Aug 2020 05:22) (64 - 82)  BP: 148/60 (10 Aug 2020 05:22) (117/57 - 148/60)  BP(mean): --  RR: 18 (10 Aug 2020 05:22) (18 - 20)  SpO2: 94% (10 Aug 2020 05:22) (94% - 98%)    GENERAL: sitting in bed comfortably; unlabored breathing on RA  CHEST/LUNG: Could not auscultate due to inability to get patient to sit up (he could not hear me/understand and kept trying to slide off the bed).   HEART: Regular rate and rhythm; No murmurs, rubs, or gallops  ABDOMEN: Soft, Nontender, Nondistended; Bowel sounds present  EXTREMITIES:  No clubbing, cyanosis, or edema; L shoulder limited range of motion due to pain; pain not made worse by palpation   PSYCH: orientated to person and place; difficult to determine AMS vs. difficulty hearing   NEUROLOGY: moving all ext. following commands.  SKIN: No rashes or lesions    LABS:                        10.0   7.90  )-----------( 236      ( 10 Aug 2020 06:56 )             31.8     08-10    141  |  102  |  20  ----------------------------<  148<H>  4.3   |  29  |  0.85    Ca    9.1      10 Aug 2020 06:56  Phos  2.3     08-09  Mg     2.1     08-09    < from: CT Chest No Cont (08.07.20 @ 15:16) >  1.  Small bilateral pleural effusions.  2.  Linear and nodular opacity in the leftlower lobe is of uncertain etiology.    < end of copied text >    Urinalysis + Microscopic Examination (08.06.20 @ 18:15)    Specific Gravity: 1.015    Protein, Urine: 100    Urine Appearance: Slightly Turbid    Urobilinogen: Negative    Color: Yellow    Glucose Qualitative, Urine: Negative    Blood, Urine: Negative    Bilirubin: Negative    Ketone - Urine: Negative    Nitrite: Positive    pH Urine: 6.5    Leukocyte Esterase Concentration: Large    Red Blood Cell - Urine: 1 /hpf    White Blood Cell - Urine: 24 /HPF    Hyaline Casts: 2 /lpf    Bacteria: Many      Specimen Source: .Urine Catheterized    Culture Results:   >100,000 CFU/ml Escherichia coli    Organism Identification: Escherichia coli    Organism: Escherichia coli    Method Type: KRYSTIAN    -Sensitivity: pansensitive    Culture - Blood (08.07.20 @ 00:28)    Specimen Source: .Blood Blood-Peripheral    Culture Results:   No growth to date. Summary:   Mr. Faith is a lively 89 year old male who has a history of Atrial Fibrillation with implanted pacemaker, CAD with multiple stents, Hypertension, Hyperlipidemia, BPH on flomax, who presents to the ED from his outpatient rehabilitation facility called [facility] with a fever and elevated WBC count, but the patient's chief complaint is "left shoulder pain" (s/p fall 2/2 sepsis last month).    -no acute overnight events.  The patient feels well.  He continues to complain of L shoulder pain (s/p fall 2/2 sepsis/UTI in July) but thinks it is starting to improve.  He denies SOB and is comfortable on room air; he denies chest pain and pain elsewhere.  It is difficult to communicate with him, as he is extremely hard of hearing; complete ROS was not completed due to this issue.  Mental status seems improved today from Friday.  Daughter had stated on Friday that he is was not quite back to his baseline mental status (though she states he is always goofy and likes to make jokes, which he continues to do).      PHYSICAL EXAM:  Vital Signs Last 24 Hrs  T(C): 36.8 (10 Aug 2020 05:22), Max: 36.8 (09 Aug 2020 12:35)  T(F): 98.3 (10 Aug 2020 05:22), Max: 98.3 (10 Aug 2020 05:22)  HR: 64 (10 Aug 2020 05:22) (64 - 82)  BP: 148/60 (10 Aug 2020 05:22) (117/57 - 148/60)  BP(mean): --  RR: 18 (10 Aug 2020 05:22) (18 - 20)  SpO2: 94% (10 Aug 2020 05:22) (94% - 98%)    GENERAL: sitting in bed comfortably; unlabored breathing on RA  CHEST/LUNG: Could not auscultate due to inability to get patient to sit up (he could not hear me/understand and kept trying to slide off the bed).   HEART: Regular rate and rhythm; No murmurs, rubs, or gallops  ABDOMEN: Soft, Nontender, Nondistended; Bowel sounds present  EXTREMITIES:  No clubbing, cyanosis, or edema; L shoulder limited range of motion due to pain; pain not made worse by palpation   PSYCH: orientated to person and place; difficult to determine AMS vs. difficulty hearing   NEUROLOGY: moving all ext. following commands.  SKIN: No rashes or lesions    LABS:                        10.0   7.90  )-----------( 236      ( 10 Aug 2020 06:56 )             31.8     08-10    141  |  102  |  20  ----------------------------<  148<H>  4.3   |  29  |  0.85    Ca    9.1      10 Aug 2020 06:56  Phos  2.3     08-09  Mg     2.1     08-09    BCx: NGTD. UCx >100k E.coli  -pansensitive E. coli    < from: Xray Shoulder 2 Views, Left (07.11.20 @ 12:55) >  No acute displaced fracture. No dislocation. Mild acromioclavicular arthrosis. Left chest wall pacemaker is visualized.    < end of copied text >    < from: Xray Chest 1 View- PORTABLE-Urgent (08.06.20 @ 17:46) >  No focal consolidation. There is an 8 mm nodule in the lateral right lung unchanged from 7/11/2020. Routine follow-up studies are recommended to document either stability or growth of this nodule    < end of copied text >    < from: CT Chest No Cont (08.07.20 @ 15:16) >  1.  Small bilateral pleural effusions.  2.  Linear and nodular opacity in the leftlower lobe is of uncertain etiology.    < end of copied text >

## 2020-08-10 NOTE — MEDICAL STUDENT PROGRESS NOTE(EDUCATION) - NS MD HP STUD ASPLAN ASSES FT
Mr. Faith is a pleasant 89 year old male who has a history of Atrial Fibrillation with implanted pacemaker, CAD with multiple stents, Hypertension, Hyperlipidemia, BPH on flomax, who presents to the ED from his outpatient rehabilitation facility called [facility] with a fever and elevated WBC count, but the patient's chief complaint is "left shoulder pain" (s/p fall 2/2 sepsis last month).  His urine culture grew E. coli and his blood cultures show no growth to date.  Chest CT showed an opacity in the LLL of uncertain etiology.  Today is day 5 of meropenem, and today is the first day since admission with WBC wnl. Mr. Faith is a pleasant 89 year old male who has a history of Atrial Fibrillation with implanted pacemaker, CAD with multiple stents, Hypertension, Hyperlipidemia, BPH on flomax, who presents to the ED from his outpatient rehabilitation facility called [facility] with a fever and elevated WBC count, but the patient's chief complaint is "left shoulder pain" (s/p fall 2/2 sepsis last month).  He came in with sepsis likely 2/2 UTI.  His urine culture grew E. coli and his blood cultures show no growth to date.  Chest CT showed an opacity in the LLL of uncertain etiology.  Today is day 4 of meropenem, and today is the first day since admission with WBC wnl.

## 2020-08-11 LAB
ALBUMIN SERPL ELPH-MCNC: 2.8 G/DL — LOW (ref 3.3–5)
ALP SERPL-CCNC: 82 U/L — SIGNIFICANT CHANGE UP (ref 40–120)
ALT FLD-CCNC: 24 U/L — SIGNIFICANT CHANGE UP (ref 10–45)
ANION GAP SERPL CALC-SCNC: 11 MMOL/L — SIGNIFICANT CHANGE UP (ref 5–17)
AST SERPL-CCNC: 18 U/L — SIGNIFICANT CHANGE UP (ref 10–40)
BASOPHILS # BLD AUTO: 0.07 K/UL — SIGNIFICANT CHANGE UP (ref 0–0.2)
BASOPHILS NFR BLD AUTO: 1 % — SIGNIFICANT CHANGE UP (ref 0–2)
BILIRUB SERPL-MCNC: 0.3 MG/DL — SIGNIFICANT CHANGE UP (ref 0.2–1.2)
BUN SERPL-MCNC: 16 MG/DL — SIGNIFICANT CHANGE UP (ref 7–23)
CALCIUM SERPL-MCNC: 9.5 MG/DL — SIGNIFICANT CHANGE UP (ref 8.4–10.5)
CHLORIDE SERPL-SCNC: 102 MMOL/L — SIGNIFICANT CHANGE UP (ref 96–108)
CO2 SERPL-SCNC: 28 MMOL/L — SIGNIFICANT CHANGE UP (ref 22–31)
CREAT SERPL-MCNC: 0.78 MG/DL — SIGNIFICANT CHANGE UP (ref 0.5–1.3)
EOSINOPHIL # BLD AUTO: 0.5 K/UL — SIGNIFICANT CHANGE UP (ref 0–0.5)
EOSINOPHIL NFR BLD AUTO: 7.5 % — HIGH (ref 0–6)
GLUCOSE SERPL-MCNC: 141 MG/DL — HIGH (ref 70–99)
HCT VFR BLD CALC: 33.9 % — LOW (ref 39–50)
HGB BLD-MCNC: 10.7 G/DL — LOW (ref 13–17)
IMM GRANULOCYTES NFR BLD AUTO: 0.4 % — SIGNIFICANT CHANGE UP (ref 0–1.5)
LYMPHOCYTES # BLD AUTO: 0.81 K/UL — LOW (ref 1–3.3)
LYMPHOCYTES # BLD AUTO: 12.1 % — LOW (ref 13–44)
MAGNESIUM SERPL-MCNC: 2 MG/DL — SIGNIFICANT CHANGE UP (ref 1.6–2.6)
MCHC RBC-ENTMCNC: 28.4 PG — SIGNIFICANT CHANGE UP (ref 27–34)
MCHC RBC-ENTMCNC: 31.6 GM/DL — LOW (ref 32–36)
MCV RBC AUTO: 89.9 FL — SIGNIFICANT CHANGE UP (ref 80–100)
MONOCYTES # BLD AUTO: 0.53 K/UL — SIGNIFICANT CHANGE UP (ref 0–0.9)
MONOCYTES NFR BLD AUTO: 7.9 % — SIGNIFICANT CHANGE UP (ref 2–14)
NEUTROPHILS # BLD AUTO: 4.74 K/UL — SIGNIFICANT CHANGE UP (ref 1.8–7.4)
NEUTROPHILS NFR BLD AUTO: 71.1 % — SIGNIFICANT CHANGE UP (ref 43–77)
NRBC # BLD: 0 /100 WBCS — SIGNIFICANT CHANGE UP (ref 0–0)
PHOSPHATE SERPL-MCNC: 2.5 MG/DL — SIGNIFICANT CHANGE UP (ref 2.5–4.5)
PLATELET # BLD AUTO: 249 K/UL — SIGNIFICANT CHANGE UP (ref 150–400)
POTASSIUM SERPL-MCNC: 4.4 MMOL/L — SIGNIFICANT CHANGE UP (ref 3.5–5.3)
POTASSIUM SERPL-SCNC: 4.4 MMOL/L — SIGNIFICANT CHANGE UP (ref 3.5–5.3)
PROT SERPL-MCNC: 6 G/DL — SIGNIFICANT CHANGE UP (ref 6–8.3)
RBC # BLD: 3.77 M/UL — LOW (ref 4.2–5.8)
RBC # FLD: 13.7 % — SIGNIFICANT CHANGE UP (ref 10.3–14.5)
SARS-COV-2 RNA SPEC QL NAA+PROBE: SIGNIFICANT CHANGE UP
SODIUM SERPL-SCNC: 141 MMOL/L — SIGNIFICANT CHANGE UP (ref 135–145)
WBC # BLD: 6.68 K/UL — SIGNIFICANT CHANGE UP (ref 3.8–10.5)
WBC # FLD AUTO: 6.68 K/UL — SIGNIFICANT CHANGE UP (ref 3.8–10.5)

## 2020-08-11 PROCEDURE — 99232 SBSQ HOSP IP/OBS MODERATE 35: CPT

## 2020-08-11 RX ORDER — LIDOCAINE 4 G/100G
1 CREAM TOPICAL
Qty: 0 | Refills: 0 | DISCHARGE

## 2020-08-11 RX ORDER — CEFUROXIME AXETIL 250 MG
1 TABLET ORAL
Qty: 0 | Refills: 0 | DISCHARGE
Start: 2020-08-11

## 2020-08-11 RX ORDER — FINASTERIDE 5 MG/1
1 TABLET, FILM COATED ORAL
Qty: 0 | Refills: 0 | DISCHARGE
Start: 2020-08-11

## 2020-08-11 RX ORDER — CEFUROXIME AXETIL 250 MG
1 TABLET ORAL
Qty: 0 | Refills: 0 | DISCHARGE
Start: 2020-08-11 | End: 2020-08-13

## 2020-08-11 RX ORDER — L.ACIDOPH/B.ANIMALIS/B.LONGUM 15B CELL
1 CAPSULE ORAL
Qty: 0 | Refills: 0 | DISCHARGE

## 2020-08-11 RX ORDER — CEFUROXIME AXETIL 250 MG
500 TABLET ORAL EVERY 12 HOURS
Refills: 0 | Status: DISCONTINUED | OUTPATIENT
Start: 2020-08-11 | End: 2020-08-13

## 2020-08-11 RX ADMIN — POLYETHYLENE GLYCOL 3350 17 GRAM(S): 17 POWDER, FOR SOLUTION ORAL at 05:41

## 2020-08-11 RX ADMIN — TAMSULOSIN HYDROCHLORIDE 0.4 MILLIGRAM(S): 0.4 CAPSULE ORAL at 21:33

## 2020-08-11 RX ADMIN — Medication 12.5 MILLIGRAM(S): at 18:04

## 2020-08-11 RX ADMIN — APIXABAN 2.5 MILLIGRAM(S): 2.5 TABLET, FILM COATED ORAL at 05:41

## 2020-08-11 RX ADMIN — Medication 1 APPLICATION(S): at 05:41

## 2020-08-11 RX ADMIN — APIXABAN 2.5 MILLIGRAM(S): 2.5 TABLET, FILM COATED ORAL at 18:04

## 2020-08-11 RX ADMIN — FINASTERIDE 5 MILLIGRAM(S): 5 TABLET, FILM COATED ORAL at 13:24

## 2020-08-11 RX ADMIN — Medication 1 APPLICATION(S): at 18:04

## 2020-08-11 RX ADMIN — AMLODIPINE BESYLATE 5 MILLIGRAM(S): 2.5 TABLET ORAL at 05:41

## 2020-08-11 RX ADMIN — SENNA PLUS 2 TABLET(S): 8.6 TABLET ORAL at 21:33

## 2020-08-11 RX ADMIN — Medication 81 MILLIGRAM(S): at 13:24

## 2020-08-11 RX ADMIN — Medication 500 MILLIGRAM(S): at 18:04

## 2020-08-11 RX ADMIN — MEROPENEM 100 MILLIGRAM(S): 1 INJECTION INTRAVENOUS at 05:40

## 2020-08-11 RX ADMIN — ATORVASTATIN CALCIUM 10 MILLIGRAM(S): 80 TABLET, FILM COATED ORAL at 21:33

## 2020-08-11 RX ADMIN — Medication 12.5 MILLIGRAM(S): at 05:41

## 2020-08-11 RX ADMIN — POLYETHYLENE GLYCOL 3350 17 GRAM(S): 17 POWDER, FOR SOLUTION ORAL at 18:04

## 2020-08-11 NOTE — PROGRESS NOTE ADULT - SUBJECTIVE AND OBJECTIVE BOX
PROGRESS NOTE:   Authored by Concha Cole MD    Patient is a 89y old  Male who presents with a chief complaint of SEPSIS (10 Aug 2020 07:39)      SUBJECTIVE / OVERNIGHT EVENTS:    ADDITIONAL REVIEW OF SYSTEMS:    MEDICATIONS  (STANDING):  amLODIPine   Tablet 5 milliGRAM(s) Oral daily  apixaban 2.5 milliGRAM(s) Oral two times a day  aspirin  chewable 81 milliGRAM(s) Oral daily  atorvastatin 10 milliGRAM(s) Oral at bedtime  finasteride 5 milliGRAM(s) Oral daily  meropenem  IVPB 1000 milliGRAM(s) IV Intermittent every 12 hours  metoprolol tartrate 12.5 milliGRAM(s) Oral every 12 hours  polyethylene glycol 3350 17 Gram(s) Oral two times a day  senna 2 Tablet(s) Oral at bedtime  silver sulfADIAZINE 1% Cream 1 Application(s) Topical two times a day  sodium chloride 0.9%. 500 milliLiter(s) (50 mL/Hr) IV Continuous <Continuous>  tamsulosin 0.4 milliGRAM(s) Oral at bedtime    MEDICATIONS  (PRN):  acetaminophen   Tablet .. 650 milliGRAM(s) Oral every 6 hours PRN Moderate Pain (4 - 6)      CAPILLARY BLOOD GLUCOSE        I&O's Summary      PHYSICAL EXAM:  Vital Signs Last 24 Hrs  T(C): 36.6 (11 Aug 2020 05:33), Max: 36.7 (10 Aug 2020 21:10)  T(F): 97.8 (11 Aug 2020 05:33), Max: 98.1 (10 Aug 2020 21:10)  HR: 75 (11 Aug 2020 05:33) (72 - 85)  BP: 148/59 (11 Aug 2020 05:33) (121/66 - 164/-)  BP(mean): --  RR: 18 (11 Aug 2020 05:33) (18 - 18)  SpO2: 92% (11 Aug 2020 05:33) (92% - 95%)    GENERAL: NAD, well-developed, hard of hearing  HEAD:  Atraumatic, Normocephalic  EYES: EOMI, PERRLA, conjunctiva and sclera clear  NECK: Supple, No JVD  CHEST/LUNG: Clear to auscultation bilaterally; No wheeze  HEART: Regular rate and rhythm; No murmurs, rubs, or gallops  ABDOMEN: Soft, Nontender, Nondistended; Bowel sounds present  EXTREMITIES:  2+ Peripheral Pulses, No clubbing, cyanosis, or edema  PSYCH: AAOx3  NEUROLOGY: moving all ext. following commands.  SKIN: No rashes or lesions    LABS:                        10.7   6.68  )-----------( 249      ( 11 Aug 2020 06:50 )             33.9     08-10    141  |  102  |  20  ----------------------------<  148<H>  4.3   |  29  |  0.85    Ca    9.1      10 Aug 2020 06:56                  RADIOLOGY & ADDITIONAL TESTS:  Results Reviewed:   Imaging Personally Reviewed:  Electrocardiogram Personally Reviewed:    COORDINATION OF CARE:  Care Discussed with Consultants/Other Providers [Y/N]:  Prior or Outpatient Records Reviewed [Y/N]: PROGRESS NOTE:   Authored by Concha Cole MD    Patient is a 89y old  Male who presents with a chief complaint of SEPSIS (10 Aug 2020 07:39)      SUBJECTIVE / OVERNIGHT EVENTS: NAEO. Denies headaches, F/C, dizziness, syncope, CP/SOB, N/V, abdominal pain, dysuria, changes in BM, peripheral swelling, skin changes, new joint aches. Tolerating P.      MEDICATIONS  (STANDING):  amLODIPine   Tablet 5 milliGRAM(s) Oral daily  apixaban 2.5 milliGRAM(s) Oral two times a day  aspirin  chewable 81 milliGRAM(s) Oral daily  atorvastatin 10 milliGRAM(s) Oral at bedtime  finasteride 5 milliGRAM(s) Oral daily  meropenem  IVPB 1000 milliGRAM(s) IV Intermittent every 12 hours  metoprolol tartrate 12.5 milliGRAM(s) Oral every 12 hours  polyethylene glycol 3350 17 Gram(s) Oral two times a day  senna 2 Tablet(s) Oral at bedtime  silver sulfADIAZINE 1% Cream 1 Application(s) Topical two times a day  sodium chloride 0.9%. 500 milliLiter(s) (50 mL/Hr) IV Continuous <Continuous>  tamsulosin 0.4 milliGRAM(s) Oral at bedtime    MEDICATIONS  (PRN):  acetaminophen   Tablet .. 650 milliGRAM(s) Oral every 6 hours PRN Moderate Pain (4 - 6)      CAPILLARY BLOOD GLUCOSE        I&O's Summary      PHYSICAL EXAM:  Vital Signs Last 24 Hrs  T(C): 36.6 (11 Aug 2020 05:33), Max: 36.7 (10 Aug 2020 21:10)  T(F): 97.8 (11 Aug 2020 05:33), Max: 98.1 (10 Aug 2020 21:10)  HR: 75 (11 Aug 2020 05:33) (72 - 85)  BP: 148/59 (11 Aug 2020 05:33) (121/66 - 164/-)  BP(mean): --  RR: 18 (11 Aug 2020 05:33) (18 - 18)  SpO2: 92% (11 Aug 2020 05:33) (92% - 95%)    GENERAL: NAD, well-developed, hard of hearing  HEAD:  Atraumatic, Normocephalic  EYES: EOMI, PERRLA, conjunctiva and sclera clear  NECK: Supple, No JVD  CHEST/LUNG: Clear to auscultation bilaterally; No wheeze  HEART: Regular rate and rhythm; No murmurs, rubs, or gallops  ABDOMEN: Soft, Nontender, Nondistended; Bowel sounds present  EXTREMITIES:  2+ Peripheral Pulses, No clubbing, cyanosis, or edema  PSYCH: AAOx3  NEUROLOGY: moving all ext. following commands.  SKIN: No rashes or lesions    LABS:                        10.7   6.68  )-----------( 249      ( 11 Aug 2020 06:50 )             33.9     08-10    141  |  102  |  20  ----------------------------<  148<H>  4.3   |  29  |  0.85    Ca    9.1      10 Aug 2020 06:56                  RADIOLOGY & ADDITIONAL TESTS:  Results Reviewed:   Imaging Personally Reviewed:  Electrocardiogram Personally Reviewed:    COORDINATION OF CARE:  Care Discussed with Consultants/Other Providers [Y/N]:  Prior or Outpatient Records Reviewed [Y/N]:

## 2020-08-11 NOTE — MEDICAL STUDENT PROGRESS NOTE(EDUCATION) - SUBJECTIVE AND OBJECTIVE BOX
Subjective    Summary: Mr. Faith is a pleasant 89 year old male who has a history of Atrial Fibrillation with implanted pacemaker, CAD with multiple stents, Hypertension, Hyperlipidemia, BPH on flomax, who presents to the ED from his outpatient rehabilitation facility called [facility] with a fever and elevated WBC count, but the patient's chief complaint is "left shoulder pain."  His urine culture grew E. coli, as did his urine and blood cultures last time he was here on 7/11/2020 when he was admitted for fall 2/2 sepsis/UTI.  His blood cultures on this admission show no growth to date.  Today is day 5 of meropenem.       -No acute overnight events.  This morning, Mr. Faith feels well and is in good spirits.  He is able to have a clear conversation as long as he can hear what you are saying to him.  He denies any pain aside from his left shoulder pain that does not seem to be improving from the time he arrived here.  It is difficult to communicate with him, as he is extremely hard of hearing; complete ROS was not completed due to this issue.     Objective Subjective    Summary: Mr. Faith is a pleasant 89 year old male who has a history of Atrial Fibrillation with implanted pacemaker, CAD with multiple stents, Hypertension, Hyperlipidemia, BPH on flomax, who presents to the ED from his outpatient rehabilitation facility called [facility] with a fever and elevated WBC count, but the patient's chief complaint is "left shoulder pain."  His urine culture grew E. coli, as did his urine and blood cultures last time he was here on 7/11/2020 when he was admitted for fall 2/2 sepsis/UTI.  His blood cultures on this admission show no growth to date.  Today is day 5 of meropenem.       -No acute overnight events.  This morning, Mr. Faith feels well and is in good spirits.  He is able to have a clear conversation as long as he can hear what you are saying to him.  He denies any pain aside from his left shoulder pain that does not seem to be improving from the time he arrived here.  It is difficult to communicate with him, as he is extremely hard of hearing; complete ROS was not completed due to this issue.     Objective     Vital Signs Last 24 Hrs  T(C): 36.2 (11 Aug 2020 13:33), Max: 36.7 (10 Aug 2020 21:10)  T(F): 97.1 (11 Aug 2020 13:33), Max: 98.1 (10 Aug 2020 21:10)  HR: 80 (11 Aug 2020 13:33) (72 - 80)  BP: 151/77 (11 Aug 2020 13:33) (139/57 - 164/-)  BP(mean): --  RR: 18 (11 Aug 2020 13:33) (18 - 18)  SpO2: 95% (11 Aug 2020 13:33) (92% - 95%)    Physical Exam:  Constitutional: NAD, awake and alert; sitting comfortably eating breakfast  	EYES: EOMI  	ENT:  very hard of hearing, no tonsillar exudates   	Respiratory: Unable to perform due to inability to get patient to lean forward (even with help of RN)  	Cardiovascular: RRR, normal S1/S2, no murmurs or rubs    	Gastrointestinal: Bowel Sounds present, soft, nontender, nondistended, no guarding, no rebound  	Extremities: No cyanosis or clubbing; warm to touch  	Vascular: 2+ peripheral pulses lower ex  	Neurological: No focal deficits,  sensation to light touch intact in all extremities,   	Musculoskeletal: painful to palpation of anterior and lateral deltoid. abduction past 90 degrees is painful to patient   	Skin: No rashes  Psych: no depression or anhedonia, AAOx3 Subjective    Summary:   Mr. Faith is a pleasant 89 year old male who has a history of Atrial Fibrillation with implanted pacemaker, CAD with multiple stents, Hypertension, Hyperlipidemia, BPH on flomax, who presents to the ED from his outpatient rehabilitation facility called [facility] with a fever and elevated WBC count, but the patient's chief complaint is "left shoulder pain" (s/p fall 2/2 sepsis last month).    -No acute overnight events.  This morning, Mr. Faith feels well and is in good spirits.  He is able to have a clear conversation as long as he can hear what you are saying to him.  He denies any pain aside from his left shoulder pain that does not seem to be improving from the time he arrived here.  It is difficult to communicate with him, as he is extremely hard of hearing; complete ROS was not completed due to this issue.     Objective     Vital Signs Last 24 Hrs  T(C): 36.2 (11 Aug 2020 13:33), Max: 36.7 (10 Aug 2020 21:10)  T(F): 97.1 (11 Aug 2020 13:33), Max: 98.1 (10 Aug 2020 21:10)  HR: 80 (11 Aug 2020 13:33) (72 - 80)  BP: 151/77 (11 Aug 2020 13:33) (139/57 - 164/-)  BP(mean): --  RR: 18 (11 Aug 2020 13:33) (18 - 18)  SpO2: 95% (11 Aug 2020 13:33) (92% - 95%)    Physical Exam:  Constitutional: NAD, awake and alert; sitting comfortably eating breakfast  	EYES: EOMI  	ENT:  very hard of hearing, no tonsillar exudates   	Respiratory: Unable to perform due to inability to get patient to lean forward (even with help of RN), largely due to inability to communicate clearly with him  	Cardiovascular: RRR, normal S1/S2, no murmurs or rubs    	Gastrointestinal: Bowel Sounds present, soft, nontender, nondistended, no guarding, no rebound  	Extremities: No cyanosis or clubbing; warm to touch  	Neurological: Oriented to self and place today (mild dementia, this is baseline)  	Musculoskeletal: painful to palpation of anterior and lateral deltoid. abduction past 90 degrees is painful to patient; other extremities have full strength and mobility  	Skin: No rashes  Psych: no depression or anhedonia    Labs                        10.7   6.68  )-----------( 249      ( 11 Aug 2020 06:50 )             33.9     08-11    141  |  102  |  16  ----------------------------<  141<H>  4.4   |  28  |  0.78    Ca    9.5      11 Aug 2020 06:50  Phos  2.5     08-11  Mg     2.0     08-11    TPro  6.0  /  Alb  2.8<L>  /  TBili  0.3  /  DBili  x   /  AST  18  /  ALT  24  /  AlkPhos  82  08-11    < from: Xray Shoulder 2 Views, Left (07.11.20 @ 12:55) >  No acute displaced fracture. No dislocation. Mild acromioclavicular arthrosis. Left chest wall pacemaker is visualized.    < end of copied text >    < from: Xray Chest 1 View- PORTABLE-Urgent (08.06.20 @ 17:46) >  No focal consolidation. There is an 8 mm nodule in the lateral right lung unchanged from 7/11/2020. Routine follow-up studies are recommended to document either stability or growth of this nodule    < end of copied text > Subjective    Summary:   Mr. Faith is a pleasant 89 year old male who has a history of Atrial Fibrillation with implanted pacemaker, CAD with multiple stents, Hypertension, Hyperlipidemia, BPH on flomax, who presents to the ED from his outpatient rehabilitation facility called [facility] with a fever and elevated WBC count, but the patient's chief complaint is "left shoulder pain" (s/p fall 2/2 sepsis last month).    -No acute overnight events.  This morning, Mr. Faith feels well and is in good spirits.  He is able to have a clear conversation as long as he can hear what you are saying to him.  He denies any pain aside from his left shoulder pain that does not seem to be improving from the time he arrived here.  It is difficult to communicate with him, as he is extremely hard of hearing; complete ROS was not completed due to this issue.     Objective     Vital Signs Last 24 Hrs  T(C): 36.2 (11 Aug 2020 13:33), Max: 36.7 (10 Aug 2020 21:10)  T(F): 97.1 (11 Aug 2020 13:33), Max: 98.1 (10 Aug 2020 21:10)  HR: 80 (11 Aug 2020 13:33) (72 - 80)  BP: 151/77 (11 Aug 2020 13:33) (139/57 - 164/-)  BP(mean): --  RR: 18 (11 Aug 2020 13:33) (18 - 18)  SpO2: 95% (11 Aug 2020 13:33) (92% - 95%)    Physical Exam:  Constitutional: NAD, awake and alert; sitting comfortably eating breakfast  	EYES: EOMI  	ENT:  very hard of hearing, no tonsillar exudates   	Respiratory: Unable to perform due to inability to get patient to lean forward (even with help of RN), largely due to inability to communicate clearly with him  	Cardiovascular: RRR, normal S1/S2, no murmurs or rubs    	Gastrointestinal: Bowel Sounds present, soft, nontender, nondistended, no guarding, no rebound  	Extremities: No cyanosis or clubbing; warm to touch  	Neurological: Oriented to self and place today (mild dementia, this is baseline)  	Musculoskeletal: painful to palpation of anterior and lateral deltoid. abduction past 90 degrees is painful to patient; other extremities have full strength and mobility  	Skin: No rashes  Psych: no depression or anhedonia    Labs                        10.7   6.68  )-----------( 249      ( 11 Aug 2020 06:50 )             33.9     08-11    141  |  102  |  16  ----------------------------<  141<H>  4.4   |  28  |  0.78    Ca    9.5      11 Aug 2020 06:50  Phos  2.5     08-11  Mg     2.0     08-11    TPro  6.0  /  Alb  2.8<L>  /  TBili  0.3  /  DBili  x   /  AST  18  /  ALT  24  /  AlkPhos  82  08-11    BCx: NGTD. UCx >100k E.coli    < from: Xray Shoulder 2 Views, Left (07.11.20 @ 12:55) >  No acute displaced fracture. No dislocation. Mild acromioclavicular arthrosis. Left chest wall pacemaker is visualized.    < end of copied text >    < from: Xray Chest 1 View- PORTABLE-Urgent (08.06.20 @ 17:46) >  No focal consolidation. There is an 8 mm nodule in the lateral right lung unchanged from 7/11/2020. Routine follow-up studies are recommended to document either stability or growth of this nodule    < end of copied text > Subjective    Summary:   Mr. Faith is a pleasant 89 year old male who has a history of Atrial Fibrillation with implanted pacemaker, CAD with multiple stents, Hypertension, Hyperlipidemia, BPH on flomax, who presents to the ED from his outpatient rehabilitation facility called [facility] with a fever and elevated WBC count, but the patient's chief complaint is "left shoulder pain" (s/p fall 2/2 sepsis last month).    -No acute overnight events.  This morning, Mr. Faith feels well and is in good spirits.  He is able to have a clear conversation as long as he can hear what you are saying to him.  He denies any pain aside from his left shoulder pain that does not seem to be improving from the time he arrived here.  It is difficult to communicate with him, as he is extremely hard of hearing; complete ROS was not completed due to this issue.     Objective     Vital Signs Last 24 Hrs  T(C): 36.2 (11 Aug 2020 13:33), Max: 36.7 (10 Aug 2020 21:10)  T(F): 97.1 (11 Aug 2020 13:33), Max: 98.1 (10 Aug 2020 21:10)  HR: 80 (11 Aug 2020 13:33) (72 - 80)  BP: 151/77 (11 Aug 2020 13:33) (139/57 - 164/-)  BP(mean): --  RR: 18 (11 Aug 2020 13:33) (18 - 18)  SpO2: 95% (11 Aug 2020 13:33) (92% - 95%)    Physical Exam:  Constitutional: NAD, awake and alert; sitting comfortably eating breakfast  	EYES: EOMI  	ENT:  very hard of hearing, no tonsillar exudates   	Respiratory: Unable to perform due to inability to get patient to lean forward (even with help of RN), largely due to inability to communicate clearly with him  	Cardiovascular: RRR, normal S1/S2, no murmurs or rubs    	Gastrointestinal: Bowel Sounds present, soft, nontender, nondistended, no guarding, no rebound  	Extremities: No cyanosis or clubbing; warm to touch  	Neurological: Oriented to self and place today (mild dementia, this is baseline)  	Musculoskeletal: painful to palpation of anterior and lateral deltoid. abduction past 90 degrees is painful to patient; other extremities have full strength and mobility  	Skin: No rashes  Psych: no depression or anhedonia    Labs                        10.7   6.68  )-----------( 249      ( 11 Aug 2020 06:50 )             33.9     08-11    141  |  102  |  16  ----------------------------<  141<H>  4.4   |  28  |  0.78    Ca    9.5      11 Aug 2020 06:50  Phos  2.5     08-11  Mg     2.0     08-11    TPro  6.0  /  Alb  2.8<L>  /  TBili  0.3  /  DBili  x   /  AST  18  /  ALT  24  /  AlkPhos  82  08-11    BCx: NGTD. UCx >100k E.coli    < from: Xray Shoulder 2 Views, Left (07.11.20 @ 12:55) >  No acute displaced fracture. No dislocation. Mild acromioclavicular arthrosis. Left chest wall pacemaker is visualized.    < end of copied text >    < from: Xray Chest 1 View- PORTABLE-Urgent (08.06.20 @ 17:46) >  No focal consolidation. There is an 8 mm nodule in the lateral right lung unchanged from 7/11/2020. Routine follow-up studies are recommended to document either stability or growth of this nodule    < end of copied text >    < from: CT Chest No Cont (08.07.20 @ 15:16) >  1.  Small bilateral pleural effusions.  2.  Linear and nodular opacity in the leftlower lobe is of uncertain etiology.    < end of copied text > Subjective    Summary:   Mr. Faith is a pleasant 89 year old male who has a history of Atrial Fibrillation with implanted pacemaker, CAD with multiple stents, Hypertension, Hyperlipidemia, BPH on flomax, who presents to the ED from his outpatient rehabilitation facility called [facility] with a fever and elevated WBC count, but the patient's chief complaint is "left shoulder pain" (s/p fall 2/2 sepsis last month).    -No acute overnight events.  This morning, Mr. Faith feels well and is in good spirits.  He is able to have a clear conversation as long as he can hear what you are saying to him.  He denies any pain aside from his left shoulder pain that does not seem to be improving significantly from the time he arrived here (although he sometimes says he thinks it is getting better).  Mental status seemed improved from yesterday.  It is difficult to communicate with him, as he is extremely hard of hearing; complete ROS was not completed due to this issue.     Objective     Vital Signs Last 24 Hrs  T(C): 36.2 (11 Aug 2020 13:33), Max: 36.7 (10 Aug 2020 21:10)  T(F): 97.1 (11 Aug 2020 13:33), Max: 98.1 (10 Aug 2020 21:10)  HR: 80 (11 Aug 2020 13:33) (72 - 80)  BP: 151/77 (11 Aug 2020 13:33) (139/57 - 164/-)  BP(mean): --  RR: 18 (11 Aug 2020 13:33) (18 - 18)  SpO2: 95% (11 Aug 2020 13:33) (92% - 95%)    Physical Exam:  Constitutional: NAD, awake and alert; sitting comfortably eating breakfast  	EYES: EOMI  	ENT:  very hard of hearing, no tonsillar exudates   	Respiratory: Unable to perform due to inability to get patient to lean forward (even with help of RN), largely due to inability to communicate clearly with him  	Cardiovascular: RRR, normal S1/S2, no murmurs or rubs    	Gastrointestinal: Bowel Sounds present, soft, nontender, nondistended, no guarding, no rebound  	Extremities: No cyanosis or clubbing; warm to touch  	Neurological: Oriented to self and place today (mild dementia, this is baseline)  	Musculoskeletal: painful to palpation of anterior and lateral deltoid. abduction past 90 degrees is painful to patient; other extremities have full strength and mobility  	Skin: No rashes  Psych: no depression or anhedonia    Labs                        10.7   6.68  )-----------( 249      ( 11 Aug 2020 06:50 )             33.9     08-11    141  |  102  |  16  ----------------------------<  141<H>  4.4   |  28  |  0.78    Ca    9.5      11 Aug 2020 06:50  Phos  2.5     08-11  Mg     2.0     08-11    TPro  6.0  /  Alb  2.8<L>  /  TBili  0.3  /  DBili  x   /  AST  18  /  ALT  24  /  AlkPhos  82  08-11    BCx: NGTD. UCx >100k E.coli    < from: Xray Shoulder 2 Views, Left (07.11.20 @ 12:55) >  No acute displaced fracture. No dislocation. Mild acromioclavicular arthrosis. Left chest wall pacemaker is visualized.    < end of copied text >    < from: Xray Chest 1 View- PORTABLE-Urgent (08.06.20 @ 17:46) >  No focal consolidation. There is an 8 mm nodule in the lateral right lung unchanged from 7/11/2020. Routine follow-up studies are recommended to document either stability or growth of this nodule    < end of copied text >    < from: CT Chest No Cont (08.07.20 @ 15:16) >  1.  Small bilateral pleural effusions.  2.  Linear and nodular opacity in the leftlower lobe is of uncertain etiology.    < end of copied text > Subjective    Summary:   Mr. Faith is a pleasant 89 year old male who has a history of Atrial Fibrillation with implanted pacemaker, CAD with multiple stents, Hypertension, Hyperlipidemia, BPH on flomax, who presents to the ED from his outpatient rehabilitation facility called [facility] with a fever and elevated WBC count, but the patient's chief complaint is "left shoulder pain" (s/p fall 2/2 sepsis last month).    -No acute overnight events.  This morning, Mr. Faith feels well and is in good spirits.  He is able to have a clear conversation as long as he can hear what you are saying to him.  He denies any pain aside from his left shoulder pain that does not seem to be improving significantly from the time he arrived here (although he sometimes says he thinks it is getting better).  Mental status seemed improved from yesterday.  It is difficult to communicate with him, as he is extremely hard of hearing; complete ROS was not completed due to this issue.     Objective     Vital Signs Last 24 Hrs  T(C): 36.2 (11 Aug 2020 13:33), Max: 36.7 (10 Aug 2020 21:10)  T(F): 97.1 (11 Aug 2020 13:33), Max: 98.1 (10 Aug 2020 21:10)  HR: 80 (11 Aug 2020 13:33) (72 - 80)  BP: 151/77 (11 Aug 2020 13:33) (139/57 - 164/-)  BP(mean): --  RR: 18 (11 Aug 2020 13:33) (18 - 18)  SpO2: 95% (11 Aug 2020 13:33) (92% - 95%)    Physical Exam:  Constitutional: NAD, awake and alert; sitting comfortably eating breakfast  	EYES: EOMI  	ENT:  very hard of hearing, no tonsillar exudates   	Respiratory: Unable to perform due to inability to get patient to lean forward (even with help of RN), largely due to inability to communicate clearly with him  	Cardiovascular: RRR, normal S1/S2, no murmurs or rubs    	Gastrointestinal: Bowel Sounds present, soft, nontender, nondistended, no guarding, no rebound  	Extremities: No cyanosis or clubbing; warm to touch  	Neurological: Oriented to self and place today (mild dementia, this is baseline)  	Musculoskeletal: painful to palpation of anterior and lateral deltoid. abduction past 90 degrees is painful to patient; other extremities have full strength and mobility  	Skin: No rashes  Psych: no depression or anhedonia    Labs                        10.7   6.68  )-----------( 249      ( 11 Aug 2020 06:50 )             33.9     08-11    141  |  102  |  16  ----------------------------<  141<H>  4.4   |  28  |  0.78    Ca    9.5      11 Aug 2020 06:50  Phos  2.5     08-11  Mg     2.0     08-11    TPro  6.0  /  Alb  2.8<L>  /  TBili  0.3  /  DBili  x   /  AST  18  /  ALT  24  /  AlkPhos  82  08-11    BCx: NGTD. UCx >100k E.coli  -pansensitive E. coli    < from: Xray Shoulder 2 Views, Left (07.11.20 @ 12:55) >  No acute displaced fracture. No dislocation. Mild acromioclavicular arthrosis. Left chest wall pacemaker is visualized.    < end of copied text >    < from: Xray Chest 1 View- PORTABLE-Urgent (08.06.20 @ 17:46) >  No focal consolidation. There is an 8 mm nodule in the lateral right lung unchanged from 7/11/2020. Routine follow-up studies are recommended to document either stability or growth of this nodule    < end of copied text >    < from: CT Chest No Cont (08.07.20 @ 15:16) >  1.  Small bilateral pleural effusions.  2.  Linear and nodular opacity in the leftlower lobe is of uncertain etiology.    < end of copied text >

## 2020-08-11 NOTE — MEDICAL STUDENT PROGRESS NOTE(EDUCATION) - NS MD HP STUD ASPLAN PLAN FT
Problem/Plan - 1:  ·  Problem: Sepsis.  Plan: -likely in the setting of UTI   -complicated by an episode of hypoxia in the ED with O2 sat of 88%, without known cause. hypoxia could be related to atelectasis and b/l effusions, in the setting of sepsis, AMS, etc. Now satting well on RA.  - CT chest: No focal consolidations, small b/l pleural effusions, linear and nodular opacity in LLL unknown eitiology  - BCx: NGTD. UCx >100k E.coli  - CTX discontinued. start jayson (8/7-8/11).  Switch to oral cefuroxime 500mg q12 for 3 days upon discharge later today.      Problem/Plan - 2:  ·  Problem: UTI (urinary tract infection).  Plan: -UA revealed large leuk esterase and +ve for nitrite. WBC in urine elevated at 24. UCx >100k E.coli   - start jayson (8/7-)  - UCx- E.coli pansensitive  - c/w meropenam.      Problem/Plan - 3:  ·  Problem: Left shoulder pain.  Plan: - likely chronic from his recent fall and admission in July. Now pain improving.  and decreased range of motion due to pain today.  - xray at that time reviewed; no acute fracture   - pain control with tylenol PRN   - PT/OT eval: [facility].      Problem/Plan - 4:  ·  Problem: Atrial fibrillation.  Plan: -c/w home Eliquis  -metoprolol 12.5 BID restarted with hold parameters  -Monitor HR.      Problem/Plan - 5:  ·  Problem: Hypertension.  Plan: -continue with home amlodipine with hold parameters of SBP <120.      Problem/Plan - 6:  Problem: Coronary artery disease. Plan: -c/w asa, statin.     Problem/Plan - 7:  ·  Problem: BPH (benign prostatic hyperplasia).  Plan: -continue with flomax.      Problem/Plan - 8:  ·  Problem: Hyperlipidemia.  Plan: -c/w statin.      Problem/Plan - 9:  ·  Problem: DVT prophylaxis.  Plan: -on home dose of eliquis. Problem/Plan - 1:  ·  Problem: Sepsis.  Plan: -likely in the setting of UTI   -complicated by an episode of hypoxia in the ED with O2 sat of 88%, without known cause. hypoxia could be related to atelectasis and b/l effusions, in the setting of sepsis, AMS, etc. Now satting well on RA.  - CT chest: No focal consolidations, small b/l pleural effusions, linear and nodular opacity in LLL unknown eitiology  - BCx: NGTD. UCx >100k E.coli  - CTX discontinued. start jayson (8/7-8/11).  Switch to oral cefuroxime 500mg q12 for 3 days upon discharge later today.      Problem/Plan - 2:  ·  Problem: UTI (urinary tract infection).  Plan: -UA revealed large leuk esterase and +ve for nitrite. WBC in urine elevated at 24. UCx >100k E.coli   - start jayson (8/7-)  - UCx- E.coli pansensitive  - c/w meropenam.      Problem/Plan - 3:  ·  Problem: Left shoulder pain.  Plan: - likely chronic from his recent fall and admission in July. Now pain improving.  and decreased range of motion due to pain today.  - xray at that time reviewed; no acute fracture   - pain control with tylenol PRN   - PT/OT eval: [facility].      Problem/Plan - 4:  ·  Problem: Atrial fibrillation.  Plan: -c/w home Eliquis  -metoprolol 12.5 BID restarted with hold parameters  -Monitor HR - HR has been wnl (between 64 and 85) since restarting metoprolol 12.5 mg BID on 8/10/2020     Problem/Plan - 5:  ·  Problem: Hypertension.  Plan: -continue with home amlodipine with hold parameters of SBP <120.      Problem/Plan - 6:  Problem: Coronary artery disease. Plan: -c/w asa, statin.     Problem/Plan - 7:  ·  Problem: BPH (benign prostatic hyperplasia).  Plan: -continue with flomax.      Problem/Plan - 8:  ·  Problem: Hyperlipidemia.  Plan: -c/w statin.      Problem/Plan - 9:  ·  Problem: DVT prophylaxis.  Plan: -on home dose of eliquis. Problem/Plan - 1:  ·  Problem: Sepsis.  Plan: -likely in the setting of UTI   -complicated by an episode of hypoxia in the ED with O2 sat of 88%, without known cause. hypoxia could be related to atelectasis and b/l effusions, in the setting of sepsis, AMS, etc. Now satting well on RA.  - CT chest: No focal consolidations, small b/l pleural effusions, linear and nodular opacity in LLL unknown eitiology  - BCx: NGTD. UCx >100k E.coli  - CTX discontinued. start jayson (8/7-8/11).  Switch to oral cefuroxime 500mg q12 for 3 days upon discharge later today.      Problem/Plan - 2:  ·  Problem: UTI (urinary tract infection).  Plan: -UA revealed large leuk esterase and +ve for nitrite. WBC in urine elevated at 24. UCx >100k E.coli   - start jayson (8/7-8/11)  - UCx- E.coli pansensitive     Problem/Plan - 3:  ·  Problem: Left shoulder pain.  Plan: - likely chronic from his recent fall and admission in July. Pain improving slightly but still bothering pt.  and decreased range of motion due to pain today.  - xray at that time reviewed; no acute fracture   - pain control with tylenol PRN   - PT/OT eval: [facility].      Problem/Plan - 4:  ·  Problem: Atrial fibrillation.  Plan: -c/w home Eliquis  -metoprolol 12.5 BID restarted with hold parameters  -Monitor HR - HR has been wnl (between 64 and 85) since restarting metoprolol 12.5 mg BID on 8/10/2020     Problem/Plan - 5:  ·  Problem: Hypertension.  Plan: -continue with home amlodipine with hold parameters of SBP <120.      Problem/Plan - 6:  Problem: Coronary artery disease. Plan: -c/w asa, statin.     Problem/Plan - 7:  ·  Problem: BPH (benign prostatic hyperplasia).  Plan: -continue with flomax.      Problem/Plan - 8:  ·  Problem: Hyperlipidemia.  Plan: -c/w statin.      Problem/Plan - 9:  ·  Problem: DVT prophylaxis.  Plan: -on home dose of eliquis.

## 2020-08-11 NOTE — PROGRESS NOTE ADULT - PROBLEM SELECTOR PLAN 1
-likely in the setting of UTI   -complicated by an episode of hypoxia in the ED with O2 sat of 88%, without known cause. hypoxia could be related to atelectasis and b/l effusions, in the setting of sepsis, AMS, etc. Now satting well on RA.  - CT chest: No focal consolidations, small b/l pleural effusions, linear and nodular opacity in LLL unknown eitiology  - BCx: NGTD. UCx >100k E.coli  - CTX discontinued. start jayson (8/7-) -likely in the setting of UTI   -complicated by an episode of hypoxia in the ED with O2 sat of 88%, without known cause. hypoxia could be related to atelectasis and b/l effusions, in the setting of sepsis, AMS, etc. Now satting well on RA.  - CT chest: No focal consolidations, small b/l pleural effusions, linear and nodular opacity in LLL unknown eitiology  - BCx: NGTD. UCx >100k E.coli  - CTX discontinued. start jayson (8/7-11). Switched to Ceftin PO for 3 days ending on 8/13.

## 2020-08-11 NOTE — PROGRESS NOTE ADULT - ATTENDING COMMENTS
Seen, examined the patient in am  Sitting in bed, no acute SOB, chest pain, remains afebrile  - Reviewed labs, imaging, WBC 6.6, CXR no consolidation    Admitted for Sepsis w UTI, low suspicion of PNA/aspiration, improving overall    blood c/s neg, urine c/s grew E. Coli( last urine c/s E.coli)  - Off IV Meropenem, added Ceftin 500mg bid for 3 more doses    CT chest showed small B/L pleural effusion, left atelectasis, doubt Pna  - c/w Xarelto, Statin  - PT in progress,     d/c planning to [facility] in progress

## 2020-08-11 NOTE — PROGRESS NOTE ADULT - PROBLEM SELECTOR PLAN 2
-UA revealed large leuk esterase and +ve for nitrite. WBC in urine elevated at 24. UCx >100k E.coli   - start jayson (8/7-)  - UCx- E.coli pansensitive  - c/w meropenam -UA revealed large leuk esterase and +ve for nitrite. WBC in urine elevated at 24. UCx >100k E.coli   - start jayson (8/7-11).   - UCx- E.coli pansensitive  - switched to PO ceftin upon discharge

## 2020-08-11 NOTE — MEDICAL STUDENT PROGRESS NOTE(EDUCATION) - NS MD HP STUD ASPLAN ASSES FT
Mr. Faith is a pleasant 89 year old male who has a history of Atrial Fibrillation with implanted pacemaker, CAD with multiple stents, Hypertension, Hyperlipidemia, BPH on flomax, who presents to the ED from his outpatient rehabilitation facility called [facility] with a fever and elevated WBC count, but the patient's chief complaint is "left shoulder pain" (s/p fall 2/2 sepsis last month).  His urine culture grew E. coli, as did his urine and blood cultures last time he was here on 7/11/2020 when he was admitted for fall 2/2 sepsis/UTI.  His blood cultures on this admission show no growth to date.  Today is day 5 of meropenem.  Today is the second consecutive day with WBC wnl (it had been abnormal on all other days since admission).  HR has been wnl (between 64 and 85) since restarting metoprolol 12.5 mg BID. Mr. Faith is a pleasant 89 year old male who has a history of Atrial Fibrillation with implanted pacemaker, CAD with multiple stents, Hypertension, Hyperlipidemia, BPH on flomax, who presents to the ED from his outpatient rehabilitation facility called [facility] with a fever and elevated WBC count, but the patient's chief complaint is "left shoulder pain" (s/p fall 2/2 sepsis last month).  His urine culture grew E. coli and his blood cultures show no growth to date.  Chest CT showed an opacity in the LLL of uncertain etiology.  Today is day 5 of meropenem.  Today is the second consecutive day with WBC wnl (it had been abnormal on all other days since admission). Mr. Faith is a pleasant 89 year old male who has a history of Atrial Fibrillation with implanted pacemaker, CAD with multiple stents, Hypertension, Hyperlipidemia, BPH on flomax, who presents to the ED from his outpatient rehabilitation facility called [facility] with a fever and elevated WBC count, but the patient's chief complaint is "left shoulder pain" (s/p fall 2/2 sepsis last month). He was found to have sepsis likely 2/2 UTI. His urine culture grew E. coli and his blood cultures show no growth to date.  Chest CT showed an opacity in the LLL of uncertain etiology.  Today is day 5 of meropenem.  Today is the second consecutive day with WBC wnl (it had been abnormal on all other days since admission).

## 2020-08-12 PROCEDURE — 99232 SBSQ HOSP IP/OBS MODERATE 35: CPT

## 2020-08-12 RX ADMIN — TAMSULOSIN HYDROCHLORIDE 0.4 MILLIGRAM(S): 0.4 CAPSULE ORAL at 21:16

## 2020-08-12 RX ADMIN — AMLODIPINE BESYLATE 5 MILLIGRAM(S): 2.5 TABLET ORAL at 06:35

## 2020-08-12 RX ADMIN — FINASTERIDE 5 MILLIGRAM(S): 5 TABLET, FILM COATED ORAL at 12:08

## 2020-08-12 RX ADMIN — Medication 1 APPLICATION(S): at 17:14

## 2020-08-12 RX ADMIN — SENNA PLUS 2 TABLET(S): 8.6 TABLET ORAL at 21:16

## 2020-08-12 RX ADMIN — Medication 1 APPLICATION(S): at 06:35

## 2020-08-12 RX ADMIN — APIXABAN 2.5 MILLIGRAM(S): 2.5 TABLET, FILM COATED ORAL at 06:35

## 2020-08-12 RX ADMIN — Medication 12.5 MILLIGRAM(S): at 06:35

## 2020-08-12 RX ADMIN — Medication 500 MILLIGRAM(S): at 06:35

## 2020-08-12 RX ADMIN — ATORVASTATIN CALCIUM 10 MILLIGRAM(S): 80 TABLET, FILM COATED ORAL at 21:16

## 2020-08-12 RX ADMIN — Medication 12.5 MILLIGRAM(S): at 17:14

## 2020-08-12 RX ADMIN — Medication 500 MILLIGRAM(S): at 17:14

## 2020-08-12 RX ADMIN — APIXABAN 2.5 MILLIGRAM(S): 2.5 TABLET, FILM COATED ORAL at 17:14

## 2020-08-12 RX ADMIN — Medication 81 MILLIGRAM(S): at 12:08

## 2020-08-12 NOTE — CONSULT NOTE ADULT - ASSESSMENT
A/P: 89 year old male, [place] with PMH of A.Fib on Eliquis s/p pacemaker, CAD s/p cardiac stents x5, HTN, HLD, BPH on flomax presented with fevers and elevated WBC admitted to medicine 8/7 with sepsis 2/2 UTI, urine culture grew E.Coli. Urology consulted for recurrent UTI A/P: 89 year old male, [place] with PMH of A.Fib on Eliquis s/p pacemaker, CAD s/p cardiac stents x5, HTN, HLD, BPH on flomax presented with fevers and elevated WBC admitted to medicine 8/7 with sepsis 2/2 UTI, urine culture grew E.Coli. Urology consulted for recurrent UTI.  Given only 2 episodes in past 15 years, question of true recurrent UTI versus undertreatment.  Currently on cefuroxime but Bactrim likely better choice (improved penetration).  CUrrently also listed for 3 days more of ABX, however would continue ABX for 2 weeks total.    - D/c with bactrim, 2 week total course of ABX  - F/u urine culture on completion of antibiotics, if still positive can consider starting methenamine prophylaxis  - continue flomax/finasteride  - f/u outpatient  -d/w Dr. Palomino

## 2020-08-12 NOTE — PROGRESS NOTE ADULT - PROBLEM SELECTOR PLAN 2
-UA revealed large leuk esterase and +ve for nitrite. WBC in urine elevated at 24. UCx >100k E.coli   - start jayson (8/7-11).   - UCx- E.coli pansensitive  - switched to PO ceftin upon discharge

## 2020-08-12 NOTE — MEDICAL STUDENT PROGRESS NOTE(EDUCATION) - NS MD HP STUD ASPLAN ASSES FT
Mr. Faith is a pleasant 89 year old male who has a history of Atrial Fibrillation with implanted pacemaker, CAD with multiple stents, Hypertension, Hyperlipidemia, BPH on flomax, who presents to the ED from his outpatient rehabilitation facility called [facility] with a fever and elevated WBC count, but the patient's chief complaint is "left shoulder pain" (s/p fall 2/2 sepsis last month). He was found to have sepsis likely 2/2 UTI. His urine culture grew E. coli and his blood cultures show no growth to date.  Chest CT showed an opacity in the LLL of uncertain etiology.  Today will be the first day of ceftin.

## 2020-08-12 NOTE — PROGRESS NOTE ADULT - PROBLEM SELECTOR PLAN 1
-likely in the setting of UTI   -complicated by an episode of hypoxia in the ED with O2 sat of 88%, without known cause. hypoxia could be related to atelectasis and b/l effusions, in the setting of sepsis, AMS, etc. Now satting well on RA.  - CT chest: No focal consolidations, small b/l pleural effusions, linear and nodular opacity in LLL unknown eitiology  - BCx: NGTD. UCx >100k E.coli  - CTX discontinued. start jayson (8/7-11). Switched to Ceftin PO for 3 days ending on 8/13.

## 2020-08-12 NOTE — PROGRESS NOTE ADULT - SUBJECTIVE AND OBJECTIVE BOX
PROGRESS NOTE:   Authored by Concha Cole MD    Patient is a 89y old  Male who presents with a chief complaint of SEPSIS (11 Aug 2020 07:22)      SUBJECTIVE / OVERNIGHT EVENTS:    ADDITIONAL REVIEW OF SYSTEMS:    MEDICATIONS  (STANDING):  amLODIPine   Tablet 5 milliGRAM(s) Oral daily  apixaban 2.5 milliGRAM(s) Oral two times a day  aspirin  chewable 81 milliGRAM(s) Oral daily  atorvastatin 10 milliGRAM(s) Oral at bedtime  cefuroxime   Tablet 500 milliGRAM(s) Oral every 12 hours  finasteride 5 milliGRAM(s) Oral daily  metoprolol tartrate 12.5 milliGRAM(s) Oral every 12 hours  polyethylene glycol 3350 17 Gram(s) Oral two times a day  senna 2 Tablet(s) Oral at bedtime  silver sulfADIAZINE 1% Cream 1 Application(s) Topical two times a day  sodium chloride 0.9%. 500 milliLiter(s) (50 mL/Hr) IV Continuous <Continuous>  tamsulosin 0.4 milliGRAM(s) Oral at bedtime    MEDICATIONS  (PRN):  acetaminophen   Tablet .. 650 milliGRAM(s) Oral every 6 hours PRN Moderate Pain (4 - 6)      CAPILLARY BLOOD GLUCOSE        I&O's Summary      PHYSICAL EXAM:  Vital Signs Last 24 Hrs  T(C): 37 (12 Aug 2020 04:37), Max: 37 (12 Aug 2020 04:37)  T(F): 98.6 (12 Aug 2020 04:37), Max: 98.6 (12 Aug 2020 04:37)  HR: 92 (12 Aug 2020 06:30) (67 - 92)  BP: 141/70 (12 Aug 2020 06:30) (124/63 - 151/77)  BP(mean): --  RR: 18 (12 Aug 2020 06:30) (18 - 18)  SpO2: 93% (12 Aug 2020 06:30) (90% - 99%)    GENERAL: NAD, well-developed, hard of hearing  HEAD:  Atraumatic, Normocephalic  EYES: EOMI, PERRLA, conjunctiva and sclera clear  NECK: Supple, No JVD  CHEST/LUNG: Clear to auscultation bilaterally; No wheeze  HEART: Regular rate and rhythm; No murmurs, rubs, or gallops  ABDOMEN: Soft, Nontender, Nondistended; Bowel sounds present  EXTREMITIES:  2+ Peripheral Pulses, No clubbing, cyanosis, or edema  PSYCH: AAOx3  NEUROLOGY: moving all ext. following commands.  SKIN: No rashes or lesions    LABS:                        10.7   6.68  )-----------( 249      ( 11 Aug 2020 06:50 )             33.9     08-11    141  |  102  |  16  ----------------------------<  141<H>  4.4   |  28  |  0.78    Ca    9.5      11 Aug 2020 06:50  Phos  2.5     08-11  Mg     2.0     08-11    TPro  6.0  /  Alb  2.8<L>  /  TBili  0.3  /  DBili  x   /  AST  18  /  ALT  24  /  AlkPhos  82  08-11                RADIOLOGY & ADDITIONAL TESTS:  Results Reviewed:   Imaging Personally Reviewed:  Electrocardiogram Personally Reviewed:    COORDINATION OF CARE:  Care Discussed with Consultants/Other Providers [Y/N]:  Prior or Outpatient Records Reviewed [Y/N]: PROGRESS NOTE:   Authored by Concha Cole MD    Patient is a 89y old  Male who presents with a chief complaint of SEPSIS (11 Aug 2020 07:22)      SUBJECTIVE / OVERNIGHT EVENTS: NAEO. Patient feeling well. Reports pain in L shoulder unchanged from prior. Denies headaches, F/C, dizziness, syncope, CP/SOB, N/V, abdominal pain, dysuria, changes in BM, peripheral swelling, skin changes, new joint aches. Tolerating PO.    MEDICATIONS  (STANDING):  amLODIPine   Tablet 5 milliGRAM(s) Oral daily  apixaban 2.5 milliGRAM(s) Oral two times a day  aspirin  chewable 81 milliGRAM(s) Oral daily  atorvastatin 10 milliGRAM(s) Oral at bedtime  cefuroxime   Tablet 500 milliGRAM(s) Oral every 12 hours  finasteride 5 milliGRAM(s) Oral daily  metoprolol tartrate 12.5 milliGRAM(s) Oral every 12 hours  polyethylene glycol 3350 17 Gram(s) Oral two times a day  senna 2 Tablet(s) Oral at bedtime  silver sulfADIAZINE 1% Cream 1 Application(s) Topical two times a day  sodium chloride 0.9%. 500 milliLiter(s) (50 mL/Hr) IV Continuous <Continuous>  tamsulosin 0.4 milliGRAM(s) Oral at bedtime    MEDICATIONS  (PRN):  acetaminophen   Tablet .. 650 milliGRAM(s) Oral every 6 hours PRN Moderate Pain (4 - 6)      CAPILLARY BLOOD GLUCOSE        I&O's Summary      PHYSICAL EXAM:  Vital Signs Last 24 Hrs  T(C): 37 (12 Aug 2020 04:37), Max: 37 (12 Aug 2020 04:37)  T(F): 98.6 (12 Aug 2020 04:37), Max: 98.6 (12 Aug 2020 04:37)  HR: 92 (12 Aug 2020 06:30) (67 - 92)  BP: 141/70 (12 Aug 2020 06:30) (124/63 - 151/77)  BP(mean): --  RR: 18 (12 Aug 2020 06:30) (18 - 18)  SpO2: 93% (12 Aug 2020 06:30) (90% - 99%)    GENERAL: NAD, well-developed, hard of hearing  HEAD:  Atraumatic, Normocephalic  EYES: EOMI, PERRLA, conjunctiva and sclera clear  NECK: Supple, No JVD  CHEST/LUNG: Clear to auscultation bilaterally; No wheeze  HEART: Regular rate and rhythm; No murmurs, rubs, or gallops  ABDOMEN: Soft, Nontender, Nondistended; Bowel sounds present  EXTREMITIES:  2+ Peripheral Pulses, No clubbing, cyanosis, or edema  PSYCH: AAOx3  NEUROLOGY: moving all ext. following commands.  SKIN: No rashes or lesions    LABS:                        10.7   6.68  )-----------( 249      ( 11 Aug 2020 06:50 )             33.9     08-11    141  |  102  |  16  ----------------------------<  141<H>  4.4   |  28  |  0.78    Ca    9.5      11 Aug 2020 06:50  Phos  2.5     08-11  Mg     2.0     08-11    TPro  6.0  /  Alb  2.8<L>  /  TBili  0.3  /  DBili  x   /  AST  18  /  ALT  24  /  AlkPhos  82  08-11                RADIOLOGY & ADDITIONAL TESTS:  Results Reviewed:   Imaging Personally Reviewed:  Electrocardiogram Personally Reviewed:    COORDINATION OF CARE:  Care Discussed with Consultants/Other Providers [Y/N]:  Prior or Outpatient Records Reviewed [Y/N]:

## 2020-08-12 NOTE — PROGRESS NOTE ADULT - ATTENDING COMMENTS
Seen, examined the patient   Sitting in bed, no acute SOB, chest pain, remains afebrile, less pain in left shoulder   Problem lists  1. Sepsis w recurrent UTI, resolved sepsis  2. CAD, s/p stent, s/p PPM  3. BPH  4. Hard of hearing  4. Arthralgia left shoulder  - Reviewed labs, imaging, WBC 6.6, CXR no consolidation    blood c/s neg, urine c/s grew E. Coli( last urine c/s E.coli)    Family requested Urology consult for recurrent UTI  - on IV Meropenem to po Ceftin 500mg bid for 2 more days    CT chest showed small B/L pleural effusion, left atelectasis, doubt Pna  - c/w Xarelto, Statin  - PT/OT in progress,     d/c planning to [facility] in progress , needed enhanced bed  ** Daughter is aware

## 2020-08-12 NOTE — MEDICAL STUDENT PROGRESS NOTE(EDUCATION) - NS MD HP STUD ASPLAN PLAN FT
Problem/Plan - 1:  ·  Problem: Sepsis.  Plan: -likely in the setting of UTI   -complicated by an episode of hypoxia in the ED with O2 sat of 88%, without known cause. hypoxia could be related to atelectasis and b/l effusions, in the setting of sepsis, AMS, etc. Now satting well on RA.  - CT chest: No focal consolidations, small b/l pleural effusions, linear and nodular opacity in LLL unknown eitiology  - BCx: NGTD. UCx >100k E.coli  - CTX discontinued. start jayson (8/7-8/11).  Switch to oral cefuroxime 500mg q12 for 3 days upon discharge to [facility] today.      Problem/Plan - 2:  ·  Problem: UTI (urinary tract infection).  Plan: -UA revealed large leuk esterase and +ve for nitrite. WBC in urine elevated at 24. UCx >100k E.coli   - start jayson (8/7-8/11)  - UCx- E.coli pansensitive     Problem/Plan - 3:  ·  Problem: Left shoulder pain.  Plan: - likely chronic from his recent fall and admission in July. Pain improving slightly but still bothering the pt.  and decreased range of motion due to pain today.  - xray at that time reviewed; no acute fracture   - pain control with tylenol PRN   - PT/OT eval: [facility].      Problem/Plan - 4:  ·  Problem: Atrial fibrillation.  Plan: -c/w home Eliquis  -metoprolol 12.5 BID restarted with hold parameters  -Monitor HR - HR has been wnl (between 64 and 92) since restarting metoprolol 12.5 mg BID on 8/10/2020     Problem/Plan - 5:  ·  Problem: Hypertension.  Plan: -continue with home amlodipine with hold parameters of SBP <120.      Problem/Plan - 6:  Problem: Coronary artery disease. Plan: -c/w asa, statin.     Problem/Plan - 7:  ·  Problem: BPH (benign prostatic hyperplasia).  Plan: -continue with flomax.      Problem/Plan - 8:  ·  Problem: Hyperlipidemia.  Plan: -c/w statin.      Problem/Plan - 9:  ·  Problem: DVT prophylaxis.  Plan: -on home dose of eliquis. Problem/Plan - 1:  ·  Problem: Sepsis.  Plan: -likely in the setting of UTI   -complicated by an episode of hypoxia in the ED with O2 sat of 88%, without known cause. hypoxia could be related to atelectasis and b/l effusions, in the setting of sepsis, AMS, etc. Now satting well on RA.  - CT chest: No focal consolidations, small b/l pleural effusions, linear and nodular opacity in LLL unknown eitiology  - BCx: NGTD. UCx >100k E.coli  - CTX discontinued. start jayson (8/7-8/11).  Switch to oral cefuroxime 500mg q12 for 3 days upon discharge to [facility] today.      Problem/Plan - 2:  ·  Problem: UTI (urinary tract infection).  Plan: -UA revealed large leuk esterase and +ve for nitrite. WBC in urine elevated at 24. UCx >100k E.coli   - start jayson (8/7-8/11)  - UCx- E.coli pansensitive  -ordered urology consult at request of daughter (who wants him evaluated for recurrent UTI)     Problem/Plan - 3:  ·  Problem: Left shoulder pain.  Plan: - likely chronic from his recent fall and admission in July. Pain improving slightly but still bothering the pt.  and decreased range of motion due to pain today.  - xray at that time reviewed; no acute fracture   - pain control with tylenol PRN   - PT/OT eval: [facility].      Problem/Plan - 4:  ·  Problem: Atrial fibrillation.  Plan: -c/w home Eliquis  -metoprolol 12.5 BID restarted with hold parameters  -Monitor HR - HR has been wnl (between 64 and 92) since restarting metoprolol 12.5 mg BID on 8/10/2020     Problem/Plan - 5:  ·  Problem: Hypertension.  Plan: -continue with home amlodipine with hold parameters of SBP <120.      Problem/Plan - 6:  Problem: Coronary artery disease. Plan: -c/w asa, statin.     Problem/Plan - 7:  ·  Problem: BPH (benign prostatic hyperplasia).  Plan: -continue with flomax.      Problem/Plan - 8:  ·  Problem: Hyperlipidemia.  Plan: -c/w statin.      Problem/Plan - 9:  ·  Problem: DVT prophylaxis.  Plan: -on home dose of eliquis.

## 2020-08-12 NOTE — MEDICAL STUDENT PROGRESS NOTE(EDUCATION) - SUBJECTIVE AND OBJECTIVE BOX
Subjective    Summary:   Mr. Faith is a pleasant 89 year old male who has a history of Atrial Fibrillation with implanted pacemaker, CAD with multiple stents, Hypertension, Hyperlipidemia, BPH on flomax, who presents to the ED from his outpatient rehabilitation facility called [facility] with a fever and elevated WBC count, but the patient's chief complaint is "left shoulder pain" (s/p fall 2/2 sepsis/UTI last month).    -No acute overnight events.  This morning, Mr. Faith feels well and is in good spirits.  He is able to have a clear conversation as long as he can hear what you are saying to him.  When asked about pain, he pointed vaguely to his left arm. Mental status seems to be stable from  yesterday.  It is difficult to communicate with him, as he is extremely hard of hearing; complete ROS was not completed due to this issue.     Objective  Vital Signs Last 24 Hrs  T(C): 37 (12 Aug 2020 04:37), Max: 37 (12 Aug 2020 04:37)  T(F): 98.6 (12 Aug 2020 04:37), Max: 98.6 (12 Aug 2020 04:37)  HR: 92 (12 Aug 2020 06:30) (67 - 92)  BP: 141/70 (12 Aug 2020 06:30) (124/63 - 151/77)  BP(mean): --  RR: 18 (12 Aug 2020 06:30) (18 - 18)  SpO2: 93% (12 Aug 2020 06:30) (90% - 99%)    Physical Exam:  Constitutional: NAD, awake and alert; laying down comfortably  	EYES: EOMI  	ENT:  very hard of hearing   	Respiratory: Unable to perform due to inability to get patient to lean forward largely due to inability to communicate clearly with him  	Cardiovascular: difficult to discern S1 and S2; possible S3 auscultated; no murmurs or rubs    	Gastrointestinal: Bowel Sounds present, soft, nontender, nondistended, no guarding, no rebound  	Extremities: No cyanosis or clubbing; warm to touch  	Neurological: Oriented to self and place today (mild dementia, this is baseline)  	Musculoskeletal: painful to palpation of anterior and lateral deltoid. abduction past 90 degrees is painful to patient; other extremities have full strength and mobility  	Skin: No rashes  Psych: no depression or anhedonia    Labs                        10.7   6.68  )-----------( 249      ( 11 Aug 2020 06:50 )             33.9     08-11    141  |  102  |  16  ----------------------------<  141<H>  4.4   |  28  |  0.78    Ca    9.5      11 Aug 2020 06:50  Phos  2.5     08-11  Mg     2.0     08-11    TPro  6.0  /  Alb  2.8<L>  /  TBili  0.3  /  DBili  x   /  AST  18  /  ALT  24  /  AlkPhos  82  08-11    BCx: NGTD. UCx >100k E.coli  -pansensitive E. coli    < from: Xray Shoulder 2 Views, Left (07.11.20 @ 12:55) >  No acute displaced fracture. No dislocation. Mild acromioclavicular arthrosis. Left chest wall pacemaker is visualized.    < end of copied text >    < from: Xray Chest 1 View- PORTABLE-Urgent (08.06.20 @ 17:46) >  No focal consolidation. There is an 8 mm nodule in the lateral right lung unchanged from 7/11/2020. Routine follow-up studies are recommended to document either stability or growth of this nodule    < end of copied text >    < from: CT Chest No Cont (08.07.20 @ 15:16) >  1.  Small bilateral pleural effusions.  2.  Linear and nodular opacity in the leftlower lobe is of uncertain etiology.    < end of copied text >

## 2020-08-12 NOTE — CONSULT NOTE ADULT - SUBJECTIVE AND OBJECTIVE BOX
Urology PA Consult Note    HPI: 89 year old male, [facility] with PMH of A.Fib on Eliquis s/p pacemaker, CAD s/p cardiac stents x5, HTN, HLD, BPH on flomax presented with fevers and elevated WBC admitted to medicine 8/7 with sepsis 2/2 UTI. Patient was treated with meropenem and switched to cefuroxime. Patient was recently admitted July 12-17, 2020 with rhabdomyolysis 2/2 fall and UTI. Blood and urine cultures grew E.Coli, treated with ceftriaxone and subsequently switched to cefpodoxime. This admission urine culture grew E.Coli and blood culture ngtd. Patient's daughter states recurrent UTI since beginning of July. Prior to this, last UTI 15 years ago. Patient with no complaints. Currently dispo planning back to [facility] rehab. Urology consulted as per patient's daughter request. Renal/bladder ultrasound with findings of bladder wall thickening and BPH from infection and/or chronic bladder outlet obstruction. Residual 56cc    PAST MEDICAL & SURGICAL HISTORY:  Atrial fibrillation  Coronary artery disease  Hyperlipidemia  Hypertension  BPH (benign prostatic hyperplasia)  H/O removal of cyst: cyst removal off tailbone  H/O hernia repair: 2018    MEDICATIONS  (STANDING):  amLODIPine   Tablet 5 milliGRAM(s) Oral daily  apixaban 2.5 milliGRAM(s) Oral two times a day  aspirin  chewable 81 milliGRAM(s) Oral daily  atorvastatin 10 milliGRAM(s) Oral at bedtime  cefuroxime   Tablet 500 milliGRAM(s) Oral every 12 hours  finasteride 5 milliGRAM(s) Oral daily  metoprolol tartrate 12.5 milliGRAM(s) Oral every 12 hours  polyethylene glycol 3350 17 Gram(s) Oral two times a day  senna 2 Tablet(s) Oral at bedtime  silver sulfADIAZINE 1% Cream 1 Application(s) Topical two times a day  sodium chloride 0.9%. 500 milliLiter(s) (50 mL/Hr) IV Continuous <Continuous>  tamsulosin 0.4 milliGRAM(s) Oral at bedtime    MEDICATIONS  (PRN):  acetaminophen   Tablet .. 650 milliGRAM(s) Oral every 6 hours PRN Moderate Pain (4 - 6)    FAMILY HISTORY:  No pertinent family history in first degree relatives    Allergies  penicillin (Rash)    SOCIAL HISTORY: lives alone. wife passed away Dec 2019  ETOH: denies  Tobacco: denies  Drugs: denies    REVIEW OF SYSTEMS: Pertinent positives and negatives as stated in HPI, otherwise negative    VITAL SIGNS  T(C): 37 (08-12-20 @ 04:37), Max: 37 (08-12-20 @ 04:37)  HR: 92 (08-12-20 @ 06:30)  BP: 141/70 (08-12-20 @ 06:30)  SpO2: 93% (08-12-20 @ 06:30)  Wt(kg): --    I/O's: voiding    PHYSICAL EXAM  Gen: NAD  Pulm: Nonlabored breathing  CV: RRR  Abd: Soft, NT/ND    LABS:  08-11 @ 06:50    WBC 6.68  / Hct 33.9  / SCr 0.78     08-11    141  |  102  |  16  ----------------------------<  141<H>  4.4   |  28  |  0.78    Ca    9.5      11 Aug 2020 06:50  Phos  2.5     08-11  Mg     2.0     08-11    TPro  6.0  /  Alb  2.8<L>  /  TBili  0.3  /  DBili  x   /  AST  18  /  ALT  24  /  AlkPhos  82  08-11    Cultures: .Urine Catheterized  08-07 @ 00:54   >100,000 CFU/ml Escherichia coli  --  Escherichia coli      .Blood Blood-Peripheral  08-07 @ 00:28   No Growth Final  --  --      .Blood PERCUTANEOUS (BLOOD)  08-06 @ 17:23   No Growth Final  --  --      .Urine CL CATCH MDSTRM  07-23 @ 02:50   <10,000 CFU/mL Normal Urogenital Joyce  --  --      .Blood PERCUTANEOUS (BLOOD)  07-22 @ 09:32   No Growth Final  --  --      .Blood ARTERIAL LINE  07-22 @ 01:57   No Growth Final  --  --      .Blood Blood-Peripheral  07-13 @ 08:59   No Growth Final  --  --      .Urine Clean Catch (Midstream)  07-11 @ 01:08   >100,000 CFU/ml Escherichia coli  --  Escherichia coli      .Blood Blood-Peripheral  07-11 @ 00:37   Growth in aerobic and anaerobic bottles: Escherichia coli  See previous culture [id number]  --  Blood Culture PCR  Escherichia coli    RADIOLOGY:  Renal/bladder u/s:  Right kidney:  11.7 cm. No renal mass, hydronephrosis or calculi.  Left kidney:  11.2 cm. No hydronephrosis or calculi. A 0.9 cm lower pole cyst is noted.  Urinary bladder: Circumferential bladder wall thickening. Bilateral urinary jets are noted.  Prostate is enlarged, with a volume measuring 56 cc.     IMPRESSION:   No hydronephrosis.     Bladder wall thickening and enlarged prostate. Findings may be related to cystitis and/or chronic outlet obstruction.    Venous Duplex:  IMPRESSION:   No evidence of deep venous thrombosis in either lower extremity.

## 2020-08-12 NOTE — PROVIDER CONTACT NOTE (OTHER) - ACTION/TREATMENT ORDERED:
MD aware. Ordered for IVF to be infused for 10hrs. Will administer & monitor pt closely.
Elevated left arm ,will continue to monitor
Provider notified and aware. Provider will come to assess patient at the bedside. Continue to monitor

## 2020-08-12 NOTE — PROVIDER CONTACT NOTE (OTHER) - ASSESSMENT
Pt confused and sleepy, but arousable. Pt alert and oriented to person. T 98.8 deg F oral, HR 51 palpated, /57, spO2 95% on room air, Pt confused and sleepy, but arousable. 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 Pt arousable to voice. Pt alert and oriented to person. T 98.8 deg F oral, HR 51 palpated, /57, spO2 95% on room air, RR18.

## 2020-08-13 PROCEDURE — 99232 SBSQ HOSP IP/OBS MODERATE 35: CPT

## 2020-08-13 PROCEDURE — 74176 CT ABD & PELVIS W/O CONTRAST: CPT | Mod: 26

## 2020-08-13 RX ORDER — METOPROLOL TARTRATE 50 MG
12.5 TABLET ORAL EVERY 12 HOURS
Refills: 0 | Status: DISCONTINUED | OUTPATIENT
Start: 2020-08-13 | End: 2020-08-14

## 2020-08-13 RX ADMIN — Medication 1 APPLICATION(S): at 17:22

## 2020-08-13 RX ADMIN — SENNA PLUS 2 TABLET(S): 8.6 TABLET ORAL at 22:35

## 2020-08-13 RX ADMIN — Medication 1 TABLET(S): at 17:24

## 2020-08-13 RX ADMIN — APIXABAN 2.5 MILLIGRAM(S): 2.5 TABLET, FILM COATED ORAL at 17:22

## 2020-08-13 RX ADMIN — ATORVASTATIN CALCIUM 10 MILLIGRAM(S): 80 TABLET, FILM COATED ORAL at 22:35

## 2020-08-13 RX ADMIN — Medication 81 MILLIGRAM(S): at 11:27

## 2020-08-13 RX ADMIN — Medication 500 MILLIGRAM(S): at 05:40

## 2020-08-13 RX ADMIN — APIXABAN 2.5 MILLIGRAM(S): 2.5 TABLET, FILM COATED ORAL at 05:40

## 2020-08-13 RX ADMIN — FINASTERIDE 5 MILLIGRAM(S): 5 TABLET, FILM COATED ORAL at 11:27

## 2020-08-13 RX ADMIN — TAMSULOSIN HYDROCHLORIDE 0.4 MILLIGRAM(S): 0.4 CAPSULE ORAL at 22:35

## 2020-08-13 RX ADMIN — AMLODIPINE BESYLATE 5 MILLIGRAM(S): 2.5 TABLET ORAL at 05:40

## 2020-08-13 RX ADMIN — Medication 12.5 MILLIGRAM(S): at 17:24

## 2020-08-13 RX ADMIN — Medication 1 APPLICATION(S): at 06:57

## 2020-08-13 NOTE — PROGRESS NOTE ADULT - PROBLEM SELECTOR PLAN 2
-UA revealed large leuk esterase and +ve for nitrite. WBC in urine elevated at 24. UCx >100k E.coli   - start jayson (8/7-11).   - UCx- E.coli pansensitive  - change to PO ceftin ending 8/13  - per Uro recs: Bactrim on 8/13, ending 8/21, CT abdomen pelvis for stones or source of UTIs  - 8/13 CT showed enlarged prostate, no stones

## 2020-08-13 NOTE — MEDICAL STUDENT PROGRESS NOTE(EDUCATION) - NS MD HP STUD ASPLAN PLAN FT
Problem/Plan - 1:  ·  Problem: Sepsis.  Plan: -likely in the setting of UTI   -complicated by an episode of hypoxia in the ED with O2 sat of 88%, without known cause. hypoxia could be related to atelectasis and b/l effusions, in the setting of sepsis, AMS, etc. Now satting well on RA.  - CT chest: No focal consolidations, small b/l pleural effusions, linear and nodular opacity in LLL unknown eitiology  - BCx: NGTD. UCx >100k E.coli  - CTX discontinued. start jayson (8/7-8/11).  was switched to oral cefuroxime 500mg q12 for 1 day, then switched to bactrim for 2 weeks as per urology rec.       Problem/Plan - 2:  ·  Problem: UTI (urinary tract infection).  Plan: -UA revealed large leuk esterase and +ve for nitrite. WBC in urine elevated at 24. UCx >100k E.coli   - start jayson (8/7-8/11); discharge on bactrim for 2 weeks   - UCx- E.coli pansensitive  -ordered urology consult at request of daughter (who wants him evaluated for recurrent UTI) - see above   -As per urology:   -Obtain CT abdomen/pelvis noncon [facility] to eval for source of UTIs  - Change abx to bactrim for better penetration and dc with bactrim, 2 week total course of ABX  - F/u urine culture on completion of antibiotics, if still positive can consider starting methenamine prophylaxis  - Continue flomax/finasteride  - Patient will need outpatient f/u with Dr. Palomino       Problem/Plan - 3:  ·  Problem: Left shoulder pain.  Plan: - likely chronic from his recent fall and admission in July. Pain improving slightly but still bothering the pt.  and decreased range of motion due to pain today.  - xray at that time reviewed; no acute fracture   - pain control with tylenol PRN   - PT/OT eval: [facility].      Problem/Plan - 4:  ·  Problem: Atrial fibrillation.  Plan: -c/w home Eliquis  -metoprolol 12.5 BID restarted with hold parameters  -Monitor HR - HR has been wnl (between 64 and 92) since restarting metoprolol 12.5 mg BID on 8/10/2020  -hypotension to 45 on 8/12 overnight; metoprolol held.  Will restart this AM (8/13) low dose metoprolol      Problem/Plan - 5:  ·  Problem: Hypertension.  Plan: -continue with home amlodipine with hold parameters of SBP <120.      Problem/Plan - 6:  Problem: Coronary artery disease. Plan: -c/w asa, statin.     Problem/Plan - 7:  ·  Problem: BPH (benign prostatic hyperplasia).  Plan: -continue with flomax.      Problem/Plan - 8:  ·  Problem: Hyperlipidemia.  Plan: -c/w statin.      Problem/Plan - 9:  ·  Problem: DVT prophylaxis.  Plan: -on home dose of eliquis.

## 2020-08-13 NOTE — PROGRESS NOTE ADULT - SUBJECTIVE AND OBJECTIVE BOX
UROLOGY DAILY PROGRESS NOTE:     Subjective:    No events.  No complaints.    Objective:    NAD, awake and alert  Respirations nonlabored  Abdomen soft, nontender, nondistended      MEDICATIONS  (STANDING):  amLODIPine   Tablet 5 milliGRAM(s) Oral daily  apixaban 2.5 milliGRAM(s) Oral two times a day  aspirin  chewable 81 milliGRAM(s) Oral daily  atorvastatin 10 milliGRAM(s) Oral at bedtime  cefuroxime   Tablet 500 milliGRAM(s) Oral every 12 hours  finasteride 5 milliGRAM(s) Oral daily  polyethylene glycol 3350 17 Gram(s) Oral two times a day  senna 2 Tablet(s) Oral at bedtime  silver sulfADIAZINE 1% Cream 1 Application(s) Topical two times a day  sodium chloride 0.9%. 500 milliLiter(s) (50 mL/Hr) IV Continuous <Continuous>  tamsulosin 0.4 milliGRAM(s) Oral at bedtime    MEDICATIONS  (PRN):  acetaminophen   Tablet .. 650 milliGRAM(s) Oral every 6 hours PRN Moderate Pain (4 - 6)      Vital Signs Last 24 Hrs  T(C): 36.9 (13 Aug 2020 05:02), Max: 37.7 (12 Aug 2020 23:40)  T(F): 98.4 (13 Aug 2020 05:02), Max: 99.9 (12 Aug 2020 23:40)  HR: 64 (13 Aug 2020 05:43) (45 - 68)  BP: 159/78 (13 Aug 2020 05:02) (127/62 - 159/78)  BP(mean): --  RR: 18 (13 Aug 2020 05:02) (18 - 18)  SpO2: 94% (13 Aug 2020 05:02) (92% - 95%)    I&O's Detail    12 Aug 2020 07:01  -  13 Aug 2020 07:00  --------------------------------------------------------  IN:    Oral Fluid: 300 mL  Total IN: 300 mL    OUT:    Voided: 100 mL  Total OUT: 100 mL    Total NET: 200 mL          Daily     Daily     LABS:

## 2020-08-13 NOTE — MEDICAL STUDENT PROGRESS NOTE(EDUCATION) - NS MD HP STUD ASPLAN ASSES FT
Mr. Faith is a pleasant 89 year old male who has a history of Atrial Fibrillation with implanted pacemaker, CAD with multiple stents, Hypertension, Hyperlipidemia, BPH on flomax, who presents to the ED from his outpatient rehabilitation facility called [facility] with a fever and elevated WBC count, but the patient's chief complaint is "left shoulder pain" (s/p fall 2/2 sepsis last month). He was found to have sepsis likely 2/2 UTI. His urine culture grew E. coli and his blood cultures show no growth to date.  Chest CT showed an opacity in the LLL of uncertain etiology.  Bladder u/s showed bladder wall thickening and enlarged prostate.

## 2020-08-13 NOTE — PROGRESS NOTE ADULT - PROBLEM SELECTOR PLAN 1
-likely in the setting of UTI   -complicated by an episode of hypoxia in the ED with O2 sat of 88%, without known cause. hypoxia could be related to atelectasis and b/l effusions, in the setting of sepsis, AMS, etc. Now satting well on RA.  - CT chest: No focal consolidations, small b/l pleural effusions, linear and nodular opacity in LLL unknown eitiology  - BCx: NGTD. UCx >100k E.coli  - CTX discontinued. start jayson (8/7-11). Switched to Ceftin PO for 3 days ending on 8/13.  - uro recs: CT abdomen and pelvis. Start on Bactrim for better penetration and dc with bactrim, 2 week total course of ABX (starting 8/7, ending 8/21)

## 2020-08-13 NOTE — PROGRESS NOTE ADULT - SUBJECTIVE AND OBJECTIVE BOX
PROGRESS NOTE:   Authored by Concha Cole MD    Patient is a 89y old  Male who presents with a chief complaint of SEPSIS (12 Aug 2020 13:25)      SUBJECTIVE / OVERNIGHT EVENTS:    ADDITIONAL REVIEW OF SYSTEMS:    MEDICATIONS  (STANDING):  amLODIPine   Tablet 5 milliGRAM(s) Oral daily  apixaban 2.5 milliGRAM(s) Oral two times a day  aspirin  chewable 81 milliGRAM(s) Oral daily  atorvastatin 10 milliGRAM(s) Oral at bedtime  cefuroxime   Tablet 500 milliGRAM(s) Oral every 12 hours  finasteride 5 milliGRAM(s) Oral daily  polyethylene glycol 3350 17 Gram(s) Oral two times a day  senna 2 Tablet(s) Oral at bedtime  silver sulfADIAZINE 1% Cream 1 Application(s) Topical two times a day  sodium chloride 0.9%. 500 milliLiter(s) (50 mL/Hr) IV Continuous <Continuous>  tamsulosin 0.4 milliGRAM(s) Oral at bedtime    MEDICATIONS  (PRN):  acetaminophen   Tablet .. 650 milliGRAM(s) Oral every 6 hours PRN Moderate Pain (4 - 6)      CAPILLARY BLOOD GLUCOSE        I&O's Summary    12 Aug 2020 07:01  -  13 Aug 2020 07:00  --------------------------------------------------------  IN: 300 mL / OUT: 100 mL / NET: 200 mL        PHYSICAL EXAM:  Vital Signs Last 24 Hrs  T(C): 36.9 (13 Aug 2020 05:02), Max: 37.7 (12 Aug 2020 23:40)  T(F): 98.4 (13 Aug 2020 05:02), Max: 99.9 (12 Aug 2020 23:40)  HR: 64 (13 Aug 2020 05:43) (45 - 68)  BP: 159/78 (13 Aug 2020 05:02) (127/62 - 159/78)  BP(mean): --  RR: 18 (13 Aug 2020 05:02) (18 - 18)  SpO2: 94% (13 Aug 2020 05:02) (92% - 95%)    GENERAL: NAD, well-developed, hard of hearing  HEAD:  Atraumatic, Normocephalic  EYES: EOMI, PERRLA, conjunctiva and sclera clear  NECK: Supple, No JVD  CHEST/LUNG: Clear to auscultation bilaterally; No wheeze  HEART: Regular rate and rhythm; No murmurs, rubs, or gallops  ABDOMEN: Soft, Nontender, Nondistended; Bowel sounds present  EXTREMITIES:  2+ Peripheral Pulses, No clubbing, cyanosis, or edema  PSYCH: AAOx3  NEUROLOGY: moving all ext. following commands.  SKIN: No rashes or lesions    LABS:                      RADIOLOGY & ADDITIONAL TESTS:  Results Reviewed:   Imaging Personally Reviewed:  Electrocardiogram Personally Reviewed:    COORDINATION OF CARE:  Care Discussed with Consultants/Other Providers [Y/N]:  Prior or Outpatient Records Reviewed [Y/N]: PROGRESS NOTE:   Authored by Concha Cole MD    Patient is a 89y old  Male who presents with a chief complaint of SEPSIS (12 Aug 2020 13:25)      SUBJECTIVE / OVERNIGHT EVENTS: Patients HR low overnight. Metoprolol dose held.     ADDITIONAL REVIEW OF SYSTEMS:    MEDICATIONS  (STANDING):  amLODIPine   Tablet 5 milliGRAM(s) Oral daily  apixaban 2.5 milliGRAM(s) Oral two times a day  aspirin  chewable 81 milliGRAM(s) Oral daily  atorvastatin 10 milliGRAM(s) Oral at bedtime  cefuroxime   Tablet 500 milliGRAM(s) Oral every 12 hours  finasteride 5 milliGRAM(s) Oral daily  polyethylene glycol 3350 17 Gram(s) Oral two times a day  senna 2 Tablet(s) Oral at bedtime  silver sulfADIAZINE 1% Cream 1 Application(s) Topical two times a day  sodium chloride 0.9%. 500 milliLiter(s) (50 mL/Hr) IV Continuous <Continuous>  tamsulosin 0.4 milliGRAM(s) Oral at bedtime    MEDICATIONS  (PRN):  acetaminophen   Tablet .. 650 milliGRAM(s) Oral every 6 hours PRN Moderate Pain (4 - 6)      CAPILLARY BLOOD GLUCOSE        I&O's Summary    12 Aug 2020 07:01  -  13 Aug 2020 07:00  --------------------------------------------------------  IN: 300 mL / OUT: 100 mL / NET: 200 mL        PHYSICAL EXAM:  Vital Signs Last 24 Hrs  T(C): 36.9 (13 Aug 2020 05:02), Max: 37.7 (12 Aug 2020 23:40)  T(F): 98.4 (13 Aug 2020 05:02), Max: 99.9 (12 Aug 2020 23:40)  HR: 64 (13 Aug 2020 05:43) (45 - 68)  BP: 159/78 (13 Aug 2020 05:02) (127/62 - 159/78)  BP(mean): --  RR: 18 (13 Aug 2020 05:02) (18 - 18)  SpO2: 94% (13 Aug 2020 05:02) (92% - 95%)    GENERAL: NAD, well-developed, hard of hearing  HEAD:  Atraumatic, Normocephalic  EYES: EOMI, PERRLA, conjunctiva and sclera clear  NECK: Supple, No JVD  CHEST/LUNG: Clear to auscultation bilaterally; No wheeze  HEART: Regular rate and rhythm; No murmurs, rubs, or gallops  ABDOMEN: Soft, Nontender, Nondistended; Bowel sounds present  EXTREMITIES:  2+ Peripheral Pulses, No clubbing, cyanosis, or edema  PSYCH: AAOx3  NEUROLOGY: moving all ext. following commands.  SKIN: No rashes or lesions    LABS:                      RADIOLOGY & ADDITIONAL TESTS:  Results Reviewed:   Imaging Personally Reviewed:  Electrocardiogram Personally Reviewed:    COORDINATION OF CARE:  Care Discussed with Consultants/Other Providers [Y/N]:  Prior or Outpatient Records Reviewed [Y/N]: PROGRESS NOTE:   Authored by Concha Cole MD    Patient is a 89y old  Male who presents with a chief complaint of SEPSIS (12 Aug 2020 13:25)      SUBJECTIVE / OVERNIGHT EVENTS: Patients HR low overnight. Metoprolol dose held for HR 48. Asymptomatic during episode.   This AM patient responding appropriately. Hard of hearing. Denies headaches, F/C, dizziness, syncope, CP/SOB, N/V, abdominal pain, dysuria, changes in BM, peripheral swelling, skin changes, new joint aches. Tolerating PO.    MEDICATIONS  (STANDING):  amLODIPine   Tablet 5 milliGRAM(s) Oral daily  apixaban 2.5 milliGRAM(s) Oral two times a day  aspirin  chewable 81 milliGRAM(s) Oral daily  atorvastatin 10 milliGRAM(s) Oral at bedtime  cefuroxime   Tablet 500 milliGRAM(s) Oral every 12 hours  finasteride 5 milliGRAM(s) Oral daily  polyethylene glycol 3350 17 Gram(s) Oral two times a day  senna 2 Tablet(s) Oral at bedtime  silver sulfADIAZINE 1% Cream 1 Application(s) Topical two times a day  sodium chloride 0.9%. 500 milliLiter(s) (50 mL/Hr) IV Continuous <Continuous>  tamsulosin 0.4 milliGRAM(s) Oral at bedtime    MEDICATIONS  (PRN):  acetaminophen   Tablet .. 650 milliGRAM(s) Oral every 6 hours PRN Moderate Pain (4 - 6)      CAPILLARY BLOOD GLUCOSE        I&O's Summary    12 Aug 2020 07:01  -  13 Aug 2020 07:00  --------------------------------------------------------  IN: 300 mL / OUT: 100 mL / NET: 200 mL        PHYSICAL EXAM:  Vital Signs Last 24 Hrs  T(C): 36.9 (13 Aug 2020 05:02), Max: 37.7 (12 Aug 2020 23:40)  T(F): 98.4 (13 Aug 2020 05:02), Max: 99.9 (12 Aug 2020 23:40)  HR: 64 (13 Aug 2020 05:43) (45 - 68)  BP: 159/78 (13 Aug 2020 05:02) (127/62 - 159/78)  BP(mean): --  RR: 18 (13 Aug 2020 05:02) (18 - 18)  SpO2: 94% (13 Aug 2020 05:02) (92% - 95%)    GENERAL: NAD, well-developed, hard of hearing  HEAD:  Atraumatic, Normocephalic  EYES: EOMI, PERRLA, conjunctiva and sclera clear  NECK: Supple, No JVD  CHEST/LUNG: Clear to auscultation bilaterally; No wheeze  HEART: Regular rate and rhythm; No murmurs, rubs, or gallops  ABDOMEN: Soft, Nontender, Nondistended; Bowel sounds present  EXTREMITIES:  2+ Peripheral Pulses, No clubbing, cyanosis, or edema  PSYCH: AAOx3  NEUROLOGY: moving all ext. following commands.  SKIN: No rashes or lesions    LABS:                      RADIOLOGY & ADDITIONAL TESTS:  Results Reviewed:   Imaging Personally Reviewed:  Electrocardiogram Personally Reviewed:    COORDINATION OF CARE:  Care Discussed with Consultants/Other Providers [Y/N]:  Prior or Outpatient Records Reviewed [Y/N]:

## 2020-08-13 NOTE — PROGRESS NOTE ADULT - ATTENDING COMMENTS
Seen, examined the patient   Feels ok, afebrile, no acute SOB, chest pain, less pain in left shoulder   Problem lists-  1. Sepsis w recurrent UTI, resolved sepsis  2. CAD, s/p stent, s/p PPM  3. BPH  4. Hard of hearing  4. Arthralgia left shoulder  - Reviewed labs, imaging, WBC wnl, CXR no consolidation    blood c/s neg, urine c/s grew E. Coli( last urine c/s E.coli)    Appreciated Urology consult,  started Bactrim per rec for 2 weeks, no need of CT abd/pelvis   Outpatient f/u    CT chest showed small B/L pleural effusion, left atelectasis  - c/w Xarelto, Statin  - spoke to daughter- Catherine d/c planning to [facility] with enhance bed in progress    d/c planning to [facility] in progress , needed enhanced bed  ** Daughter is aware.

## 2020-08-13 NOTE — MEDICAL STUDENT PROGRESS NOTE(EDUCATION) - SUBJECTIVE AND OBJECTIVE BOX
Summary:   Mr. Faith is a pleasant 89 year old male who has a history of Atrial Fibrillation with implanted pacemaker, CAD with multiple stents, Hypertension, Hyperlipidemia, BPH on flomax, who presents to the ED from his outpatient rehabilitation facility called [facility] with a fever and elevated WBC count, but the patient's chief complaint is "left shoulder pain" (s/p fall 2/2 sepsis/UTI last month).    -No acute overnight events.  Overnight, he had an episode of bradycardia (as low as 45).  His metoprolol was discontinued.  This morning, Mr. Faith feels well and is in very good spirits.  He is able to have a clear conversation as long as he can hear what you are saying to him.  It is difficult to communicate with him, as he is extremely hard of hearing; complete ROS was not completed due to this issue.  He seems to be aware that he had a low grade fever last night and says that the fever is gone and that he feels better.      Objective   Vital Signs Last 24 Hrs  T(C): 36.9 (13 Aug 2020 05:02), Max: 37.7 (12 Aug 2020 23:40)  T(F): 98.4 (13 Aug 2020 05:02), Max: 99.9 (12 Aug 2020 23:40)  HR: 75 (13 Aug 2020 12:15) (45 - 75)  BP: 133/54 (13 Aug 2020 12:15) (127/62 - 159/78)  BP(mean): --  RR: 18 (13 Aug 2020 05:02) (18 - 18)  SpO2: 94% (13 Aug 2020 05:02) (92% - 95%)    Morning labs are no longer being done.     RADIOLOGY:  Renal/bladder u/s:  Right kidney:  11.7 cm. No renal mass, hydronephrosis or calculi.  Left kidney:  11.2 cm. No hydronephrosis or calculi. A 0.9 cm lower pole cyst is noted.  Urinary bladder: Circumferential bladder wall thickening. Bilateral urinary jets are noted.  Prostate is enlarged, with a volume measuring 56 cc.     IMPRESSION:   No hydronephrosis.     Bladder wall thickening and enlarged prostate. Findings may be related to cystitis and/or chronic outlet obstruction. Summary:   Mr. Faith is a pleasant 89 year old male who has a history of Atrial Fibrillation with implanted pacemaker, CAD with multiple stents, Hypertension, Hyperlipidemia, BPH on flomax, who presents to the ED from his outpatient rehabilitation facility called [facility] with a fever and elevated WBC count, but the patient's chief complaint is "left shoulder pain" (s/p fall 2/2 sepsis/UTI last month).    -No acute overnight events.  Overnight, he had an episode of bradycardia (as low as 45).  His metoprolol was discontinued.  This morning, Mr. Faith feels well and is in very good spirits.  He is able to have a clear conversation as long as he can hear what you are saying to him.  It is difficult to communicate with him, as he is extremely hard of hearing; complete ROS was not completed due to this issue.  He seems to be aware that he had a low grade fever last night and says that the fever is gone and that he feels better.      Objective   Vital Signs Last 24 Hrs  T(C): 36.9 (13 Aug 2020 05:02), Max: 37.7 (12 Aug 2020 23:40)  T(F): 98.4 (13 Aug 2020 05:02), Max: 99.9 (12 Aug 2020 23:40)  HR: 75 (13 Aug 2020 12:15) (45 - 75)  BP: 133/54 (13 Aug 2020 12:15) (127/62 - 159/78)  BP(mean): --  RR: 18 (13 Aug 2020 05:02) (18 - 18)  SpO2: 94% (13 Aug 2020 05:02) (92% - 95%)    Physical Exam:  Constitutional: NAD, awake and alert; laying down comfortably  	EYES: EOMI  	ENT:  very hard of hearing   	Respiratory: Unable to perform posteriorly due to inability to get patient to lean forward largely due to inability to communicate clearly with him; clear anteriorly  	Cardiovascular: difficult to discern S1 and S2; possible S3 auscultated; no murmurs or rubs    	Gastrointestinal: Bowel Sounds present, soft, nontender, nondistended, no guarding, no rebound  	Extremities: No cyanosis or clubbing; warm to touch  	Neurological: Oriented to self and place today (mild dementia, this is baseline)  	Musculoskeletal: painful to palpation of anterior and lateral deltoid. abduction past 90 degrees is painful to patient; other extremities have full strength and mobility  	Skin: No rashes  Psych: no depression or anhedonia      Morning labs are no longer being done.     RADIOLOGY:  Renal/bladder u/s:  Right kidney:  11.7 cm. No renal mass, hydronephrosis or calculi.  Left kidney:  11.2 cm. No hydronephrosis or calculi. A 0.9 cm lower pole cyst is noted.  Urinary bladder: Circumferential bladder wall thickening. Bilateral urinary jets are noted.  Prostate is enlarged, with a volume measuring 56 cc.     IMPRESSION:   No hydronephrosis.     Bladder wall thickening and enlarged prostate. Findings may be related to cystitis and/or chronic outlet obstruction.

## 2020-08-14 ENCOUNTER — TRANSCRIPTION ENCOUNTER (OUTPATIENT)
Age: 85
End: 2020-08-14

## 2020-08-14 VITALS
OXYGEN SATURATION: 95 % | SYSTOLIC BLOOD PRESSURE: 126 MMHG | TEMPERATURE: 98 F | DIASTOLIC BLOOD PRESSURE: 65 MMHG | HEART RATE: 91 BPM | RESPIRATION RATE: 16 BRPM

## 2020-08-14 LAB
ALBUMIN SERPL ELPH-MCNC: 2.6 G/DL — LOW (ref 3.3–5)
ALP SERPL-CCNC: 80 U/L — SIGNIFICANT CHANGE UP (ref 40–120)
ALT FLD-CCNC: 16 U/L — SIGNIFICANT CHANGE UP (ref 10–45)
ANION GAP SERPL CALC-SCNC: 8 MMOL/L — SIGNIFICANT CHANGE UP (ref 5–17)
AST SERPL-CCNC: 13 U/L — SIGNIFICANT CHANGE UP (ref 10–40)
BASOPHILS # BLD AUTO: 0.05 K/UL — SIGNIFICANT CHANGE UP (ref 0–0.2)
BASOPHILS NFR BLD AUTO: 0.5 % — SIGNIFICANT CHANGE UP (ref 0–2)
BILIRUB SERPL-MCNC: 0.2 MG/DL — SIGNIFICANT CHANGE UP (ref 0.2–1.2)
BUN SERPL-MCNC: 22 MG/DL — SIGNIFICANT CHANGE UP (ref 7–23)
CALCIUM SERPL-MCNC: 9.5 MG/DL — SIGNIFICANT CHANGE UP (ref 8.4–10.5)
CHLORIDE SERPL-SCNC: 101 MMOL/L — SIGNIFICANT CHANGE UP (ref 96–108)
CO2 SERPL-SCNC: 28 MMOL/L — SIGNIFICANT CHANGE UP (ref 22–31)
CREAT SERPL-MCNC: 1.19 MG/DL — SIGNIFICANT CHANGE UP (ref 0.5–1.3)
EOSINOPHIL # BLD AUTO: 0.85 K/UL — HIGH (ref 0–0.5)
EOSINOPHIL NFR BLD AUTO: 8.9 % — HIGH (ref 0–6)
GLUCOSE BLDC GLUCOMTR-MCNC: 124 MG/DL — HIGH (ref 70–99)
GLUCOSE SERPL-MCNC: 122 MG/DL — HIGH (ref 70–99)
HCT VFR BLD CALC: 32.5 % — LOW (ref 39–50)
HGB BLD-MCNC: 10.2 G/DL — LOW (ref 13–17)
IMM GRANULOCYTES NFR BLD AUTO: 0.4 % — SIGNIFICANT CHANGE UP (ref 0–1.5)
LYMPHOCYTES # BLD AUTO: 1.08 K/UL — SIGNIFICANT CHANGE UP (ref 1–3.3)
LYMPHOCYTES # BLD AUTO: 11.3 % — LOW (ref 13–44)
MAGNESIUM SERPL-MCNC: 2 MG/DL — SIGNIFICANT CHANGE UP (ref 1.6–2.6)
MCHC RBC-ENTMCNC: 28.1 PG — SIGNIFICANT CHANGE UP (ref 27–34)
MCHC RBC-ENTMCNC: 31.4 GM/DL — LOW (ref 32–36)
MCV RBC AUTO: 89.5 FL — SIGNIFICANT CHANGE UP (ref 80–100)
MONOCYTES # BLD AUTO: 0.65 K/UL — SIGNIFICANT CHANGE UP (ref 0–0.9)
MONOCYTES NFR BLD AUTO: 6.8 % — SIGNIFICANT CHANGE UP (ref 2–14)
NEUTROPHILS # BLD AUTO: 6.93 K/UL — SIGNIFICANT CHANGE UP (ref 1.8–7.4)
NEUTROPHILS NFR BLD AUTO: 72.1 % — SIGNIFICANT CHANGE UP (ref 43–77)
NRBC # BLD: 0 /100 WBCS — SIGNIFICANT CHANGE UP (ref 0–0)
PHOSPHATE SERPL-MCNC: 2.6 MG/DL — SIGNIFICANT CHANGE UP (ref 2.5–4.5)
PLATELET # BLD AUTO: 231 K/UL — SIGNIFICANT CHANGE UP (ref 150–400)
POTASSIUM SERPL-MCNC: 4.4 MMOL/L — SIGNIFICANT CHANGE UP (ref 3.5–5.3)
POTASSIUM SERPL-SCNC: 4.4 MMOL/L — SIGNIFICANT CHANGE UP (ref 3.5–5.3)
PROT SERPL-MCNC: 5.8 G/DL — LOW (ref 6–8.3)
RBC # BLD: 3.63 M/UL — LOW (ref 4.2–5.8)
RBC # FLD: 13.5 % — SIGNIFICANT CHANGE UP (ref 10.3–14.5)
SODIUM SERPL-SCNC: 137 MMOL/L — SIGNIFICANT CHANGE UP (ref 135–145)
WBC # BLD: 9.6 K/UL — SIGNIFICANT CHANGE UP (ref 3.8–10.5)
WBC # FLD AUTO: 9.6 K/UL — SIGNIFICANT CHANGE UP (ref 3.8–10.5)

## 2020-08-14 PROCEDURE — 87086 URINE CULTURE/COLONY COUNT: CPT

## 2020-08-14 PROCEDURE — 80048 BASIC METABOLIC PNL TOTAL CA: CPT

## 2020-08-14 PROCEDURE — 82435 ASSAY OF BLOOD CHLORIDE: CPT

## 2020-08-14 PROCEDURE — 82947 ASSAY GLUCOSE BLOOD QUANT: CPT

## 2020-08-14 PROCEDURE — 83735 ASSAY OF MAGNESIUM: CPT

## 2020-08-14 PROCEDURE — 84100 ASSAY OF PHOSPHORUS: CPT

## 2020-08-14 PROCEDURE — 74176 CT ABD & PELVIS W/O CONTRAST: CPT

## 2020-08-14 PROCEDURE — 71045 X-RAY EXAM CHEST 1 VIEW: CPT

## 2020-08-14 PROCEDURE — 87040 BLOOD CULTURE FOR BACTERIA: CPT

## 2020-08-14 PROCEDURE — 99239 HOSP IP/OBS DSCHRG MGMT >30: CPT

## 2020-08-14 PROCEDURE — 82330 ASSAY OF CALCIUM: CPT

## 2020-08-14 PROCEDURE — 96375 TX/PRO/DX INJ NEW DRUG ADDON: CPT

## 2020-08-14 PROCEDURE — 82803 BLOOD GASES ANY COMBINATION: CPT

## 2020-08-14 PROCEDURE — 85014 HEMATOCRIT: CPT

## 2020-08-14 PROCEDURE — 84132 ASSAY OF SERUM POTASSIUM: CPT

## 2020-08-14 PROCEDURE — 97116 GAIT TRAINING THERAPY: CPT

## 2020-08-14 PROCEDURE — 87186 SC STD MICRODIL/AGAR DIL: CPT

## 2020-08-14 PROCEDURE — 84295 ASSAY OF SERUM SODIUM: CPT

## 2020-08-14 PROCEDURE — U0003: CPT

## 2020-08-14 PROCEDURE — 81001 URINALYSIS AUTO W/SCOPE: CPT

## 2020-08-14 PROCEDURE — 97530 THERAPEUTIC ACTIVITIES: CPT

## 2020-08-14 PROCEDURE — 82962 GLUCOSE BLOOD TEST: CPT

## 2020-08-14 PROCEDURE — 85027 COMPLETE CBC AUTOMATED: CPT

## 2020-08-14 PROCEDURE — 80053 COMPREHEN METABOLIC PANEL: CPT

## 2020-08-14 PROCEDURE — 96365 THER/PROPH/DIAG IV INF INIT: CPT

## 2020-08-14 PROCEDURE — 85730 THROMBOPLASTIN TIME PARTIAL: CPT

## 2020-08-14 PROCEDURE — 93005 ELECTROCARDIOGRAM TRACING: CPT

## 2020-08-14 PROCEDURE — 99285 EMERGENCY DEPT VISIT HI MDM: CPT | Mod: 25

## 2020-08-14 PROCEDURE — 71250 CT THORAX DX C-: CPT

## 2020-08-14 PROCEDURE — 83605 ASSAY OF LACTIC ACID: CPT

## 2020-08-14 PROCEDURE — 85610 PROTHROMBIN TIME: CPT

## 2020-08-14 RX ORDER — METOPROLOL TARTRATE 50 MG
0.5 TABLET ORAL
Qty: 0 | Refills: 0 | DISCHARGE

## 2020-08-14 RX ADMIN — APIXABAN 2.5 MILLIGRAM(S): 2.5 TABLET, FILM COATED ORAL at 17:40

## 2020-08-14 RX ADMIN — AMLODIPINE BESYLATE 5 MILLIGRAM(S): 2.5 TABLET ORAL at 05:25

## 2020-08-14 RX ADMIN — Medication 12.5 MILLIGRAM(S): at 17:40

## 2020-08-14 RX ADMIN — Medication 1 APPLICATION(S): at 05:24

## 2020-08-14 RX ADMIN — FINASTERIDE 5 MILLIGRAM(S): 5 TABLET, FILM COATED ORAL at 11:45

## 2020-08-14 RX ADMIN — Medication 12.5 MILLIGRAM(S): at 05:30

## 2020-08-14 RX ADMIN — APIXABAN 2.5 MILLIGRAM(S): 2.5 TABLET, FILM COATED ORAL at 05:24

## 2020-08-14 RX ADMIN — Medication 81 MILLIGRAM(S): at 11:45

## 2020-08-14 RX ADMIN — Medication 1 TABLET(S): at 17:40

## 2020-08-14 RX ADMIN — Medication 1 TABLET(S): at 05:30

## 2020-08-14 RX ADMIN — Medication 1 APPLICATION(S): at 17:42

## 2020-08-14 RX ADMIN — POLYETHYLENE GLYCOL 3350 17 GRAM(S): 17 POWDER, FOR SOLUTION ORAL at 05:22

## 2020-08-14 NOTE — PROGRESS NOTE ADULT - PROBLEM SELECTOR PLAN 9
-on home dose of eliquis

## 2020-08-14 NOTE — PROGRESS NOTE ADULT - PROBLEM SELECTOR PLAN 7
-continue with flomax
-continue with flomax  - Continue antibiotics; recommend 2 week total course of antibiotics  - Followup ID recs  - Continue finasteride  - No further inpatient management/workup required  - Patient should followup with Dr. Palomino after discharge for further management  - Will need followup urine culture after completion of antibiotics to ensure resolution    [facility]  [address] [employer] [place] [place], [place] [id number] [phone]

## 2020-08-14 NOTE — PROGRESS NOTE ADULT - ASSESSMENT
89 year old male, Nikolai with PMH of A.Fib on Eliquis s/p pacemaker, CAD s/p cardiac stents x5, HTN, HLD, BPH on flomax presented with fevers and elevated WBC admitted to medicine 8/7 with sepsis 2/2 UTI  UCx with E.Coli    - Obtain CT abdomen/pelvis noncon [facility] to eval for source of UTIs  - Change abx to bactrim for better penetration and dc with bactrim, 2 week total course of ABX  - F/u urine culture on completion of antibiotics, if still positive can consider starting methenamine prophylaxis  - Continue flomax/finasteride  - Patient will need outpatient f/u with Dr. Palomino    [facility]  [address], [address]  [place] [address] [phone]
Patient is a 89 year old male with history Atrial Fibrillation with implanted pacemaker, CAD with multiple stents, Hypertension, Hyperlipidemia, BPH, who comes into the ED with sepsis likely secondary to UTI with an episode of hypoxia requiring 2L NC, as well as left sided shoulder pain.
89 year old male, Viejas with PMH of A.Fib on Eliquis s/p pacemaker, CAD s/p cardiac stents x5, HTN, HLD, BPH on flomax presented with fevers and elevated WBC admitted to medicine 8/7 with sepsis 2/2 UTI  UCx with E.Coli    CT scan with enlarged prostate ~80cc. No other abnormalities in the  system    - Continue antibiotics; recommend 2 week total course of antibiotics  - Followup ID recs  - Continue flomax/finasteride  - No further inpatient management/workup required  - Patient should followup with Dr. Palomino after discharge for further management  - Will need followup urine culture after completion of antibiotics to ensure resolution    [facility]  [address] [place] [address], [address] [address] [phone]
Patient is a 89 year old male with history Atrial Fibrillation with implanted pacemaker, CAD with multiple stents, Hypertension, Hyperlipidemia, BPH, who comes into the ED with sepsis likely secondary to UTI with an episode of hypoxia requiring 2L NC, as well as left sided shoulder pain.
Patient is a 89 year old male with history Atrial Fibrillation with implanted pacemaker, CAD with multiple stents, Hypertension, Hyperlipidemia, BPH, who comes into the ED with sepsis likely secondary to UTI.

## 2020-08-14 NOTE — PROGRESS NOTE ADULT - PROBLEM SELECTOR PROBLEM 9
DVT prophylaxis

## 2020-08-14 NOTE — PROGRESS NOTE ADULT - PROBLEM SELECTOR PLAN 10
PCP: Sherman Brian   Contacted: [x] N [ ] Y   Discharge: likely back to rehab facility once clinically stable (daughter wants [facility]. [facility])
PCP: Sherman Brian   Contacted: [x] N [ ] Y   Discharge: likely back to rehab facility once clinically stable
PCP: Sherman rBian   Contacted: [x] N [ ] Y   Discharge: likely back to rehab facility once clinically stable
PCP: Sherman Brian   Contacted: [x] N [ ] Y   Discharge: likely back to rehab facility once clinically stable
PCP: Sherman Brina   Contacted: [x] N [ ] Y   Discharge: likely back to rehab facility once clinically stable (daughter wants [facility]. [facility])

## 2020-08-14 NOTE — MEDICAL STUDENT PROGRESS NOTE(EDUCATION) - NS MD HP STUD ASPLAN PLAN FT
Problem/Plan - 1:  ·  Problem: Sepsis.  Plan: -likely in the setting of UTI   -complicated by an episode of hypoxia in the ED with O2 sat of 88%, without known cause. hypoxia could be related to atelectasis and b/l effusions, in the setting of sepsis, AMS, etc. Now satting well on RA.  - CT chest: No focal consolidations, small b/l pleural effusions, linear and nodular opacity in LLL unknown eitiology  - BCx: NGTD. UCx >100k E.coli  - CTX discontinued. start jayson (8/7-8/11).  was switched to oral cefuroxime 500mg q12 for 1 day, then switched to bactrim for 2 weeks as per urology rec.       Problem/Plan - 2:  ·  Problem: UTI (urinary tract infection).  Plan: -UA revealed large leuk esterase and +ve for nitrite. WBC in urine elevated at 24. UCx >100k E.coli   - start jayson (8/7-8/11); discharge on bactrim for 2 weeks   - UCx- E.coli pansensitive  -ordered urology consult at request of daughter (who wants him evaluated for recurrent UTI) - see above   -As per urology:   -Obtain CT abdomen/pelvis noncon [facility] to eval for source of UTIs - no calculi or hydronephrosis noted on CT; no further workup required  - Change abx to bactrim for better penetration and dc with bactrim, 2 week total course of ABX  - F/u urine culture on completion of antibiotics, if still positive can consider starting methenamine prophylaxis  - Continue flomax/finasteride  - Patient will need outpatient f/u with Dr. Palomino       Problem/Plan - 3:  ·  Problem: Left shoulder pain.  Plan: - likely chronic from his recent fall and admission in July. Pain improving slightly but still bothering the pt.  and decreased range of motion due to pain today.  - xray at that time reviewed; no acute fracture   - pain control with tylenol PRN   - PT/OT eval: [facility].      Problem/Plan - 4:  ·  Problem: Atrial fibrillation.  Plan: -c/w home Eliquis  -metoprolol 12.5 BID restarted with hold parameters  -Monitor HR - HR has been wnl (between 64 and 92) since restarting metoprolol 12.5 mg BID on 8/10/2020  -hypotension to 45 on 8/12 overnight; metoprolol held.  Will restart this AM (8/13) low dose metoprolol      Problem/Plan - 5:  ·  Problem: Hypertension.  Plan: -continue with home amlodipine with hold parameters of SBP <120.      Problem/Plan - 6:  Problem: Coronary artery disease. Plan: -c/w asa, statin.     Problem/Plan - 7:  ·  Problem: BPH (benign prostatic hyperplasia).  Plan: -continue with flomax.      Problem/Plan - 8:  ·  Problem: Hyperlipidemia.  Plan: -c/w statin.      Problem/Plan - 9:  ·  Problem: DVT prophylaxis.  Plan: -on home dose of eliquis.

## 2020-08-14 NOTE — CHART NOTE - NSCHARTNOTEFT_GEN_A_CORE
Nutrition Follow-up  Patient seen for: nutrition follow-up    Source: comprehensive chart review, ( )     Hospital course as per chart:  Pt is a 89 yo male with PMH of afib with pacemaker, CAD with stents, HTN, HLD, BPH, who presented with fever and elevated WBC, admitted 8/6 with sepsis likely secondary to UTI. Chart reviewed, events noted.    Diet: DASH/TLC    Patient reports ( )    Encouraged PO intake as tolerated ( ).     Education provided:     PO intake:  < 50% [ ] 50-75% [x]   % [ ]  other :    Source for PO intake: chart/flowsheets, ( )     Enteral /Parenteral Nutrition: N/A    Most recent weight: 137.5 pounds (8/12 bed) - possible weight loss since dosing weight noted. Both weights via bed scale - ? accuracy. Noted pt also with edema, weight fluctuations also likely related to fluid shifts. Will continue to monitor and trend weights.     Pertinent Medications: MEDICATIONS  (STANDING):  amLODIPine   Tablet 5 milliGRAM(s) Oral daily  apixaban 2.5 milliGRAM(s) Oral two times a day  aspirin  chewable 81 milliGRAM(s) Oral daily  atorvastatin 10 milliGRAM(s) Oral at bedtime  finasteride 5 milliGRAM(s) Oral daily  metoprolol tartrate 12.5 milliGRAM(s) Oral every 12 hours  polyethylene glycol 3350 17 Gram(s) Oral two times a day  senna 2 Tablet(s) Oral at bedtime  silver sulfADIAZINE 1% Cream 1 Application(s) Topical two times a day  tamsulosin 0.4 milliGRAM(s) Oral at bedtime  trimethoprim  160 mG/sulfamethoxazole 800 mG 1 Tablet(s) Oral two times a day    MEDICATIONS  (PRN):  acetaminophen   Tablet .. 650 milliGRAM(s) Oral every 6 hours PRN Moderate Pain (4 - 6)    Pertinent Labs:  08-14 Na137 mmol/L Glu 122 mg/dL<H> K+ 4.4 mmol/L Cr  1.19 mg/dL BUN 22 mg/dL 08-14 Phos 2.6 mg/dL 08-14 Alb 2.6 g/dL<L>    Skin: no pressure injuries per flowsheets   Edema: 1+ edema to b/l ankles as per flowsheets    Estimated Needs: no change since previous assessment  Weight Used for Calculation: dosing  143.9 pounds  Estimated Energy Needs (25-30 calories/kg): 4414-1989 kcal/day  Estimated Protein Needs (1.0-1.2 gm/kg): 65-78 g/day   Estimated Fluid Needs (Less than 25ml/kg): 1630 ml/day    Previous Nutrition Diagnosis: None     New Nutrition Diagnosis: [ ] not applicable    [ ] Inadequate Protein Energy Intake [ ]Inadequate Oral Intake [ ] Excessive Energy Intake     [ ] Underweight [ ] Increased Nutrient Needs [ ] Overweight/Obesity     [ ] Altered GI Function [ ] Unintended Weight Loss [ ] Food & Nutrition Related Knowledge Deficit[ ] Limited Adherence to nutrition related recommendations [ ] Malnutrition  [ ] other: Free text    Recommendations:  1)     Monitoring and Evaluation: Monitor PO intake, weight, labs, skin, GI status, diet     RD remains available upon request and will continue to follow-up per protocol.   Vickie Elmore MS RD CDN pager #[phone] Nutrition Follow-up  Patient seen for: nutrition follow-up    Source: comprehensive chart review, patient    Hospital course as per chart:  Pt is a 89 yo male with PMH of afib with pacemaker, CAD with stents, HTN, HLD, BPH, who presented with fever and elevated WBC, admitted 8/6 with sepsis likely secondary to UTI. Chart reviewed, events noted.    Diet: DASH/TLC    Patient [place] - limited information obtained. Patient reports "feeling fine." Observed lunch tray at bedside, consumed >75% of meal tray. Per flowsheets, patient with 50-90% PO intake of all documented meals since admission. Noted pt with fecal incontinence, last BM today (8/14) per flowsheets. Noted on Miralax, senna.     PO intake:  < 50% [ ] 50-75% [ ]   % [x]  other :    Source for PO intake: chart/flowsheets, observation of meal tray    Enteral /Parenteral Nutrition: N/A    Most recent weight: 137.5 pounds (8/12 bed) - possible weight loss since dosing weight noted. Both weights via bed scale - ? accuracy. Noted pt also with edema, weight fluctuations also likely related to fluid shifts. Will continue to monitor and trend weights.     Pertinent Medications: MEDICATIONS  (STANDING):  amLODIPine   Tablet 5 milliGRAM(s) Oral daily  apixaban 2.5 milliGRAM(s) Oral two times a day  aspirin  chewable 81 milliGRAM(s) Oral daily  atorvastatin 10 milliGRAM(s) Oral at bedtime  finasteride 5 milliGRAM(s) Oral daily  metoprolol tartrate 12.5 milliGRAM(s) Oral every 12 hours  polyethylene glycol 3350 17 Gram(s) Oral two times a day  senna 2 Tablet(s) Oral at bedtime  silver sulfADIAZINE 1% Cream 1 Application(s) Topical two times a day  tamsulosin 0.4 milliGRAM(s) Oral at bedtime  trimethoprim  160 mG/sulfamethoxazole 800 mG 1 Tablet(s) Oral two times a day    MEDICATIONS  (PRN):  acetaminophen   Tablet .. 650 milliGRAM(s) Oral every 6 hours PRN Moderate Pain (4 - 6)    Pertinent Labs:  08-14 Na137 mmol/L Glu 122 mg/dL<H> K+ 4.4 mmol/L Cr  1.19 mg/dL BUN 22 mg/dL 08-14 Phos 2.6 mg/dL 08-14 Alb 2.6 g/dL<L>    Skin: no pressure injuries per flowsheets   Edema: 1+ edema to b/l ankles as per flowsheets    Estimated Needs: no change since previous assessment  Weight Used for Calculation: dosing  143.9 pounds  Estimated Energy Needs (25-30 calories/kg): 3051-4668 kcal/day  Estimated Protein Needs (1.0-1.2 gm/kg): 65-78 g/day   Estimated Fluid Needs (Less than 25ml/kg): 1630 ml/day    Previous Nutrition Diagnosis: None     New Nutrition Diagnosis: N/A     Recommendations:  1) Continue DASH/TLC diet. Monitor/adjust as needed. Nutrition to remain consistent with GOC.     Monitoring and Evaluation: Monitor PO intake, weight, labs, skin, GI status, diet     RD remains available upon request and will continue to follow-up per protocol.   Vickie Elmore MS RD CDN pager #[phone]

## 2020-08-14 NOTE — DISCHARGE NOTE NURSING/CASE MANAGEMENT/SOCIAL WORK - NSDCFUADDAPPT_GEN_ALL_CORE_FT
[facility]  Address: [address] [place] [address], [place] [id number]  Phone: [phone]  Please call the above number upon discharge to set up an appointment within 1 week of discharge.

## 2020-08-14 NOTE — PROGRESS NOTE ADULT - PROVIDER SPECIALTY LIST ADULT
Internal Medicine
Urology
Urology
Internal Medicine

## 2020-08-14 NOTE — DISCHARGE NOTE NURSING/CASE MANAGEMENT/SOCIAL WORK - PATIENT PORTAL LINK FT
You can access the FollowMyHealth Patient Portal offered by [facility] by registering at the following website: [email or web address]. By joining [facility]’s FollowMyHealth portal, you will also be able to view your health information using other applications (apps) compatible with our system.

## 2020-08-14 NOTE — PROGRESS NOTE ADULT - PROBLEM SELECTOR PLAN 6
-c/w asa, statin

## 2020-08-14 NOTE — PROGRESS NOTE ADULT - ATTENDING COMMENTS
Seen, examined the patient this am with house staff  Feels ok, afebrile, no acute SOB, chest pain, wants to go home   Problem lists-  1. Sepsis w recurrent UTI- resolved sepsis  2. CAD, s/p stent, s/p PPM  3. BPH  4. Hard of hearing  4. Arthralgia left shoulder  - Reviewed labs, imaging- WBC wnl, CXR no consolidation    blood c/s neg, urine c/s grew E. Coli( last urine c/s E.coli)    Urology f/u plan noted,  started Bactrim per Urology rec for 2 weeks,     CT abd/pelvis- no bladder mass, stone or hydro    Outpatient f/u with Urology in 2 weeks    CT chest showed small B/L pleural effusion, left atelectasis  - c/w Xarelto, Statin  - spoke to daughter- slade Alexander/manuel planning to rehab( with [facility]) vs home in progress  -  is aware Seen, examined the patient this am with house staff  Feels ok, afebrile, no acute SOB, chest pain, wants to go home   Problem lists-  1. Sepsis w recurrent UTI- resolved sepsis  2. CAD, s/p stent, s/p PPM  3. BPH  4. Hard of hearing  4. Arthralgia left shoulder  - Reviewed labs, imaging- WBC wnl, CXR no consolidation    blood c/s neg, urine c/s grew E. Coli( last urine c/s E.coli)    Urology f/u plan noted,  started Bactrim per Urology rec for 2 weeks,     CT abd/pelvis- no bladder mass, stone or hydro    Outpatient f/u with Urology in 2 weeks    CT chest showed small B/L pleural effusion, left atelectasis  - c/w Xarelto, Statin  - spoke to daughter- Catherine, d/c planning to rehab( with [facility]) vs home in progress  -  is aware  - d/c time 43 min

## 2020-08-14 NOTE — PROGRESS NOTE ADULT - PROBLEM SELECTOR PROBLEM 3
Left shoulder pain

## 2020-08-14 NOTE — MEDICAL STUDENT PROGRESS NOTE(EDUCATION) - SUBJECTIVE AND OBJECTIVE BOX
Summary:   Mr. Faith is a pleasant 89 year old male who has a history of Atrial Fibrillation with implanted pacemaker, CAD with multiple stents, Hypertension, Hyperlipidemia, BPH on flomax, who presents to the ED from his outpatient rehabilitation facility called [facility] with a fever and elevated WBC count, but the patient's chief complaint is "left shoulder pain" (s/p fall 2/2 sepsis/UTI last month).    -No acute overnight events. This morning, Mr. Faith feels well and is in very good spirits.  He is able to have a clear conversation as long as he can hear what you are saying to him.  It is difficult to communicate with him, as he is extremely hard of hearing; complete ROS was not completed due to this issue.  He seems to be aware that he had a low grade fever last night and says that the fever is gone and that he feels better.      Objective  Vital Signs Last 24 Hrs  T(C): 36.6 (14 Aug 2020 04:36), Max: 36.7 (13 Aug 2020 13:47)  T(F): 97.9 (14 Aug 2020 04:36), Max: 98.1 (13 Aug 2020 13:47)  HR: 84 (14 Aug 2020 04:36) (75 - 86)  BP: 112/59 (14 Aug 2020 04:36) (109/61 - 147/72)  BP(mean): --  RR: 18 (14 Aug 2020 04:36) (18 - 18)  SpO2: 94% (14 Aug 2020 04:36) (94% - 98%)    Physical Exam:  Constitutional: NAD, awake and alert; laying down comfortably  	EYES: EOMI  	ENT:  very hard of hearing   	Respiratory: Unable to perform due to inability to get patient to lean forward largely due to inability to communicate clearly with him  	Cardiovascular: difficult to discern S1 and S2; possible S3 auscultated; no murmurs or rubs    	Gastrointestinal: Bowel Sounds present, soft, nontender, nondistended, no guarding, no rebound  	Extremities: No cyanosis or clubbing; warm to touch  	Neurological: Oriented to self and place today (mild dementia, this is baseline)  	Musculoskeletal: painful to palpation of anterior and lateral deltoid. abduction past 90 degrees is painful to patient; other extremities have full strength and mobility  	Skin: No rashes  Psych: no depression or anhedonia    Labs                        10.2   9.60  )-----------( 231      ( 14 Aug 2020 07:17 )             32.5     08-14    137  |  101  |  22  ----------------------------<  122<H>  4.4   |  28  |  1.19    Ca    9.5      14 Aug 2020 07:16  Phos  2.6     08-14  Mg     2.0     08-14    TPro  5.8<L>  /  Alb  2.6<L>  /  TBili  0.2  /  DBili  x   /  AST  13  /  ALT  16  /  AlkPhos  80  08-14    RADIOLOGY:  Renal/bladder u/s:  Right kidney:  11.7 cm. No renal mass, hydronephrosis or calculi.  Left kidney:  11.2 cm. No hydronephrosis or calculi. A 0.9 cm lower pole cyst is noted.  Urinary bladder: Circumferential bladder wall thickening. Bilateral urinary jets are noted.  Prostate is enlarged, with a volume measuring 56 cc.     IMPRESSION:   No hydronephrosis.     Bladder wall thickening and enlarged prostate. Findings may be related to cystitis and/or chronic outlet obstruction.    < from: CT Abdomen and Pelvis No Cont (08.13.20 @ 16:10) >  No renal calculi or hydronephrosis.    Enlarged prostate.    Small bilateral pleural effusions.      Diverticulosis without diverticulitis.    < end of copied text > Summary:   Mr. Faith is a pleasant 89 year old male who has a history of Atrial Fibrillation with implanted pacemaker, CAD with multiple stents, Hypertension, Hyperlipidemia, BPH on flomax, who presents to the ED from his outpatient rehabilitation facility called [facility] with a fever and elevated WBC count, but the patient's chief complaint is "left shoulder pain" (s/p fall 2/2 sepsis/UTI last month).    -No acute overnight events. This morning, Mr. Faith feels well and is in very good spirits.  He complains this AM of L arm swelling and pain that starts in his thumb and works its way up his arm and across his back, between the shoulders.  He is able to have a clear conversation as long as he can hear what you are saying to him.  It is difficult to communicate with him, as he is extremely hard of hearing; complete ROS was not completed due to this issue.  He seems to be aware that he had a low grade fever last night and says that the fever is gone and that he feels better.      Objective  Vital Signs Last 24 Hrs  T(C): 36.6 (14 Aug 2020 04:36), Max: 36.7 (13 Aug 2020 13:47)  T(F): 97.9 (14 Aug 2020 04:36), Max: 98.1 (13 Aug 2020 13:47)  HR: 84 (14 Aug 2020 04:36) (75 - 86)  BP: 112/59 (14 Aug 2020 04:36) (109/61 - 147/72)  BP(mean): --  RR: 18 (14 Aug 2020 04:36) (18 - 18)  SpO2: 94% (14 Aug 2020 04:36) (94% - 98%)    Physical Exam:  Constitutional: NAD, awake and alert; laying down comfortably  	EYES: EOMI  	ENT:  very hard of hearing   	Respiratory: Unable to perform due to inability to get patient to lean forward largely due to inability to communicate clearly with him  	Cardiovascular: difficult to discern S1 and S2; possible S3 auscultated; no murmurs or rubs    	Gastrointestinal: Bowel Sounds present, soft, nontender, nondistended, no guarding, no rebound  	Extremities: No cyanosis or clubbing; erythematous, swollen spot on L arm tender to palpation; L thumb is not currently tender   	Neurological: Oriented to self and place today (mild dementia, this is baseline)  	Musculoskeletal: painful to palpation of anterior and lateral deltoid. abduction past 90 degrees is painful to patient; other extremities have full strength and mobility  	Skin: No rashes  Psych: no depression or anhedonia    Labs                        10.2   9.60  )-----------( 231      ( 14 Aug 2020 07:17 )             32.5     08-14    137  |  101  |  22  ----------------------------<  122<H>  4.4   |  28  |  1.19    Ca    9.5      14 Aug 2020 07:16  Phos  2.6     08-14  Mg     2.0     08-14    TPro  5.8<L>  /  Alb  2.6<L>  /  TBili  0.2  /  DBili  x   /  AST  13  /  ALT  16  /  AlkPhos  80  08-14    RADIOLOGY:  Renal/bladder u/s:  Right kidney:  11.7 cm. No renal mass, hydronephrosis or calculi.  Left kidney:  11.2 cm. No hydronephrosis or calculi. A 0.9 cm lower pole cyst is noted.  Urinary bladder: Circumferential bladder wall thickening. Bilateral urinary jets are noted.  Prostate is enlarged, with a volume measuring 56 cc.     IMPRESSION:   No hydronephrosis.     Bladder wall thickening and enlarged prostate. Findings may be related to cystitis and/or chronic outlet obstruction.    < from: CT Abdomen and Pelvis No Cont (08.13.20 @ 16:10) >  No renal calculi or hydronephrosis.    Enlarged prostate.    Small bilateral pleural effusions.      Diverticulosis without diverticulitis.    < end of copied text >

## 2020-08-14 NOTE — PROGRESS NOTE ADULT - SUBJECTIVE AND OBJECTIVE BOX
PROGRESS NOTE:   Authored by Concha Cole MD    Patient is a 89y old  Male who presents with a chief complaint of SEPSIS (13 Aug 2020 10:47)      SUBJECTIVE / OVERNIGHT EVENTS:    ADDITIONAL REVIEW OF SYSTEMS:    MEDICATIONS  (STANDING):  amLODIPine   Tablet 5 milliGRAM(s) Oral daily  apixaban 2.5 milliGRAM(s) Oral two times a day  aspirin  chewable 81 milliGRAM(s) Oral daily  atorvastatin 10 milliGRAM(s) Oral at bedtime  finasteride 5 milliGRAM(s) Oral daily  metoprolol tartrate 12.5 milliGRAM(s) Oral every 12 hours  polyethylene glycol 3350 17 Gram(s) Oral two times a day  senna 2 Tablet(s) Oral at bedtime  silver sulfADIAZINE 1% Cream 1 Application(s) Topical two times a day  tamsulosin 0.4 milliGRAM(s) Oral at bedtime  trimethoprim  160 mG/sulfamethoxazole 800 mG 1 Tablet(s) Oral two times a day    MEDICATIONS  (PRN):  acetaminophen   Tablet .. 650 milliGRAM(s) Oral every 6 hours PRN Moderate Pain (4 - 6)      CAPILLARY BLOOD GLUCOSE        I&O's Summary    13 Aug 2020 07:01  -  14 Aug 2020 07:00  --------------------------------------------------------  IN: 0 mL / OUT: 250 mL / NET: -250 mL        PHYSICAL EXAM:  Vital Signs Last 24 Hrs  T(C): 36.6 (14 Aug 2020 04:36), Max: 36.7 (13 Aug 2020 13:47)  T(F): 97.9 (14 Aug 2020 04:36), Max: 98.1 (13 Aug 2020 13:47)  HR: 84 (14 Aug 2020 04:36) (75 - 86)  BP: 112/59 (14 Aug 2020 04:36) (109/61 - 147/72)  BP(mean): --  RR: 18 (14 Aug 2020 04:36) (18 - 18)  SpO2: 94% (14 Aug 2020 04:36) (94% - 98%)    GENERAL: NAD, well-developed, hard of hearing  HEAD:  Atraumatic, Normocephalic  EYES: EOMI, PERRLA, conjunctiva and sclera clear  NECK: Supple, No JVD  CHEST/LUNG: Clear to auscultation bilaterally; No wheeze  HEART: Regular rate and rhythm; No murmurs, rubs, or gallops  ABDOMEN: Soft, Nontender, Nondistended; Bowel sounds present  EXTREMITIES:  2+ Peripheral Pulses, No clubbing, cyanosis, or edema  PSYCH: AAOx3  NEUROLOGY: moving all ext. following commands.  SKIN: No rashes or lesions    LABS:                      RADIOLOGY & ADDITIONAL TESTS:  Results Reviewed:   Imaging Personally Reviewed:  Electrocardiogram Personally Reviewed:    COORDINATION OF CARE:  Care Discussed with Consultants/Other Providers [Y/N]:  Prior or Outpatient Records Reviewed [Y/N]: PROGRESS NOTE:   Authored by Concha Cole MD    Patient is a 89y old  Male who presents with a chief complaint of SEPSIS (13 Aug 2020 10:47)      SUBJECTIVE / OVERNIGHT EVENTS: NAEO. Denies headaches, F/C, dizziness, syncope, CP/SOB, N/V, abdominal pain, dysuria, changes in BM, peripheral swelling, skin changes, new joint aches. Tolerating PO.      MEDICATIONS  (STANDING):  amLODIPine   Tablet 5 milliGRAM(s) Oral daily  apixaban 2.5 milliGRAM(s) Oral two times a day  aspirin  chewable 81 milliGRAM(s) Oral daily  atorvastatin 10 milliGRAM(s) Oral at bedtime  finasteride 5 milliGRAM(s) Oral daily  metoprolol tartrate 12.5 milliGRAM(s) Oral every 12 hours  polyethylene glycol 3350 17 Gram(s) Oral two times a day  senna 2 Tablet(s) Oral at bedtime  silver sulfADIAZINE 1% Cream 1 Application(s) Topical two times a day  tamsulosin 0.4 milliGRAM(s) Oral at bedtime  trimethoprim  160 mG/sulfamethoxazole 800 mG 1 Tablet(s) Oral two times a day    MEDICATIONS  (PRN):  acetaminophen   Tablet .. 650 milliGRAM(s) Oral every 6 hours PRN Moderate Pain (4 - 6)      CAPILLARY BLOOD GLUCOSE        I&O's Summary    13 Aug 2020 07:01  -  14 Aug 2020 07:00  --------------------------------------------------------  IN: 0 mL / OUT: 250 mL / NET: -250 mL        PHYSICAL EXAM:  Vital Signs Last 24 Hrs  T(C): 36.6 (14 Aug 2020 04:36), Max: 36.7 (13 Aug 2020 13:47)  T(F): 97.9 (14 Aug 2020 04:36), Max: 98.1 (13 Aug 2020 13:47)  HR: 84 (14 Aug 2020 04:36) (75 - 86)  BP: 112/59 (14 Aug 2020 04:36) (109/61 - 147/72)  BP(mean): --  RR: 18 (14 Aug 2020 04:36) (18 - 18)  SpO2: 94% (14 Aug 2020 04:36) (94% - 98%)    GENERAL: NAD, well-developed, hard of hearing  HEAD:  Atraumatic, Normocephalic  EYES: EOMI, PERRLA, conjunctiva and sclera clear  NECK: Supple, No JVD  CHEST/LUNG: Clear to auscultation bilaterally; No wheeze  HEART: Regular rate and rhythm; No murmurs, rubs, or gallops  ABDOMEN: Soft, Nontender, Nondistended; Bowel sounds present  EXTREMITIES:  2+ Peripheral Pulses, No clubbing, cyanosis, or edema  PSYCH: AAOx3  NEUROLOGY: moving all ext. following commands.  SKIN: No rashes or lesions    LABS:                      RADIOLOGY & ADDITIONAL TESTS:  Results Reviewed:   Imaging Personally Reviewed:  Electrocardiogram Personally Reviewed:    COORDINATION OF CARE:  Care Discussed with Consultants/Other Providers [Y/N]:  Prior or Outpatient Records Reviewed [Y/N]:

## 2020-08-14 NOTE — PROGRESS NOTE ADULT - SUBJECTIVE AND OBJECTIVE BOX
UROLOGY DAILY PROGRESS NOTE:     Subjective:    No events overnight.  Overall feels okay.  Voiding.    Objective:    NAD, awake and alert  Respirations nonlabored  Abdomen soft, nontender, nondistended  Urine in urinal clear    MEDICATIONS  (STANDING):  amLODIPine   Tablet 5 milliGRAM(s) Oral daily  apixaban 2.5 milliGRAM(s) Oral two times a day  aspirin  chewable 81 milliGRAM(s) Oral daily  atorvastatin 10 milliGRAM(s) Oral at bedtime  finasteride 5 milliGRAM(s) Oral daily  metoprolol tartrate 12.5 milliGRAM(s) Oral every 12 hours  polyethylene glycol 3350 17 Gram(s) Oral two times a day  senna 2 Tablet(s) Oral at bedtime  silver sulfADIAZINE 1% Cream 1 Application(s) Topical two times a day  tamsulosin 0.4 milliGRAM(s) Oral at bedtime  trimethoprim  160 mG/sulfamethoxazole 800 mG 1 Tablet(s) Oral two times a day    MEDICATIONS  (PRN):  acetaminophen   Tablet .. 650 milliGRAM(s) Oral every 6 hours PRN Moderate Pain (4 - 6)      Vital Signs Last 24 Hrs  T(C): 36.6 (14 Aug 2020 04:36), Max: 36.7 (13 Aug 2020 13:47)  T(F): 97.9 (14 Aug 2020 04:36), Max: 98.1 (13 Aug 2020 13:47)  HR: 84 (14 Aug 2020 04:36) (75 - 86)  BP: 112/59 (14 Aug 2020 04:36) (109/61 - 147/72)  BP(mean): --  RR: 18 (14 Aug 2020 04:36) (18 - 18)  SpO2: 94% (14 Aug 2020 04:36) (94% - 98%)    I&O's Detail    13 Aug 2020 07:01  -  14 Aug 2020 07:00  --------------------------------------------------------  IN:  Total IN: 0 mL    OUT:    Voided: 250 mL  Total OUT: 250 mL    Total NET: -250 mL          Daily     Daily     LABS:                        10.2   9.60  )-----------( 231      ( 14 Aug 2020 07:17 )             32.5

## 2020-08-14 NOTE — PROGRESS NOTE ADULT - PROBLEM SELECTOR PROBLEM 6
Coronary artery disease

## 2020-08-25 NOTE — CDI QUERY NOTE - NSCDIOTHERTXTBX_GEN_ALL_CORE_HH
The patient was admitted with sepsis and UTI.    1.  Per ED note, the patient presented with fever and increased altered mental status "although reportedly is at current baseline A&Ox1".         Please clarify a specific medical diagnosis for this condition if significant?       =  The patient had altered mental status secondary to encephalopathy due to sepsis.       =  The patient had altered mental status secondary to other condition.       =  The patient had altered mental status secondary to delirium from medical condition.       =  Unable to determine.    2.  The patient was also noted to have hypoxia.  ED note: "Pt altered, noted to be hypoxic to 88% but improves to 95+ on 2L NC."  Lowest O2 sat after this incident:  8/6 @21:15 - 92% RA --> 97% 2LNC        Please clarify the medical diagnosis for this condition, if significant?        =  The patient had acute respiratory failure with hypoxia.        =  The patient had other respiratory condition ____________________________________.        =  Unable to determine.    3.  The patient was noted to have sacral decubitus ulcer.  Per ED note, stage 2, per nursing stage 2 & stage 3.       RN note 8/7/2020 02:37         Pressure Injury #1 (Initial/Weekly)        Was Pressure Injury Present on Admission to hospital?: yes         Location/: sacrum         Stage: Stage III         Length in cm/: 1.5 Cm         Width in cm: 1 cm         Wound Depth: 0.3          Dead Space: tunneling-no           Authored By: wound care specialist   ave chavis rn (8/7/2020 11:55)        Wound Specialist Recommendations: Requested by staff to assess skin status of pt a/w a sacral pressure injury.Upon                  assessment, the pt presents with skin changes secondary to incontinence. There is hyperpigmentation of the skin on the b/l         buttocks with a partial thickness skin loss noted.          ED MD Note 8/6/2020 19:26        Pt has a stage 1-2 sacral decub which is dressed with superficial skin breakdown without surrounding erythema.            Please clarify the sacral pressure ulcer stage?        =  The patient had stage 2 sacral pressure ulcer.        =  The patient had stage 3 sacral pressure ulcer.        =  The patient had sacral pressure ulcer stage ________________.        =  Unable to determine.      Thank you.

## 2020-12-28 NOTE — ED ADULT TRIAGE NOTE - AS TEMP SITE
Griseofulvin Counseling:  I discussed with the patient the risks of griseofulvin including but not limited to photosensitivity, cytopenia, liver damage, nausea/vomiting and severe allergy.  The patient understands that this medication is best absorbed when taken with a fatty meal (e.g., ice cream or french fries). oral

## 2021-03-26 NOTE — MEDICAL STUDENT PROGRESS NOTE(EDUCATION) - NS MD HP STUD ASPLAN ASSES FT
89 year old man, history of afib on eliquis s/p pacemaker, CAD s/p five stents, HTN, HLD, BPH, who presents after sepsis 2/2 UTI and likely rhabdomyolysis after mechanical fall. Clinically improved, afebrile with decreasing WBC and CK, WBC remains elevated today at 12.39; however, plan to discharge to [facility]
Continue Regimen: - Lancôme gentle
Initiate Treatment: -Arazlo 0.045 % lotion Apply to entire face at nigh5 2-3 times a week, increase to nightly as tolerated\\n\\n- Onexton 1.2 % (1 % base)-3.75 % topical gel with pump Sig: Apply to entire face once daily in the morning\\n\\n-spironolactone 25 mg tablet Daily Take 1 tablet daily for 1 week. The increase 2 tablet daily x 1 week . If tolerated increase to 3 tablets daily
Render In Strict Bullet Format?: No
Detail Level: Zone
Plan: - gentle moisturizers and cleansers daily \\n- sunscreen daily, recommend Elta \\n- [facility] handout provided\\n-follow up in 2 months

## 2021-06-14 ENCOUNTER — NON-APPOINTMENT (OUTPATIENT)
Age: 86
End: 2021-06-14

## 2021-06-14 ENCOUNTER — APPOINTMENT (OUTPATIENT)
Dept: OPHTHALMOLOGY | Facility: CLINIC | Age: 86
End: 2021-06-14
Payer: MEDICARE

## 2021-06-14 PROCEDURE — 92012 INTRM OPH EXAM EST PATIENT: CPT

## 2021-06-14 PROCEDURE — 92015 DETERMINE REFRACTIVE STATE: CPT

## 2021-08-10 ENCOUNTER — NON-APPOINTMENT (OUTPATIENT)
Age: 86
End: 2021-08-10

## 2021-08-13 ENCOUNTER — APPOINTMENT (OUTPATIENT)
Dept: HOME HEALTH SERVICES | Facility: HOME HEALTH | Age: 86
End: 2021-08-13
Payer: MEDICARE

## 2021-08-13 VITALS
SYSTOLIC BLOOD PRESSURE: 140 MMHG | DIASTOLIC BLOOD PRESSURE: 80 MMHG | HEART RATE: 91 BPM | OXYGEN SATURATION: 93 % | RESPIRATION RATE: 16 BRPM | TEMPERATURE: 99.2 F

## 2021-08-13 DIAGNOSIS — Z71.89 OTHER SPECIFIED COUNSELING: ICD-10-CM

## 2021-08-13 DIAGNOSIS — H91.90 UNSPECIFIED HEARING LOSS, UNSPECIFIED EAR: ICD-10-CM

## 2021-08-13 DIAGNOSIS — M47.22 OTHER SPONDYLOSIS WITH RADICULOPATHY, CERVICAL REGION: ICD-10-CM

## 2021-08-13 DIAGNOSIS — R29.6 REPEATED FALLS: ICD-10-CM

## 2021-08-13 DIAGNOSIS — M19.011 PRIMARY OSTEOARTHRITIS, RIGHT SHOULDER: ICD-10-CM

## 2021-08-13 PROCEDURE — 99345 HOME/RES VST NEW HIGH MDM 75: CPT

## 2021-08-18 ENCOUNTER — NON-APPOINTMENT (OUTPATIENT)
Age: 86
End: 2021-08-18

## 2021-08-18 LAB
25(OH)D3 SERPL-MCNC: 41.4 NG/ML
ALBUMIN SERPL ELPH-MCNC: 3.7 G/DL
ALP BLD-CCNC: 74 U/L
ALT SERPL-CCNC: 9 U/L
ANION GAP SERPL CALC-SCNC: 15 MMOL/L
AST SERPL-CCNC: 16 U/L
BASOPHILS # BLD AUTO: 0.08 K/UL
BASOPHILS NFR BLD AUTO: 1 %
BILIRUB SERPL-MCNC: 0.3 MG/DL
BUN SERPL-MCNC: 26 MG/DL
CALCIUM SERPL-MCNC: 9.1 MG/DL
CHLORIDE SERPL-SCNC: 101 MMOL/L
CHOLEST SERPL-MCNC: 161 MG/DL
CO2 SERPL-SCNC: 26 MMOL/L
CREAT SERPL-MCNC: 1.26 MG/DL
EOSINOPHIL # BLD AUTO: 0.52 K/UL
EOSINOPHIL NFR BLD AUTO: 6.6 %
ESTIMATED AVERAGE GLUCOSE: 160 MG/DL
FOLATE SERPL-MCNC: >20 NG/ML
GLUCOSE SERPL-MCNC: 216 MG/DL
HBA1C MFR BLD HPLC: 7.2 %
HCT VFR BLD CALC: 40.7 %
HDLC SERPL-MCNC: 51 MG/DL
HGB BLD-MCNC: 13.3 G/DL
IMM GRANULOCYTES NFR BLD AUTO: 0.3 %
LDLC SERPL CALC-MCNC: 53 MG/DL
LYMPHOCYTES # BLD AUTO: 1.56 K/UL
LYMPHOCYTES NFR BLD AUTO: 19.8 %
MAN DIFF?: NORMAL
MCHC RBC-ENTMCNC: 30.1 PG
MCHC RBC-ENTMCNC: 32.7 GM/DL
MCV RBC AUTO: 92.1 FL
MONOCYTES # BLD AUTO: 0.47 K/UL
MONOCYTES NFR BLD AUTO: 6 %
NEUTROPHILS # BLD AUTO: 5.23 K/UL
NEUTROPHILS NFR BLD AUTO: 66.3 %
NONHDLC SERPL-MCNC: 110 MG/DL
PLATELET # BLD AUTO: 142 K/UL
POTASSIUM SERPL-SCNC: 4.1 MMOL/L
PROT SERPL-MCNC: 6.1 G/DL
RBC # BLD: 4.42 M/UL
RBC # FLD: 13.5 %
SODIUM SERPL-SCNC: 141 MMOL/L
TRIGL SERPL-MCNC: 287 MG/DL
TSH SERPL-ACNC: 1.76 UIU/ML
VIT B12 SERPL-MCNC: >2000 PG/ML
WBC # FLD AUTO: 7.88 K/UL

## 2021-09-08 NOTE — PROGRESS NOTE ADULT - ATTENDING COMMENTS
Lipid abnormalities are Tx with diet and exercises, continue the same   Patient seen and examined today. I agree with the above findings, assessment, and plan with the following additions and exceptions:     Continue meropenem for complicated UTI. Pneumonia is possible although lower on our ddx.   Hold home BB for now given bradycardia to 39. Monitor HR for any signs of reflex tachycardia.   IS and OOBTC with assistance daily.   F/u all cx data.   Rest of plan as above.     Dr. Jorge Coleman, DO  Attending Physician  Division of Hospital Medicine  [facility]  Pager:  [phone]

## 2021-09-20 ENCOUNTER — TRANSCRIPTION ENCOUNTER (OUTPATIENT)
Age: 86
End: 2021-09-20

## 2021-09-22 ENCOUNTER — TRANSCRIPTION ENCOUNTER (OUTPATIENT)
Age: 86
End: 2021-09-22

## 2021-10-08 NOTE — INPATIENT CERTIFICATION FOR MEDICARE PATIENTS - PHYSICIAN CONCUR
Discharge Instructions Following Removal of Your Implanted Port      What special care does the site need?  · You may have some pain, swelling, and bruising after your implanted port is removed. Your shoulder may feel stiff and sore. To relieve some of these symptoms you may try:    · ICE - Ice is best if started right after your port is inserted. Apply ice (crushed ice in a plastic bag covered by a towel) to the port area for 15 to 20 minutes every hour as long as you need it. Do not sleep with an ice pack on because it may cause frostbite.    · HEAT - You can apply a warm compress (small towel dampened with hot water and placed in a plastic bag or heating pad set on low) to your port site after the first 24 to 48 hours as needed for comfort. Apply heat for 15 to 20 minutes every hour as long as you need it. Do not sleep with heat on because it may cause a burn.    · MEDICATION - You can take over the counter medication (Tylenol, Motrin) as directed on the package to relieve pain and swelling as long as it does not interfere with any other medication that you are taking.        Discharge instructions:  · There is glue and steristrips holding your incision together. These will fall off on their own.   · Do not take tub baths, use hot tubs or swim for the next 10 to 14 days.   · Keep the incision site clean and dry at all times. The dressing over your incision is waterproof. You may shower with the dressing in place. After 48 hours remove the dressing and leave the incision \"open to air\".   · No lifting, pushing or pulling over 10 pounds for the next 10 to 14 days.      Call your doctor or the Interventional Radiology Department at if you have any of the following:  · Fever over 100.4 F  · Redness, tenderness or warmth around the incision site  · Drainage from from the incision site  · Swelling in your face or neck  · Pain in your shoulder, arms or neck that does not go away or gets worse      You will be going home  with a peripherally inserted central catheter (PICC).  A PICC line is a thin, soft, flexible tube - like an intravenous (IV) line.  This line allows you to receive long term medications and fluids.    No bathing, swimming, or hot tubs.  You may shower, but the PICC line must be covered.  You can use plastic wrap (saran-wrap, press 'n seal), or a cast cover and remove after showering.    PICC dressing must remain dry and intact.  Dressing changes are to be done weekly or as needed by a trained person.    Call your Health Care Provider if you experience any of the following:    · Pain, warmth, redness or swelling around the PICC site  · Fever >101.5 for 24 hours  · Leaking catheter     I concur with the Admission Order and I certify that services are provided in accordance with Section 42 CFR § 412.3

## 2021-11-03 ENCOUNTER — LABORATORY RESULT (OUTPATIENT)
Age: 86
End: 2021-11-03

## 2021-11-03 ENCOUNTER — NON-APPOINTMENT (OUTPATIENT)
Age: 86
End: 2021-11-03

## 2021-11-04 ENCOUNTER — TRANSCRIPTION ENCOUNTER (OUTPATIENT)
Age: 86
End: 2021-11-04

## 2021-11-04 ENCOUNTER — INPATIENT (INPATIENT)
Facility: HOSPITAL | Age: 86
LOS: 7 days | Discharge: HOME HEALTH | DRG: 871 | End: 2021-11-12
Attending: STUDENT IN AN ORGANIZED HEALTH CARE EDUCATION/TRAINING PROGRAM | Admitting: STUDENT IN AN ORGANIZED HEALTH CARE EDUCATION/TRAINING PROGRAM
Payer: MEDICARE

## 2021-11-04 VITALS — HEIGHT: 70 IN

## 2021-11-04 DIAGNOSIS — Z98.890 OTHER SPECIFIED POSTPROCEDURAL STATES: Chronic | ICD-10-CM

## 2021-11-04 DIAGNOSIS — N39.0 URINARY TRACT INFECTION, SITE NOT SPECIFIED: ICD-10-CM

## 2021-11-04 LAB
APPEARANCE: ABNORMAL
BASE EXCESS BLDV CALC-SCNC: -2.6 MMOL/L — LOW (ref -2–2)
BASOPHILS # BLD AUTO: 0.03 K/UL
BASOPHILS NFR BLD AUTO: 0.2 %
BILIRUBIN URINE: NEGATIVE
BLOOD URINE: ABNORMAL
CA-I SERPL-SCNC: 1.25 MMOL/L — SIGNIFICANT CHANGE UP (ref 1.15–1.33)
CHLORIDE BLDV-SCNC: 102 MMOL/L — SIGNIFICANT CHANGE UP (ref 96–108)
CO2 BLDV-SCNC: 26 MMOL/L — SIGNIFICANT CHANGE UP (ref 22–26)
COLOR: YELLOW
EOSINOPHIL # BLD AUTO: 0.07 K/UL
EOSINOPHIL NFR BLD AUTO: 0.5 %
GAS PNL BLDV: 138 MMOL/L — SIGNIFICANT CHANGE UP (ref 136–145)
GAS PNL BLDV: SIGNIFICANT CHANGE UP
GLUCOSE BLDV-MCNC: 317 MG/DL — HIGH (ref 70–99)
GLUCOSE QUALITATIVE U: NORMAL
HCO3 BLDV-SCNC: 24 MMOL/L — SIGNIFICANT CHANGE UP (ref 22–29)
HCT VFR BLD CALC: 36.7 % — LOW (ref 39–50)
HCT VFR BLD CALC: 39 %
HCT VFR BLDA CALC: 37 % — LOW (ref 39–51)
HGB BLD CALC-MCNC: 12.3 G/DL — LOW (ref 12.6–17.4)
HGB BLD-MCNC: 12.2 G/DL — LOW (ref 13–17)
HGB BLD-MCNC: 13 G/DL
IMM GRANULOCYTES NFR BLD AUTO: 0.9 %
KETONES URINE: NEGATIVE
LACTATE BLDV-MCNC: 7.2 MMOL/L — CRITICAL HIGH (ref 0.7–2)
LEUKOCYTE ESTERASE URINE: ABNORMAL
LYMPHOCYTES # BLD AUTO: 0.38 K/UL
LYMPHOCYTES NFR BLD AUTO: 2.8 %
MAN DIFF?: NORMAL
MCHC RBC-ENTMCNC: 29.5 PG — SIGNIFICANT CHANGE UP (ref 27–34)
MCHC RBC-ENTMCNC: 30.2 PG
MCHC RBC-ENTMCNC: 33.2 GM/DL — SIGNIFICANT CHANGE UP (ref 32–36)
MCHC RBC-ENTMCNC: 33.3 GM/DL
MCV RBC AUTO: 88.6 FL — SIGNIFICANT CHANGE UP (ref 80–100)
MCV RBC AUTO: 90.7 FL
MONOCYTES # BLD AUTO: 0.74 K/UL
MONOCYTES NFR BLD AUTO: 5.4 %
NEUTROPHILS # BLD AUTO: 12.41 K/UL
NEUTROPHILS NFR BLD AUTO: 90.2 %
NITRITE URINE: POSITIVE
PCO2 BLDV: 51 MMHG — SIGNIFICANT CHANGE UP (ref 42–55)
PH BLDV: 7.29 — LOW (ref 7.32–7.43)
PH URINE: 8.5
PLATELET # BLD AUTO: 116 K/UL — LOW (ref 150–400)
PLATELET # BLD AUTO: 134 K/UL
PO2 BLDV: 24 MMHG — LOW (ref 25–45)
POTASSIUM BLDV-SCNC: 4.5 MMOL/L — SIGNIFICANT CHANGE UP (ref 3.5–5.1)
PROTEIN URINE: ABNORMAL
RBC # BLD: 4.14 M/UL — LOW (ref 4.2–5.8)
RBC # BLD: 4.3 M/UL
RBC # FLD: 14.2 %
RBC # FLD: 14.2 % — SIGNIFICANT CHANGE UP (ref 10.3–14.5)
SAO2 % BLDV: 32.6 % — LOW (ref 67–88)
SPECIFIC GRAVITY URINE: 1.01
UROBILINOGEN URINE: NORMAL
WBC # BLD: 15.24 K/UL — HIGH (ref 3.8–10.5)
WBC # FLD AUTO: 13.75 K/UL
WBC # FLD AUTO: 15.24 K/UL — HIGH (ref 3.8–10.5)

## 2021-11-04 PROCEDURE — 71045 X-RAY EXAM CHEST 1 VIEW: CPT | Mod: 26

## 2021-11-04 PROCEDURE — 99291 CRITICAL CARE FIRST HOUR: CPT | Mod: CS,GC

## 2021-11-04 RX ORDER — SODIUM CHLORIDE 9 MG/ML
1000 INJECTION INTRAMUSCULAR; INTRAVENOUS; SUBCUTANEOUS ONCE
Refills: 0 | Status: COMPLETED | OUTPATIENT
Start: 2021-11-04 | End: 2021-11-04

## 2021-11-04 RX ORDER — VANCOMYCIN HCL 1 G
1000 VIAL (EA) INTRAVENOUS ONCE
Refills: 0 | Status: COMPLETED | OUTPATIENT
Start: 2021-11-04 | End: 2021-11-04

## 2021-11-04 RX ORDER — CEFEPIME 1 G/1
1000 INJECTION, POWDER, FOR SOLUTION INTRAMUSCULAR; INTRAVENOUS ONCE
Refills: 0 | Status: COMPLETED | OUTPATIENT
Start: 2021-11-04 | End: 2021-11-05

## 2021-11-04 RX ORDER — NOREPINEPHRINE BITARTRATE/D5W 8 MG/250ML
0.05 PLASTIC BAG, INJECTION (ML) INTRAVENOUS
Qty: 8 | Refills: 0 | Status: DISCONTINUED | OUTPATIENT
Start: 2021-11-04 | End: 2021-11-06

## 2021-11-04 RX ADMIN — Medication 250 MILLIGRAM(S): at 23:45

## 2021-11-04 RX ADMIN — SODIUM CHLORIDE 1000 MILLILITER(S): 9 INJECTION INTRAMUSCULAR; INTRAVENOUS; SUBCUTANEOUS at 23:45

## 2021-11-04 NOTE — ED PROVIDER NOTE - PHYSICAL EXAMINATION
GENERAL: ill appearing  HEENT: normal conjunctiva, bilat cochlear implants, secretions in upper airway pt protecting airway  CARDIAC: tachy rate and appears regular rhythm on monitor, sbp 100s on arrival  PULM: sats 98% on RA, tachypnic to 30s, grossly clear lungs  GI: abdomen nondistended, soft, nontender  : no suprapubic tenderness  NEURO: alert and oriented x 2-3? interactive though hard of hearing, normal speech, CN's 3-12 intact, moving all extremities without lateralization  MSK: no visible deformities, no peripheral edema, calf tenderness/redness/swelling  SKIN: no visible rashes

## 2021-11-04 NOTE — ED PROVIDER NOTE - PROGRESS NOTE DETAILS
ap- pt hypotensive, starting levophed, hanging a 2nd L already received 1L by EMS PTA [id number]: pt received at sign-out, seen and evaluated at bedside.  Pt sitting comfortably in NAD. Pt persistently hypotensive MAP <65 despite fluid resuscitation, levophed started. MICU consulted, pending recs. [id number]: pt seen by MICU, accepted for admission.

## 2021-11-04 NOTE — ED PROVIDER NOTE - CLINICAL SUMMARY MEDICAL DECISION MAKING FREE TEXT BOX
Pt septic, concern for urosepsis c/b possible asp pna?, given fluids prior to arrival initial presentation in ED HD stable, will draw cultures, give another liter fluids and reassess, labs, urine, cxr, abx (rash to penicillin), rectal temp. TBA

## 2021-11-04 NOTE — ED PROVIDER NOTE - ATTENDING CONTRIBUTION TO CARE
89 M w/ hx of  Atrial Fibrillation with implanted pacemaker, CAD with multiple stents, Hypertension, Hyperlipidemia, Benign Prostatic Hyperplasia p/w "unresponsiveness". Pt was dx w/ a UTI today was started on bactrim, pt unable to provide a history at this time due to clinical condition. Pt 89 M w/ hx of  Atrial Fibrillation on eliquis, implanted pacemaker, CAD with multiple stents, Hypertension, Hyperlipidemia, Benign Prostatic Hyperplasia p/w "unresponsiveness". Pt was dx w/ a UTI yesterday due to blood in the urine. Pt was started on bactrim, pt unable to provide a history at this time due to clinical condition. PT was bibems, since son in law went to house and noted to be unrousable to tactile stimulation. When medics arrived, he was hypoxic to 88, hypotensive 80/50s, he was started on IVF, and was given approx 1L PTA. Upon arrival to [facility] he was SBP in the 100s, sating 90-93 percent on RA, he has a cough. Pt is aaox2-3 at baseline uses a walker, has 24 hour aide. Uses the house calls program.   On exam, pt is arousable to voice, he is intermittently following commands, he has rhonchi on the L side, he has a soft abdomen, no lower extremity edema. he is tachycardic w/ 2+ radial pulses, he has mild lower extremity edema.   Plan for labs, imaging and admission, discussion w/ son in law POA is his wife, and states pt is full code.   Concern for septic shock due to UTI likely w/ aspiration PNA

## 2021-11-04 NOTE — ED CLERICAL - NS ED CARE COORDINATION INFORMATION

## 2021-11-04 NOTE — ED PROVIDER NOTE - OBJECTIVE STATEMENT
89M PMH Afib on eliquis, HTN, presents to the ED by EMS with altered mental status. Pt diagnosed with UTI yesterday and started on Bactrim. Today found on his couch somnolent, tachycardic, sbp 70s, tachypnic, hypox to 88% on RA? per EMS though no oxygen req on arrival here. Pts FS 300s. Given 700cc's fluid prior to arrival in ED.

## 2021-11-05 ENCOUNTER — NON-APPOINTMENT (OUTPATIENT)
Age: 86
End: 2021-11-05

## 2021-11-05 DIAGNOSIS — A41.9 SEPSIS, UNSPECIFIED ORGANISM: ICD-10-CM

## 2021-11-05 LAB
ALBUMIN SERPL ELPH-MCNC: 2.1 G/DL — LOW (ref 3.3–5)
ALBUMIN SERPL ELPH-MCNC: 2.4 G/DL — LOW (ref 3.3–5)
ALBUMIN SERPL ELPH-MCNC: 2.5 G/DL — LOW (ref 3.3–5)
ALP SERPL-CCNC: 135 U/L — HIGH (ref 40–120)
ALP SERPL-CCNC: 167 U/L — HIGH (ref 40–120)
ALP SERPL-CCNC: 327 U/L — HIGH (ref 40–120)
ALT FLD-CCNC: 23 U/L — SIGNIFICANT CHANGE UP (ref 10–45)
ALT FLD-CCNC: 23 U/L — SIGNIFICANT CHANGE UP (ref 10–45)
ALT FLD-CCNC: 26 U/L — SIGNIFICANT CHANGE UP (ref 10–45)
ANION GAP SERPL CALC-SCNC: 17 MMOL/L — SIGNIFICANT CHANGE UP (ref 5–17)
APPEARANCE UR: ABNORMAL
APTT BLD: 28.5 SEC — SIGNIFICANT CHANGE UP (ref 27.5–35.5)
AST SERPL-CCNC: 26 U/L — SIGNIFICANT CHANGE UP (ref 10–40)
AST SERPL-CCNC: 26 U/L — SIGNIFICANT CHANGE UP (ref 10–40)
AST SERPL-CCNC: 27 U/L — SIGNIFICANT CHANGE UP (ref 10–40)
B-OH-BUTYR SERPL-SCNC: 0.2 MMOL/L — SIGNIFICANT CHANGE UP
BACTERIA # UR AUTO: ABNORMAL
BASE EXCESS BLDV CALC-SCNC: -6.7 MMOL/L — LOW (ref -2–2)
BASOPHILS # BLD AUTO: 0.14 K/UL — SIGNIFICANT CHANGE UP (ref 0–0.2)
BASOPHILS NFR BLD AUTO: 0.9 % — SIGNIFICANT CHANGE UP (ref 0–2)
BILIRUB SERPL-MCNC: 0.4 MG/DL — SIGNIFICANT CHANGE UP (ref 0.2–1.2)
BILIRUB SERPL-MCNC: 0.6 MG/DL — SIGNIFICANT CHANGE UP (ref 0.2–1.2)
BILIRUB SERPL-MCNC: 0.7 MG/DL — SIGNIFICANT CHANGE UP (ref 0.2–1.2)
BILIRUB UR-MCNC: ABNORMAL
BUN SERPL-MCNC: 50 MG/DL — HIGH (ref 7–23)
BUN SERPL-MCNC: 50 MG/DL — HIGH (ref 7–23)
BUN SERPL-MCNC: 52 MG/DL — HIGH (ref 7–23)
BURR CELLS BLD QL SMEAR: PRESENT — SIGNIFICANT CHANGE UP
CA-I SERPL-SCNC: 1.18 MMOL/L — SIGNIFICANT CHANGE UP (ref 1.15–1.33)
CALCIUM SERPL-MCNC: 8.1 MG/DL — LOW (ref 8.4–10.5)
CALCIUM SERPL-MCNC: 8.5 MG/DL — SIGNIFICANT CHANGE UP (ref 8.4–10.5)
CALCIUM SERPL-MCNC: 8.9 MG/DL — SIGNIFICANT CHANGE UP (ref 8.4–10.5)
CHLORIDE BLDV-SCNC: 103 MMOL/L — SIGNIFICANT CHANGE UP (ref 96–108)
CHLORIDE SERPL-SCNC: 103 MMOL/L — SIGNIFICANT CHANGE UP (ref 96–108)
CO2 BLDV-SCNC: 22 MMOL/L — SIGNIFICANT CHANGE UP (ref 22–26)
CO2 SERPL-SCNC: 18 MMOL/L — LOW (ref 22–31)
CO2 SERPL-SCNC: 19 MMOL/L — LOW (ref 22–31)
CO2 SERPL-SCNC: 21 MMOL/L — LOW (ref 22–31)
COLOR SPEC: ABNORMAL
CREAT SERPL-MCNC: 2.11 MG/DL — HIGH (ref 0.5–1.3)
CREAT SERPL-MCNC: 2.21 MG/DL — HIGH (ref 0.5–1.3)
CREAT SERPL-MCNC: 2.36 MG/DL — HIGH (ref 0.5–1.3)
DIFF PNL FLD: ABNORMAL
ELLIPTOCYTES BLD QL SMEAR: SLIGHT — SIGNIFICANT CHANGE UP
EOSINOPHIL # BLD AUTO: 0 K/UL — SIGNIFICANT CHANGE UP (ref 0–0.5)
EOSINOPHIL NFR BLD AUTO: 0 % — SIGNIFICANT CHANGE UP (ref 0–6)
EPI CELLS # UR: 5 — SIGNIFICANT CHANGE UP
GAS PNL BLDV: 136 MMOL/L — SIGNIFICANT CHANGE UP (ref 136–145)
GAS PNL BLDV: SIGNIFICANT CHANGE UP
GAS PNL BLDV: SIGNIFICANT CHANGE UP
GIANT PLATELETS BLD QL SMEAR: PRESENT — SIGNIFICANT CHANGE UP
GLUCOSE BLDC GLUCOMTR-MCNC: 155 MG/DL — HIGH (ref 70–99)
GLUCOSE BLDC GLUCOMTR-MCNC: 259 MG/DL — HIGH (ref 70–99)
GLUCOSE BLDC GLUCOMTR-MCNC: 262 MG/DL — HIGH (ref 70–99)
GLUCOSE BLDC GLUCOMTR-MCNC: 283 MG/DL — HIGH (ref 70–99)
GLUCOSE BLDV-MCNC: 296 MG/DL — HIGH (ref 70–99)
GLUCOSE SERPL-MCNC: 295 MG/DL — HIGH (ref 70–99)
GLUCOSE SERPL-MCNC: 306 MG/DL — HIGH (ref 70–99)
GLUCOSE SERPL-MCNC: 314 MG/DL — HIGH (ref 70–99)
GLUCOSE UR QL: NEGATIVE — SIGNIFICANT CHANGE UP
GRAM STN FLD: SIGNIFICANT CHANGE UP
HCO3 BLDV-SCNC: 21 MMOL/L — LOW (ref 22–29)
HCT VFR BLD CALC: 33.7 % — LOW (ref 39–50)
HCT VFR BLD CALC: 34 % — LOW (ref 39–50)
HCT VFR BLDA CALC: 34 % — LOW (ref 39–51)
HGB BLD CALC-MCNC: 11.3 G/DL — LOW (ref 12.6–17.4)
HGB BLD-MCNC: 11.3 G/DL — LOW (ref 13–17)
HGB BLD-MCNC: 11.6 G/DL — LOW (ref 13–17)
HYALINE CASTS # UR AUTO: 2 /LPF — SIGNIFICANT CHANGE UP (ref 0–7)
INR BLD: 1.41 RATIO — HIGH (ref 0.88–1.16)
KETONES UR-MCNC: SIGNIFICANT CHANGE UP
LACTATE BLDV-MCNC: 5 MMOL/L — CRITICAL HIGH (ref 0.7–2)
LEUKOCYTE ESTERASE UR-ACNC: ABNORMAL
LYMPHOCYTES # BLD AUTO: 0.4 K/UL — LOW (ref 1–3.3)
LYMPHOCYTES # BLD AUTO: 2.6 % — LOW (ref 13–44)
MAGNESIUM SERPL-MCNC: 1.5 MG/DL — LOW (ref 1.6–2.6)
MAGNESIUM SERPL-MCNC: 2.2 MG/DL — SIGNIFICANT CHANGE UP (ref 1.6–2.6)
MANUAL SMEAR VERIFICATION: SIGNIFICANT CHANGE UP
MCHC RBC-ENTMCNC: 29.3 PG — SIGNIFICANT CHANGE UP (ref 27–34)
MCHC RBC-ENTMCNC: 29.6 PG — SIGNIFICANT CHANGE UP (ref 27–34)
MCHC RBC-ENTMCNC: 33.5 GM/DL — SIGNIFICANT CHANGE UP (ref 32–36)
MCHC RBC-ENTMCNC: 34.1 GM/DL — SIGNIFICANT CHANGE UP (ref 32–36)
MCV RBC AUTO: 85.9 FL — SIGNIFICANT CHANGE UP (ref 80–100)
MCV RBC AUTO: 88.2 FL — SIGNIFICANT CHANGE UP (ref 80–100)
METAMYELOCYTES # FLD: 1.7 % — HIGH (ref 0–0)
METHOD TYPE: SIGNIFICANT CHANGE UP
MONOCYTES # BLD AUTO: 0.53 K/UL — SIGNIFICANT CHANGE UP (ref 0–0.9)
MONOCYTES NFR BLD AUTO: 3.5 % — SIGNIFICANT CHANGE UP (ref 2–14)
MYELOCYTES NFR BLD: 1.7 % — HIGH (ref 0–0)
NEUTROPHILS # BLD AUTO: 13.52 K/UL — HIGH (ref 1.8–7.4)
NEUTROPHILS NFR BLD AUTO: 80.9 % — HIGH (ref 43–77)
NEUTS BAND # BLD: 7.8 % — SIGNIFICANT CHANGE UP (ref 0–8)
NITRITE UR-MCNC: POSITIVE
NRBC # BLD: 0 /100 WBCS — SIGNIFICANT CHANGE UP (ref 0–0)
NRBC # BLD: 0 /100 WBCS — SIGNIFICANT CHANGE UP (ref 0–0)
OVALOCYTES BLD QL SMEAR: SIGNIFICANT CHANGE UP
P MIRABILIS DNA BLD POS QL NAA+PROBE: SIGNIFICANT CHANGE UP
PCO2 BLDV: 50 MMHG — SIGNIFICANT CHANGE UP (ref 42–55)
PH BLDV: 7.23 — LOW (ref 7.32–7.43)
PH UR: 8.5 — HIGH (ref 5–8)
PHOSPHATE SERPL-MCNC: 3 MG/DL — SIGNIFICANT CHANGE UP (ref 2.5–4.5)
PHOSPHATE SERPL-MCNC: 3.5 MG/DL — SIGNIFICANT CHANGE UP (ref 2.5–4.5)
PLAT MORPH BLD: NORMAL — SIGNIFICANT CHANGE UP
PLATELET # BLD AUTO: 116 K/UL — LOW (ref 150–400)
PLATELET # BLD AUTO: 118 K/UL — LOW (ref 150–400)
PO2 BLDV: 42 MMHG — SIGNIFICANT CHANGE UP (ref 25–45)
POIKILOCYTOSIS BLD QL AUTO: SLIGHT — SIGNIFICANT CHANGE UP
POTASSIUM BLDV-SCNC: 3.1 MMOL/L — LOW (ref 3.5–5.1)
POTASSIUM SERPL-MCNC: 3.5 MMOL/L — SIGNIFICANT CHANGE UP (ref 3.5–5.3)
POTASSIUM SERPL-MCNC: 3.7 MMOL/L — SIGNIFICANT CHANGE UP (ref 3.5–5.3)
POTASSIUM SERPL-MCNC: 4.1 MMOL/L — SIGNIFICANT CHANGE UP (ref 3.5–5.3)
POTASSIUM SERPL-SCNC: 3.5 MMOL/L — SIGNIFICANT CHANGE UP (ref 3.5–5.3)
POTASSIUM SERPL-SCNC: 3.7 MMOL/L — SIGNIFICANT CHANGE UP (ref 3.5–5.3)
POTASSIUM SERPL-SCNC: 4.1 MMOL/L — SIGNIFICANT CHANGE UP (ref 3.5–5.3)
PROT SERPL-MCNC: 5.1 G/DL — LOW (ref 6–8.3)
PROT SERPL-MCNC: 5.4 G/DL — LOW (ref 6–8.3)
PROT SERPL-MCNC: 5.6 G/DL — LOW (ref 6–8.3)
PROT UR-MCNC: ABNORMAL
PROTHROM AB SERPL-ACNC: 16.7 SEC — HIGH (ref 10.6–13.6)
RAPID RVP RESULT: SIGNIFICANT CHANGE UP
RBC # BLD: 3.82 M/UL — LOW (ref 4.2–5.8)
RBC # BLD: 3.96 M/UL — LOW (ref 4.2–5.8)
RBC # FLD: 14.5 % — SIGNIFICANT CHANGE UP (ref 10.3–14.5)
RBC # FLD: 14.6 % — HIGH (ref 10.3–14.5)
RBC BLD AUTO: ABNORMAL
RBC CASTS # UR COMP ASSIST: >50 /HPF — HIGH (ref 0–4)
SAO2 % BLDV: 65.9 % — LOW (ref 67–88)
SARS-COV-2 RNA SPEC QL NAA+PROBE: SIGNIFICANT CHANGE UP
SARS-COV-2 RNA SPEC QL NAA+PROBE: SIGNIFICANT CHANGE UP
SCHISTOCYTES BLD QL AUTO: SLIGHT — SIGNIFICANT CHANGE UP
SODIUM SERPL-SCNC: 138 MMOL/L — SIGNIFICANT CHANGE UP (ref 135–145)
SODIUM SERPL-SCNC: 139 MMOL/L — SIGNIFICANT CHANGE UP (ref 135–145)
SODIUM SERPL-SCNC: 141 MMOL/L — SIGNIFICANT CHANGE UP (ref 135–145)
SP GR SPEC: 1.02 — SIGNIFICANT CHANGE UP (ref 1.01–1.02)
SPECIMEN SOURCE: SIGNIFICANT CHANGE UP
SPECIMEN SOURCE: SIGNIFICANT CHANGE UP
TROPONIN T, HIGH SENSITIVITY RESULT: 90 NG/L — HIGH (ref 0–51)
TROPONIN T, HIGH SENSITIVITY RESULT: 92 NG/L — HIGH (ref 0–51)
TSH SERPL-MCNC: 1.21 UIU/ML — SIGNIFICANT CHANGE UP (ref 0.27–4.2)
UROBILINOGEN FLD QL: NEGATIVE — SIGNIFICANT CHANGE UP
VARIANT LYMPHS # BLD: 0.9 % — SIGNIFICANT CHANGE UP (ref 0–6)
WBC # BLD: 32.36 K/UL — HIGH (ref 3.8–10.5)
WBC # BLD: 35.64 K/UL — HIGH (ref 3.8–10.5)
WBC # FLD AUTO: 32.36 K/UL — HIGH (ref 3.8–10.5)
WBC # FLD AUTO: 35.64 K/UL — HIGH (ref 3.8–10.5)
WBC UR QL: >50 /HPF — HIGH (ref 0–5)

## 2021-11-05 PROCEDURE — 99291 CRITICAL CARE FIRST HOUR: CPT | Mod: 25

## 2021-11-05 PROCEDURE — 76604 US EXAM CHEST: CPT | Mod: 26,GC

## 2021-11-05 RX ORDER — MAGNESIUM SULFATE 500 MG/ML
2 VIAL (ML) INJECTION ONCE
Refills: 0 | Status: COMPLETED | OUTPATIENT
Start: 2021-11-05 | End: 2021-11-05

## 2021-11-05 RX ORDER — DEXTROSE 50 % IN WATER 50 %
25 SYRINGE (ML) INTRAVENOUS ONCE
Refills: 0 | Status: DISCONTINUED | OUTPATIENT
Start: 2021-11-05 | End: 2021-11-12

## 2021-11-05 RX ORDER — INSULIN LISPRO 100/ML
VIAL (ML) SUBCUTANEOUS
Refills: 0 | Status: DISCONTINUED | OUTPATIENT
Start: 2021-11-05 | End: 2021-11-07

## 2021-11-05 RX ORDER — MEROPENEM 1 G/30ML
500 INJECTION INTRAVENOUS ONCE
Refills: 0 | Status: COMPLETED | OUTPATIENT
Start: 2021-11-05 | End: 2021-11-05

## 2021-11-05 RX ORDER — AZTREONAM 2 G
1000 VIAL (EA) INJECTION ONCE
Refills: 0 | Status: COMPLETED | OUTPATIENT
Start: 2021-11-05 | End: 2021-11-05

## 2021-11-05 RX ORDER — DEXTROSE 50 % IN WATER 50 %
12.5 SYRINGE (ML) INTRAVENOUS ONCE
Refills: 0 | Status: DISCONTINUED | OUTPATIENT
Start: 2021-11-05 | End: 2021-11-12

## 2021-11-05 RX ORDER — SODIUM CHLORIDE 9 MG/ML
1000 INJECTION, SOLUTION INTRAVENOUS
Refills: 0 | Status: DISCONTINUED | OUTPATIENT
Start: 2021-11-05 | End: 2021-11-12

## 2021-11-05 RX ORDER — MEROPENEM 1 G/30ML
INJECTION INTRAVENOUS
Refills: 0 | Status: DISCONTINUED | OUTPATIENT
Start: 2021-11-05 | End: 2021-11-08

## 2021-11-05 RX ORDER — MIDODRINE HYDROCHLORIDE 2.5 MG/1
10 TABLET ORAL EVERY 8 HOURS
Refills: 0 | Status: DISCONTINUED | OUTPATIENT
Start: 2021-11-05 | End: 2021-11-07

## 2021-11-05 RX ORDER — INSULIN LISPRO 100/ML
VIAL (ML) SUBCUTANEOUS EVERY 6 HOURS
Refills: 0 | Status: DISCONTINUED | OUTPATIENT
Start: 2021-11-05 | End: 2021-11-05

## 2021-11-05 RX ORDER — SODIUM CHLORIDE 9 MG/ML
1000 INJECTION, SOLUTION INTRAVENOUS ONCE
Refills: 0 | Status: COMPLETED | OUTPATIENT
Start: 2021-11-05 | End: 2021-11-05

## 2021-11-05 RX ORDER — CHLORHEXIDINE GLUCONATE 213 G/1000ML
1 SOLUTION TOPICAL
Refills: 0 | Status: DISCONTINUED | OUTPATIENT
Start: 2021-11-05 | End: 2021-11-12

## 2021-11-05 RX ORDER — INSULIN GLARGINE 100 [IU]/ML
6 INJECTION, SOLUTION SUBCUTANEOUS AT BEDTIME
Refills: 0 | Status: DISCONTINUED | OUTPATIENT
Start: 2021-11-05 | End: 2021-11-12

## 2021-11-05 RX ORDER — AZTREONAM 2 G
1000 VIAL (EA) INJECTION EVERY 12 HOURS
Refills: 0 | Status: DISCONTINUED | OUTPATIENT
Start: 2021-11-05 | End: 2021-11-05

## 2021-11-05 RX ORDER — GLUCAGON INJECTION, SOLUTION 0.5 MG/.1ML
1 INJECTION, SOLUTION SUBCUTANEOUS ONCE
Refills: 0 | Status: DISCONTINUED | OUTPATIENT
Start: 2021-11-05 | End: 2021-11-12

## 2021-11-05 RX ORDER — DEXTROSE 50 % IN WATER 50 %
15 SYRINGE (ML) INTRAVENOUS ONCE
Refills: 0 | Status: DISCONTINUED | OUTPATIENT
Start: 2021-11-05 | End: 2021-11-12

## 2021-11-05 RX ORDER — MEROPENEM 1 G/30ML
500 INJECTION INTRAVENOUS EVERY 12 HOURS
Refills: 0 | Status: DISCONTINUED | OUTPATIENT
Start: 2021-11-05 | End: 2021-11-08

## 2021-11-05 RX ORDER — AZTREONAM 2 G
VIAL (EA) INJECTION
Refills: 0 | Status: DISCONTINUED | OUTPATIENT
Start: 2021-11-05 | End: 2021-11-05

## 2021-11-05 RX ORDER — HEPARIN SODIUM 5000 [USP'U]/ML
5000 INJECTION INTRAVENOUS; SUBCUTANEOUS EVERY 8 HOURS
Refills: 0 | Status: DISCONTINUED | OUTPATIENT
Start: 2021-11-05 | End: 2021-11-06

## 2021-11-05 RX ADMIN — Medication 50 MILLIGRAM(S): at 05:59

## 2021-11-05 RX ADMIN — MIDODRINE HYDROCHLORIDE 10 MILLIGRAM(S): 2.5 TABLET ORAL at 12:22

## 2021-11-05 RX ADMIN — Medication 3: at 12:26

## 2021-11-05 RX ADMIN — INSULIN GLARGINE 6 UNIT(S): 100 INJECTION, SOLUTION SUBCUTANEOUS at 22:43

## 2021-11-05 RX ADMIN — MIDODRINE HYDROCHLORIDE 10 MILLIGRAM(S): 2.5 TABLET ORAL at 22:16

## 2021-11-05 RX ADMIN — MEROPENEM 100 MILLIGRAM(S): 1 INJECTION INTRAVENOUS at 12:21

## 2021-11-05 RX ADMIN — Medication 7.5 MICROGRAM(S)/KG/MIN: at 00:33

## 2021-11-05 RX ADMIN — Medication 7.5 MICROGRAM(S)/KG/MIN: at 22:24

## 2021-11-05 RX ADMIN — CHLORHEXIDINE GLUCONATE 1 APPLICATION(S): 213 SOLUTION TOPICAL at 05:10

## 2021-11-05 RX ADMIN — Medication 2: at 22:36

## 2021-11-05 RX ADMIN — HEPARIN SODIUM 5000 UNIT(S): 5000 INJECTION INTRAVENOUS; SUBCUTANEOUS at 13:04

## 2021-11-05 RX ADMIN — SODIUM CHLORIDE 500 MILLILITER(S): 9 INJECTION INTRAMUSCULAR; INTRAVENOUS; SUBCUTANEOUS at 00:00

## 2021-11-05 RX ADMIN — Medication 50 GRAM(S): at 06:09

## 2021-11-05 RX ADMIN — MEROPENEM 100 MILLIGRAM(S): 1 INJECTION INTRAVENOUS at 17:20

## 2021-11-05 RX ADMIN — HEPARIN SODIUM 5000 UNIT(S): 5000 INJECTION INTRAVENOUS; SUBCUTANEOUS at 22:16

## 2021-11-05 RX ADMIN — CEFEPIME 100 MILLIGRAM(S): 1 INJECTION, POWDER, FOR SOLUTION INTRAMUSCULAR; INTRAVENOUS at 02:08

## 2021-11-05 RX ADMIN — Medication 6: at 17:18

## 2021-11-05 RX ADMIN — SODIUM CHLORIDE 1000 MILLILITER(S): 9 INJECTION, SOLUTION INTRAVENOUS at 02:36

## 2021-11-05 RX ADMIN — Medication 3: at 05:09

## 2021-11-05 RX ADMIN — Medication 7.5 MICROGRAM(S)/KG/MIN: at 05:10

## 2021-11-05 NOTE — ED ADULT NURSE NOTE - OBJECTIVE STATEMENT
89yM, PMH afib- on eliquis, implanted pacemaker, CAD with multiple stents, Hypertension, Hyperlipidemia, Benign Prostatic Hyperplasia presents to the ED via EMS c/o AMS/ "unresponsiveness". Pt was diagnosed w/ a UTI yesterday by pcp, started on bactrim, pt A&Ox0, unable to provide a history at this time due to clinical condition. Son in law went to house today and noted pt to be unarousable. When medics arrived, pt was hypoxic to 88%, hypotensive 80/50s, Received 1L NS pta, Upon arrival to [facility] he was SBP in the 100s, sating 90-93% on RA, febrile. Cough noted, Pt is aaox2-3 at baseline and ambulates with a walker. Pulses x 4, moving all extremities, abdomen soft nontender nondistended, blanchable redness noted to sacrum with 1 healing stage 2 approx, [id number].

## 2021-11-05 NOTE — H&P ADULT - NSHPLABSRESULTS_GEN_ALL_CORE
LABS:                        12.2   15.24 )-----------( 116      ( 2021 23:43 )             36.7     Hgb Trend: 12.2<--      141  |  103  |  50<H>  ----------------------------<  295<H>  3.7   |  21<L>  |  2.36<H>    Ca    8.9      2021 23:36    TPro  5.4<L>  /  Alb  2.5<L>  /  TBili  0.7  /  DBili  x   /  AST  27  /  ALT  26  /  AlkPhos  327<H>      Creatinine Trend: 2.36<--  PT/INR - ( 2021 23:43 )   PT: 16.7 sec;   INR: 1.41 ratio         PTT - ( 2021 23:43 )  PTT:28.5 sec  Urinalysis Basic - ( 2021 23:58 )    Color: Red / Appearance: Slightly Turbid / S.021 / pH: x  Gluc: x / Ketone: Trace  / Bili: Moderate / Urobili: Negative   Blood: x / Protein: 300 mg/dL / Nitrite: Positive   Leuk Esterase: Large / RBC: >50 /hpf / WBC >50 /HPF   Sq Epi: x / Non Sq Epi: 5 / Bacteria: Many        Venous Blood Gas:   @ 23:43  7.29/51/24/24/32.6  VBG Lactate: 7.2 LABS:                        12.2   15.24 )-----------( 116      ( 2021 23:43 )             36.7     Hgb Trend: 12.2<--      141  |  103  |  50<H>  ----------------------------<  295<H>  3.7   |  21<L>  |  2.36<H>    Ca    8.9      2021 23:36    TPro  5.4<L>  /  Alb  2.5<L>  /  TBili  0.7  /  DBili  x   /  AST  27  /  ALT  26  /  AlkPhos  327<H>      Creatinine Trend: 2.36<--  PT/INR - ( 2021 23:43 )   PT: 16.7 sec;   INR: 1.41 ratio         PTT - ( 2021 23:43 )  PTT:28.5 sec  Urinalysis Basic - ( 2021 23:58 )    Color: Red / Appearance: Slightly Turbid / S.021 / pH: x  Gluc: x / Ketone: Trace  / Bili: Moderate / Urobili: Negative   Blood: x / Protein: 300 mg/dL / Nitrite: Positive   Leuk Esterase: Large / RBC: >50 /hpf / WBC >50 /HPF   Sq Epi: x / Non Sq Epi: 5 / Bacteria: Many      Venous Blood Gas:   @ 23:43  7.29/51/24/24/32.6  VBG Lactate: 7.2    RADIOLOGY:  < from: Xray Chest 1 View- PORTABLE-Urgent (21 @ 23:39) >      Clear lungs. Left pleural effusion.

## 2021-11-05 NOTE — H&P ADULT - HISTORY OF PRESENT ILLNESS
HPI: 89 M w/ hx of  Atrial Fibrillation on eliquis, implanted pacemaker, CAD with multiple stents, Hypertension, Hyperlipidemia, Benign Prostatic Hyperplasia p/w "unresponsiveness". Pt was dx w/ a UTI yesterday due to blood in the urine. Pt was started on bactrim, pt unable to provide a history at this time due to clinical condition. PT was bibems, since son in law went to house and noted to be unarousable to tactile stimulation. When medics arrived, he was hypoxic to 88, hypotensive 80/50s, he was started on IVF, and was given approx 1L PTA. Upon arrival to [facility] he was SBP in the 100s, sating 90-93 percent on RA, he has a cough. Pt is aaox2-3 at baseline uses a walker, has 24 hour aide.     In ED pt received 2 L NS vanc cefepime and was started on levo maps ~60

## 2021-11-05 NOTE — H&P ADULT - ASSESSMENT
89 M w/ hx of  Atrial Fibrillation on eliquis, implanted pacemaker, CAD with multiple stents, Hypertension, Hyperlipidemia, Benign Prostatic Hyperplasia found "unresponsive". Pt was dx w/ a UTI yesterday p/w likely septic shock.       #Neuro  AMS likely 2/2 septic shock   typically A+O x4 per son in law     #Resp  L sided plueral effusion could represent asp PNA   on 2L nasal cannula will titrate     #CV  hypotensive 2/2 septic shock 93/46 (59)   on .1 levo drip, LR     #GI  NPO due to AMS    #ID  septic shock 2/2 UTI given vanc and cefepime will continue cefepime    pending blood and urine cultures      #Renal  increased CR 2.36 likely 2/2 septic shock will continue LR      #Heme  increased INR will continue to monitor     #Endo  increased glucose likely 2/2 sepsis q6hr glucose checks due to NPO    #DVT PPx  AFIB on apixaban will continue 2.5 dose once able to tolerate PO  SCDs 89 M w/ hx of  Atrial Fibrillation on eliquis, implanted pacemaker, CAD with multiple stents, Hypertension, Hyperlipidemia, Benign Prostatic Hyperplasia found "unresponsive". Pt was dx w/ a UTI yesterday p/w likely septic shock.       #Neuro  acute encephalopathy likely 2/2 septic shock   typically A+O x4 per son in law     #Resp  Acute hypoxic resp failure likely due to aspiration pneumonitis vs pneumonia  -O2 via NC to maintain sat>92%  -abx as below    #CV  hypotensive 2/2 septic shock from UTI  -s/p IVF; POCUS shows decreased LVEF, IVC plethoric - will not volume resuscitate further  -Levo gtt to maintain MAP>65    #GI  NPO due to AMS    #ID  septic shock 2/2 UTI vs aspiration pneumonia  -followup cultures  -aztreonam      #Renal  JAMIE due to volume depletion and JAMIE from septic shock  -strict I/O  -trend Cr/UOP    #Heme  increased INR will continue to monitor     #Endo  increased glucose likely 2/2 sepsis   -q6hr glucose checks with ISS    #DVT PPx  AFIB on apixaban will continue 2.5 dose once able to tolerate PO  SCDs

## 2021-11-05 NOTE — CHART NOTE - NSCHARTNOTEFT_GEN_A_CORE
: Sindhu TREVINO NP     INDICATION: Shock      PROCEDURE:  [x ] LIMITED ECHO  [x ] LIMITED CHEST  [ ] LIMITED RETROPERITONEAL  [ x] LIMITED ABDOMINAL  [ ] LIMITED DVT  [ ] NEEDLE GUIDANCE VASCULAR  [ ] NEEDLE GUIDANCE THORACENTESIS  [ ] NEEDLE GUIDANCE PARACENTESIS  [ ] NEEDLE GUIDANCE PERICARDIOCENTESIS  [ ] OTHER    FINDINGS:  Echo   RV slightly smaller than LV   LV with decreased systolic function   IVC 2.7   no pericardial effusion is noted   RV with Pacing wire noted     Chest   Bilateral A line predominance   - some scattered b lines to right base no consolidation noted   - Small Left pleural effusion with very small consolidation     ABd   - right and left kidney without any hydronephrosis or nephrolithiasis noted   Bladder is distended with anechoic  fluid        INTERPRETATION:    RV slightly smaller than LV  LV with decreased systolic function  IVC 2.7   RV with (+) PPM wire Small left pleural effusion with some consolidation   (+) anechoic fluid in bladder bilateral kidney WNL

## 2021-11-05 NOTE — H&P ADULT - ATTENDING COMMENTS
Patient seen and examined.  Agree with resident note as above.  Patient with hx as noted including BPH and previous UTI who was diagnosed with UTI yesterday, who now presents ot [facility] with altered mentation, with subsequent cough and acute hypoxia.  Appears that patient became altered due to acute cystitis, and then may have aspiration pneumonia vs pneumonitis with acute hypoxic resp failure as a result.  Patient had hypoTN in ER that was not responsive to IVF, so is now admitted to MICU for pressors in the setting of septic shock from UTI and possible PNA.    POCUS on arrival shows mostly A lines, decreased LVEF, RV<LV, IVC plethoric.  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  Is volume replete.    Will support BP with Levo gtt, follow cx, give empiric Aztreonam to cover urine and lung.  JAMIE- trend UOP and strict I/O.  LIkely ATN due to sepsis.  Continue full AC for fib, will provide DVT ppx.  Daughter is surrogate decision maker.  FULL CODE.  Rest of plan as above and I have edited as appropriate.

## 2021-11-05 NOTE — ED ADULT NURSE REASSESSMENT NOTE - NS ED NURSE REASSESS COMMENT FT1
Patient straight cathed for urine using sterile technique. Second RN present to confirm sterility. Explained procedure as it was being done - Pt tolerated procedure well. Sterile specimens collected and sent to lab as ordered. Initial output of 20mL drained. Comfort and safety provided.

## 2021-11-05 NOTE — H&P ADULT - NSHPPHYSICALEXAM_GEN_ALL_CORE
OBJECTIVE:  ICU Vital Signs Last 24 Hrs  T(C): 39.2 (04 Nov 2021 23:00), Max: 39.2 (04 Nov 2021 23:00)  T(F): 102.6 (04 Nov 2021 23:00), Max: 102.6 (04 Nov 2021 23:00)  HR: 102 (05 Nov 2021 00:03) (101 - 104)  BP: 114/46 (05 Nov 2021 00:03) (86/47 - 114/46)  BP(mean): 66 (05 Nov 2021 00:03) (58 - 67)  ABP: --  ABP(mean): --  RR: 24 (05 Nov 2021 00:03) (24 - 32)  SpO2: 100% (05 Nov 2021 00:03) (96% - 100%)        CAPILLARY BLOOD GLUCOSE      POCT Blood Glucose.: 293 mg/dL (05 Nov 2021 00:09)    Gen:  non-toxic appearing  Head: normal appearing  HEENT: normal conjunctiva, oral mucosa moist  Lung: no respiratory distress, CTA b/l     CV: regular rate and rhythm, no murmurs  Abd: soft, non distended, non tender   MSK: no visible deformities  Neuro: pt unarousable   Skin: Warm

## 2021-11-05 NOTE — H&P ADULT - NSHPREVIEWOFSYSTEMS_GEN_ALL_CORE
REVIEW OF SYSTEMS:  Constitutional: [ ] fevers [ ] chills [ ] weight loss [ ] weight gain  HEENT: [ ] dry eyes [ ] eye irritation [ ] postnasal drip [ ] nasal congestion  CV: [ ] chest pain [ ] orthopnea [ ] palpitations [ ] murmur  Resp: [ ] cough [ ] shortness of breath [ ] dyspnea [ ] wheezing [ ] sputum [ ] hemoptysis  GI: [ ] nausea [ ] vomiting [ ] diarrhea [ ] constipation [ ] abd pain [ ] dysphagia   : [ ] dysuria [ ] nocturia [ ] hematuria [ ] increased urinary frequency  Musculoskeletal: [ ] back pain [ ] myalgias [ ] arthralgias [ ] fracture  Skin: [ ] rash [ ] itch  Neurological: [ ] headache [ ] dizziness [ ] syncope [ ] weakness [ ] numbness  Psychiatric: [ ] anxiety [ ] depression  Endocrine: [ ] diabetes [ ] thyroid problem  Hematologic/Lymphatic: [ ] anemia [ ] bleeding problem  Allergic/Immunologic: [ ] itchy eyes [ ] nasal discharge [ ] hives [ ] angioedema  [ ] All other systems negative  [ X] Unable to assess ROS because __pt AMS______ REVIEW OF SYSTEMS:  [ X] Unable to assess ROS because __pt AMS______

## 2021-11-06 DIAGNOSIS — E78.5 HYPERLIPIDEMIA, UNSPECIFIED: ICD-10-CM

## 2021-11-06 DIAGNOSIS — I10 ESSENTIAL (PRIMARY) HYPERTENSION: ICD-10-CM

## 2021-11-06 DIAGNOSIS — E11.65 TYPE 2 DIABETES MELLITUS WITH HYPERGLYCEMIA: ICD-10-CM

## 2021-11-06 LAB
A1C WITH ESTIMATED AVERAGE GLUCOSE RESULT: 7.4 % — HIGH (ref 4–5.6)
ALBUMIN SERPL ELPH-MCNC: 2.3 G/DL — LOW (ref 3.3–5)
ALP SERPL-CCNC: 122 U/L — HIGH (ref 40–120)
ALT FLD-CCNC: 32 U/L — SIGNIFICANT CHANGE UP (ref 10–45)
ANION GAP SERPL CALC-SCNC: 12 MMOL/L — SIGNIFICANT CHANGE UP (ref 5–17)
AST SERPL-CCNC: 45 U/L — HIGH (ref 10–40)
BASOPHILS # BLD AUTO: 0.02 K/UL — SIGNIFICANT CHANGE UP (ref 0–0.2)
BASOPHILS NFR BLD AUTO: 0.1 % — SIGNIFICANT CHANGE UP (ref 0–2)
BILIRUB SERPL-MCNC: 0.3 MG/DL — SIGNIFICANT CHANGE UP (ref 0.2–1.2)
BUN SERPL-MCNC: 59 MG/DL — HIGH (ref 7–23)
CALCIUM SERPL-MCNC: 8.5 MG/DL — SIGNIFICANT CHANGE UP (ref 8.4–10.5)
CHLORIDE SERPL-SCNC: 105 MMOL/L — SIGNIFICANT CHANGE UP (ref 96–108)
CHOLEST SERPL-MCNC: 94 MG/DL — SIGNIFICANT CHANGE UP
CO2 SERPL-SCNC: 21 MMOL/L — LOW (ref 22–31)
CREAT SERPL-MCNC: 2.38 MG/DL — HIGH (ref 0.5–1.3)
EOSINOPHIL # BLD AUTO: 0.01 K/UL — SIGNIFICANT CHANGE UP (ref 0–0.5)
EOSINOPHIL NFR BLD AUTO: 0 % — SIGNIFICANT CHANGE UP (ref 0–6)
ESTIMATED AVERAGE GLUCOSE: 166 MG/DL — HIGH (ref 68–114)
GLUCOSE BLDC GLUCOMTR-MCNC: 118 MG/DL — HIGH (ref 70–99)
GLUCOSE BLDC GLUCOMTR-MCNC: 136 MG/DL — HIGH (ref 70–99)
GLUCOSE BLDC GLUCOMTR-MCNC: 139 MG/DL — HIGH (ref 70–99)
GLUCOSE BLDC GLUCOMTR-MCNC: 148 MG/DL — HIGH (ref 70–99)
GLUCOSE SERPL-MCNC: 154 MG/DL — HIGH (ref 70–99)
HCT VFR BLD CALC: 31.5 % — LOW (ref 39–50)
HDLC SERPL-MCNC: 40 MG/DL — LOW
HGB BLD-MCNC: 10.6 G/DL — LOW (ref 13–17)
IMM GRANULOCYTES NFR BLD AUTO: 7.7 % — HIGH (ref 0–1.5)
LIPID PNL WITH DIRECT LDL SERPL: 27 MG/DL — SIGNIFICANT CHANGE UP
LYMPHOCYTES # BLD AUTO: 1.16 K/UL — SIGNIFICANT CHANGE UP (ref 1–3.3)
LYMPHOCYTES # BLD AUTO: 3.5 % — LOW (ref 13–44)
MAGNESIUM SERPL-MCNC: 2.2 MG/DL — SIGNIFICANT CHANGE UP (ref 1.6–2.6)
MCHC RBC-ENTMCNC: 29.3 PG — SIGNIFICANT CHANGE UP (ref 27–34)
MCHC RBC-ENTMCNC: 33.7 GM/DL — SIGNIFICANT CHANGE UP (ref 32–36)
MCV RBC AUTO: 87 FL — SIGNIFICANT CHANGE UP (ref 80–100)
MONOCYTES # BLD AUTO: 1.69 K/UL — HIGH (ref 0–0.9)
MONOCYTES NFR BLD AUTO: 5.1 % — SIGNIFICANT CHANGE UP (ref 2–14)
MRSA PCR RESULT.: SIGNIFICANT CHANGE UP
NEUTROPHILS # BLD AUTO: 27.71 K/UL — HIGH (ref 1.8–7.4)
NEUTROPHILS NFR BLD AUTO: 83.6 % — HIGH (ref 43–77)
NON HDL CHOLESTEROL: 55 MG/DL — SIGNIFICANT CHANGE UP
NRBC # BLD: 0 /100 WBCS — SIGNIFICANT CHANGE UP (ref 0–0)
PHOSPHATE SERPL-MCNC: 3.3 MG/DL — SIGNIFICANT CHANGE UP (ref 2.5–4.5)
PLATELET # BLD AUTO: 113 K/UL — LOW (ref 150–400)
POTASSIUM SERPL-MCNC: 4 MMOL/L — SIGNIFICANT CHANGE UP (ref 3.5–5.3)
POTASSIUM SERPL-SCNC: 4 MMOL/L — SIGNIFICANT CHANGE UP (ref 3.5–5.3)
PROT SERPL-MCNC: 5.4 G/DL — LOW (ref 6–8.3)
RBC # BLD: 3.62 M/UL — LOW (ref 4.2–5.8)
RBC # FLD: 14.7 % — HIGH (ref 10.3–14.5)
S AUREUS DNA NOSE QL NAA+PROBE: SIGNIFICANT CHANGE UP
SODIUM SERPL-SCNC: 138 MMOL/L — SIGNIFICANT CHANGE UP (ref 135–145)
TRIGL SERPL-MCNC: 137 MG/DL — SIGNIFICANT CHANGE UP
WBC # BLD: 33.13 K/UL — HIGH (ref 3.8–10.5)
WBC # FLD AUTO: 33.13 K/UL — HIGH (ref 3.8–10.5)

## 2021-11-06 PROCEDURE — 99232 SBSQ HOSP IP/OBS MODERATE 35: CPT

## 2021-11-06 PROCEDURE — 99222 1ST HOSP IP/OBS MODERATE 55: CPT

## 2021-11-06 RX ORDER — FINASTERIDE 5 MG/1
5 TABLET, FILM COATED ORAL DAILY
Refills: 0 | Status: DISCONTINUED | OUTPATIENT
Start: 2021-11-06 | End: 2021-11-12

## 2021-11-06 RX ORDER — TAMSULOSIN HYDROCHLORIDE 0.4 MG/1
0.4 CAPSULE ORAL AT BEDTIME
Refills: 0 | Status: DISCONTINUED | OUTPATIENT
Start: 2021-11-06 | End: 2021-11-12

## 2021-11-06 RX ORDER — ACETAMINOPHEN 500 MG
650 TABLET ORAL EVERY 6 HOURS
Refills: 0 | Status: DISCONTINUED | OUTPATIENT
Start: 2021-11-06 | End: 2021-11-12

## 2021-11-06 RX ORDER — POLYETHYLENE GLYCOL 3350 17 G/17G
17 POWDER, FOR SOLUTION ORAL DAILY
Refills: 0 | Status: DISCONTINUED | OUTPATIENT
Start: 2021-11-06 | End: 2021-11-12

## 2021-11-06 RX ORDER — SENNA PLUS 8.6 MG/1
2 TABLET ORAL AT BEDTIME
Refills: 0 | Status: DISCONTINUED | OUTPATIENT
Start: 2021-11-06 | End: 2021-11-12

## 2021-11-06 RX ORDER — APIXABAN 2.5 MG/1
2.5 TABLET, FILM COATED ORAL
Refills: 0 | Status: DISCONTINUED | OUTPATIENT
Start: 2021-11-06 | End: 2021-11-12

## 2021-11-06 RX ADMIN — Medication 2 DROP(S): at 16:50

## 2021-11-06 RX ADMIN — FINASTERIDE 5 MILLIGRAM(S): 5 TABLET, FILM COATED ORAL at 13:36

## 2021-11-06 RX ADMIN — MIDODRINE HYDROCHLORIDE 10 MILLIGRAM(S): 2.5 TABLET ORAL at 05:55

## 2021-11-06 RX ADMIN — Medication 2 DROP(S): at 12:28

## 2021-11-06 RX ADMIN — CHLORHEXIDINE GLUCONATE 1 APPLICATION(S): 213 SOLUTION TOPICAL at 05:58

## 2021-11-06 RX ADMIN — TAMSULOSIN HYDROCHLORIDE 0.4 MILLIGRAM(S): 0.4 CAPSULE ORAL at 22:33

## 2021-11-06 RX ADMIN — MEROPENEM 100 MILLIGRAM(S): 1 INJECTION INTRAVENOUS at 05:56

## 2021-11-06 RX ADMIN — MEROPENEM 100 MILLIGRAM(S): 1 INJECTION INTRAVENOUS at 18:18

## 2021-11-06 RX ADMIN — Medication 2 DROP(S): at 21:37

## 2021-11-06 RX ADMIN — Medication 2 DROP(S): at 18:17

## 2021-11-06 RX ADMIN — APIXABAN 2.5 MILLIGRAM(S): 2.5 TABLET, FILM COATED ORAL at 11:00

## 2021-11-06 RX ADMIN — APIXABAN 2.5 MILLIGRAM(S): 2.5 TABLET, FILM COATED ORAL at 22:33

## 2021-11-06 RX ADMIN — INSULIN GLARGINE 6 UNIT(S): 100 INJECTION, SOLUTION SUBCUTANEOUS at 22:43

## 2021-11-06 RX ADMIN — HEPARIN SODIUM 5000 UNIT(S): 5000 INJECTION INTRAVENOUS; SUBCUTANEOUS at 05:55

## 2021-11-06 NOTE — PROGRESS NOTE ADULT - ASSESSMENT
89 M w/ hx of  Atrial Fibrillation on eliquis, implanted pacemaker, CAD with multiple stents, Hypertension, Hyperlipidemia, Benign Prostatic Hyperplasia found "unresponsive" was foudn to be in septic shock likely multifactorial proteus m.  bacteremia and urosepsis. .       #Neuro; acute encephalopathy likely 2/2 septic shock   -resolved   -at the baseline  A+O x4 , [place]   -c/w neuro checks      #Resp  AMS ; monitor  airway compromise , hypoxia 85% , L pleural effusion   -admitted to MICU with septic shock and airway monitoring   -weaned off o2   -c/t monitor o2sat ; maintain o2sat >93%     #CV  Septic shock multifactorial proteus m bacteremia , UTI   -off levophed gtt since 12 midnight   -c/w Midodrine 10 TID ; maintain MAP >65  -c/w ABX       A fib  -restarted on home dose of eliqus   -telemetry with NSR 66  -c/ tele monitoring     #GI  Tolerating PO intake   -c/ aspiration precaution       #Renal  JAMIE in setting of septic shock   -send urin lytes and osmo   -bladder US   -renal US   -trend BUN /cr   -renally dose drugs   -avoid nephrotoxins     #ID  septic shock 2/2 multifactorial proteus m bacteremia , UTI   -send repeat BC x 2 for clearance   -pending MRSA and Ucx results   -c/w meropenem ; pending sensitivity and Ucx and BCx #2 speciation   -trend T curve   -trend WBC     #Heme  Thrombocytopenia likely in setting of sepsis   -will trend CBC; stable   -check INR   -bleeding precaution      #Endo  Aic 7.4   -no hx of DM   -c/w mos ISS q AC & HS   -over night started on Lantus   -will get ENDO consult       PPX  #DVT PPx; restarted on Eliquis    GOC     89 M w/ hx of  Atrial Fibrillation on eliquis, implanted pacemaker, CAD with multiple stents, Hypertension, Hyperlipidemia, Benign Prostatic Hyperplasia found "unresponsive" was foudn to be in septic shock likely multifactorial proteus m.  bacteremia and urosepsis. .       #Neuro; acute encephalopathy likely 2/2 septic shock   -resolved   -at the baseline  A+O x4 , [place]   -c/w neuro checks      #Resp  AMS ; monitor  airway compromise , hypoxia 85% , L pleural effusion   -admitted to MICU with septic shock and airway monitoring   -weaned off o2   -c/t monitor o2sat ; maintain o2sat >93%     #CV  Septic shock multifactorial proteus m bacteremia , UTI   -off levophed gtt since 12 midnight   -c/w Midodrine 10 TID ; maintain MAP >65  -c/w ABX       A fib  -restarted on home dose of eliqus   -telemetry with NSR 66  -c/ tele monitoring     #GI  Tolerating PO intake   -c/ aspiration precaution       #Renal  JAMIE in setting of septic shock   -hx of BPH   -send urin lytes and osmo   -bladder US   -renal US   -trend BUN /cr   -renally dose drugs   -avoid nephrotoxins   -start Flomax and fenestrae home dose   -bladder scan x 1     #ID  septic shock 2/2 multifactorial proteus m bacteremia , UTI   -send repeat BC x 2 for clearance   -pending MRSA and Ucx results   -c/w meropenem ; pending sensitivity and Ucx and BCx #2 speciation   -trend T curve   -trend WBC     #Heme  Thrombocytopenia likely in setting of sepsis   -will trend CBC; stable   -check INR   -bleeding precaution      #Endo  Aic 7.4   -no hx of DM   -c/w mos ISS q AC & HS   -over night started on Lantus   -will get ENDO consult       PPX  #DVT PPx; restarted on Eliquis    GOC     89 M w/ hx of  Atrial Fibrillation on eliquis, implanted pacemaker, CAD with multiple stents, Hypertension, Hyperlipidemia, Benign Prostatic Hyperplasia found "unresponsive" was foudn to be in septic shock likely multifactorial proteus m.  bacteremia and urosepsis. .       #Neuro; acute encephalopathy likely 2/2 septic shock   -resolved   -at the baseline  A+O x4 , [place]   -c/w neuro checks      #Resp  AMS ; monitor  airway compromise , hypoxia 85% , L pleural effusion   -admitted to MICU with septic shock and airway monitoring   -weaned off o2   -c/t monitor o2sat ; maintain o2sat >93%     #CV  Septic shock multifactorial proteus m bacteremia , UTI   -off levophed gtt since 12 midnight   -c/w Midodrine 10 TID ; maintain MAP >65  -c/w ABX   -holding on ASA 81 as pt platelets dropping . Pt was on heparin SQ for 24 hrs ; if c/t drop will consider sending HIT . Pt also has increase in LFT's . will send hemolysis lab .       A fib  -restarted on home dose of eliqus   -telemetry with NSR 66  -c/ tele monitoring     #GI  Transaminitis   -will obtain AB US   Tolerating PO intake   -c/ aspiration precaution       #Renal  JAMIE in setting of septic shock   -hx of BPH   -send urin lytes and osmo   -bladder US   -renal US   -trend BUN /cr   -renally dose drugs   -avoid nephrotoxins   -start Flomax and fenestrae home dose   -bladder scan x 1     #ID  septic shock 2/2 multifactorial proteus m bacteremia , UTI   -send repeat BC x 2 for clearance   -pending MRSA and Ucx results   -c/w meropenem ; pending sensitivity and Ucx and BCx #2 speciation   -trend T curve   -trend WBC     #Heme  Thrombocytopenia likely in setting of sepsis   -will trend CBC; stable   -check INR   -bleeding precaution      #Endo  Aic 7.4   -no hx of DM   -c/w mos ISS q AC & HS   -over night started on Lantus   -will get ENDO consult       PPX  #DVT PPx; restarted on Eliquis    GOC     89 M w/ hx of  Atrial Fibrillation on eliquis, implanted pacemaker, CAD with multiple stents, Hypertension, Hyperlipidemia, Benign Prostatic Hyperplasia found "unresponsive" was foudn to be in septic shock likely multifactorial proteus m.  bacteremia and urosepsis. .       #Neuro; acute encephalopathy likely 2/2 septic shock   -resolved   -at the baseline  A+O x4 , [place]   -c/w neuro checks      #Resp  AMS ; monitor  airway compromise , hypoxia 85% , L pleural effusion   -admitted to MICU with septic shock and airway monitoring   -weaned off o2   -c/t monitor o2sat ; maintain o2sat >93%     #CV  Septic shock multifactorial proteus m bacteremia , UTI   -off levophed gtt since 12 midnight   -c/w Midodrine 10 TID ; maintain MAP >65  -c/w ABX   -holding on ASA 81 as pt platelets dropping . Pt was on heparin SQ for 24 hrs ; if c/t drop will consider sending HIT . Pt also has increase in LFT's . will send hemolysis lab .       A fib  -restarted on home dose of eliqus   -telemetry with NSR 66  -c/ tele monitoring     #GI  Transaminitis   -will obtain AB US   -avoid hepatotoxins   -send labs   -c/w  PO intake   -c/ aspiration precaution       #Renal  JAMIE in setting of septic shock   -hx of BPH   -send urin lytes and osmo   -bladder US   -renal US   -trend BUN /cr   -renally dose drugs   -avoid nephrotoxins   -start Flomax and fenestrae home dose   -bladder scan x 1     #ID  septic shock 2/2 multifactorial proteus m bacteremia , UTI   -send repeat BC x 2 for clearance   -pending MRSA and Ucx results   -c/w meropenem ; pending sensitivity and Ucx and BCx #2 speciation   -trend T curve   -trend WBC     #Heme  Thrombocytopenia likely in setting of sepsis   -will trend CBC; stable   -check INR   -bleeding precaution      #Endo  Aic 7.4   -no hx of DM   -c/w mos ISS q AC & HS   -over night started on Lantus   -will get ENDO consult       PPX  #DVT PPx; restarted on Eliquis    GOC

## 2021-11-06 NOTE — CONSULT NOTE ADULT - PROBLEM SELECTOR RECOMMENDATION 9
Diabetes Education and Nutrition Eval  No need for tight glycemic control given age. Goal A1c <8% Goal Glucose 100-200  Can continue to monitor on Lantus 6 units qhs and low correction scale qac + bedtime  Outpt. PCP follow-up  Outpt. optho, podiatry, nephrology  Plan to d/c with dietary and lifestyle modifications first. If A1c increases in 3 months can consider tradjenta 5mg daily.

## 2021-11-06 NOTE — CONSULT NOTE ADULT - PROBLEM SELECTOR RECOMMENDATION 3
statin therapy as outpatient with hx of risk factors.     Will sign off at this time. Glucose controlled on current regimen of only basal and correction scale. Discharge plan without pharmacologic agents. Can first focus on dietary modifications. Consider tradjenta as outpatient.     Cuba Casper D.O  [phone]

## 2021-11-06 NOTE — PROGRESS NOTE ADULT - SUBJECTIVE AND OBJECTIVE BOX
INTERVAL HPI/OVERNIGHT EVENTS: Titrated levo off since midnight .     Patient is a 89y old  Male who presents with a chief complaint of       HPI: 89 M w/ hx of  Atrial Fibrillation on eliquis, implanted pacemaker, CAD with multiple stents, Hypertension, Hyperlipidemia, Benign Prostatic Hyperplasia p/w "unresponsiveness". Pt was dx w/ a UTI yesterday due to blood in the urine. Pt was started on bactrim, pt unable to provide a history at this time due to clinical condition. PT was bibems, since son in law went to house and noted to be unarousable to tactile stimulation. When medics arrived, he was hypoxic to 88, hypotensive 80/50s, he was started on IVF, and was given approx 1L PTA. Upon arrival to [facility] he was SBP in the 100s, sating 90-93 percent on RA, he has a cough. Pt is aaox2-3 at baseline uses a walker, has 24 hour aide.     In ED pt received 2 L NS vanc cefepime and was started on levo maps ~60      ROS:  CONSTITUTIONAL:  Negative fever or chills, feels better, good appetite  EYES:  Negative  blurry vision   CARDIOVASCULAR:  Negative for chest pain or palpitations  RESPIRATORY:  Negative for cough, wheezing, or SOB   GASTROINTESTINAL:  Negative for nausea, vomiting, diarrhea, constipation, or abdominal pain  GENITOURINARY:  Negative frequency, urgency or dysuria  NEUROLOGIC: + Torres Martinez on R ear . wears hearing aid .  No headache, confusion, dizziness, lightheadedness    Allergies    penicillin (Rash)    Intolerances        ANTIBIOTICS/RELEVANT:  antimicrobials  meropenem  IVPB      meropenem  IVPB 500 milliGRAM(s) IV Intermittent every 12 hours    immunologic:    OTHER:  chlorhexidine 4% Liquid 1 Application(s) Topical <User Schedule>  dextrose 40% Gel 15 Gram(s) Oral once  dextrose 5%. 1000 milliLiter(s) IV Continuous <Continuous>  dextrose 5%. 1000 milliLiter(s) IV Continuous <Continuous>  dextrose 50% Injectable 25 Gram(s) IV Push once  dextrose 50% Injectable 12.5 Gram(s) IV Push once  dextrose 50% Injectable 25 Gram(s) IV Push once  glucagon  Injectable 1 milliGRAM(s) IntraMuscular once  heparin   Injectable 5000 Unit(s) SubCutaneous every 8 hours  insulin glargine Injectable (LANTUS) 6 Unit(s) SubCutaneous at bedtime  insulin lispro (ADMELOG) corrective regimen sliding scale   SubCutaneous Before meals and at bedtime  midodrine 10 milliGRAM(s) Oral every 8 hours  norepinephrine Infusion 0.05 MICROgram(s)/kG/Min IV Continuous <Continuous>      Objective:  Vital Signs Last 24 Hrs  T(C): 36.4 (2021 00:00), Max: 37.4 (2021 16:00)  T(F): 97.6 (2021 00:00), Max: 99.3 (2021 16:00)  HR: 66 (2021 04:00) (62 - 89)  BP: 106/53 (2021 04:00) (86/48 - 141/65)  BP(mean): 76 (2021 04:00) (62 - 94)  RR: 16 (2021 04:00) (16 - 40)  SpO2: 96% (2021 04:00) (91% - 100%)    PHYSICAL EXAM:  General - Awake, responds to stimuli , follow commands , Torres Martinez   HEENT - dry mucosal membranes.    CV - s1 & s 2 +   Resp - CTA b/l , diminished BS at the bases , no wheezing   Abdomen - soft , NT, ND, + BS x 4 quadrants   Extremities - no edema   Skin - pink, warm and dry       LABS:                        10.6   33.13 )-----------( 113      ( 2021 00:27 )             31.5     11-06    138  |  105  |  59<H>  ----------------------------<  154<H>  4.0   |  21<L>  |  2.38<H>    Ca    8.5      2021 00:27  Phos  3.3     11-  Mg     2.2     -    TPro  5.4<L>  /  Alb  2.3<L>  /  TBili  0.3  /  DBili  x   /  AST  45<H>  /  ALT  32  /  AlkPhos  122<H>  11-06    PT/INR - ( 2021 23:43 )   PT: 16.7 sec;   INR: 1.41 ratio         PTT - ( 2021 23:43 )  PTT:28.5 sec  Urinalysis Basic - ( 2021 23:58 )    Color: Red / Appearance: Slightly Turbid / S.021 / pH: x  Gluc: x / Ketone: Trace  / Bili: Moderate / Urobili: Negative   Blood: x / Protein: 300 mg/dL / Nitrite: Positive   Leuk Esterase: Large / RBC: >50 /hpf / WBC >50 /HPF   Sq Epi: x / Non Sq Epi: 5 / Bacteria: Many          MICROBIOLOGY:    Culture - Blood (21 @ 02:31)    -  Proteus mirabilis: Detec    Culture - Blood (21 @ 02:31)    Gram Stain:   Growth in anaerobic bottle: Gram Negative Rods  Growth in aerobic bottle: Gram Negative Rods    Specimen Source: .Blood Blood-Peripheral    Culture Results:   Growth in anaerobic bottle: Gram Negative Rods  Growth in aerobic bottle: Gram Negative Rods      UCX ; pending results     MRSA pending results     RADIOLOGY & ADDITIONAL STUDIES:    < from: Xray Chest 1 View- PORTABLE-Urgent (21 @ 23:39) >    EXAM:  XR CHEST PORTABLE URGENT 1V                            PROCEDURE DATE:  2021            INTERPRETATION:  CLINICAL INFORMATION: Sepsis    TECHNIQUE: Frontal radiograph of the chest    COMPARISON: Chest radiograph  2020    FINDINGS:    Clear lungs. Left pleural effusion. No pneumothorax. Cardiac silhouette size cannot be accurately assessed on this projection. No acute osseous findings. Left chest wall cardiac device.    IMPRESSION:    Clear lungs. Left pleural effusion.    --- End of Report ---    < end of copied text >

## 2021-11-06 NOTE — CHART NOTE - [id number]_GEN_A_CORE
MICU Transfer Note    Transfer from: MICU    Transfer to: ( x ) Medicine    (  ) Telemetry     (   ) RCU        (    ) Palliative         (   ) Stroke Unit          (   ) __________________    Accepting physican:      MICU COURSE:  89 M w/ hx of  Atrial Fibrillation on eliquis, implanted pacemaker, CAD with multiple stents, Hypertension, Hyperlipidemia, Benign Prostatic Hyperplasia p/w "unresponsiveness". Pt was dx w/ a UTI yesterday due to blood in the urine. Pt was started on bactrim, pt unable to provide a history at this time due to clinical condition. PT was bibems, since son in law went to house and noted to be unarousable to tactile stimulation. When medics arrived, he was hypoxic to 88, hypotensive 80/50s, he was started on IVF, and was given approx 1L PTA. Upon arrival to [facility] he was SBP in the 100s, sating 90-93 percent on RA, he has a cough. Pt is aaox2-3 at baseline uses a walker, has 24 hour aide.   In ED pt received 2 L NS vanc cefepime and was started on iv pressors to maintain MAP >65.   11/5; On IV levophed , started on Midodrine 10mg TID. Upgraded abx to meropenem . David improving .   11/6; midnight weaned off levo gtt. BCx x 1 w/ proteus m , BC #2 GNR P speciation and Ucx w/ GNR pending speciation. C/w meropenem until sensitivity than adjust abx. C.w Midodrine 10 TID, wean as tolerated. Holding antihypertensives in setting of sepsis and while on midodrin . Restarted Eliquis 2.5 BID . Drugs renally dosed.  DAVID ; little bump in Cr ; added home regimen flomax and finasteride ; bladder scan q 6 hrs for residual void. Pt has stress incontinence.           ASSESSMENT & PLAN:     89 M w/ hx of  Atrial Fibrillation on Eliquis, implanted pacemaker, CAD with multiple stents, Hypertension, Hyperlipidemia, Benign Prostatic Hyperplasia found "unresponsive" was found to be in septic shock likely multifactorial proteus m.  bacteremia and urosepsis. .       #Neuro; acute encephalopathy likely 2/2 septic shock   -resolved   -at the baseline  A+O x4 , [place]   -c/w neuro checks      #Resp  AMS ; monitor  airway compromise , hypoxia 85% , L pleural effusion   -admitted to MICU with septic shock and airway monitoring   -weaned off o2   -c/t monitor o2sat ; maintain o2sat >93%     #CV  Septic shock multifactorial proteus m bacteremia , UTI   -off levophed gtt since 12 midnight   -c/w Midodrine 10 TID ; maintain MAP >65; wean as tolerated   -holding antihypertensives while on midodrine   -c/w ABX       A fib  -restarted on home dose of Eliquis   -telemetry with NSR 66  -c/ tele monitoring     #GI  Tolerating PO intake   -c/ aspiration precaution       #Renal  DAVID in setting of septic shock   -hx of BPH   -send urin lytes and osmo   -bladder US   -renal US   -trend BUN /cr   -renally dose drugs   -avoid nephrotoxins   -start Flomax and fenestrae home dose   -bladder scan x 1 ; than q 6 hrs     #ID  septic shock 2/2 multifactorial proteus m bacteremia , UTI   -send repeat BC x 2 for clearance   -pending MRSA and Ucx results   -c/w meropenem ; pending sensitivity and Ucx and BCx #2 speciation   -trend T curve   -trend WBC     #Heme  Thrombocytopenia likely in setting of sepsis   -will trend CBC; stable  -pt started on home low dose Eliquis; c/ trend CBC     -check INR   -bleeding precaution      #Endo  Aic 7.4   -no hx of DM   -c/w mos ISS q AC & HS   -over night started on Lantus   -will get ENDO consult       PPX  #DVT PPx; restarted on Eliquis    GOC      FULL      For Followup:  -wean Midodrine as tolerated , now 10 TID , could go 5 TID 11/7 if MAP > 65   -f/ endocrinologist recs ; pt new Dm dx and new on insulin   -trend BUN / Cr   -bladder scan q 6 hrs   -restarted home dose Eliquis ; trend daily CBC          Vital Signs Last 24 Hrs  T(C): 36.2 (06 Nov 2021 08:00), Max: 37.4 (05 Nov 2021 16:00)  T(F): 97.1 (06 Nov 2021 08:00), Max: 99.3 (05 Nov 2021 16:00)  HR: 91 (06 Nov 2021 11:00) (62 - 91)  BP: 131/58 (06 Nov 2021 10:00) (86/48 - 141/65)  BP(mean): 84 (06 Nov 2021 10:00) (62 - 94)  RR: 30 (06 Nov 2021 11:00) (16 - 33)  SpO2: 94% (06 Nov 2021 11:00) (93% - 97%)  I&O's Summary    05 Nov 2021 07:01  -  06 Nov 2021 07:00  --------------------------------------------------------  IN: 1238 mL / OUT: 0 mL / NET: 1238 mL    06 Nov 2021 08:01  -  06 Nov 2021 13:31  --------------------------------------------------------  IN: 1140 mL / OUT: 0 mL / NET: 1140 mL        MEDICATIONS  (STANDING):  apixaban 2.5 milliGRAM(s) Oral two times a day  artificial  tears Solution 2 Drop(s) Both EYES every 3 hours  chlorhexidine 4% Liquid 1 Application(s) Topical <User Schedule>  dextrose 40% Gel 15 Gram(s) Oral once  dextrose 5%. 1000 milliLiter(s) (50 mL/Hr) IV Continuous <Continuous>  dextrose 5%. 1000 milliLiter(s) (100 mL/Hr) IV Continuous <Continuous>  dextrose 50% Injectable 25 Gram(s) IV Push once  dextrose 50% Injectable 12.5 Gram(s) IV Push once  dextrose 50% Injectable 25 Gram(s) IV Push once  finasteride 5 milliGRAM(s) Oral daily  glucagon  Injectable 1 milliGRAM(s) IntraMuscular once  insulin glargine Injectable (LANTUS) 6 Unit(s) SubCutaneous at bedtime  insulin lispro (ADMELOG) corrective regimen sliding scale   SubCutaneous Before meals and at bedtime  meropenem  IVPB      meropenem  IVPB 500 milliGRAM(s) IV Intermittent every 12 hours  midodrine 10 milliGRAM(s) Oral every 8 hours  polyethylene glycol 3350 17 Gram(s) Oral daily  senna 2 Tablet(s) Oral at bedtime  tamsulosin 0.4 milliGRAM(s) Oral at bedtime    MEDICATIONS  (PRN):        LABS                                            10.6                  Neurophils% (auto):   83.6   (11-06 @ 00:27):    33.13)-----------(113          Lymphocytes% (auto):  3.5                                           31.5                   Eosinphils% (auto):   0.0      Manual%: Neutrophils x    ; Lymphocytes x    ; Eosinophils x    ; Bands%: x    ; Blasts x                                    138    |  105    |  59                  Calcium: 8.5   / iCa: x      (11-06 @ 00:27)    ----------------------------<  154       Magnesium: 2.2                              4.0     |  21     |  2.38             Phosphorous: 3.3      TPro  5.4    /  Alb  2.3    /  TBili  0.3    /  DBili  x      /  AST  45     /  ALT  32     /  AlkPhos  122    06 Nov 2021 00:27        Mari Dobbins [facility]   [phone] MICU Transfer Note    Transfer from: MICU    Transfer to: ( x ) Medicine    (  ) Telemetry     (   ) RCU        (    ) Palliative         (   ) Stroke Unit          (   ) __________________    Accepting physican: Dr. Rogers (hospitalist)       MICU COURSE:  89 M w/ hx of  Atrial Fibrillation on eliquis, implanted pacemaker, CAD with multiple stents, Hypertension, Hyperlipidemia, Benign Prostatic Hyperplasia p/w "unresponsiveness". Pt was dx w/ a UTI yesterday due to blood in the urine. Pt was started on bactrim, pt unable to provide a history at this time due to clinical condition. PT was bibems, since son in law went to house and noted to be unarousable to tactile stimulation. When medics arrived, he was hypoxic to 88, hypotensive 80/50s, he was started on IVF, and was given approx 1L PTA. Upon arrival to [facility] he was SBP in the 100s, sating 90-93 percent on RA, he has a cough. Pt is aaox2-3 at baseline uses a walker, has 24 hour aide.   In ED pt received 2 L NS vanc cefepime and was started on iv pressors to maintain MAP >65.   11/5; On IV levophed , started on Midodrine 10mg TID. Upgraded abx to meropenem . David improving .   11/6; midnight weaned off levo gtt. BCx x 1 w/ proteus m , BC #2 GNR P speciation and Ucx w/ GNR pending speciation. C/w meropenem until sensitivity than adjust abx. C.w Midodrine 10 TID, wean as tolerated. Holding antihypertensives in setting of sepsis and while on midodrin . Restarted Eliquis 2.5 BID . Drugs renally dosed.  DAVID ; little bump in Cr ; added home regimen flomax and finasteride ; bladder scan q 6 hrs for residual void. Pt has stress incontinence.           ASSESSMENT & PLAN:     89 M w/ hx of  Atrial Fibrillation on Eliquis, implanted pacemaker, CAD with multiple stents, Hypertension, Hyperlipidemia, Benign Prostatic Hyperplasia found "unresponsive" was found to be in septic shock likely multifactorial proteus m.  bacteremia and urosepsis. .       #Neuro; acute encephalopathy likely 2/2 septic shock   -resolved   -at the baseline  A+O x4 , [place]   -c/w neuro checks      #Resp  AMS ; monitor  airway compromise , hypoxia 85% , L pleural effusion   -admitted to MICU with septic shock and airway monitoring   -weaned off o2   -c/t monitor o2sat ; maintain o2sat >93%     #CV  Septic shock multifactorial proteus m bacteremia , UTI   -off levophed gtt since 12 midnight   -c/w Midodrine 10 TID ; maintain MAP >65; wean as tolerated   -holding antihypertensives while on midodrine   -c/w ABX       A fib  -restarted on home dose of Eliquis   -telemetry with NSR 66  -c/ tele monitoring     #GI  Tolerating PO intake   -c/ aspiration precaution       #Renal  DAVID in setting of septic shock   -hx of BPH   -send urin lytes and osmo   -bladder US   -renal US   -trend BUN /cr   -renally dose drugs   -avoid nephrotoxins   -start Flomax and fenestrae home dose   -bladder scan x 1 ; than q 6 hrs     #ID  septic shock 2/2 multifactorial proteus m bacteremia , UTI   -send repeat BC x 2 for clearance   -pending MRSA and Ucx results   -c/w meropenem ; pending sensitivity and Ucx and BCx #2 speciation   -trend T curve   -trend WBC     #Heme  Thrombocytopenia likely in setting of sepsis   -will trend CBC; stable  -pt started on home low dose Eliquis; c/ trend CBC     -check INR   -bleeding precaution      #Endo  Aic 7.4   -no hx of DM   -c/w mos ISS q AC & HS   -over night started on Lantus   -will get ENDO consult       PPX  #DVT PPx; restarted on Eliquis    GOC      FULL      For Followup:  -wean Midodrine as tolerated , now 10 TID , could go 5 TID 11/7 if MAP > 65   -f/ endocrinologist recs ; pt new Dm dx and new on insulin   -trend BUN / Cr   -bladder scan q 6 hrs   -pending renal US   -restarted home dose Eliquis ; trend daily CBC          Vital Signs Last 24 Hrs  T(C): 36.2 (06 Nov 2021 08:00), Max: 37.4 (05 Nov 2021 16:00)  T(F): 97.1 (06 Nov 2021 08:00), Max: 99.3 (05 Nov 2021 16:00)  HR: 91 (06 Nov 2021 11:00) (62 - 91)  BP: 131/58 (06 Nov 2021 10:00) (86/48 - 141/65)  BP(mean): 84 (06 Nov 2021 10:00) (62 - 94)  RR: 30 (06 Nov 2021 11:00) (16 - 33)  SpO2: 94% (06 Nov 2021 11:00) (93% - 97%)    I&O's Summary    05 Nov 2021 07:01  -  06 Nov 2021 07:00  --------------------------------------------------------  IN: 1238 mL / OUT: 0 mL / NET: 1238 mL    06 Nov 2021 08:01  -  06 Nov 2021 13:31  --------------------------------------------------------  IN: 1140 mL / OUT: 0 mL / NET: 1140 mL        MEDICATIONS  (STANDING):  apixaban 2.5 milliGRAM(s) Oral two times a day  artificial  tears Solution 2 Drop(s) Both EYES every 3 hours  chlorhexidine 4% Liquid 1 Application(s) Topical <User Schedule>  dextrose 40% Gel 15 Gram(s) Oral once  dextrose 5%. 1000 milliLiter(s) (50 mL/Hr) IV Continuous <Continuous>  dextrose 5%. 1000 milliLiter(s) (100 mL/Hr) IV Continuous <Continuous>  dextrose 50% Injectable 25 Gram(s) IV Push once  dextrose 50% Injectable 12.5 Gram(s) IV Push once  dextrose 50% Injectable 25 Gram(s) IV Push once  finasteride 5 milliGRAM(s) Oral daily  glucagon  Injectable 1 milliGRAM(s) IntraMuscular once  insulin glargine Injectable (LANTUS) 6 Unit(s) SubCutaneous at bedtime  insulin lispro (ADMELOG) corrective regimen sliding scale   SubCutaneous Before meals and at bedtime  meropenem  IVPB      meropenem  IVPB 500 milliGRAM(s) IV Intermittent every 12 hours  midodrine 10 milliGRAM(s) Oral every 8 hours  polyethylene glycol 3350 17 Gram(s) Oral daily  senna 2 Tablet(s) Oral at bedtime  tamsulosin 0.4 milliGRAM(s) Oral at bedtime    MEDICATIONS  (PRN):        LABS                                            10.6                  Neurophils% (auto):   83.6   (11-06 @ 00:27):    33.13)-----------(113          Lymphocytes% (auto):  3.5                                           31.5                   Eosinphils% (auto):   0.0      Manual%: Neutrophils x    ; Lymphocytes x    ; Eosinophils x    ; Bands%: x    ; Blasts x                                    138    |  105    |  59                  Calcium: 8.5   / iCa: x      (11-06 @ 00:27)    ----------------------------<  154       Magnesium: 2.2                              4.0     |  21     |  2.38             Phosphorous: 3.3      TPro  5.4    /  Alb  2.3    /  TBili  0.3    /  DBili  x      /  AST  45     /  ALT  32     /  AlkPhos  122    06 Nov 2021 00:27        Mari Dobbins [facility]   [phone] MICU Transfer Note    Transfer from: MICU    Transfer to: ( x ) Medicine    (  ) Telemetry     (   ) RCU        (    ) Palliative         (   ) Stroke Unit          (   ) __________________    Accepting physican: Dr. Rogers (hospitalist)       MICU COURSE:  89 M w/ hx of  Atrial Fibrillation on eliquis, implanted pacemaker, CAD with multiple stents, Hypertension, Hyperlipidemia, Benign Prostatic Hyperplasia p/w "unresponsiveness". Pt was dx w/ a UTI yesterday due to blood in the urine. Pt was started on bactrim, pt unable to provide a history at this time due to clinical condition. PT was bibems, since son in law went to house and noted to be unarousable to tactile stimulation. When medics arrived, he was hypoxic to 88, hypotensive 80/50s, he was started on IVF, and was given approx 1L PTA. Upon arrival to [facility] he was SBP in the 100s, sating 90-93 percent on RA, he has a cough. Pt is aaox2-3 at baseline uses a walker, has 24 hour aide.   In ED pt received 2 L NS vanc cefepime and was started on iv pressors to maintain MAP >65.   11/5; On IV levophed , started on Midodrine 10mg TID. Upgraded abx to meropenem . David improving .   11/6; midnight weaned off levo gtt. BCx x 1 w/ proteus m , BC #2 GNR P speciation and Ucx w/ GNR pending speciation. C/w meropenem until sensitivity than adjust abx. C.w Midodrine 10 TID, wean as tolerated. Holding antihypertensives in setting of sepsis and while on midodrin . Restarted Eliquis 2.5 BID . Drugs renally dosed.  DAVID ; little bump in Cr ; added home regimen flomax and finasteride ; bladder scan q 6 hrs for residual void. Pt has stress incontinence.           ASSESSMENT & PLAN:     89 M w/ hx of  Atrial Fibrillation on Eliquis, implanted pacemaker, CAD with multiple stents, Hypertension, Hyperlipidemia, Benign Prostatic Hyperplasia found "unresponsive" was found to be in septic shock likely multifactorial proteus m.  bacteremia and urosepsis. .     Neuro; acute encephalopathy likely 2/2 septic shock   -resolved   -at the baseline  A+O x4 , [place]   -c/w neuro checks      #Resp  AMS ; monitor  airway compromise , hypoxia 85% , L pleural effusion   -admitted to MICU with septic shock and airway monitoring   -weaned off o2   -c/t monitor o2sat ; maintain o2sat >93%     #CV  Septic shock multifactorial proteus m bacteremia , UTI   -off levophed gtt since 12 midnight   -c/w Midodrine 10 TID ; maintain MAP >65  -c/w ABX   -holding on ASA 81 as pt platelets dropping . Pt was on heparin SQ for 24 hrs ; if c/t drop will consider sending HIT . Pt also has increase in LFT's . will send hemolysis lab .       A fib  -restarted on home dose of eliqus   -telemetry with NSR 66  -c/ tele monitoring     #GI  Transaminitis   -will obtain AB US   -avoid hepatotoxins   -send labs   -c/w  PO intake   -c/ aspiration precaution       #Renal  DAVID in setting of septic shock   -hx of BPH   -send urin lytes and osmo   -bladder US   -renal US   -trend BUN /cr   -renally dose drugs   -avoid nephrotoxins   -start Flomax and fenestrae home dose   -bladder scan x 1     #ID  septic shock 2/2 multifactorial proteus m bacteremia , UTI   -send repeat BC x 2 for clearance   -pending MRSA and Ucx results   -c/w meropenem   -trend T curve   -trend WBC     #Heme  Thrombocytopenia likely in setting of sepsis   -will trend CBC; stable   -check INR   -bleeding precaution      #Endo  Aic 7.4   -no hx of DM   -c/w mos ISS q AC & HS   -over night started on Lantus   -will get ENDO consult       PPX  #DVT PPx; restarted on Eliquis    GOC        For Followup:  -f/ endocrinologist recs ; pt new Dm dx and new on insulin   -trend BUN / Cr   -bladder scan q 6 hrs   -pending renal US & Abdominal US   -restarted home dose Eliquis ; trend daily CBC          Vital Signs Last 24 Hrs  T(C): 36.2 (06 Nov 2021 08:00), Max: 37.4 (05 Nov 2021 16:00)  T(F): 97.1 (06 Nov 2021 08:00), Max: 99.3 (05 Nov 2021 16:00)  HR: 91 (06 Nov 2021 11:00) (62 - 91)  BP: 131/58 (06 Nov 2021 10:00) (86/48 - 141/65)  BP(mean): 84 (06 Nov 2021 10:00) (62 - 94)  RR: 30 (06 Nov 2021 11:00) (16 - 33)  SpO2: 94% (06 Nov 2021 11:00) (93% - 97%)    I&O's Summary    05 Nov 2021 07:01  -  06 Nov 2021 07:00  --------------------------------------------------------  IN: 1238 mL / OUT: 0 mL / NET: 1238 mL    06 Nov 2021 08:01  -  06 Nov 2021 13:31  --------------------------------------------------------  IN: 1140 mL / OUT: 0 mL / NET: 1140 mL        MEDICATIONS  (STANDING):  apixaban 2.5 milliGRAM(s) Oral two times a day  artificial  tears Solution 2 Drop(s) Both EYES every 3 hours  chlorhexidine 4% Liquid 1 Application(s) Topical <User Schedule>  dextrose 40% Gel 15 Gram(s) Oral once  dextrose 5%. 1000 milliLiter(s) (50 mL/Hr) IV Continuous <Continuous>  dextrose 5%. 1000 milliLiter(s) (100 mL/Hr) IV Continuous <Continuous>  dextrose 50% Injectable 25 Gram(s) IV Push once  dextrose 50% Injectable 12.5 Gram(s) IV Push once  dextrose 50% Injectable 25 Gram(s) IV Push once  finasteride 5 milliGRAM(s) Oral daily  glucagon  Injectable 1 milliGRAM(s) IntraMuscular once  insulin glargine Injectable (LANTUS) 6 Unit(s) SubCutaneous at bedtime  insulin lispro (ADMELOG) corrective regimen sliding scale   SubCutaneous Before meals and at bedtime  meropenem  IVPB      meropenem  IVPB 500 milliGRAM(s) IV Intermittent every 12 hours  midodrine 10 milliGRAM(s) Oral every 8 hours  polyethylene glycol 3350 17 Gram(s) Oral daily  senna 2 Tablet(s) Oral at bedtime  tamsulosin 0.4 milliGRAM(s) Oral at bedtime    MEDICATIONS  (PRN):        LABS                                            10.6                  Neurophils% (auto):   83.6   (11-06 @ 00:27):    33.13)-----------(113          Lymphocytes% (auto):  3.5                                           31.5                   Eosinphils% (auto):   0.0      Manual%: Neutrophils x    ; Lymphocytes x    ; Eosinophils x    ; Bands%: x    ; Blasts x                                    138    |  105    |  59                  Calcium: 8.5   / iCa: x      (11-06 @ 00:27)    ----------------------------<  154       Magnesium: 2.2                              4.0     |  21     |  2.38             Phosphorous: 3.3      TPro  5.4    /  Alb  2.3    /  TBili  0.3    /  DBili  x      /  AST  45     /  ALT  32     /  AlkPhos  122    06 Nov 2021 00:27        Mari Dobbins [facility]   [phone] MICU Transfer Note    Transfer from: MICU    Transfer to: ( x ) Medicine    (  ) Telemetry     (   ) RCU        (    ) Palliative         (   ) Stroke Unit          (   ) __________________    Accepting physican: Dr. Rogers (hospitalist)       MICU COURSE:  89 M w/ hx of  Atrial Fibrillation on eliquis, implanted pacemaker, CAD with multiple stents, Hypertension, Hyperlipidemia, Benign Prostatic Hyperplasia p/w "unresponsiveness". Pt was dx w/ a UTI yesterday due to blood in the urine. Pt was started on bactrim, pt unable to provide a history at this time due to clinical condition. PT was bibems, since son in law went to house and noted to be unarousable to tactile stimulation. When medics arrived, he was hypoxic to 88, hypotensive 80/50s, he was started on IVF, and was given approx 1L PTA. Upon arrival to [facility] he was SBP in the 100s, sating 90-93 percent on RA, he has a cough. Pt is aaox2-3 at baseline uses a walker, has 24 hour aide.   In ED pt received 2 L NS vanc cefepime and was started on iv pressors to maintain MAP >65.   11/5; On IV levophed , started on Midodrine 10mg TID. Upgraded abx to meropenem . David improving .   11/6; midnight weaned off levo gtt. BCx x 1 w/ proteus m , BC #2 GNR P speciation and Ucx w/ GNR pending speciation. C/w meropenem until sensitivity than adjust abx. C.w Midodrine 10 TID, wean as tolerated. Holding antihypertensives in setting of sepsis and while on midodrin . Restarted Eliquis 2.5 BID . Drugs renally dosed.  DAVID ; little bump in Cr ; added home regimen flomax and finasteride ; bladder scan q 6 hrs for residual void. Pt has stress incontinence.           ASSESSMENT & PLAN:     89 M w/ hx of  Atrial Fibrillation on Eliquis, implanted pacemaker, CAD with multiple stents, Hypertension, Hyperlipidemia, Benign Prostatic Hyperplasia found "unresponsive" was found to be in septic shock likely multifactorial proteus m.  bacteremia and urosepsis. .     Neuro; acute encephalopathy likely 2/2 septic shock   -resolved   -at the baseline  A+O x4 , [place]   -c/w neuro checks      #Resp  AMS ; monitor  airway compromise , hypoxia 85% , L pleural effusion   -resolved   -c/t monitor o2sat ; maintain o2sat >93% on RA     #CV  Septic shock multifactorial proteus m bacteremia , UTI   -c/w Midodrine wean down   -c/w ABX   -holding on ASA 81 as pt platelets dropping . Pt was on heparin SQ for 24 hrs ; if c/t drop will consider sending HIT . Pt also has increase in LFT's . will send hemolysis lab .       A fib  -restarted on home dose of eliqus   -telemetry with NSR 66  -c/ tele monitoring     #GI  Transaminitis   -will obtain AB US   -avoid hepatotoxins   -send labs   -c/w  PO intake   -c/ aspiration precaution       #Renal  DAVID in setting of septic shock   -hx of BPH   -bladder US q 6 for retention   -renal US   -trend BUN /cr   -renally dose drugs   -avoid nephrotoxins   -c/w Flomax and fenestrae home dose   -pt to have OP follow with urology ; may OP course of abx for uTI in setting of retention / w follow up ; d/w family ; daughter     #ID  septic shock 2/2 multifactorial proteus m bacteremia , UTI   -send repeat BC x 2 for clearance   -MRSA negative   -c/w meropenem x 7 days (11/12)  -trend T curve   -trend WBC     #Heme  Thrombocytopenia likely in setting of sepsis   -will trend CBC; stable   -check INR   -bleeding precaution        #Endo  A1c 7.4   -no hx of DM   -change to low ISS q AC & HS   -c/w Lantus     Endocrine input appreciated .   -No need for tight glycemic control given age. Goal A1c <8% Goal Glucose 100-200  Can continue to monitor on Lantus 6 units qhs and low correction scale qac + bedtime  Outpt. PCP follow-up  Outpt. optho, podiatry, nephrology  Plan to d/c with dietary and lifestyle modifications first. If A1c increases in 3 months can consider tradjenta 5mg daily.    PPX  #DVT PPx; restarted on Eliquis    GOC        For Followup:  -f/ endocrinologist recs ; pt new Dm dx and new on insulin   -trend BUN / Cr   -bladder scan q 6 hrs   -pending renal US & Abdominal US   -restarted home dose Eliquis ; trend daily CBC          Vital Signs Last 24 Hrs  T(C): 36.2 (06 Nov 2021 08:00), Max: 37.4 (05 Nov 2021 16:00)  T(F): 97.1 (06 Nov 2021 08:00), Max: 99.3 (05 Nov 2021 16:00)  HR: 91 (06 Nov 2021 11:00) (62 - 91)  BP: 131/58 (06 Nov 2021 10:00) (86/48 - 141/65)  BP(mean): 84 (06 Nov 2021 10:00) (62 - 94)  RR: 30 (06 Nov 2021 11:00) (16 - 33)  SpO2: 94% (06 Nov 2021 11:00) (93% - 97%)    I&O's Summary    05 Nov 2021 07:01  -  06 Nov 2021 07:00  --------------------------------------------------------  IN: 1238 mL / OUT: 0 mL / NET: 1238 mL    06 Nov 2021 08:01  -  06 Nov 2021 13:31  --------------------------------------------------------  IN: 1140 mL / OUT: 0 mL / NET: 1140 mL        MEDICATIONS  (STANDING):  apixaban 2.5 milliGRAM(s) Oral two times a day  artificial  tears Solution 2 Drop(s) Both EYES every 3 hours  chlorhexidine 4% Liquid 1 Application(s) Topical <User Schedule>  dextrose 40% Gel 15 Gram(s) Oral once  dextrose 5%. 1000 milliLiter(s) (50 mL/Hr) IV Continuous <Continuous>  dextrose 5%. 1000 milliLiter(s) (100 mL/Hr) IV Continuous <Continuous>  dextrose 50% Injectable 25 Gram(s) IV Push once  dextrose 50% Injectable 12.5 Gram(s) IV Push once  dextrose 50% Injectable 25 Gram(s) IV Push once  finasteride 5 milliGRAM(s) Oral daily  glucagon  Injectable 1 milliGRAM(s) IntraMuscular once  insulin glargine Injectable (LANTUS) 6 Unit(s) SubCutaneous at bedtime  insulin lispro (ADMELOG) corrective regimen sliding scale   SubCutaneous Before meals and at bedtime  meropenem  IVPB      meropenem  IVPB 500 milliGRAM(s) IV Intermittent every 12 hours  midodrine 10 milliGRAM(s) Oral every 8 hours  polyethylene glycol 3350 17 Gram(s) Oral daily  senna 2 Tablet(s) Oral at bedtime  tamsulosin 0.4 milliGRAM(s) Oral at bedtime    MEDICATIONS  (PRN):        LABS                                            10.6                  Neurophils% (auto):   83.6   (11-06 @ 00:27):    33.13)-----------(113          Lymphocytes% (auto):  3.5                                           31.5                   Eosinphils% (auto):   0.0      Manual%: Neutrophils x    ; Lymphocytes x    ; Eosinophils x    ; Bands%: x    ; Blasts x                                    138    |  105    |  59                  Calcium: 8.5   / iCa: x      (11-06 @ 00:27)    ----------------------------<  154       Magnesium: 2.2                              4.0     |  21     |  2.38             Phosphorous: 3.3      TPro  5.4    /  Alb  2.3    /  TBili  0.3    /  DBili  x      /  AST  45     /  ALT  32     /  AlkPhos  122    06 Nov 2021 00:27        [facility] Dobb[facility] [place], [place]   [phone]

## 2021-11-06 NOTE — PROGRESS NOTE ADULT - ATTENDING COMMENTS
89 y/o M w/Afib admitted with hypotension secondary to severe sepsis with septic shock due to UTI and proteus bacteremia. JAMIE likely secondary to ATN/sepsis.    - Continue midodrine, wean as tolerated  - Continue meropenem, narrow once sensitivities are available  - Rate control and therapeutic AC for Afib  - Trend Cr, avoid nephrotoxins  - Insulin and endo consult for hyperglycemia  - Downgrade to medicine

## 2021-11-07 LAB
-  AMIKACIN: SIGNIFICANT CHANGE UP
-  AMIKACIN: SIGNIFICANT CHANGE UP
-  AMOXICILLIN/CLAVULANIC ACID: SIGNIFICANT CHANGE UP
-  AMPICILLIN/SULBACTAM: SIGNIFICANT CHANGE UP
-  AMPICILLIN/SULBACTAM: SIGNIFICANT CHANGE UP
-  AMPICILLIN: SIGNIFICANT CHANGE UP
-  AMPICILLIN: SIGNIFICANT CHANGE UP
-  AZTREONAM: SIGNIFICANT CHANGE UP
-  AZTREONAM: SIGNIFICANT CHANGE UP
-  CEFAZOLIN: SIGNIFICANT CHANGE UP
-  CEFAZOLIN: SIGNIFICANT CHANGE UP
-  CEFEPIME: SIGNIFICANT CHANGE UP
-  CEFEPIME: SIGNIFICANT CHANGE UP
-  CEFOXITIN: SIGNIFICANT CHANGE UP
-  CEFOXITIN: SIGNIFICANT CHANGE UP
-  CEFTRIAXONE: SIGNIFICANT CHANGE UP
-  CEFTRIAXONE: SIGNIFICANT CHANGE UP
-  CIPROFLOXACIN: SIGNIFICANT CHANGE UP
-  CIPROFLOXACIN: SIGNIFICANT CHANGE UP
-  ERTAPENEM: SIGNIFICANT CHANGE UP
-  ERTAPENEM: SIGNIFICANT CHANGE UP
-  GENTAMICIN: SIGNIFICANT CHANGE UP
-  GENTAMICIN: SIGNIFICANT CHANGE UP
-  LEVOFLOXACIN: SIGNIFICANT CHANGE UP
-  LEVOFLOXACIN: SIGNIFICANT CHANGE UP
-  MEROPENEM: SIGNIFICANT CHANGE UP
-  MEROPENEM: SIGNIFICANT CHANGE UP
-  NITROFURANTOIN: SIGNIFICANT CHANGE UP
-  PIPERACILLIN/TAZOBACTAM: SIGNIFICANT CHANGE UP
-  PIPERACILLIN/TAZOBACTAM: SIGNIFICANT CHANGE UP
-  TOBRAMYCIN: SIGNIFICANT CHANGE UP
-  TOBRAMYCIN: SIGNIFICANT CHANGE UP
-  TRIMETHOPRIM/SULFAMETHOXAZOLE: SIGNIFICANT CHANGE UP
-  TRIMETHOPRIM/SULFAMETHOXAZOLE: SIGNIFICANT CHANGE UP
ALBUMIN SERPL ELPH-MCNC: 2.4 G/DL — LOW (ref 3.3–5)
ALP SERPL-CCNC: 139 U/L — HIGH (ref 40–120)
ALT FLD-CCNC: 69 U/L — HIGH (ref 10–45)
ANION GAP SERPL CALC-SCNC: 12 MMOL/L — SIGNIFICANT CHANGE UP (ref 5–17)
AST SERPL-CCNC: 84 U/L — HIGH (ref 10–40)
BASOPHILS # BLD AUTO: 0.11 K/UL — SIGNIFICANT CHANGE UP (ref 0–0.2)
BASOPHILS NFR BLD AUTO: 0.4 % — SIGNIFICANT CHANGE UP (ref 0–2)
BILIRUB DIRECT SERPL-MCNC: 0.1 MG/DL — SIGNIFICANT CHANGE UP (ref 0–0.2)
BILIRUB INDIRECT FLD-MCNC: 0.3 MG/DL — SIGNIFICANT CHANGE UP (ref 0.2–1)
BILIRUB SERPL-MCNC: 0.3 MG/DL — SIGNIFICANT CHANGE UP (ref 0.2–1.2)
BILIRUB SERPL-MCNC: 0.4 MG/DL — SIGNIFICANT CHANGE UP (ref 0.2–1.2)
BUN SERPL-MCNC: 66 MG/DL — HIGH (ref 7–23)
CALCIUM SERPL-MCNC: 8.7 MG/DL — SIGNIFICANT CHANGE UP (ref 8.4–10.5)
CHLORIDE SERPL-SCNC: 104 MMOL/L — SIGNIFICANT CHANGE UP (ref 96–108)
CO2 SERPL-SCNC: 22 MMOL/L — SIGNIFICANT CHANGE UP (ref 22–31)
CREAT ?TM UR-MCNC: 64 MG/DL — SIGNIFICANT CHANGE UP
CREAT SERPL-MCNC: 2.03 MG/DL — HIGH (ref 0.5–1.3)
CULTURE RESULTS: SIGNIFICANT CHANGE UP
EOSINOPHIL # BLD AUTO: 0.04 K/UL — SIGNIFICANT CHANGE UP (ref 0–0.5)
EOSINOPHIL NFR BLD AUTO: 0.1 % — SIGNIFICANT CHANGE UP (ref 0–6)
GLUCOSE BLDC GLUCOMTR-MCNC: 134 MG/DL — HIGH (ref 70–99)
GLUCOSE BLDC GLUCOMTR-MCNC: 141 MG/DL — HIGH (ref 70–99)
GLUCOSE BLDC GLUCOMTR-MCNC: 153 MG/DL — HIGH (ref 70–99)
GLUCOSE BLDC GLUCOMTR-MCNC: 179 MG/DL — HIGH (ref 70–99)
GLUCOSE SERPL-MCNC: 155 MG/DL — HIGH (ref 70–99)
HAV IGM SER-ACNC: SIGNIFICANT CHANGE UP
HBV CORE IGM SER-ACNC: SIGNIFICANT CHANGE UP
HBV SURFACE AG SER-ACNC: SIGNIFICANT CHANGE UP
HCT VFR BLD CALC: 35.9 % — LOW (ref 39–50)
HCV AB S/CO SERPL IA: 0.54 S/CO — SIGNIFICANT CHANGE UP (ref 0–0.99)
HCV AB SERPL-IMP: SIGNIFICANT CHANGE UP
HGB BLD-MCNC: 11.8 G/DL — LOW (ref 13–17)
IMM GRANULOCYTES NFR BLD AUTO: 1.3 % — SIGNIFICANT CHANGE UP (ref 0–1.5)
INR BLD: 1.18 RATIO — HIGH (ref 0.88–1.16)
LDH SERPL L TO P-CCNC: 227 U/L — SIGNIFICANT CHANGE UP (ref 50–242)
LYMPHOCYTES # BLD AUTO: 0.95 K/UL — LOW (ref 1–3.3)
LYMPHOCYTES # BLD AUTO: 3.6 % — LOW (ref 13–44)
MAGNESIUM SERPL-MCNC: 2.4 MG/DL — SIGNIFICANT CHANGE UP (ref 1.6–2.6)
MCHC RBC-ENTMCNC: 28.9 PG — SIGNIFICANT CHANGE UP (ref 27–34)
MCHC RBC-ENTMCNC: 32.9 GM/DL — SIGNIFICANT CHANGE UP (ref 32–36)
MCV RBC AUTO: 88 FL — SIGNIFICANT CHANGE UP (ref 80–100)
METHOD TYPE: SIGNIFICANT CHANGE UP
METHOD TYPE: SIGNIFICANT CHANGE UP
MONOCYTES # BLD AUTO: 0.67 K/UL — SIGNIFICANT CHANGE UP (ref 0–0.9)
MONOCYTES NFR BLD AUTO: 2.5 % — SIGNIFICANT CHANGE UP (ref 2–14)
NEUTROPHILS # BLD AUTO: 24.65 K/UL — HIGH (ref 1.8–7.4)
NEUTROPHILS NFR BLD AUTO: 92.1 % — HIGH (ref 43–77)
NRBC # BLD: 0 /100 WBCS — SIGNIFICANT CHANGE UP (ref 0–0)
ORGANISM # SPEC MICROSCOPIC CNT: SIGNIFICANT CHANGE UP
PHOSPHATE SERPL-MCNC: 3.1 MG/DL — SIGNIFICANT CHANGE UP (ref 2.5–4.5)
PLATELET # BLD AUTO: 105 K/UL — LOW (ref 150–400)
POTASSIUM SERPL-MCNC: 4.2 MMOL/L — SIGNIFICANT CHANGE UP (ref 3.5–5.3)
POTASSIUM SERPL-SCNC: 4.2 MMOL/L — SIGNIFICANT CHANGE UP (ref 3.5–5.3)
PROT SERPL-MCNC: 5.4 G/DL — LOW (ref 6–8.3)
PROTHROM AB SERPL-ACNC: 14 SEC — HIGH (ref 10.6–13.6)
RBC # BLD: 4.08 M/UL — LOW (ref 4.2–5.8)
RBC # FLD: 14.7 % — HIGH (ref 10.3–14.5)
SODIUM SERPL-SCNC: 138 MMOL/L — SIGNIFICANT CHANGE UP (ref 135–145)
SODIUM UR-SCNC: 38 MMOL/L — SIGNIFICANT CHANGE UP
SPECIMEN SOURCE: SIGNIFICANT CHANGE UP
WBC # BLD: 26.76 K/UL — HIGH (ref 3.8–10.5)
WBC # FLD AUTO: 26.76 K/UL — HIGH (ref 3.8–10.5)

## 2021-11-07 PROCEDURE — 99232 SBSQ HOSP IP/OBS MODERATE 35: CPT

## 2021-11-07 RX ORDER — INSULIN LISPRO 100/ML
VIAL (ML) SUBCUTANEOUS
Refills: 0 | Status: DISCONTINUED | OUTPATIENT
Start: 2021-11-07 | End: 2021-11-12

## 2021-11-07 RX ORDER — INSULIN LISPRO 100/ML
VIAL (ML) SUBCUTANEOUS AT BEDTIME
Refills: 0 | Status: DISCONTINUED | OUTPATIENT
Start: 2021-11-07 | End: 2021-11-12

## 2021-11-07 RX ORDER — MIDODRINE HYDROCHLORIDE 2.5 MG/1
5 TABLET ORAL EVERY 8 HOURS
Refills: 0 | Status: DISCONTINUED | OUTPATIENT
Start: 2021-11-07 | End: 2021-11-07

## 2021-11-07 RX ADMIN — MEROPENEM 100 MILLIGRAM(S): 1 INJECTION INTRAVENOUS at 17:22

## 2021-11-07 RX ADMIN — POLYETHYLENE GLYCOL 3350 17 GRAM(S): 17 POWDER, FOR SOLUTION ORAL at 12:06

## 2021-11-07 RX ADMIN — APIXABAN 2.5 MILLIGRAM(S): 2.5 TABLET, FILM COATED ORAL at 22:31

## 2021-11-07 RX ADMIN — Medication 1: at 17:23

## 2021-11-07 RX ADMIN — CHLORHEXIDINE GLUCONATE 1 APPLICATION(S): 213 SOLUTION TOPICAL at 06:35

## 2021-11-07 RX ADMIN — Medication 2 DROP(S): at 06:03

## 2021-11-07 RX ADMIN — FINASTERIDE 5 MILLIGRAM(S): 5 TABLET, FILM COATED ORAL at 12:06

## 2021-11-07 RX ADMIN — SENNA PLUS 2 TABLET(S): 8.6 TABLET ORAL at 22:30

## 2021-11-07 RX ADMIN — TAMSULOSIN HYDROCHLORIDE 0.4 MILLIGRAM(S): 0.4 CAPSULE ORAL at 22:30

## 2021-11-07 RX ADMIN — Medication 2 DROP(S): at 22:29

## 2021-11-07 RX ADMIN — Medication 2 DROP(S): at 12:02

## 2021-11-07 RX ADMIN — Medication 1: at 11:54

## 2021-11-07 RX ADMIN — Medication 2 DROP(S): at 17:24

## 2021-11-07 RX ADMIN — Medication 2 DROP(S): at 15:20

## 2021-11-07 RX ADMIN — Medication 2 DROP(S): at 01:08

## 2021-11-07 RX ADMIN — INSULIN GLARGINE 6 UNIT(S): 100 INJECTION, SOLUTION SUBCUTANEOUS at 22:30

## 2021-11-07 RX ADMIN — APIXABAN 2.5 MILLIGRAM(S): 2.5 TABLET, FILM COATED ORAL at 09:44

## 2021-11-07 RX ADMIN — MEROPENEM 100 MILLIGRAM(S): 1 INJECTION INTRAVENOUS at 06:03

## 2021-11-07 NOTE — PROGRESS NOTE ADULT - SUBJECTIVE AND OBJECTIVE BOX
INTERVAL HPI/OVERNIGHT EVENTS: No events over night     Patient is a 89y old  Male who presents with a chief complaint of UTI with Sepsis (06 Nov 2021 15:46)      ROS:  Pt is with [place].     CONSTITUTIONAL:  Negative chills.   CARDIOVASCULAR:  Negative for chest pain  RESPIRATORY:  + cough, No wheezing, or SOB   GASTROINTESTINAL:  Negative for nausea, vomiting  abdominal pain  ; incontinent of urine x 10 years   NEUROLOGY: + dry eyes. No HA.           Allergies    penicillin (Rash)    Intolerances        ANTIBIOTICS/RELEVANT:  antimicrobials  meropenem  IVPB      meropenem  IVPB 500 milliGRAM(s) IV Intermittent every 12 hours    immunologic:    OTHER:  acetaminophen    Suspension .. 650 milliGRAM(s) Oral every 6 hours PRN  apixaban 2.5 milliGRAM(s) Oral two times a day  artificial  tears Solution 2 Drop(s) Both EYES every 3 hours  chlorhexidine 4% Liquid 1 Application(s) Topical <User Schedule>  dextrose 40% Gel 15 Gram(s) Oral once  dextrose 5%. 1000 milliLiter(s) IV Continuous <Continuous>  dextrose 5%. 1000 milliLiter(s) IV Continuous <Continuous>  dextrose 50% Injectable 25 Gram(s) IV Push once  dextrose 50% Injectable 12.5 Gram(s) IV Push once  dextrose 50% Injectable 25 Gram(s) IV Push once  finasteride 5 milliGRAM(s) Oral daily  glucagon  Injectable 1 milliGRAM(s) IntraMuscular once  insulin glargine Injectable (LANTUS) 6 Unit(s) SubCutaneous at bedtime  insulin lispro (ADMELOG) corrective regimen sliding scale   SubCutaneous Before meals and at bedtime  midodrine 10 milliGRAM(s) Oral every 8 hours  polyethylene glycol 3350 17 Gram(s) Oral daily  senna 2 Tablet(s) Oral at bedtime  tamsulosin 0.4 milliGRAM(s) Oral at bedtime      Objective:  Vital Signs Last 24 Hrs  T(C): 37.1 (07 Nov 2021 04:00), Max: 37.1 (06 Nov 2021 20:00)  T(F): 98.8 (07 Nov 2021 04:00), Max: 98.8 (07 Nov 2021 04:00)  HR: 67 (07 Nov 2021 04:00) (62 - 91)  BP: 148/68 (07 Nov 2021 04:00) (106/71 - 153/70)  BP(mean): 98 (07 Nov 2021 04:00) (80 - 98)  RR: 27 (07 Nov 2021 04:00) (17 - 36)  SpO2: 95% (07 Nov 2021 04:00) (87% - 98%)    PHYSICAL EXAM:  General - [place] with hearing aids b/l.   HEENT -   CV - s1 & s2 +. NSR 72, with 1st degree HRB,   Resp - CTA b/l. No wheezing   Abdomen - soft, NT, ND. + BS x 4 quadrants .   Extremities - no edema   Skin - warm and dry       LABS:                        11.8   26.76 )-----------( 105      ( 07 Nov 2021 00:14 )             35.9     11-07    138  |  104  |  66<H>  ----------------------------<  155<H>  4.2   |  22  |  2.03<H>    Ca    8.7      07 Nov 2021 00:14  Phos  3.1     11-07  Mg     2.4     11-07    TPro  5.4<L>  /  Alb  2.4<L>  /  TBili  0.3  /  DBili  x   /  AST  84<H>  /  ALT  69<H>  /  AlkPhos  139<H>  11-07    PT/INR - ( 07 Nov 2021 00:14 )   PT: 14.0 sec;   INR: 1.18 ratio           MICROBIOLOGY:  Culture - Blood (11.05.21 @ 02:31)    -  Proteus mirabilis: Detec  Culture - Blood (11.05.21 @ 02:31) and anaerobic bottles: Proteus mirabilis  See previous culture [id number]  Culture - Urine (11.05.21 @ 02:23)    Specimen Source: Clean Catch Clean Catch (Midstream)    Culture Results:   >100,000 CFU/ml Proteus mirabilis      RADIOLOGY & ADDITIONAL STUDIES:  < from: Xray Chest 1 View- PORTABLE-Urgent (11.04.21 @ 23:39) >  EXAM:  XR CHEST PORTABLE URGENT 1V                            PROCEDURE DATE:  11/04/2021            INTERPRETATION:  CLINICAL INFORMATION: Sepsis    TECHNIQUE: Frontal radiograph of the chest    COMPARISON: Chest radiograph  8/6/2020    FINDINGS:    Clear lungs. Left pleural effusion. No pneumothorax. Cardiac silhouette size cannot be accurately assessed on this projection. No acute osseous findings. Left chest wall cardiac device.    IMPRESSION:    Clear lungs. Left pleural effusion.    --- End of Report ---    < end of copied text >

## 2021-11-07 NOTE — PROGRESS NOTE ADULT - ASSESSMENT
89 M w/ hx of  Atrial Fibrillation on eliquis, implanted pacemaker, CAD with multiple stents, Hypertension, Hyperlipidemia, Benign Prostatic Hyperplasia found "unresponsive" was foudn to be in septic shock likely multifactorial proteus m.  bacteremia and urosepsis. .       #Neuro; acute encephalopathy likely 2/2 septic shock   -resolved   -at the baseline  A+O x4 , [place]   -c/w neuro checks      #Resp  AMS ; monitor  airway compromise , hypoxia 85% , L pleural effusion   -admitted to MICU with septic shock and airway monitoring   -weaned off o2   -c/t monitor o2sat ; maintain o2sat >93%     #CV  Septic shock multifactorial proteus m bacteremia , UTI   -off levophed gtt since 12 midnight   titrated  Midodrine to 5 TID ; maintain MAP >65  -c/w ABX       A fib  -restarted on home dose of eliqus   -telemetry with NSR 66  -c/ tele monitoring     #GI  Tolerating PO intake   -c/ aspiration precaution       #Renal  JAMIE in setting of septic shock   improving   -hx of BPH   -bladder US q 6 for residual urine   -renal US pending   -trend BUN /cr   -renally dose drugs   -avoid nephrotoxins   -start Flomax and fenestrae home dose       #ID  septic shock 2/2 multifactorial proteus m bacteremia  and UTI   -repeat BC x 2 pending results   - MRSA negative   -c/w meropenem ; pending sensitivity   -trend T curve   -trend WBC     #Heme  Thrombocytopenia likely in setting of sepsis   platelets > 100 ( 115-->105)   -will trend CBC;   -check INR   -bleeding precaution      #Endo  A1c 7.4   -no hx of DM   -change to low ISS q AC & HS   -c/w Lantus     Endocrine input appreciated .   -No need for tight glycemic control given age. Goal A1c <8% Goal Glucose 100-200  Can continue to monitor on Lantus 6 units qhs and low correction scale qac + bedtime  Outpt. PCP follow-up  Outpt. optho, podiatry, nephrology  Plan to d/c with dietary and lifestyle modifications first. If A1c increases in 3 months can consider tradjenta 5mg daily.      PPX  #DVT PPx; on Eliquis    GO    FULL    DISPO  BB to medical floor to dr. Rogers    89 M w/ hx of  Atrial Fibrillation on eliquis, implanted pacemaker, CAD with multiple stents, Hypertension, Hyperlipidemia, Benign Prostatic Hyperplasia found "unresponsive" was foudn to be in septic shock likely multifactorial proteus m.  bacteremia and urosepsis. .       #Neuro; acute encephalopathy likely 2/2 septic shock   -resolved   -at the baseline  A+O x4 , [place]   -c/w neuro checks      #Resp  AMS ; monitor  airway compromise , hypoxia 85% , L pleural effusion   -resolved   -admitted to MICU with septic shock and airway monitoring   -c/t monitor o2sat ; maintain o2sat >93% on RA    #CV  Septic shock multifactorial proteus m bacteremia , UTI   -resolved  titrated  Midodrine to 5 TID ; and wean off  maintain MAP >65  -c/w ABX       A fib  -restarted on home dose of eliqus   -telemetry with NSR 66  -c/ tele monitoring     #GI  Transaminitis   -get US abdomen   -trend LFT's   Tolerating PO intake   -c/ aspiration precaution       #Renal  JAMIE in setting of septic shock   improving   -hx of BPH   -bladder US q 6 for residual urine   -renal US pending   -trend BUN /cr   -renally dose drugs   -avoid nephrotoxins   -start Flomax and fenestrae home dose       #ID  septic shock 2/2 multifactorial proteus m bacteremia  and UTI   -repeat BC x 2 pending results   - MRSA negative   -c/w meropenem ; pending sensitivity   -trend T curve   -trend WBC     #Heme  Thrombocytopenia likely in setting of sepsis   platelets > 100 ( 115-->105)   -will trend CBC;   -check INR   -bleeding precaution      #Endo  A1c 7.4   -no hx of DM   -change to low ISS q AC & HS   -c/w Lantus     Endocrine input appreciated .   -No need for tight glycemic control given age. Goal A1c <8% Goal Glucose 100-200  Can continue to monitor on Lantus 6 units qhs and low correction scale qac + bedtime  Outpt. PCP follow-up  Outpt. optho, podiatry, nephrology  Plan to d/c with dietary and lifestyle modifications first. If A1c increases in 3 months can consider tradjenta 5mg daily.      PPX  #DVT PPx; on Eliquis    GOC    FULL    DISPO  BB to medical floor to dr. Rogers

## 2021-11-07 NOTE — PROGRESS NOTE ADULT - ATTENDING COMMENTS
89 y/o M w/Afib admitted with hypotension secondary to severe sepsis with septic shock due to UTI and proteus bacteremia. JAMIE likely secondary to ATN/sepsis.    - Continue midodrine, wean as tolerated  - Continue meropenem, narrow once sensitivities are available  - Rate control and therapeutic AC for Afib  - Trend Cr, avoid nephrotoxins  - Insulin and endo consult for hyperglycemia  - Downgrade to medicine 89 y/o M w/Afib admitted with hypotension secondary to severe sepsis with septic shock due to UTI and proteus bacteremia. JAMIE likely secondary to ATN/sepsis.    - Continue midodrine, wean as tolerated  - Continue meropenem, narrow once sensitivities are available  - Rate control and therapeutic AC for Afib  - Trend Cr, avoid nephrotoxins  - Appreciate endo recs  - Downgrade to medicine

## 2021-11-08 LAB
ALBUMIN SERPL ELPH-MCNC: 2.2 G/DL — LOW (ref 3.3–5)
ALP SERPL-CCNC: 111 U/L — SIGNIFICANT CHANGE UP (ref 40–120)
ALT FLD-CCNC: 56 U/L — HIGH (ref 10–45)
ANION GAP SERPL CALC-SCNC: 10 MMOL/L — SIGNIFICANT CHANGE UP (ref 5–17)
APTT BLD: 27.5 SEC — SIGNIFICANT CHANGE UP (ref 27.5–35.5)
AST SERPL-CCNC: 38 U/L — SIGNIFICANT CHANGE UP (ref 10–40)
BASE EXCESS BLDV CALC-SCNC: 0.2 MMOL/L — SIGNIFICANT CHANGE UP (ref -2–2)
BASOPHILS # BLD AUTO: 0.05 K/UL — SIGNIFICANT CHANGE UP (ref 0–0.2)
BASOPHILS NFR BLD AUTO: 0.4 % — SIGNIFICANT CHANGE UP (ref 0–2)
BILIRUB SERPL-MCNC: 0.4 MG/DL — SIGNIFICANT CHANGE UP (ref 0.2–1.2)
BUN SERPL-MCNC: 57 MG/DL — HIGH (ref 7–23)
CA-I SERPL-SCNC: 1.27 MMOL/L — SIGNIFICANT CHANGE UP (ref 1.15–1.33)
CALCIUM SERPL-MCNC: 8.8 MG/DL — SIGNIFICANT CHANGE UP (ref 8.4–10.5)
CHLORIDE BLDV-SCNC: 107 MMOL/L — SIGNIFICANT CHANGE UP (ref 96–108)
CHLORIDE SERPL-SCNC: 107 MMOL/L — SIGNIFICANT CHANGE UP (ref 96–108)
CO2 BLDV-SCNC: 27 MMOL/L — HIGH (ref 22–26)
CO2 SERPL-SCNC: 21 MMOL/L — LOW (ref 22–31)
CREAT SERPL-MCNC: 1.71 MG/DL — HIGH (ref 0.5–1.3)
EOSINOPHIL # BLD AUTO: 0.17 K/UL — SIGNIFICANT CHANGE UP (ref 0–0.5)
EOSINOPHIL NFR BLD AUTO: 1.3 % — SIGNIFICANT CHANGE UP (ref 0–6)
GAS PNL BLDV: 137 MMOL/L — SIGNIFICANT CHANGE UP (ref 136–145)
GAS PNL BLDV: SIGNIFICANT CHANGE UP
GAS PNL BLDV: SIGNIFICANT CHANGE UP
GLUCOSE BLDC GLUCOMTR-MCNC: 110 MG/DL — HIGH (ref 70–99)
GLUCOSE BLDC GLUCOMTR-MCNC: 135 MG/DL — HIGH (ref 70–99)
GLUCOSE BLDC GLUCOMTR-MCNC: 140 MG/DL — HIGH (ref 70–99)
GLUCOSE BLDC GLUCOMTR-MCNC: 180 MG/DL — HIGH (ref 70–99)
GLUCOSE BLDV-MCNC: 140 MG/DL — HIGH (ref 70–99)
GLUCOSE SERPL-MCNC: 142 MG/DL — HIGH (ref 70–99)
HCO3 BLDV-SCNC: 26 MMOL/L — SIGNIFICANT CHANGE UP (ref 22–29)
HCT VFR BLD CALC: 34.7 % — LOW (ref 39–50)
HCT VFR BLDA CALC: 36 % — LOW (ref 39–51)
HGB BLD CALC-MCNC: 12.1 G/DL — LOW (ref 12.6–17.4)
HGB BLD-MCNC: 11.8 G/DL — LOW (ref 13–17)
IMM GRANULOCYTES NFR BLD AUTO: 0.7 % — SIGNIFICANT CHANGE UP (ref 0–1.5)
INR BLD: 1.02 RATIO — SIGNIFICANT CHANGE UP (ref 0.88–1.16)
LACTATE BLDV-MCNC: 0.8 MMOL/L — SIGNIFICANT CHANGE UP (ref 0.7–2)
LYMPHOCYTES # BLD AUTO: 0.97 K/UL — LOW (ref 1–3.3)
LYMPHOCYTES # BLD AUTO: 7.3 % — LOW (ref 13–44)
MAGNESIUM SERPL-MCNC: 2.5 MG/DL — SIGNIFICANT CHANGE UP (ref 1.6–2.6)
MCHC RBC-ENTMCNC: 29.1 PG — SIGNIFICANT CHANGE UP (ref 27–34)
MCHC RBC-ENTMCNC: 34 GM/DL — SIGNIFICANT CHANGE UP (ref 32–36)
MCV RBC AUTO: 85.5 FL — SIGNIFICANT CHANGE UP (ref 80–100)
MONOCYTES # BLD AUTO: 0.62 K/UL — SIGNIFICANT CHANGE UP (ref 0–0.9)
MONOCYTES NFR BLD AUTO: 4.6 % — SIGNIFICANT CHANGE UP (ref 2–14)
NEUTROPHILS # BLD AUTO: 11.47 K/UL — HIGH (ref 1.8–7.4)
NEUTROPHILS NFR BLD AUTO: 85.7 % — HIGH (ref 43–77)
NRBC # BLD: 0 /100 WBCS — SIGNIFICANT CHANGE UP (ref 0–0)
PCO2 BLDV: 46 MMHG — SIGNIFICANT CHANGE UP (ref 42–55)
PH BLDV: 7.36 — SIGNIFICANT CHANGE UP (ref 7.32–7.43)
PHOSPHATE SERPL-MCNC: 3.2 MG/DL — SIGNIFICANT CHANGE UP (ref 2.5–4.5)
PLATELET # BLD AUTO: 115 K/UL — LOW (ref 150–400)
PO2 BLDV: 37 MMHG — SIGNIFICANT CHANGE UP (ref 25–45)
POTASSIUM BLDV-SCNC: 4.6 MMOL/L — SIGNIFICANT CHANGE UP (ref 3.5–5.1)
POTASSIUM SERPL-MCNC: 4.7 MMOL/L — SIGNIFICANT CHANGE UP (ref 3.5–5.3)
POTASSIUM SERPL-SCNC: 4.7 MMOL/L — SIGNIFICANT CHANGE UP (ref 3.5–5.3)
PROT SERPL-MCNC: 5 G/DL — LOW (ref 6–8.3)
PROTHROM AB SERPL-ACNC: 12.2 SEC — SIGNIFICANT CHANGE UP (ref 10.6–13.6)
RBC # BLD: 4.06 M/UL — LOW (ref 4.2–5.8)
RBC # FLD: 14.6 % — HIGH (ref 10.3–14.5)
SAO2 % BLDV: 66.4 % — LOW (ref 67–88)
SODIUM SERPL-SCNC: 138 MMOL/L — SIGNIFICANT CHANGE UP (ref 135–145)
WBC # BLD: 13.37 K/UL — HIGH (ref 3.8–10.5)
WBC # FLD AUTO: 13.37 K/UL — HIGH (ref 3.8–10.5)

## 2021-11-08 PROCEDURE — 93306 TTE W/DOPPLER COMPLETE: CPT | Mod: 26

## 2021-11-08 PROCEDURE — 99291 CRITICAL CARE FIRST HOUR: CPT

## 2021-11-08 PROCEDURE — 76700 US EXAM ABDOM COMPLETE: CPT | Mod: 26

## 2021-11-08 RX ORDER — CEFTRIAXONE 500 MG/1
1000 INJECTION, POWDER, FOR SOLUTION INTRAMUSCULAR; INTRAVENOUS
Refills: 0 | Status: COMPLETED | OUTPATIENT
Start: 2021-11-08 | End: 2021-11-11

## 2021-11-08 RX ADMIN — APIXABAN 2.5 MILLIGRAM(S): 2.5 TABLET, FILM COATED ORAL at 09:19

## 2021-11-08 RX ADMIN — CHLORHEXIDINE GLUCONATE 1 APPLICATION(S): 213 SOLUTION TOPICAL at 06:06

## 2021-11-08 RX ADMIN — Medication 2 DROP(S): at 06:02

## 2021-11-08 RX ADMIN — SENNA PLUS 2 TABLET(S): 8.6 TABLET ORAL at 21:56

## 2021-11-08 RX ADMIN — FINASTERIDE 5 MILLIGRAM(S): 5 TABLET, FILM COATED ORAL at 12:22

## 2021-11-08 RX ADMIN — TAMSULOSIN HYDROCHLORIDE 0.4 MILLIGRAM(S): 0.4 CAPSULE ORAL at 21:56

## 2021-11-08 RX ADMIN — Medication 2 DROP(S): at 09:19

## 2021-11-08 RX ADMIN — CEFTRIAXONE 100 MILLIGRAM(S): 500 INJECTION, POWDER, FOR SOLUTION INTRAMUSCULAR; INTRAVENOUS at 17:35

## 2021-11-08 RX ADMIN — Medication 1: at 17:47

## 2021-11-08 RX ADMIN — MEROPENEM 100 MILLIGRAM(S): 1 INJECTION INTRAVENOUS at 06:02

## 2021-11-08 RX ADMIN — Medication 2 DROP(S): at 21:56

## 2021-11-08 RX ADMIN — INSULIN GLARGINE 6 UNIT(S): 100 INJECTION, SOLUTION SUBCUTANEOUS at 21:56

## 2021-11-08 RX ADMIN — APIXABAN 2.5 MILLIGRAM(S): 2.5 TABLET, FILM COATED ORAL at 21:56

## 2021-11-08 NOTE — PROGRESS NOTE ADULT - ASSESSMENT
89 M w/ hx of  Atrial Fibrillation on eliquis, implanted pacemaker, CAD with multiple stents, Hypertension, Hyperlipidemia, Benign Prostatic Hyperplasia found "unresponsive" was foudn to be in septic shock likely multifactorial proteus m.  bacteremia and urosepsis. .       #Neuro; acute encephalopathy likely 2/2 septic shock   -resolved   -at the baseline  A+O x4 , [place]   -c/w neuro checks      #Resp  AMS ; monitor  airway compromise , hypoxia 85% , L pleural effusion   -resolved   -admitted to MICU with septic shock and airway monitoring   -c/t monitor o2sat ; maintain o2sat >93% on RA    #CV  Septic shock multifactorial proteus m bacteremia , UTI   -resolved  titrated  Midodrine to 5 TID ; and wean off  maintain MAP >65  -c/w ABX       A fib  -restarted on home dose of eliqus   -telemetry with NSR 66  -c/ tele monitoring     #GI  Transaminitis   -get US abdomen   -trend LFT's   Tolerating PO intake   -c/ aspiration precaution       #Renal  JAMIE in setting of septic shock   improving   -hx of BPH   -bladder US q 6 for residual urine   -renal US pending   -trend BUN /cr   -renally dose drugs   -avoid nephrotoxins   -start Flomax and fenestrae home dose       #ID  septic shock 2/2 multifactorial proteus m bacteremia  and UTI   -repeat BC x 2 pending results   - MRSA negative   -c/w meropenem ; pending sensitivity   -trend T curve   -trend WBC     #Heme  Thrombocytopenia likely in setting of sepsis   platelets > 100 ( 115-->105)   -will trend CBC;   -check INR   -bleeding precaution      #Endo  A1c 7.4   -no hx of DM   -change to low ISS q AC & HS   -c/w Lantus     Endocrine input appreciated .   -No need for tight glycemic control given age. Goal A1c <8% Goal Glucose 100-200  Can continue to monitor on Lantus 6 units qhs and low correction scale qac + bedtime  Outpt. PCP follow-up  Outpt. optho, podiatry, nephrology  Plan to d/c with dietary and lifestyle modifications first. If A1c increases in 3 months can consider tradjenta 5mg daily.      PPX  #DVT PPx; on Eliquis    GOC    FULL    DISPO  BB to medical floor to dr. Rogers      89 M w/ hx of  Atrial Fibrillation on eliquis, implanted pacemaker, CAD with multiple stents, Hypertension, Hyperlipidemia, Benign Prostatic Hyperplasia found "unresponsive" was foudn to be in septic shock likely multifactorial proteus m.  bacteremia and urosepsis. .       #Neuro; acute encephalopathy likely 2/2 septic shock   -resolved   -at the baseline  A+O x4 , [place]   -c/w neuro checks      #Resp  AMS ; monitor  airway compromise , hypoxia 85% , L pleural effusion   -resolved   -admitted to MICU with septic shock and airway monitoring   -c/t monitor o2sat ; maintain o2sat >93% on RA    #CV  Septic shock multifactorial proteus m bacteremia , UTI   -resolved  titrated  Midodrine to 5 TID ; and wean off  maintain MAP >65  -c/w ABX       A fib  -restarted on home dose of eliqus   -telemetry with NSR 66  -c/ tele monitoring     #GI  Transaminitis   - US abdomen   - downtrending trend LFT's   Tolerating PO intake   -c/ aspiration precaution       #Renal  JAMIE in setting of septic shock   improving   -hx of BPH   -bladder US q 6 for residual urine   -renal US pending   -trend BUN /cr   -renally dose drugs   -avoid nephrotoxins   -start Flomax and fenestrae home dose       #ID  septic shock 2/2 multifactorial proteus m bacteremia  and UTI   -repeat BC x 2 pending results   - MRSA negative   -c/w meropenem ; pending sensitivity   -trend T curve   -trend WBC     #Heme  Thrombocytopenia likely in setting of sepsis   -improving   platelets > 100 ( 115-->105-->115)   -will trend CBC;   -bleeding precaution      #Endo  A1c 7.4   -no hx of DM   -change to low ISS q AC & HS   -c/w Lantus     Endocrine input appreciated .   -No need for tight glycemic control given age. Goal A1c <8% Goal Glucose 100-200  Can continue to monitor on Lantus 6 units qhs and low correction scale qac + bedtime  Outpt. PCP follow-up  Outpt. optho, podiatry, nephrology  Plan to d/c with dietary and lifestyle modifications first. If A1c increases in 3 months can consider tradjenta 5mg daily.      PPX  #DVT PPx; on Eliquis    GO    FULL    DISPO  BB to medical floor to dr. Rogers

## 2021-11-08 NOTE — PROGRESS NOTE ADULT - SUBJECTIVE AND OBJECTIVE BOX
INTERVAL HPI/OVERNIGHT EVENTS:    Patient is a 89y old  Male who presents with a chief complaint of UTI with Sepsis (06 Nov 2021 15:46)      ROS:  CONSTITUTIONAL:  Negative fever or chills, feels well, good appetite  EYES:  Negative  blurry vision or double vision  CARDIOVASCULAR:  Negative for chest pain or palpitations  RESPIRATORY:  Negative for cough, wheezing, or SOB   GASTROINTESTINAL:  Negative for nausea, vomiting, diarrhea, constipation, or abdominal pain  GENITOURINARY:  Negative frequency, urgency or dysuria  NEUROLOGIC:  No headache, confusion, dizziness, lightheadedness    Allergies    penicillin (Rash)    Intolerances        ANTIBIOTICS/RELEVANT:  antimicrobials  meropenem  IVPB      meropenem  IVPB 500 milliGRAM(s) IV Intermittent every 12 hours    immunologic:    OTHER:  acetaminophen    Suspension .. 650 milliGRAM(s) Oral every 6 hours PRN  apixaban 2.5 milliGRAM(s) Oral two times a day  artificial  tears Solution 2 Drop(s) Both EYES every 3 hours  chlorhexidine 4% Liquid 1 Application(s) Topical <User Schedule>  dextrose 40% Gel 15 Gram(s) Oral once  dextrose 5%. 1000 milliLiter(s) IV Continuous <Continuous>  dextrose 5%. 1000 milliLiter(s) IV Continuous <Continuous>  dextrose 50% Injectable 25 Gram(s) IV Push once  dextrose 50% Injectable 12.5 Gram(s) IV Push once  dextrose 50% Injectable 25 Gram(s) IV Push once  finasteride 5 milliGRAM(s) Oral daily  glucagon  Injectable 1 milliGRAM(s) IntraMuscular once  insulin glargine Injectable (LANTUS) 6 Unit(s) SubCutaneous at bedtime  insulin lispro (ADMELOG) corrective regimen sliding scale   SubCutaneous three times a day before meals  insulin lispro (ADMELOG) corrective regimen sliding scale   SubCutaneous at bedtime  polyethylene glycol 3350 17 Gram(s) Oral daily  senna 2 Tablet(s) Oral at bedtime  tamsulosin 0.4 milliGRAM(s) Oral at bedtime      Objective:  Vital Signs Last 24 Hrs  T(C): 37 (08 Nov 2021 04:00), Max: 37.1 (08 Nov 2021 00:00)  T(F): 98.6 (08 Nov 2021 04:00), Max: 98.7 (08 Nov 2021 00:00)  HR: 69 (08 Nov 2021 05:00) (60 - 91)  BP: 139/65 (08 Nov 2021 05:00) (138/89 - 164/71)  BP(mean): 93 (08 Nov 2021 05:00) (90 - 109)  RR: 20 (08 Nov 2021 05:00) (18 - 29)  SpO2: 96% (08 Nov 2021 05:00) (93% - 97%)    PHYSICAL EXAM:  General -   HEENT -   CV -   Resp -   Abdomen -   Extremities -   Skin -       LABS:                        11.8   26.76 )-----------( 105      ( 07 Nov 2021 00:14 )             35.9     11-07    138  |  104  |  66<H>  ----------------------------<  155<H>  4.2   |  22  |  2.03<H>    Ca    8.7      07 Nov 2021 00:14  Phos  3.1     11-07  Mg     2.4     11-07    TPro  x   /  Alb  x   /  TBili  0.4  /  DBili  0.1  /  AST  x   /  ALT  x   /  AlkPhos  x   11-07    PT/INR - ( 07 Nov 2021 00:14 )   PT: 14.0 sec;   INR: 1.18 ratio                 MICROBIOLOGY:        RADIOLOGY & ADDITIONAL STUDIES: INTERVAL HPI/OVERNIGHT EVENTS:    Patient is a 89y old  Male who presents with a chief complaint of UTI with Sepsis (06 Nov 2021 15:46)      ROS:  CONSTITUTIONAL:  Negative fever or chills, feels well, good appetite  EYES:  Negative  blurry vision or double vision  CARDIOVASCULAR:  Negative for chest pain or palpitations  RESPIRATORY:  Negative for cough, wheezing, or SOB   GASTROINTESTINAL:  Negative for nausea, vomiting, diarrhea, constipation, or abdominal pain  GENITOURINARY:  Negative frequency, urgency or dysuria  NEUROLOGIC:  No headache, confusion, dizziness, lightheadedness    Allergies    penicillin (Rash)    Intolerances        ANTIBIOTICS/RELEVANT:  antimicrobials  meropenem  IVPB      meropenem  IVPB 500 milliGRAM(s) IV Intermittent every 12 hours    immunologic:    OTHER:  acetaminophen    Suspension .. 650 milliGRAM(s) Oral every 6 hours PRN  apixaban 2.5 milliGRAM(s) Oral two times a day  artificial  tears Solution 2 Drop(s) Both EYES every 3 hours  chlorhexidine 4% Liquid 1 Application(s) Topical <User Schedule>  dextrose 40% Gel 15 Gram(s) Oral once  dextrose 5%. 1000 milliLiter(s) IV Continuous <Continuous>  dextrose 5%. 1000 milliLiter(s) IV Continuous <Continuous>  dextrose 50% Injectable 25 Gram(s) IV Push once  dextrose 50% Injectable 12.5 Gram(s) IV Push once  dextrose 50% Injectable 25 Gram(s) IV Push once  finasteride 5 milliGRAM(s) Oral daily  glucagon  Injectable 1 milliGRAM(s) IntraMuscular once  insulin glargine Injectable (LANTUS) 6 Unit(s) SubCutaneous at bedtime  insulin lispro (ADMELOG) corrective regimen sliding scale   SubCutaneous three times a day before meals  insulin lispro (ADMELOG) corrective regimen sliding scale   SubCutaneous at bedtime  polyethylene glycol 3350 17 Gram(s) Oral daily  senna 2 Tablet(s) Oral at bedtime  tamsulosin 0.4 milliGRAM(s) Oral at bedtime      Objective:  Vital Signs Last 24 Hrs  T(C): 37 (08 Nov 2021 04:00), Max: 37.1 (08 Nov 2021 00:00)  T(F): 98.6 (08 Nov 2021 04:00), Max: 98.7 (08 Nov 2021 00:00)  HR: 69 (08 Nov 2021 05:00) (60 - 91)  BP: 139/65 (08 Nov 2021 05:00) (138/89 - 164/71)  BP(mean): 93 (08 Nov 2021 05:00) (90 - 109)  RR: 20 (08 Nov 2021 05:00) (18 - 29)  SpO2: 96% (08 Nov 2021 05:00) (93% - 97%)    PHYSICAL EXAM:  General -   HEENT -   CV -   Resp -   Abdomen -   Extremities -   Skin -       LABS:                                     11.8   13.37 )-----------( 115      ( 08 Nov 2021 05:28 )             34.7     Hgb Trend: 11.8<--, 11.8<--, 10.6<--, 11.6<--, 11.3<--  11-07    138  |  104  |  66<H>  ----------------------------<  155<H>  4.2   |  22  |  2.03<H>    Ca    8.7      07 Nov 2021 00:14  Phos  3.1     11-07  Mg     2.4     11-07    TPro  x   /  Alb  x   /  TBili  0.4  /  DBili  0.1  /  AST  x   /  ALT  x   /  AlkPhos  x   11-07    Creatinine Trend: 2.03<--, 2.38<--, 2.21<--, 2.11<--, 2.36<--  PT/INR - ( 07 Nov 2021 00:14 )   PT: 14.0 sec;   INR: 1.18 ratio            MICROBIOLOGY:  Culture - Blood (11.05.21 @ 02:31)    -  Proteus mirabilis: Detec  Culture - Blood (11.05.21 @ 02:31) and anaerobic bottles: Proteus mirabilis  See previous culture [id number]  Culture - Urine (11.05.21 @ 02:23)    Specimen Source: Clean Catch Clean Catch (Midstream)    Culture Results:   >100,000 CFU/ml Proteus mirabilis      RADIOLOGY & ADDITIONAL STUDIES:  < from: Xray Chest 1 View- PORTABLE-Urgent (11.04.21 @ 23:39) >  EXAM:  XR CHEST PORTABLE URGENT 1V                            PROCEDURE DATE:  11/04/2021            INTERPRETATION:  CLINICAL INFORMATION: Sepsis    TECHNIQUE: Frontal radiograph of the chest    COMPARISON: Chest radiograph  8/6/2020    FINDINGS:    Clear lungs. Left pleural effusion. No pneumothorax. Cardiac silhouette size cannot be accurately assessed on this projection. No acute osseous findings. Left chest wall cardiac device.    IMPRESSION:    Clear lungs. Left pleural effusion.    --- End of Report ---    < end of copied text >   INTERVAL HPI/OVERNIGHT EVENTS:    Patient is a 89y old  Male who presents with a chief complaint of UTI with Sepsis (06 Nov 2021 15:46)      ROS:  [place] w/ hearing aids in both ears .   CONSTITUTIONAL:  Negative fever or chills, feels well, good appetite  EYES:  Negative  blurry vision or double vision  CARDIOVASCULAR:  Negative for chest pain or palpitations  RESPIRATORY:  Negative for cough, wheezing, or SOB   GASTROINTESTINAL:  Negative for nausea, vomiting, diarrhea, constipation, or abdominal pain  GENITOURINARY:  Negative frequency, urgency or dysuria  NEUROLOGIC:  No headache, confusion, dizziness, lightheadedness    Allergies    penicillin (Rash)    Intolerances        ANTIBIOTICS/RELEVANT:  antimicrobials  meropenem  IVPB      meropenem  IVPB 500 milliGRAM(s) IV Intermittent every 12 hours    immunologic:    OTHER:  acetaminophen    Suspension .. 650 milliGRAM(s) Oral every 6 hours PRN  apixaban 2.5 milliGRAM(s) Oral two times a day  artificial  tears Solution 2 Drop(s) Both EYES every 3 hours  chlorhexidine 4% Liquid 1 Application(s) Topical <User Schedule>  dextrose 40% Gel 15 Gram(s) Oral once  dextrose 5%. 1000 milliLiter(s) IV Continuous <Continuous>  dextrose 5%. 1000 milliLiter(s) IV Continuous <Continuous>  dextrose 50% Injectable 25 Gram(s) IV Push once  dextrose 50% Injectable 12.5 Gram(s) IV Push once  dextrose 50% Injectable 25 Gram(s) IV Push once  finasteride 5 milliGRAM(s) Oral daily  glucagon  Injectable 1 milliGRAM(s) IntraMuscular once  insulin glargine Injectable (LANTUS) 6 Unit(s) SubCutaneous at bedtime  insulin lispro (ADMELOG) corrective regimen sliding scale   SubCutaneous three times a day before meals  insulin lispro (ADMELOG) corrective regimen sliding scale   SubCutaneous at bedtime  polyethylene glycol 3350 17 Gram(s) Oral daily  senna 2 Tablet(s) Oral at bedtime  tamsulosin 0.4 milliGRAM(s) Oral at bedtime      Objective:  Vital Signs Last 24 Hrs  T(C): 37 (08 Nov 2021 04:00), Max: 37.1 (08 Nov 2021 00:00)  T(F): 98.6 (08 Nov 2021 04:00), Max: 98.7 (08 Nov 2021 00:00)  HR: 69 (08 Nov 2021 05:00) (60 - 91)  BP: 139/65 (08 Nov 2021 05:00) (138/89 - 164/71)  BP(mean): 93 (08 Nov 2021 05:00) (90 - 109)  RR: 20 (08 Nov 2021 05:00) (18 - 29)  SpO2: 96% (08 Nov 2021 05:00) (93% - 97%)    PHYSICAL EXAM:  General - calm, follows commands   HEENT -   CV - s1 & s2+   Resp - CTA b/l   Abdomen - soft, ND, NT , BS x 4 quadrants .   Extremities - no edema   Skin - warm and dry       LABS:                                     11.8   13.37 )-----------( 115      ( 08 Nov 2021 05:28 )             34.7     Hgb Trend: 11.8<--, 11.8<--, 10.6<--, 11.6<--, 11.3<--  11-07    138  |  104  |  66<H>  ----------------------------<  155<H>  4.2   |  22  |  2.03<H>    Ca    8.7      07 Nov 2021 00:14  Phos  3.1     11-07  Mg     2.4     11-07    TPro  x   /  Alb  x   /  TBili  0.4  /  DBili  0.1  /  AST  x   /  ALT  x   /  AlkPhos  x   11-07    Creatinine Trend: 2.03<--, 2.38<--, 2.21<--, 2.11<--, 2.36<--  PT/INR - ( 07 Nov 2021 00:14 )   PT: 14.0 sec;   INR: 1.18 ratio            MICROBIOLOGY:  Culture - Blood (11.05.21 @ 02:31)    -  Proteus mirabilis: Detec  Culture - Blood (11.05.21 @ 02:31) and anaerobic bottles: Proteus mirabilis  See previous culture [id number]  Culture - Urine (11.05.21 @ 02:23)    Specimen Source: Clean Catch Clean Catch (Midstream)    Culture Results:   >100,000 CFU/ml Proteus mirabilis      RADIOLOGY & ADDITIONAL STUDIES:  < from: Xray Chest 1 View- PORTABLE-Urgent (11.04.21 @ 23:39) >  EXAM:  XR CHEST PORTABLE URGENT 1V                            PROCEDURE DATE:  11/04/2021            INTERPRETATION:  CLINICAL INFORMATION: Sepsis    TECHNIQUE: Frontal radiograph of the chest    COMPARISON: Chest radiograph  8/6/2020    FINDINGS:    Clear lungs. Left pleural effusion. No pneumothorax. Cardiac silhouette size cannot be accurately assessed on this projection. No acute osseous findings. Left chest wall cardiac device.    IMPRESSION:    Clear lungs. Left pleural effusion.    --- End of Report ---    < end of copied text >

## 2021-11-08 NOTE — PROGRESS NOTE ADULT - ATTENDING COMMENTS
89 y/o M w/Afib admitted with hypotension secondary to severe sepsis with septic shock due to UTI and proteus bacteremia likely from urine. JAMIE likely secondary to ATN/sepsis.    - Continue meropenem until 11/12 (7 day course PCN allergy)  - Rate control and therapeutic AC for Afib  - Trend Cr, avoid nephrotoxins    transfer to floor    cc itme 30 mns

## 2021-11-09 LAB
ALBUMIN SERPL ELPH-MCNC: 2.3 G/DL — LOW (ref 3.3–5)
ALBUMIN SERPL ELPH-MCNC: 3 G/DL — LOW (ref 3.3–5)
ALP SERPL-CCNC: 109 U/L — SIGNIFICANT CHANGE UP (ref 40–120)
ALP SERPL-CCNC: 71 U/L — SIGNIFICANT CHANGE UP (ref 40–120)
ALT FLD-CCNC: 45 U/L — SIGNIFICANT CHANGE UP (ref 10–45)
ALT FLD-CCNC: 50 U/L — HIGH (ref 10–45)
ANION GAP SERPL CALC-SCNC: 11 MMOL/L — SIGNIFICANT CHANGE UP (ref 5–17)
ANION GAP SERPL CALC-SCNC: 7 MMOL/L — SIGNIFICANT CHANGE UP (ref 5–17)
AST SERPL-CCNC: 24 U/L — SIGNIFICANT CHANGE UP (ref 10–40)
AST SERPL-CCNC: 25 U/L — SIGNIFICANT CHANGE UP (ref 10–40)
BACTERIA UR CULT: ABNORMAL
BASOPHILS # BLD AUTO: 0.06 K/UL — SIGNIFICANT CHANGE UP (ref 0–0.2)
BASOPHILS NFR BLD AUTO: 0.4 % — SIGNIFICANT CHANGE UP (ref 0–2)
BILIRUB SERPL-MCNC: 0.4 MG/DL — SIGNIFICANT CHANGE UP (ref 0.2–1.2)
BILIRUB SERPL-MCNC: 0.9 MG/DL — SIGNIFICANT CHANGE UP (ref 0.2–1.2)
BUN SERPL-MCNC: 20 MG/DL — SIGNIFICANT CHANGE UP (ref 7–23)
BUN SERPL-MCNC: 53 MG/DL — HIGH (ref 7–23)
CALCIUM SERPL-MCNC: 8.7 MG/DL — SIGNIFICANT CHANGE UP (ref 8.4–10.5)
CALCIUM SERPL-MCNC: 8.7 MG/DL — SIGNIFICANT CHANGE UP (ref 8.4–10.5)
CHLORIDE SERPL-SCNC: 100 MMOL/L — SIGNIFICANT CHANGE UP (ref 96–108)
CHLORIDE SERPL-SCNC: 107 MMOL/L — SIGNIFICANT CHANGE UP (ref 96–108)
CO2 SERPL-SCNC: 22 MMOL/L — SIGNIFICANT CHANGE UP (ref 22–31)
CO2 SERPL-SCNC: 26 MMOL/L — SIGNIFICANT CHANGE UP (ref 22–31)
CREAT SERPL-MCNC: 1.32 MG/DL — HIGH (ref 0.5–1.3)
CREAT SERPL-MCNC: 1.43 MG/DL — HIGH (ref 0.5–1.3)
EOSINOPHIL # BLD AUTO: 0.39 K/UL — SIGNIFICANT CHANGE UP (ref 0–0.5)
EOSINOPHIL NFR BLD AUTO: 2.8 % — SIGNIFICANT CHANGE UP (ref 0–6)
GLUCOSE BLDC GLUCOMTR-MCNC: 104 MG/DL — HIGH (ref 70–99)
GLUCOSE BLDC GLUCOMTR-MCNC: 117 MG/DL — HIGH (ref 70–99)
GLUCOSE BLDC GLUCOMTR-MCNC: 159 MG/DL — HIGH (ref 70–99)
GLUCOSE BLDC GLUCOMTR-MCNC: 177 MG/DL — HIGH (ref 70–99)
GLUCOSE SERPL-MCNC: 155 MG/DL — HIGH (ref 70–99)
GLUCOSE SERPL-MCNC: 98 MG/DL — SIGNIFICANT CHANGE UP (ref 70–99)
HAPTOGLOB SERPL-MCNC: 189 MG/DL — SIGNIFICANT CHANGE UP (ref 34–200)
HCT VFR BLD CALC: 38.3 % — LOW (ref 39–50)
HCT VFR BLD CALC: 38.3 % — LOW (ref 39–50)
HGB BLD-MCNC: 12.7 G/DL — LOW (ref 13–17)
HGB BLD-MCNC: 12.9 G/DL — LOW (ref 13–17)
IMM GRANULOCYTES NFR BLD AUTO: 1.2 % — SIGNIFICANT CHANGE UP (ref 0–1.5)
LYMPHOCYTES # BLD AUTO: 1.28 K/UL — SIGNIFICANT CHANGE UP (ref 1–3.3)
LYMPHOCYTES # BLD AUTO: 9.1 % — LOW (ref 13–44)
MAGNESIUM SERPL-MCNC: 2.2 MG/DL — SIGNIFICANT CHANGE UP (ref 1.6–2.6)
MAGNESIUM SERPL-MCNC: 2.4 MG/DL — SIGNIFICANT CHANGE UP (ref 1.6–2.6)
MCHC RBC-ENTMCNC: 27.6 PG — SIGNIFICANT CHANGE UP (ref 27–34)
MCHC RBC-ENTMCNC: 29.5 PG — SIGNIFICANT CHANGE UP (ref 27–34)
MCHC RBC-ENTMCNC: 33.2 GM/DL — SIGNIFICANT CHANGE UP (ref 32–36)
MCHC RBC-ENTMCNC: 33.7 GM/DL — SIGNIFICANT CHANGE UP (ref 32–36)
MCV RBC AUTO: 83.3 FL — SIGNIFICANT CHANGE UP (ref 80–100)
MCV RBC AUTO: 87.4 FL — SIGNIFICANT CHANGE UP (ref 80–100)
MONOCYTES # BLD AUTO: 0.91 K/UL — HIGH (ref 0–0.9)
MONOCYTES NFR BLD AUTO: 6.5 % — SIGNIFICANT CHANGE UP (ref 2–14)
NEUTROPHILS # BLD AUTO: 11.19 K/UL — HIGH (ref 1.8–7.4)
NEUTROPHILS NFR BLD AUTO: 80 % — HIGH (ref 43–77)
NRBC # BLD: 0 /100 WBCS — SIGNIFICANT CHANGE UP (ref 0–0)
NRBC # BLD: 0 /100 WBCS — SIGNIFICANT CHANGE UP (ref 0–0)
PHOSPHATE SERPL-MCNC: 3 MG/DL — SIGNIFICANT CHANGE UP (ref 2.5–4.5)
PHOSPHATE SERPL-MCNC: 4 MG/DL — SIGNIFICANT CHANGE UP (ref 2.5–4.5)
PLATELET # BLD AUTO: 125 K/UL — LOW (ref 150–400)
PLATELET # BLD AUTO: SIGNIFICANT CHANGE UP (ref 150–400)
POTASSIUM SERPL-MCNC: 3.7 MMOL/L — SIGNIFICANT CHANGE UP (ref 3.5–5.3)
POTASSIUM SERPL-MCNC: 4.7 MMOL/L — SIGNIFICANT CHANGE UP (ref 3.5–5.3)
POTASSIUM SERPL-SCNC: 3.7 MMOL/L — SIGNIFICANT CHANGE UP (ref 3.5–5.3)
POTASSIUM SERPL-SCNC: 4.7 MMOL/L — SIGNIFICANT CHANGE UP (ref 3.5–5.3)
PROT SERPL-MCNC: 5.4 G/DL — LOW (ref 6–8.3)
PROT SERPL-MCNC: 6 G/DL — SIGNIFICANT CHANGE UP (ref 6–8.3)
RBC # BLD: 4.38 M/UL — SIGNIFICANT CHANGE UP (ref 4.2–5.8)
RBC # BLD: 4.6 M/UL — SIGNIFICANT CHANGE UP (ref 4.2–5.8)
RBC # FLD: 14.4 % — SIGNIFICANT CHANGE UP (ref 10.3–14.5)
RBC # FLD: 15.7 % — HIGH (ref 10.3–14.5)
SODIUM SERPL-SCNC: 136 MMOL/L — SIGNIFICANT CHANGE UP (ref 135–145)
SODIUM SERPL-SCNC: 137 MMOL/L — SIGNIFICANT CHANGE UP (ref 135–145)
WBC # BLD: 14 K/UL — HIGH (ref 3.8–10.5)
WBC # BLD: 8.05 K/UL — SIGNIFICANT CHANGE UP (ref 3.8–10.5)
WBC # FLD AUTO: 14 K/UL — HIGH (ref 3.8–10.5)
WBC # FLD AUTO: 8.05 K/UL — SIGNIFICANT CHANGE UP (ref 3.8–10.5)

## 2021-11-09 PROCEDURE — 99291 CRITICAL CARE FIRST HOUR: CPT

## 2021-11-09 RX ORDER — DOCOSANOL 100 MG/G
1 CREAM TOPICAL
Refills: 0 | Status: DISCONTINUED | OUTPATIENT
Start: 2021-11-09 | End: 2021-11-12

## 2021-11-09 RX ADMIN — INSULIN GLARGINE 6 UNIT(S): 100 INJECTION, SOLUTION SUBCUTANEOUS at 22:33

## 2021-11-09 RX ADMIN — POLYETHYLENE GLYCOL 3350 17 GRAM(S): 17 POWDER, FOR SOLUTION ORAL at 13:02

## 2021-11-09 RX ADMIN — CEFTRIAXONE 100 MILLIGRAM(S): 500 INJECTION, POWDER, FOR SOLUTION INTRAMUSCULAR; INTRAVENOUS at 18:47

## 2021-11-09 RX ADMIN — CHLORHEXIDINE GLUCONATE 1 APPLICATION(S): 213 SOLUTION TOPICAL at 06:31

## 2021-11-09 RX ADMIN — DOCOSANOL 1 APPLICATION(S): 100 CREAM TOPICAL at 18:48

## 2021-11-09 RX ADMIN — FINASTERIDE 5 MILLIGRAM(S): 5 TABLET, FILM COATED ORAL at 13:02

## 2021-11-09 RX ADMIN — Medication 2 DROP(S): at 22:33

## 2021-11-09 RX ADMIN — APIXABAN 2.5 MILLIGRAM(S): 2.5 TABLET, FILM COATED ORAL at 22:37

## 2021-11-09 RX ADMIN — SENNA PLUS 2 TABLET(S): 8.6 TABLET ORAL at 22:37

## 2021-11-09 RX ADMIN — Medication 2 DROP(S): at 09:10

## 2021-11-09 RX ADMIN — APIXABAN 2.5 MILLIGRAM(S): 2.5 TABLET, FILM COATED ORAL at 09:10

## 2021-11-09 RX ADMIN — Medication 1: at 16:40

## 2021-11-09 RX ADMIN — Medication 1: at 12:16

## 2021-11-09 RX ADMIN — TAMSULOSIN HYDROCHLORIDE 0.4 MILLIGRAM(S): 0.4 CAPSULE ORAL at 22:36

## 2021-11-09 NOTE — CHART NOTE - NSCHARTNOTEFT_GEN_A_CORE
MAR Accept Note  Transfer to: (X) Medicine    () Telemetry  Accepting Physician: Dr. Rogers  Assigned Room: 442W  PATIENT SEEN AND EXAMINED  NO PHYSICAL EXAM FINDINGS PRECLUDING TRANSFER OF SERVICE    HPI/MICU COURSE:    89 M w/ hx of  Atrial Fibrillation on eliquis, implanted pacemaker, CAD with multiple stents, Hypertension, Hyperlipidemia, Benign Prostatic Hyperplasia p/w "unresponsiveness". Pt was dx w/ a UTI yesterday due to blood in the urine. Pt was started on bactrim, pt unable to provide a history at this time due to clinical condition. PT was bibems, since son in law went to house and noted to be unarousable to tactile stimulation. When medics arrived, he was hypoxic to 88, hypotensive 80/50s, he was started on IVF, and was given approx 1L PTA. Upon arrival to [facility] he was SBP in the 100s, sating 90-93 percent on RA, he has a cough. Pt is aaox2-3 at baseline uses a walker, has 24 hour aide.   In ED pt received 2 L NS vanc cefepime and was started on iv pressors to maintain MAP >65.   11/5; On IV levophed , started on Midodrine 10mg TID. Upgraded abx to meropenem . David improving .   11/6; midnight weaned off levo gtt. BCx x 1 w/ proteus m , BC #2 GNR P speciation and Ucx w/ GNR pending speciation. C/w meropenem until sensitivity than adjust abx. C.w Midodrine 10 TID, wean as tolerated. Holding antihypertensives in setting of sepsis and while on midodrin . Restarted Eliquis 2.5 BID . Drugs renally dosed.  DAVID ; little bump in Cr ; added home regimen flomax and finasteride ; bladder scan q 6 hrs for residual void. Pt has stress incontinence.               OBJECTIVE DATA:  Vital Signs Last 24 Hrs  T(C): 37.3 (09 Nov 2021 16:00), Max: 37.3 (09 Nov 2021 16:00)  T(F): 99.1 (09 Nov 2021 16:00), Max: 99.1 (09 Nov 2021 16:00)  HR: 74 (09 Nov 2021 16:24) (66 - 80)  BP: 133/65 (09 Nov 2021 16:24) (127/62 - 157/71)  BP(mean): 93 (09 Nov 2021 16:00) (89 - 102)  RR: 29 (09 Nov 2021 16:24) (20 - 30)  SpO2: 95% (09 Nov 2021 16:24) (94% - 95%)    LABS                                            See note                 Neurophils% (auto):   x      (11-09 @ 06:07):    See Note)-----------(See note        Lymphocytes% (auto):  x                                             See note                  Eosinphils% (auto):   x        Manual%: Neutrophils x    ; Lymphocytes x    ; Eosinophils x    ; Bands%: x    ; Blasts x                                    137    |  100    |  20                  Calcium: 8.7   / iCa: x      (11-09 @ 06:07)    ----------------------------<  98        Magnesium: 2.2                              3.7     |  26     |  1.32             Phosphorous: 4.0      TPro  6.0    /  Alb  3.0    /  TBili  0.9    /  DBili  x      /  AST  24     /  ALT  50     /  AlkPhos  71     09 Nov 2021 06:07              ASSESSMENT/PLAN & FOLLOW-UP: MAR ACCEPT NOTE    MICU COURSE:  89 M w/ hx of  Atrial Fibrillation on eliquis, implanted pacemaker, CAD with multiple stents, Hypertension, Hyperlipidemia, Benign Prostatic Hyperplasia p/w "unresponsiveness". Pt was dx w/ a UTI yesterday due to blood in the urine. Pt was started on bactrim, pt unable to provide a history at this time due to clinical condition. PT was bibems, since son in law went to house and noted to be unarousable to tactile stimulation. When medics arrived, he was hypoxic to 88, hypotensive 80/50s, he was started on IVF, and was given approx 1L PTA. Upon arrival to [facility] he was SBP in the 100s, sating 90-93 percent on RA, he has a cough. Pt is aaox2-3 at baseline uses a walker, has 24 hour aide.   In ED pt received 2 L NS vanc cefepime and was started on iv pressors to maintain MAP >65.   11/5; On IV levophed , started on Midodrine 10mg TID. Upgraded abx to meropenem . David improving .   11/6; midnight weaned off levo gtt. BCx x 1 w/ proteus m , BC #2 GNR P speciation and Ucx w/ GNR pending speciation. C/w meropenem until sensitivity than adjust abx. C.w Midodrine 10 TID, wean as tolerated. Holding antihypertensives in setting of sepsis and while on midodrin . Restarted Eliquis 2.5 BID . Drugs renally dosed.  DAVID ; little bump in Cr ; added home regimen flomax and finasteride ; bladder scan q 6 hrs for residual void. Pt has stress incontinence.     ***  At time of transfer: patient in NAD and without complaints.  ***    ASSESSMENT & PLAN:     89 M w/ hx of  Atrial Fibrillation on Eliquis, implanted pacemaker, CAD with multiple stents, Hypertension, Hyperlipidemia, Benign Prostatic Hyperplasia found "unresponsive" was found to be in septic shock likely multifactorial proteus m.  bacteremia and urosepsis.     ***  See MICU transfer note for full plan of care.  ***    For Followup:  -f/ endocrinologist recs ; pt new Dm dx and new on insulin   -trend BUN / Cr   -bladder scan q 6 hrs   -pending renal US & Abdominal US   -restarted home dose Eliquis ; trend daily CBC

## 2021-11-09 NOTE — PROGRESS NOTE ADULT - ASSESSMENT
89 M w/ hx of  Atrial Fibrillation on eliquis, implanted pacemaker, CAD with multiple stents, Hypertension, Hyperlipidemia, Benign Prostatic Hyperplasia found "unresponsive" was foudn to be in septic shock likely multifactorial proteus m.  bacteremia and urosepsis. .       #Neuro; acute encephalopathy likely 2/2 septic shock   -resolved   -at the baseline  A+O x4 , [place]   -c/w neuro checks      #Resp  AMS ; monitor  airway compromise , hypoxia 85% , L pleural effusion   -resolved   -admitted to MICU with septic shock and airway monitoring   -c/t monitor o2sat ; maintain o2sat >93% on RA    #CV  Septic shock multifactorial proteus m bacteremia , UTI   -resolved  titrated  Midodrine to 5 TID ; and wean off  maintain MAP >65  -c/w ABX       A fib  -restarted on home dose of eliqus   -telemetry with NSR 66  -c/ tele monitoring     #GI  Transaminitis   - US abdomen   - downtrending trend LFT's   Tolerating PO intake   -c/ aspiration precaution       #Renal  JAMIE in setting of septic shock , now found R moderate hydroureteronephrosis   -JAMIE improving ;-trend BUN /cr   -hx of BPH   -bladder US q 6 for residual urine   -renal/abdomen US completed; 11/8 w/ Moderate righthydroureteronephrosis.B/l echogenic kidneys suggesting medical renal disease. Contracted gallbladder with sludge. No biliary ductal dilation. Hepatic steatosis.Prostatomegaly with associated trabeculated urinary bladder wall.   -renally dose drugs   -avoid nephrotoxins   -c/w Flomax and fenestrae home dose  -c/w strict I&os   -urology called regarding findings ; pending input       #ID  septic shock 2/2 multifactorial proteus m bacteremia  and UTI   -repeat BC x 2 pending results   - MRSA negative   -on meropenem 11/5-11/8; based on sensitivity changed to ceftriaxone 11/8-11/12  -trend T curve   -trend WBC   -Urology consult     #Heme  Thrombocytopenia likely in setting of sepsis   -improving   platelets > 100 ( 115-->105-->115-->125)   -will trend CBC;   -bleeding precaution      #Endo  A1c 7.4   -no hx of DM   -change to low ISS q AC & HS   -c/w Lantus     Endocrine input appreciated .   -No need for tight glycemic control given age. Goal A1c <8% Goal Glucose 100-200  Can continue to monitor on Lantus 6 units qhs and low correction scale qac + bedtime  Outpt. PCP follow-up  Outpt. optho, podiatry, nephrology  Plan to d/c with dietary and lifestyle modifications first. If A1c increases in 3 months can consider tradjenta 5mg daily.      PPX  #DVT PPx; on Eliquis    GOC    FULL    DISPO  BB to medical floor to dr. Rogers        89 M w/ hx of  Atrial Fibrillation on eliquis, implanted pacemaker, CAD with multiple stents, Hypertension, Hyperlipidemia, Benign Prostatic Hyperplasia found "unresponsive" was foudn to be in septic shock likely multifactorial proteus m.  bacteremia and urosepsis. .       #Neuro; acute encephalopathy likely 2/2 septic shock   -resolved   -at the baseline  A+O x4 , [place]   -c/w neuro checks      #Resp  AMS ; monitor  airway compromise , hypoxia 85% , L pleural effusion   -resolved   -admitted to MICU with septic shock and airway monitoring   -c/t monitor o2sat ; maintain o2sat >93% on RA    #CV  Septic shock multifactorial proteus m bacteremia , UTI   -resolved  titrated  Midodrine to 5 TID ; and wean off  maintain MAP >65  -c/w ABX       A fib  -restarted on home dose of eliqus   -telemetry with NSR 66  -c/ tele monitoring     #GI  Transaminitis   - US abdomen   - downtrending trend LFT's   Tolerating PO intake   -c/ aspiration precaution       #Renal  JAMIE in setting of septic shock , now found R moderate hydroureteronephrosis   -JAMIE improving ;-trend BUN /cr   -hx of BPH   -bladder US q 6 for residual urine   -renal/abdomen US completed; 11/8 w/ Moderate righthydroureteronephrosis.B/l echogenic kidneys suggesting medical renal disease. Contracted gallbladder with sludge. No biliary ductal dilation. Hepatic steatosis.Prostatomegaly with associated trabeculated urinary bladder wall.   -renally dose drugs   -avoid nephrotoxins   -c/w Flomax and fenestrae home dose  -c/w strict I&os   -urology called regarding findings ; place escalona cath and  pending input   -Dr. ROLANDA Juárez = friend of family        #ID  septic shock 2/2 multifactorial proteus m bacteremia  and UTI   -repeat BC x 2 pending results   - MRSA negative   -on meropenem 11/5-11/8; based on sensitivity changed to ceftriaxone 11/8-11/12  -trend T curve   -trend WBC   -Urology consult     #Heme  Thrombocytopenia likely in setting of sepsis   -improving   platelets > 100 ( 115-->105-->115-->125)   -will trend CBC;   -bleeding precaution      #Endo  A1c 7.4   -no hx of DM   -change to low ISS q AC & HS   -c/w Lantus     Endocrine input appreciated .   -No need for tight glycemic control given age. Goal A1c <8% Goal Glucose 100-200  Can continue to monitor on Lantus 6 units qhs and low correction scale qac + bedtime  Outpt. PCP follow-up  Outpt. optho, podiatry, nephrology  Plan to d/c with dietary and lifestyle modifications first. If A1c increases in 3 months can consider tradjenta 5mg daily.      PPX  #DVT PPx; on Eliquis    GOC    FULL    DISPO  BB to medical floor to dr. Rogers

## 2021-11-09 NOTE — PROGRESS NOTE ADULT - ATTENDING COMMENTS
89 y/o M w/Afib admitted with hypotension secondary to severe sepsis with septic shock due to UTI and proteus bacteremia likely from urine. JAMIE likely secondary to ATN/sepsis. Now found to have hydro-ureter rgt sided - escalona and urology cs in place and appreciated.    - Continue meropenem until 11/12 (7 day course PCN allergy)  - Rate control and therapeutic AC for Afib  - Trend Cr, avoid nephrotoxins    transfer to floor    cc itme 30 mns

## 2021-11-09 NOTE — PHYSICAL THERAPY INITIAL EVALUATION ADULT - PRECAUTIONS/LIMITATIONS, REHAB EVAL
Abdomen US 11/8/21: Moderate right-sided hydroureteronephrosis. Bilateral echogenic kidneys suggesting medical renal disease. Contracted gallbladder with sludge. No biliary ductal dilation. Hepatic steatosis. Prostatomegaly with associated trabeculated urinary bladder wall. CXR 11/4/21: Clear lungs. Left pleural effusion./fall precautions

## 2021-11-09 NOTE — PHYSICAL THERAPY INITIAL EVALUATION ADULT - PERTINENT HX OF CURRENT PROBLEM, REHAB EVAL
Pt is a 89 M w/ hx of  Atrial Fibrillation on eliquis, implanted pacemaker, CAD with multiple stents, Hypertension, Hyperlipidemia, Benign Prostatic Hyperplasia found "unresponsive" was found to be in septic shock likely multifactorial proteus m. bacteremia and urosepsis.

## 2021-11-09 NOTE — CONSULT NOTE ADULT - SUBJECTIVE AND OBJECTIVE BOX
89 yo Male0 admitted to MICU for sepsis requiring pressors found with proteus bacteremia and UTI. On imaging found with moderate right hydronephrosis. Has not seen urologist. Was on bladder scan protocol in MICU. Pt seen and examined denies right flank pain, but states does have back pain due to bed and chair. Unable to add to baseline voiding symptoms.   Mcrae placed today with 300cc urine output   Imaging from August reviewed and kidneys without hydronephrosis     PAST MEDICAL & SURGICAL HISTORY:  BPH (benign prostatic hyperplasia)    Hypertension    Hyperlipidemia    Coronary artery disease    Atrial fibrillation    H/O hernia repair  2018    H/O removal of cyst  cyst removal off tailbone        MEDICATIONS  (STANDING):  apixaban 2.5 milliGRAM(s) Oral two times a day  artificial  tears Solution 2 Drop(s) Both EYES every 12 hours  cefTRIAXone   IVPB 1000 milliGRAM(s) IV Intermittent <User Schedule>  chlorhexidine 4% Liquid 1 Application(s) Topical <User Schedule>  dextrose 40% Gel 15 Gram(s) Oral once  dextrose 5%. 1000 milliLiter(s) (50 mL/Hr) IV Continuous <Continuous>  dextrose 5%. 1000 milliLiter(s) (100 mL/Hr) IV Continuous <Continuous>  dextrose 50% Injectable 25 Gram(s) IV Push once  dextrose 50% Injectable 12.5 Gram(s) IV Push once  dextrose 50% Injectable 25 Gram(s) IV Push once  finasteride 5 milliGRAM(s) Oral daily  glucagon  Injectable 1 milliGRAM(s) IntraMuscular once  insulin glargine Injectable (LANTUS) 6 Unit(s) SubCutaneous at bedtime  insulin lispro (ADMELOG) corrective regimen sliding scale   SubCutaneous three times a day before meals  insulin lispro (ADMELOG) corrective regimen sliding scale   SubCutaneous at bedtime  polyethylene glycol 3350 17 Gram(s) Oral daily  senna 2 Tablet(s) Oral at bedtime  tamsulosin 0.4 milliGRAM(s) Oral at bedtime    MEDICATIONS  (PRN):  acetaminophen    Suspension .. 650 milliGRAM(s) Oral every 6 hours PRN Mild Pain (1 - 3), Moderate Pain (4 - 6)      FAMILY HISTORY:  No pertinent family history in first degree relatives        Allergies    penicillin (Rash)    Intolerances    SOCIAL HISTORY:  retired  REVIEW OF SYSTEMS: Otherwise negative as stated in HPI    Physical Exam  Vital signs  T(C): 37 (11-08-21 @ 20:00), Max: 37 (11-08-21 @ 20:00)  HR: 71 (11-09-21 @ 04:00)  BP: 148/67 (11-09-21 @ 04:00)  SpO2: 94% (11-09-21 @ 04:00)  Wt(kg): --    Output    UOP YELLOW    Gen:  AWAKE ALERT NAD AXOX3 NONTOXIC APPEARING    Pulm:  NO RESP DISTRESS  	  CV:  S1S2    GI:  OBESE SOFT NT/ND    BACK: NO CVAT BL      : MCRAE IN PLACE URINE YELLOW   BL DESC TESTIS NONTENDER SMALL RIGHT HYDROCELE       LABS:                 Complete Blood Count + Automated Diff (11.09.21 @ 01:30)    WBC Count: 14.00 K/uL    RBC Count: 4.38 M/uL    Hemoglobin: 12.9 g/dL    Hematocrit: 38.3 %    Mean Cell Volume: 87.4 fl    Mean Cell Hemoglobin: 29.5 pg    Mean Cell Hemoglobin Conc: 33.7 gm/dL    Red Cell Distrib Width: 14.4 %    Platelet Count - Automated: 125: Test Repeated Specimen integrity verified. K/uL    Auto Neutrophil #: 11.19 K/uL    Auto Lymphocyte #: 1.28 K/uL    Auto Monocyte #: 0.91 K/uL    Auto Eosinophil #: 0.39 K/uL    Auto Basophil #: 0.06 K/uL    Auto Neutrophil %: 80.0: Differential percentages must be correlated with absolute numbers for  clinical significance. %    Auto Lymphocyte %: 9.1 %    Auto Monocyte %: 6.5 %    Auto Eosinophil %: 2.8 %    Auto Basophil %: 0.4 %    Auto Immature Granulocyte %: 1.2: (Includes meta, myelo and promyelocytes) %    Nucleated RBC: 0 /100 WBCs        11-09    137  |  100  |  20  ----------------------------<  98  3.7   |  26  |  1.32<H>    Ca    8.7      09 Nov 2021 06:07  Phos  4.0     11-09  Mg     2.2     11-09    TPro  6.0  /  Alb  3.0<L>  /  TBili  0.9  /  DBili  x   /  AST  24  /  ALT  50<H>  /  AlkPhos  71  11-09    PT/INR - ( 08 Nov 2021 05:32 )   PT: 12.2 sec;   INR: 1.02 ratio         PTT - ( 08 Nov 2021 05:32 )  PTT:27.5 sec      Urine Cx: Culture - Urine (11.05.21 @ 02:23)  Culture - Blood (11.05.21 @ 02:31)    -  Proteus mirabilis: Detec    -  Trimethoprim/Sulfamethoxazole: S 1/19    Gram Stain:   Growth in anaerobic bottle: Gram Negative Rods  Growth in aerobic bottle: Gram Negative Rods    -  Amikacin: S <=16    -  Ampicillin: S <=8 These ampicillin results predict results for amoxicillin    -  Ampicillin/Sulbactam: S <=4/2 Enterobacter, Klebsiella aerogenes, Citrobacter, and Serratia may develop resistance during prolonged therapy (3-4 days)    -  Aztreonam: S <=4    -  Cefazolin: S <=2 Enterobacter, Klebsiella aerogenes, Citrobacter, and Serratia may develop resistance during prolonged therapy (3-4 days)    -  Cefepime: S <=2    -  Cefoxitin: S <=8    -  Ceftriaxone: S <=1 Enterobacter, Klebsiella aerogenes, Citrobacter, and Serratia may develop resistance during prolonged therapy    -  Ciprofloxacin: R >2    -  Ertapenem: S <=0.5    -  Gentamicin: S <=2    -  Levofloxacin: I 1    -  Meropenem: S <=1    -  Piperacillin/Tazobactam: S <=8    -  Tobramycin: S <=2    Specimen Source: .Blood Blood-Peripheral    Organism: Blood Culture PCR    Organism: Proteus mirabilis    Culture Results:   Growth in aerobic and anaerobic bottles: Proteus mirabilis  ***Blood Panel PCR results on this specimen are available  approximately 3 hours after the Gram stain result.***  Gram stain, PCR, and/or culture results may not always  correspond due to difference in methodologies.  ************************************************************  This PCR assay was performed by multiplex PCR. This  Assay tests for 66 bacterial and resistance gene targets.  Please refer to the [facility] test directory  at [email or web address] for details.    Organism Identification: Blood Culture PCR  Proteus mirabilis    Method Type: PCR    Method Type: KRYSTIAN      -  Tobramycin: S <=2    -  Trimethoprim/Sulfamethoxazole: S <=0.5/9.5    -  Amikacin: S <=16    -  Amoxicillin/Clavulanic Acid: S <=8/4    -  Ampicillin: S <=8 These ampicillin results predict results for amoxicillin    -  Nitrofurantoin: R >64 Should not be used to treat pyelonephritis    -  Piperacillin/Tazobactam: S <=8    -  Levofloxacin: I 1    -  Meropenem: S <=1    -  Ertapenem: S <=0.5    -  Gentamicin: S <=2    -  Ciprofloxacin: R 1    -  Ceftriaxone: S <=1 Enterobacter, Klebsiella aerogenes, Citrobacter, and Serratia may develop resistance during prolonged therapy    -  Cefoxitin: S <=8    -  Cefepime: S <=2    -  Cefazolin: S <=2 ([id number]) For uncomplicated UTI with K. pneumoniae, E. coli, or P. mirablis: KRYSTIAN <=16 is sensitive and KRYSTIAN >=32 is resistant. This also predicts results for oral agents cefaclor, cefdinir, cefpodoxime, cefprozil, cefuroxime axetil, cephalexin and locarbef for uncomplicated UTI. Note that some isolates may be susceptible to these agents while testing resistant to cefazolin.    -  Aztreonam: S <=4    -  Ampicillin/Sulbactam: S <=4/2 Enterobacter, Klebsiella aerogenes, Citrobacter, and Serratia may develop resistance during prolonged therapy (3-4 days)    Specimen Source: Clean Catch Clean Catch (Midstream)    Culture Results:   >100,000 CFU/ml Proteus mirabilis    Organism Identification: Proteus mirabilis    Organism: Proteus mirabilis    Method Type: KRYSTIAN      Culture - Blood (11.06.21 @ 16:32)    Specimen Source: .Blood Blood    Culture Results:   No growth to date.    Culture - Blood (11.06.21 @ 17:01)    Specimen Source: .Blood Blood    Culture Results:   No growth to date.    < from: US Abdomen Complete (US Abdomen Complete .) (11.08.21 @ 13:16) >  FINDINGS:    Liver: Increased echogenicity. Right lobe measures 15.4 cm, normal. Portal vein is patent. Smooth hepatic contour.  Bile ducts: Normal caliber. Common bile duct measures 0.5 cm.  Gallbladder: Contracted with fine sludge. No sonographic Canela's sign.  Pancreas: Visualized portions are within normal limits.  Spleen: 10.9 cm. Within normal limits.  Right kidney: 11.8 cm. Echogenic kidney. Moderate hydroureteronephrosis.  Left kidney: 12.1 cm. Echogenic kidney. No hydronephrosis. Trace perinephric fluid.  Ascites: None.  Aorta and IVC: Visualized portions are within normal limits.  Bladder: Trabeculated bladder wall likely secondary to chronic bilateral obstruction.  Prostate: 5.3 x 4.9 x 5.3 cm, volume = 80.0 mL, enlarged.    Bilateral pleural effusions.      IMPRESSION:    Moderate right-sided hydroureteronephrosis.    Bilateral echogenic kidneys suggesting medical renal disease.    Contracted gallbladder with sludge. No biliary ductal dilation.    Hepatic steatosis.    Prostatomegaly with associated trabeculated urinary bladder wall.    < end of copied text >        
    HPI:  89 M w/ hx of  Atrial Fibrillation on eliquis, implanted pacemaker, CAD with multiple stents, Hypertension, Hyperlipidemia, Benign Prostatic Hyperplasia p/w "unresponsiveness". Pt was dx w/ a UTI yesterday due to blood in the urine. Pt was started on bactrim, pt unable to provide a history at this time due to clinical condition. PT was bibems, since son in law went to house and noted to be unarousable to tactile stimulation. When medics arrived, he was hypoxic to 88, hypotensive 80/50s, he was started on IVF, and was given approx 1L PTA. Upon arrival to [facility] he was SBP in the 100s, sating 90-93 percent on RA, he has a cough. Pt is aaox2-3 at baseline uses a walker, has 24 hour aide.   Endocrine consulted for newly diagnosed diabetes with A1c of 7.4% Pt. denies a hx of diabetes or prediabetes. Grandson has Type 2 DM. Denies polyuria, polydipsia, nausea or vomiting. Has not been monitoring carb intake as he did not know he had diabetes. No reported hypoglycemia or symptoms of hypoglycemia.     PAST MEDICAL & SURGICAL HISTORY:  BPH (benign prostatic hyperplasia)    Hypertension    Hyperlipidemia    Coronary artery disease    Atrial fibrillation    H/O hernia repair  2018    [facility]        FAMILY HISTORY:  Grandson- Type 2 DM         Social History: + former tobacco use >50 years ago. +social alcohol use    Outpatient Medications:  · 	sulfamethoxazole-trimethoprim 800 mg-160 mg oral tablet: 1 tab(s) orally 2 times a day  · 	finasteride 5 mg oral tablet: 1 tab(s) orally once a day  · 	senna oral tablet: 2 tab(s) orally once a day (at bedtime)  · 	polyethylene glycol 3350 oral powder for reconstitution: 17 gram(s) orally 2 times a day  · 	Eliquis 2.5 mg oral tablet: 1 tab(s) orally 2 times a day  · 	amLODIPine 5 mg oral tablet: 1 tab(s) orally once a day  · 	Flomax 0.4 mg oral capsule: 1 cap(s) orally once a day  · 	folic acid 0.4 mg oral tablet: 1 tab(s) orally once a day  · 	aspirin 81 mg oral tablet, chewable: 1 tab(s) orally once a day  · 	acetaminophen 325 mg oral tablet: 3 tab(s) (975mg) orally every 4 hours, As Needed - 3), Moderate Pain (4 - 6)  · 	Vitamin B12 100 mcg oral tablet: 0.5 tab(s) (50mcg) orally once a day  · 	Lipitor 10 mg oral tablet: 1 tab(s) orally once a day (at bedtime)    MEDICATIONS  (STANDING):  apixaban 2.5 milliGRAM(s) Oral two times a day  artificial  tears Solution 2 Drop(s) Both EYES every 3 hours  chlorhexidine 4% Liquid 1 Application(s) Topical <User Schedule>  dextrose 40% Gel 15 Gram(s) Oral once  dextrose 5%. 1000 milliLiter(s) (50 mL/Hr) IV Continuous <Continuous>  dextrose 5%. 1000 milliLiter(s) (100 mL/Hr) IV Continuous <Continuous>  dextrose 50% Injectable 25 Gram(s) IV Push once  dextrose 50% Injectable 12.5 Gram(s) IV Push once  dextrose 50% Injectable 25 Gram(s) IV Push once  finasteride 5 milliGRAM(s) Oral daily  glucagon  Injectable 1 milliGRAM(s) IntraMuscular once  insulin glargine Injectable (LANTUS) 6 Unit(s) SubCutaneous at bedtime  insulin lispro (ADMELOG) corrective regimen sliding scale   SubCutaneous Before meals and at bedtime  meropenem  IVPB      meropenem  IVPB 500 milliGRAM(s) IV Intermittent every 12 hours  midodrine 10 milliGRAM(s) Oral every 8 hours  polyethylene glycol 3350 17 Gram(s) Oral daily  senna 2 Tablet(s) Oral at bedtime  tamsulosin 0.4 milliGRAM(s) Oral at bedtime    MEDICATIONS  (PRN):      Allergies    penicillin (Rash)    Intolerances      Review of Systems:  Constitutional: No fever, No change in weight  Eyes: No blurry vision  Neuro: No headache, No paresthesias  HEENT: No throat pain  Cardiovascular: No chest pain  Respiratory: No SOB  GI: No nausea or vomiting  : No polyuria  Skin: no rash  Psych: no depression  Endocrine: No polydipsia, No heat or cold intolerance, rest as noted in HPI  Hem/lymph: no swelling    All other review of systems negative      PHYSICAL EXAM:  VITALS: T(C): 36.3 (11-06-21 @ 12:00)  T(F): 97.3 (11-06-21 @ 12:00), Max: 99.3 (11-05-21 @ 16:00)  HR: 78 (11-06-21 @ 15:00) (62 - 91)  BP: 138/63 (11-06-21 @ 15:00) (86/48 - 153/70)  RR:  (16 - 31)  SpO2:  (92% - 98%)  Wt(kg): --  GENERAL: NAD at this time  EYES: No proptosis, EOMI  HEENT:  Atraumatic, Normocephalic, +hard of hearing  THYROID: Normal size, no palpable nodules  RESPIRATORY: Clear to auscultation bilaterally, full excursion, non-labored  CARDIOVASCULAR: Regular rhythm; No murmurs; no peripheral edema  GI: Soft, nontender, non distended, normal bowel sounds  SKIN: Dry, intact, No rashes or lesions  MUSCULOSKELETAL: normal strength  NEURO: follows commands, no tremor, normal reflexes  PSYCH:  normal affect, normal mood  CUSHING'S SIGNS: no striae      POCT Blood Glucose.: 139 mg/dL (11-06-21 @ 13:21)  POCT Blood Glucose.: 136 mg/dL (11-06-21 @ 09:06)  POCT Blood Glucose.: 155 mg/dL (11-05-21 @ 22:18)  POCT Blood Glucose.: 262 mg/dL (11-05-21 @ 16:59)  POCT Blood Glucose.: 259 mg/dL (11-05-21 @ 12:14)  POCT Blood Glucose.: 283 mg/dL (11-05-21 @ 05:07)  POCT Blood Glucose.: 293 mg/dL (11-05-21 @ 00:09)                              10.6   33.13 )-----------( 113      ( 06 Nov 2021 00:27 )             31.5       11-06    138  |  105  |  59<H>  ----------------------------<  154<H>  4.0   |  21<L>  |  2.38<H>    EGFR if : 26<L>  EGFR if non : 22<L>    Ca    8.5      11-06  Mg     2.2     11-06  Phos  3.3     11-06    TPro  5.4<L>  /  Alb  2.3<L>  /  TBili  0.3  /  DBili  x   /  AST  45<H>  /  ALT  32  /  AlkPhos  122<H>  11-06    Thyroid Function Tests:  11-05 @ 11:36 TSH 1.21 FreeT4 -- T3 -- Anti TPO -- Anti Thyroglobulin Ab -- TSI --          11-06 Chol 94 Direct LDL -- LDL calculated 27 HDL 40<L> Trig 137  Radiology:

## 2021-11-09 NOTE — PHYSICAL THERAPY INITIAL EVALUATION ADULT - ADDITIONAL COMMENTS
Pt lives alone in a high ranch house with 8 steps to enter, +HR and steps inside as well as a stairlift inside. Pt had a live in [facility] for 24 hrs/7 days PTA. Pt used a RW for ambulation PTA. +drives.

## 2021-11-09 NOTE — CONSULT NOTE ADULT - ASSESSMENT
89 yo male with Proteus UTI and bacteremia found with moderate right hydronephrosis and trabeculated bladder; JAMIE resolving    keep escalona inserted while hydronephrosis is present  abx per cx results and primary team   cont flomax and proscar   obtain CT A/P ideally urogram protocol to assess for source of hydronephrosis 
89 y/o M w/ newly diagnosed Type 2 DM on admission for UTI with sepsis.

## 2021-11-09 NOTE — PROGRESS NOTE ADULT - SUBJECTIVE AND OBJECTIVE BOX
INTERVAL HPI/OVERNIGHT EVENTS:    Patient is a 89y old  Male who presents with a chief complaint of UTI with Sepsis (06 Nov 2021 15:46)      ROS:  CONSTITUTIONAL:  Negative fever or chills, feels well, good appetite  EYES:  Negative  blurry vision or double vision  CARDIOVASCULAR:  Negative for chest pain or palpitations  RESPIRATORY:  Negative for cough, wheezing, or SOB   GASTROINTESTINAL:  Negative for nausea, vomiting, diarrhea, constipation, or abdominal pain  GENITOURINARY:  Negative frequency, urgency or dysuria  NEUROLOGIC:  No headache, confusion, dizziness, lightheadedness    Allergies    penicillin (Rash)    Intolerances        ANTIBIOTICS/RELEVANT:  antimicrobials  cefTRIAXone   IVPB 1000 milliGRAM(s) IV Intermittent <User Schedule>    immunologic:    OTHER:  acetaminophen    Suspension .. 650 milliGRAM(s) Oral every 6 hours PRN  apixaban 2.5 milliGRAM(s) Oral two times a day  artificial  tears Solution 2 Drop(s) Both EYES every 12 hours  chlorhexidine 4% Liquid 1 Application(s) Topical <User Schedule>  dextrose 40% Gel 15 Gram(s) Oral once  dextrose 5%. 1000 milliLiter(s) IV Continuous <Continuous>  dextrose 5%. 1000 milliLiter(s) IV Continuous <Continuous>  dextrose 50% Injectable 25 Gram(s) IV Push once  dextrose 50% Injectable 12.5 Gram(s) IV Push once  dextrose 50% Injectable 25 Gram(s) IV Push once  finasteride 5 milliGRAM(s) Oral daily  glucagon  Injectable 1 milliGRAM(s) IntraMuscular once  insulin glargine Injectable (LANTUS) 6 Unit(s) SubCutaneous at bedtime  insulin lispro (ADMELOG) corrective regimen sliding scale   SubCutaneous three times a day before meals  insulin lispro (ADMELOG) corrective regimen sliding scale   SubCutaneous at bedtime  polyethylene glycol 3350 17 Gram(s) Oral daily  senna 2 Tablet(s) Oral at bedtime  tamsulosin 0.4 milliGRAM(s) Oral at bedtime      Objective:  Vital Signs Last 24 Hrs  T(C): 37 (08 Nov 2021 20:00), Max: 37 (08 Nov 2021 20:00)  T(F): 98.6 (08 Nov 2021 20:00), Max: 98.6 (08 Nov 2021 20:00)  HR: 71 (09 Nov 2021 04:00) (66 - 80)  BP: 148/67 (09 Nov 2021 04:00) (121/62 - 156/73)  BP(mean): 97 (09 Nov 2021 04:00) (85 - 105)  RR: 20 (09 Nov 2021 04:00) (20 - 30)  SpO2: 94% (09 Nov 2021 04:00) (92% - 95%)    PHYSICAL EXAM:  General -   HEENT -   CV -   Resp -   Abdomen -   Extremities -   Skin -       LABS:                        12.9   14.00 )-----------( 125      ( 09 Nov 2021 01:30 )             38.3     11-09    136  |  107  |  53<H>  ----------------------------<  155<H>  4.7   |  22  |  1.43<H>    Ca    8.7      09 Nov 2021 01:30  Phos  3.0     11-09  Mg     2.4     11-09    TPro  5.4<L>  /  Alb  2.3<L>  /  TBili  0.4  /  DBili  x   /  AST  25  /  ALT  45  /  AlkPhos  109  11-09    PT/INR - ( 08 Nov 2021 05:32 )   PT: 12.2 sec;   INR: 1.02 ratio         PTT - ( 08 Nov 2021 05:32 )  PTT:27.5 sec        MICROBIOLOGY:        RADIOLOGY & ADDITIONAL STUDIES:    < from: US Abdomen Complete (US Abdomen Complete .) (11.08.21 @ 13:16) >  EXAM:  US ABDOMEN COMPLETE                            PROCEDURE DATE:  11/08/2021            INTERPRETATION:  CLINICAL INFORMATION: Septic shock. Acute kidney injury. Increasing LFTs. Thrombocytopenia.    COMPARISON: Renal ultrasound 7/24/2020. CT abdomen pelvis 8/13/2020.    TECHNIQUE: Sonography of the abdomen performed portably.    FINDINGS:    Liver: Increased echogenicity. Right lobe measures 15.4 cm, normal. Portal vein is patent. Smooth hepatic contour.  Bile ducts: Normal caliber. Common bile duct measures 0.5 cm.  Gallbladder: Contracted with fine sludge. No sonographic Canela's sign.  Pancreas: Visualized portions are within normal limits.  Spleen: 10.9 cm. Within normal limits.  Right kidney: 11.8 cm. Echogenic kidney. Moderate hydroureteronephrosis.  Left kidney: 12.1 cm. Echogenic kidney. No hydronephrosis. Trace perinephric fluid.  Ascites: None.  Aorta and IVC: Visualized portions are within normal limits.  Bladder: Trabeculated bladder wall likely secondary to chronic bilateral obstruction.  Prostate: 5.3 x 4.9 x 5.3 cm, volume = 80.0 mL, enlarged.    Bilateral pleural effusions.      IMPRESSION:    Moderate right-sided hydroureteronephrosis.    Bilateral echogenic kidneys suggesting medical renal disease.    Contracted gallbladder with sludge. No biliary ductal dilation.    Hepatic steatosis.    Prostatomegaly with associated trabeculated urinary bladder wall.    < end of copied text >   INTERVAL HPI/OVERNIGHT EVENTS:    Patient is a 89y old  Male who presents with a chief complaint of UTI with Sepsis (06 Nov 2021 15:46)      ROS:  CONSTITUTIONAL:  Negative fever or chills, feels well, good appetite  EYES:  Negative  blurry vision or double vision  CARDIOVASCULAR:  Negative for chest pain or palpitations  RESPIRATORY:  Negative for cough, wheezing, or SOB   GASTROINTESTINAL:  Negative for nausea, vomiting, diarrhea, constipation, or abdominal pain  GENITOURINARY:  Negative frequency, urgency or dysuria  NEUROLOGIC:  No headache, confusion, dizziness, lightheadedness    Allergies    penicillin (Rash)    Intolerances        ANTIBIOTICS/RELEVANT:  antimicrobials  cefTRIAXone   IVPB 1000 milliGRAM(s) IV Intermittent <User Schedule>    immunologic:    OTHER:  acetaminophen    Suspension .. 650 milliGRAM(s) Oral every 6 hours PRN  apixaban 2.5 milliGRAM(s) Oral two times a day  artificial  tears Solution 2 Drop(s) Both EYES every 12 hours  chlorhexidine 4% Liquid 1 Application(s) Topical <User Schedule>  dextrose 40% Gel 15 Gram(s) Oral once  dextrose 5%. 1000 milliLiter(s) IV Continuous <Continuous>  dextrose 5%. 1000 milliLiter(s) IV Continuous <Continuous>  dextrose 50% Injectable 25 Gram(s) IV Push once  dextrose 50% Injectable 12.5 Gram(s) IV Push once  dextrose 50% Injectable 25 Gram(s) IV Push once  finasteride 5 milliGRAM(s) Oral daily  glucagon  Injectable 1 milliGRAM(s) IntraMuscular once  insulin glargine Injectable (LANTUS) 6 Unit(s) SubCutaneous at bedtime  insulin lispro (ADMELOG) corrective regimen sliding scale   SubCutaneous three times a day before meals  insulin lispro (ADMELOG) corrective regimen sliding scale   SubCutaneous at bedtime  polyethylene glycol 3350 17 Gram(s) Oral daily  senna 2 Tablet(s) Oral at bedtime  tamsulosin 0.4 milliGRAM(s) Oral at bedtime      Objective:  Vital Signs Last 24 Hrs  T(C): 37 (08 Nov 2021 20:00), Max: 37 (08 Nov 2021 20:00)  T(F): 98.6 (08 Nov 2021 20:00), Max: 98.6 (08 Nov 2021 20:00)  HR: 71 (09 Nov 2021 04:00) (66 - 80)  BP: 148/67 (09 Nov 2021 04:00) (121/62 - 156/73)  BP(mean): 97 (09 Nov 2021 04:00) (85 - 105)  RR: 20 (09 Nov 2021 04:00) (20 - 30)  SpO2: 94% (09 Nov 2021 04:00) (92% - 95%)    PHYSICAL EXAM:  General -   HEENT -   CV -   Resp -   Abdomen -   Extremities -   Skin -       LABS:                        12.9   14.00 )-----------( 125      ( 09 Nov 2021 01:30 )             38.3     11-09    136  |  107  |  53<H>  ----------------------------<  155<H>  4.7   |  22  |  1.43<H>    Ca    8.7      09 Nov 2021 01:30  Phos  3.0     11-09  Mg     2.4     11-09    TPro  5.4<L>  /  Alb  2.3<L>  /  TBili  0.4  /  DBili  x   /  AST  25  /  ALT  45  /  AlkPhos  109  11-09    PT/INR - ( 08 Nov 2021 05:32 )   PT: 12.2 sec;   INR: 1.02 ratio         PTT - ( 08 Nov 2021 05:32 )  PTT:27.5 sec        RADIOLOGY & ADDITIONAL STUDIES:    < from: US Abdomen Complete (US Abdomen Complete .) (11.08.21 @ 13:16) >  EXAM:  US ABDOMEN COMPLETE                            PROCEDURE DATE:  11/08/2021            INTERPRETATION:  CLINICAL INFORMATION: Septic shock. Acute kidney injury. Increasing LFTs. Thrombocytopenia.    COMPARISON: Renal ultrasound 7/24/2020. CT abdomen pelvis 8/13/2020.    TECHNIQUE: Sonography of the abdomen performed portably.    FINDINGS:    Liver: Increased echogenicity. Right lobe measures 15.4 cm, normal. Portal vein is patent. Smooth hepatic contour.  Bile ducts: Normal caliber. Common bile duct measures 0.5 cm.  Gallbladder: Contracted with fine sludge. No sonographic Canela's sign.  Pancreas: Visualized portions are within normal limits.  Spleen: 10.9 cm. Within normal limits.  Right kidney: 11.8 cm. Echogenic kidney. Moderate hydroureteronephrosis.  Left kidney: 12.1 cm. Echogenic kidney. No hydronephrosis. Trace perinephric fluid.  Ascites: None.  Aorta and IVC: Visualized portions are within normal limits.  Bladder: Trabeculated bladder wall likely secondary to chronic bilateral obstruction.  Prostate: 5.3 x 4.9 x 5.3 cm, volume = 80.0 mL, enlarged.    Bilateral pleural effusions.      IMPRESSION:    Moderate right-sided hydroureteronephrosis.    Bilateral echogenic kidneys suggesting medical renal disease.    Contracted gallbladder with sludge. No biliary ductal dilation.    Hepatic steatosis.    Prostatomegaly with associated trabeculated urinary bladder wall.    < end of copied text >      MICROBIOLOGY:  Culture - Blood (11.05.21 @ 02:31)    -  Proteus mirabilis: Detec  Culture - Blood (11.05.21 @ 02:31) and anaerobic bottles: Proteus mirabilis  See previous culture [id number]  Culture - Urine (11.05.21 @ 02:23)    Specimen Source: Clean Catch Clean Catch (Midstream)    Culture Results:   >100,000 CFU/ml Proteus mirabilis      RADIOLOGY & ADDITIONAL STUDIES:  < from: Xray Chest 1 View- PORTABLE-Urgent (11.04.21 @ 23:39) >  EXAM:  XR CHEST PORTABLE URGENT 1V                            PROCEDURE DATE:  11/04/2021            INTERPRETATION:  CLINICAL INFORMATION: Sepsis    TECHNIQUE: Frontal radiograph of the chest    COMPARISON: Chest radiograph  8/6/2020    FINDINGS:    Clear lungs. Left pleural effusion. No pneumothorax. Cardiac silhouette size cannot be accurately assessed on this projection. No acute osseous findings. Left chest wall cardiac device.    IMPRESSION:    Clear lungs. Left pleural effusion.

## 2021-11-10 DIAGNOSIS — N40.0 BENIGN PROSTATIC HYPERPLASIA WITHOUT LOWER URINARY TRACT SYMPTOMS: ICD-10-CM

## 2021-11-10 DIAGNOSIS — A41.9 SEPSIS, UNSPECIFIED ORGANISM: ICD-10-CM

## 2021-11-10 DIAGNOSIS — N17.9 ACUTE KIDNEY FAILURE, UNSPECIFIED: ICD-10-CM

## 2021-11-10 DIAGNOSIS — Z29.9 ENCOUNTER FOR PROPHYLACTIC MEASURES, UNSPECIFIED: ICD-10-CM

## 2021-11-10 DIAGNOSIS — N13.30 UNSPECIFIED HYDRONEPHROSIS: ICD-10-CM

## 2021-11-10 DIAGNOSIS — I48.91 UNSPECIFIED ATRIAL FIBRILLATION: ICD-10-CM

## 2021-11-10 LAB
ALBUMIN SERPL ELPH-MCNC: 2.4 G/DL — LOW (ref 3.3–5)
ALP SERPL-CCNC: 92 U/L — SIGNIFICANT CHANGE UP (ref 40–120)
ALT FLD-CCNC: 35 U/L — SIGNIFICANT CHANGE UP (ref 10–45)
ANION GAP SERPL CALC-SCNC: 9 MMOL/L — SIGNIFICANT CHANGE UP (ref 5–17)
APTT BLD: 28 SEC — SIGNIFICANT CHANGE UP (ref 27.5–35.5)
AST SERPL-CCNC: 20 U/L — SIGNIFICANT CHANGE UP (ref 10–40)
BILIRUB SERPL-MCNC: 0.4 MG/DL — SIGNIFICANT CHANGE UP (ref 0.2–1.2)
BUN SERPL-MCNC: 43 MG/DL — HIGH (ref 7–23)
CALCIUM SERPL-MCNC: 8.5 MG/DL — SIGNIFICANT CHANGE UP (ref 8.4–10.5)
CHLORIDE SERPL-SCNC: 110 MMOL/L — HIGH (ref 96–108)
CO2 SERPL-SCNC: 23 MMOL/L — SIGNIFICANT CHANGE UP (ref 22–31)
CREAT SERPL-MCNC: 1.19 MG/DL — SIGNIFICANT CHANGE UP (ref 0.5–1.3)
GLUCOSE BLDC GLUCOMTR-MCNC: 136 MG/DL — HIGH (ref 70–99)
GLUCOSE BLDC GLUCOMTR-MCNC: 144 MG/DL — HIGH (ref 70–99)
GLUCOSE BLDC GLUCOMTR-MCNC: 164 MG/DL — HIGH (ref 70–99)
GLUCOSE BLDC GLUCOMTR-MCNC: 164 MG/DL — HIGH (ref 70–99)
GLUCOSE SERPL-MCNC: 148 MG/DL — HIGH (ref 70–99)
HCT VFR BLD CALC: 36.8 % — LOW (ref 39–50)
HGB BLD-MCNC: 11.9 G/DL — LOW (ref 13–17)
INR BLD: 1.01 RATIO — SIGNIFICANT CHANGE UP (ref 0.88–1.16)
MAGNESIUM SERPL-MCNC: 2.3 MG/DL — SIGNIFICANT CHANGE UP (ref 1.6–2.6)
MCHC RBC-ENTMCNC: 29 PG — SIGNIFICANT CHANGE UP (ref 27–34)
MCHC RBC-ENTMCNC: 32.3 GM/DL — SIGNIFICANT CHANGE UP (ref 32–36)
MCV RBC AUTO: 89.5 FL — SIGNIFICANT CHANGE UP (ref 80–100)
NRBC # BLD: 0 /100 WBCS — SIGNIFICANT CHANGE UP (ref 0–0)
PHOSPHATE SERPL-MCNC: 2.8 MG/DL — SIGNIFICANT CHANGE UP (ref 2.5–4.5)
PLATELET # BLD AUTO: 160 K/UL — SIGNIFICANT CHANGE UP (ref 150–400)
POTASSIUM SERPL-MCNC: 4.7 MMOL/L — SIGNIFICANT CHANGE UP (ref 3.5–5.3)
POTASSIUM SERPL-SCNC: 4.7 MMOL/L — SIGNIFICANT CHANGE UP (ref 3.5–5.3)
PROT SERPL-MCNC: 5.1 G/DL — LOW (ref 6–8.3)
PROTHROM AB SERPL-ACNC: 12.1 SEC — SIGNIFICANT CHANGE UP (ref 10.6–13.6)
RBC # BLD: 4.11 M/UL — LOW (ref 4.2–5.8)
RBC # FLD: 14.3 % — SIGNIFICANT CHANGE UP (ref 10.3–14.5)
SODIUM SERPL-SCNC: 142 MMOL/L — SIGNIFICANT CHANGE UP (ref 135–145)
WBC # BLD: 11.25 K/UL — HIGH (ref 3.8–10.5)
WBC # FLD AUTO: 11.25 K/UL — HIGH (ref 3.8–10.5)

## 2021-11-10 PROCEDURE — 99233 SBSQ HOSP IP/OBS HIGH 50: CPT | Mod: GC

## 2021-11-10 PROCEDURE — 74178 CT ABD&PLV WO CNTR FLWD CNTR: CPT | Mod: 26

## 2021-11-10 RX ORDER — FOLIC ACID 0.8 MG
1 TABLET ORAL
Qty: 0 | Refills: 0 | DISCHARGE

## 2021-11-10 RX ORDER — LACTOBACILLUS ACIDOPHILUS 100MM CELL
1 CAPSULE ORAL
Qty: 0 | Refills: 0 | DISCHARGE

## 2021-11-10 RX ORDER — ACETAMINOPHEN 500 MG
3 TABLET ORAL
Qty: 0 | Refills: 0 | DISCHARGE

## 2021-11-10 RX ORDER — SIMVASTATIN 20 MG/1
1 TABLET, FILM COATED ORAL
Qty: 0 | Refills: 0 | DISCHARGE

## 2021-11-10 RX ORDER — ATORVASTATIN CALCIUM 80 MG/1
1 TABLET, FILM COATED ORAL
Qty: 0 | Refills: 0 | DISCHARGE

## 2021-11-10 RX ORDER — ASPIRIN/CALCIUM CARB/MAGNESIUM 324 MG
1 TABLET ORAL
Qty: 0 | Refills: 0 | DISCHARGE

## 2021-11-10 RX ORDER — PREGABALIN 225 MG/1
0.5 CAPSULE ORAL
Qty: 0 | Refills: 0 | DISCHARGE

## 2021-11-10 RX ORDER — TAMSULOSIN HYDROCHLORIDE 0.4 MG/1
1 CAPSULE ORAL
Qty: 0 | Refills: 0 | DISCHARGE

## 2021-11-10 RX ORDER — AMLODIPINE BESYLATE 2.5 MG/1
1 TABLET ORAL
Qty: 0 | Refills: 0 | DISCHARGE

## 2021-11-10 RX ORDER — APIXABAN 2.5 MG/1
1 TABLET, FILM COATED ORAL
Qty: 0 | Refills: 0 | DISCHARGE

## 2021-11-10 RX ADMIN — POLYETHYLENE GLYCOL 3350 17 GRAM(S): 17 POWDER, FOR SOLUTION ORAL at 13:00

## 2021-11-10 RX ADMIN — CHLORHEXIDINE GLUCONATE 1 APPLICATION(S): 213 SOLUTION TOPICAL at 05:17

## 2021-11-10 RX ADMIN — DOCOSANOL 1 APPLICATION(S): 100 CREAM TOPICAL at 17:09

## 2021-11-10 RX ADMIN — TAMSULOSIN HYDROCHLORIDE 0.4 MILLIGRAM(S): 0.4 CAPSULE ORAL at 22:25

## 2021-11-10 RX ADMIN — DOCOSANOL 1 APPLICATION(S): 100 CREAM TOPICAL at 05:14

## 2021-11-10 RX ADMIN — FINASTERIDE 5 MILLIGRAM(S): 5 TABLET, FILM COATED ORAL at 13:00

## 2021-11-10 RX ADMIN — CEFTRIAXONE 100 MILLIGRAM(S): 500 INJECTION, POWDER, FOR SOLUTION INTRAMUSCULAR; INTRAVENOUS at 17:11

## 2021-11-10 RX ADMIN — INSULIN GLARGINE 6 UNIT(S): 100 INJECTION, SOLUTION SUBCUTANEOUS at 22:25

## 2021-11-10 RX ADMIN — APIXABAN 2.5 MILLIGRAM(S): 2.5 TABLET, FILM COATED ORAL at 11:15

## 2021-11-10 RX ADMIN — SENNA PLUS 2 TABLET(S): 8.6 TABLET ORAL at 22:25

## 2021-11-10 RX ADMIN — Medication 2 DROP(S): at 22:25

## 2021-11-10 RX ADMIN — Medication 1: at 17:51

## 2021-11-10 RX ADMIN — APIXABAN 2.5 MILLIGRAM(S): 2.5 TABLET, FILM COATED ORAL at 22:25

## 2021-11-10 RX ADMIN — Medication 2 DROP(S): at 09:52

## 2021-11-10 NOTE — PROGRESS NOTE ADULT - ATTENDING COMMENTS
89M w/pmh Atrial Fibrillation on Eliquis, s/p ppm, CAD with multiple stents, Hypertension, Hyperlipidemia, Benign Prostatic Hyperplasia found unresponsive, admitted for septic shock 2/2 Proteus UTI and bacteremia.    - cont ceftriaxone while inpatient, once ready for DC can switch to an oral agent (probably augmentin) to complete 10 day course  - PT recs CHENG, CM assistance appreciated for discharge planning  - today still has some confusion, will ask family about baseline mental status, might also have ICU/hospital delirium  - per urology - rec CT urogram w/IV contrast, need to confirm if he truly has contrast allergy or not, may need pretreatment  - maintain escalona for hydroureteronephrosis  - do med rec

## 2021-11-10 NOTE — PROGRESS NOTE ADULT - PROBLEM SELECTOR PLAN 2
New R moderate hydroureteronephrosis   -renal/abdomen US completed; 11/8 w/ Moderate righthydroureteronephrosis. B/l echogenic kidneys suggesting medical renal disease. Contracted gallbladder with sludge. No biliary ductal dilation. Hepatic steatosis. Prostatomegaly with associated trabeculated urinary bladder wall.   -urology called in micu regarding findings ; place escalona cath and  pending input   -Dr. ROLANDA Juárez = friend of family New R moderate hydroureteronephrosis   -renal/abdomen US completed; 11/8 w/ Moderate righthydroureteronephrosis. B/l echogenic kidneys suggesting medical renal disease. Contracted gallbladder with sludge. No biliary ductal dilation. Hepatic steatosis. Prostatomegaly with associated trabeculated urinary bladder wall.   -urology called in micu regarding findings ; place escalona cath and  pending input   -Dr. ROLANDA Juárez = friend of family    Appreciate urology recs:  CT AP with urogram w/ & w/o IV contrast ordered  keep escalona inserted while hydronephrosis is present  c/w flomax and proscar New R moderate hydroureteronephrosis   -renal/abdomen US completed; 11/8 w/ Moderate righthydroureteronephrosis. B/l echogenic kidneys suggesting medical renal disease. Contracted gallbladder with sludge. No biliary ductal dilation. Hepatic steatosis. Prostatomegaly with associated trabeculated urinary bladder wall.   -urology called in micu regarding findings ; place escalona cath and  pending input   -Dr. ROLANDA Juárez = friend of family    Appreciate urology recs:  CT AP with urogram w/ & w/o IV contrast ordered  confirm w/ radiology if there's allergy  keep escalona inserted while hydronephrosis is present  c/w flomax and proscar

## 2021-11-10 NOTE — ADVANCED PRACTICE NURSE CONSULT - RECOMMEDATIONS
Will recommend the  followin. B/l buttocks: continue to monitor , routine pericare with Vaishnavi  2. continue with turning and positioning, continue to encourage mobility  3. complete Cair boots   4. Seat cushion when OOB to chair   5. nutrition support as pt condition allows  Tx plan discussed with RN

## 2021-11-10 NOTE — PROGRESS NOTE ADULT - PROBLEM SELECTOR PLAN 7
Diet: Soft and bite sized, carb consistent, dash  DVT prophylaxis: Eliquis 2.5 -currently systolic in the 150's.  -med rec

## 2021-11-10 NOTE — ADVANCED PRACTICE NURSE CONSULT - REASON FOR CONSULT
Requested by staff to assess skin status: b/l buttocks and heels. Staff is concerned that the pt has developed deep tissue injuries. PNH is noted:  HPI: 89 M w/ hx of  Atrial Fibrillation on eliquis, implanted pacemaker, CAD with multiple stents, Hypertension, Hyperlipidemia, Benign Prostatic Hyperplasia p/w "unresponsiveness". Pt was dx w/ a UTI yesterday due to blood in the urine. Pt was started on bactrim, pt unable to provide a history at this time due to clinical condition. PT was bibems, since son in law went to house and noted to be unarousable to tactile stimulation. When medics arrived, he was hypoxic to 88, hypotensive 80/50s, he was started on IVF, and was given approx 1L PTA. Upon arrival to [facility] he was SBP in the 100s, sating 90-93 percent on RA, he has a cough. Pt is aaox2-3 at baseline uses a walker, has 24 hour aide.     In ED pt received 2 L NS vanc cefepime and was started on levo maps ~60

## 2021-11-10 NOTE — PHARMACOTHERAPY INTERVENTION NOTE - COMMENTS
Medication reconciliation completed. Please refer to specifics in home medication list (outpatient medication review). Medications verified with patient's daughter and pharmacy records.    Acetaminophen 325mg 3 tabs by mouth every 4 hours as needed for mod pain  Eliquis 2.5mg 1 tab by mouth twice daily  Tamsulosin 0.4mg 1 cap by mouth daily   Aspirin Chewable 81mg 1 tab by mouth daily   Finasteride 5mg 1 tab by mouth twice daily    Polyethylene Glycol 3350 powder 17grams by mouth twice daily as needed  Senna 2 tabs by mouth daily as needed     Added:    Ramipril 10mg 1 capsule by mouth daily   Simvastatin 20mg 1 tab by mouth daily   Torsemide 20mg 1 tab by mouth daily  Multivitamin 1 tab by mouth daily   Lactobacillus Acidophilus 1 capsule by mouth daily   Folbee Plus 1 tab by mouth daily     Removed (no longer taking these medications) :  Amlodipine 5mg  Folic Acid 0.4mg  Vitamin B12 100mcg  Atorvastatin 10mg   Bactrim 800-160mg    Time spent: 15 minutes     Jacky Cuellar, PharmD Candidate  Viraj Ceron, PharmD, BCPS  [phone]  Available on Microsoft Teams Medication reconciliation completed. Please refer to specifics in home medication list (outpatient medication review). Medications verified with patient's daughter and pharmacy records.    Acetaminophen 325mg 3 tabs by mouth every 4 hours as needed for mod pain  Eliquis 2.5mg 1 tab by mouth twice daily  Tamsulosin 0.4mg 1 cap by mouth daily   Aspirin Chewable 81mg 1 tab by mouth daily   Finasteride 5mg 1 tab by mouth twice daily    Polyethylene Glycol 3350 powder 17grams by mouth twice daily as needed  Senna 2 tabs by mouth daily as needed     Added:  Ramipril 10mg 1 capsule by mouth daily (switched from amlodipine)  Simvastatin 20mg 1 tab by mouth daily   Torsemide 20mg 1 tab by mouth daily  Multivitamin 1 tab by mouth daily   Lactobacillus Acidophilus 1 capsule by mouth daily   Folbee Plus 1 tab by mouth daily     Removed (no longer taking these medications):  Amlodipine 5mg - removed due to leg edema  Folic Acid 0.4mg  Vitamin B12 100mcg  Atorvastatin 10mg   Bactrim 800-160mg    Time spent: 15 minutes     Jacky Cuellar, PharmD Candidate  Viraj Ceron, ValorieD, BCPS  [phone]  Available on Microsoft Teams

## 2021-11-10 NOTE — PROGRESS NOTE ADULT - SUBJECTIVE AND OBJECTIVE BOX
AUBREY CARRERA  89y  MRN: [id number]    Patient is a 89y old  Male who presents with a chief complaint of UTI with Sepsis (06 Nov 2021 15:46)      MICU COURSE:  89 M w/ hx of  Atrial Fibrillation on eliquis, implanted pacemaker, CAD with multiple stents, Hypertension, Hyperlipidemia, Benign Prostatic Hyperplasia p/w "unresponsiveness". Pt was dx w/ a UTI with blood in the urine. Pt was started on bactrim, pt unable to provide a history at this time due to clinical condition. PT was bibems, since son in law went to house and noted to be unarousable to tactile stimulation. When medics arrived, he was hypoxic to 88, hypotensive 80/50s, he was started on IVF, and was given approx 1L PTA. Upon arrival to [facility] he was SBP in the 100s, sating 90-93 percent on RA, he has a cough. Pt is aaox2-3 at baseline uses a walker, has 24 hour aide.   In ED pt received 2 L NS vanc cefepime and was started on iv pressors to maintain MAP >65.   11/5; On IV levophed , started on Midodrine 10mg TID. Upgraded abx to meropenem . David improving .   11/6; midnight weaned off levo gtt. BCx x 1 w/ proteus m , BC #2 GNR P speciation and Ucx w/ GNR pending speciation. C/w meropenem until sensitivity than adjust abx. C.w Midodrine 10 TID, wean as tolerated. Holding antihypertensives in setting of sepsis and while on midodrin . Restarted Eliquis 2.5 BID . Drugs renally dosed.  DAVID ; little bump in Cr ; added home regimen flomax and finasteride ; bladder scan q 6 hrs for residual void. Pt has stress incontinence.   11/7: Bedboarded. Stopped midodrine.   11/8; per sensistivity ; changed meropenm to ceftriaxon. repat BC negative .   11/9; R hyrdourethronephrosis ; escalona placed ; urology called. family notified.     Subjective: no events ON. Denies fever, CP, SOB, abn pain, N/V, dysuria. Tolerating diet.      MEDICATIONS  (STANDING):  apixaban 2.5 milliGRAM(s) Oral two times a day  artificial  tears Solution 2 Drop(s) Both EYES every 12 hours  cefTRIAXone   IVPB 1000 milliGRAM(s) IV Intermittent <User Schedule>  chlorhexidine 4% Liquid 1 Application(s) Topical <User Schedule>  dextrose 40% Gel 15 Gram(s) Oral once  dextrose 5%. 1000 milliLiter(s) (50 mL/Hr) IV Continuous <Continuous>  dextrose 5%. 1000 milliLiter(s) (100 mL/Hr) IV Continuous <Continuous>  dextrose 50% Injectable 25 Gram(s) IV Push once  dextrose 50% Injectable 12.5 Gram(s) IV Push once  dextrose 50% Injectable 25 Gram(s) IV Push once  docosanol 10% Cream 1 Application(s) Topical two times a day  finasteride 5 milliGRAM(s) Oral daily  glucagon  Injectable 1 milliGRAM(s) IntraMuscular once  insulin glargine Injectable (LANTUS) 6 Unit(s) SubCutaneous at bedtime  insulin lispro (ADMELOG) corrective regimen sliding scale   SubCutaneous three times a day before meals  insulin lispro (ADMELOG) corrective regimen sliding scale   SubCutaneous at bedtime  polyethylene glycol 3350 17 Gram(s) Oral daily  senna 2 Tablet(s) Oral at bedtime  tamsulosin 0.4 milliGRAM(s) Oral at bedtime    MEDICATIONS  (PRN):  acetaminophen    Suspension .. 650 milliGRAM(s) Oral every 6 hours PRN Mild Pain (1 - 3), Moderate Pain (4 - 6)      Objective:    Vitals: Vital Signs Last 24 Hrs  T(C): 36.7 (11-10-21 @ 05:19), Max: 37.3 (11-09-21 @ 16:00)  T(F): 98.1 (11-10-21 @ 05:19), Max: 99.1 (11-09-21 @ 16:00)  HR: 78 (11-10-21 @ 05:19) (68 - 78)  BP: 154/73 (11-10-21 @ 05:19) (133/65 - 157/71)  BP(mean): 93 (11-09-21 @ 16:00) (93 - 102)  RR: 17 (11-10-21 @ 05:19) (16 - 29)  SpO2: 96% (11-10-21 @ 05:19) (95% - 97%)            I&O's Summary    09 Nov 2021 07:01  -  10 Nov 2021 07:00  --------------------------------------------------------  IN: 650 mL / OUT: 2490 mL / NET: -1840 mL        PHYSICAL EXAM:  GENERAL: NAD  HEAD:  Atraumatic, Normocephalic  EYES: EOMI, conjunctiva and sclera clear  CHEST/LUNG: Clear to percussion bilaterally; No rales, rhonchi, wheezing, or rubs  HEART: Regular rate and rhythm; No murmurs, rubs, or gallops  ABDOMEN: Soft, Nontender, Nondistended;   SKIN: No rashes or lesions  NERVOUS SYSTEM:  Alert & Oriented X3, no focal deficit    LABS:  11-09    137  |  100  |  20  ----------------------------<  98  3.7   |  26  |  1.32<H>  11-09    136  |  107  |  53<H>  ----------------------------<  155<H>  4.7   |  22  |  1.43<H>  11-08    138  |  107  |  57<H>  ----------------------------<  142<H>  4.7   |  21<L>  |  1.71<H>    Ca    8.7      09 Nov 2021 06:07  Ca    8.7      09 Nov 2021 01:30  Ca    8.8      08 Nov 2021 05:28  Phos  4.0     11-09  Mg     2.2     11-09    TPro  6.0  /  Alb  3.0<L>  /  TBili  0.9  /  DBili  x   /  AST  24  /  ALT  50<H>  /  AlkPhos  71  11-09  TPro  5.4<L>  /  Alb  2.3<L>  /  TBili  0.4  /  DBili  x   /  AST  25  /  ALT  45  /  AlkPhos  109  11-09  TPro  5.0<L>  /  Alb  2.2<L>  /  TBili  0.4  /  DBili  x   /  AST  38  /  ALT  56<H>  /  AlkPhos  111  11-08      PT/INR - ( 10 Nov 2021 07:15 )   PT: 12.1 sec;   INR: 1.01 ratio         PTT - ( 10 Nov 2021 07:15 )  PTT:28.0 sec                                        See note  See Note )-----------( See note    ( 09 Nov 2021 06:07 )             See note                        12.9   14.00 )-----------( 125      ( 09 Nov 2021 01:30 )             38.3                         11.8   13.37 )-----------( 115      ( 08 Nov 2021 05:28 )             34.7     CAPILLARY BLOOD GLUCOSE  159 (09 Nov 2021 16:00)    POCT Blood Glucose.: 104 mg/dL (09 Nov 2021 22:03)  POCT Blood Glucose.: 159 mg/dL (09 Nov 2021 16:31)  POCT Blood Glucose.: 177 mg/dL (09 Nov 2021 12:14)  POCT Blood Glucose.: 117 mg/dL (09 Nov 2021 08:10)

## 2021-11-10 NOTE — PROGRESS NOTE ADULT - PROBLEM SELECTOR PLAN 4
A1c 7.4   -no hx of DM   -change to low ISS q AC & HS   -c/w Lantus 6 units at bedtime    Endocrine input appreciated .   -No need for tight glycemic control given age. Goal A1c <8% Goal Glucose 100-200  Can continue to monitor on Lantus 6 units qhs and low correction scale qac + bedtime  Outpt. PCP follow-up  Plan to d/c with dietary and lifestyle modifications first. If A1c increases in 3 months can consider tradjenta 5mg daily. A1c 7.4   -no hx of DM   -low ISS q AC & HS   -c/w Lantus 6 units at bedtime    Endocrine input appreciated .   -No need for tight glycemic control given age. Goal A1c <8% Goal Glucose 100-200  Can continue to monitor on Lantus 6 units qhs and low correction scale qac + bedtime  Outpt. PCP follow-up  Plan to d/c with dietary and lifestyle modifications first. If A1c increases in 3 months can consider tradjenta 5mg daily.

## 2021-11-10 NOTE — ADVANCED PRACTICE NURSE CONSULT - ASSESSMENT
The pt was encountered on 4DS[facility]- he was awake, alert and eager to engage in conversation. He has just finished breakfast - his appetite was good and he had consumed 100% of his tray.  He is on a Low air-loss surface and was able to turn to his side independently. Will recommend Complete Cair boots to off-load the heels.   On the b/l buttocks, the skin tone appeared to be darker that the pts remaining skin with a small open area noted on the right buttocks. would not call these skin changes pressure injuries- they are most likely secondary to chronic exposure to moisture as with IAD or MASD.  At this time, the b/l heels present with intact skin.  Education was provided to the pt regarding pressure injury prevention and he was encouraged to turn to his while in bed

## 2021-11-10 NOTE — PROGRESS NOTE ADULT - PROBLEM SELECTOR PLAN 1
Resolved, no longer in septic shock    septic shock 2/2 multifactorial proteus m bacteremia  and UTI   -repeat BC x 2 pending results   - MRSA negative   -s/p meropenem 11/5-11/8; based on sensitivity changed to ceftriaxone 11/8-11/12  - Weaned off midodrine  -Urology consult Resolved, no longer in septic shock    septic shock 2/2 multifactorial proteus m bacteremia  and UTI   -repeat BC x 2 pending results   - MRSA negative   -s/p meropenem 11/5-11/8; based on sensitivity changed to ceftriaxone 11/8-11/12  - Weaned off midodrine

## 2021-11-10 NOTE — PROGRESS NOTE ADULT - PROBLEM SELECTOR PLAN 3
-JAMIE in setting of septic shock  - resolving, close to baseline  -renally dose drugs   -avoid nephrotoxins  -c/w strict I&os -JAMIE in setting of septic shock  - resolving, close to baseline  -renally dose drugs   -avoid nephrotoxins  -c/w strict I&os    Give fluids prior to CT with urogram with IV

## 2021-11-10 NOTE — PROGRESS NOTE ADULT - ASSESSMENT
89 M w/ hx of  Atrial Fibrillation on Eliquis, implanted pacemaker, CAD with multiple stents, Hypertension, Hyperlipidemia, Benign Prostatic Hyperplasia found "unresponsive" was found to be in septic shock likely multifactorial proteus m.  bacteremia and urosepsis.     For Followup:  -f/ endocrinologist recs ; pt new Dm dx and new on insulin   -trend BUN / Cr   -bladder scan q 6 hrs   -pending renal US & Abdominal US   -restarted home dose Eliquis ; trend daily CB

## 2021-11-11 LAB
ANION GAP SERPL CALC-SCNC: 12 MMOL/L — SIGNIFICANT CHANGE UP (ref 5–17)
BUN SERPL-MCNC: 36 MG/DL — HIGH (ref 7–23)
CALCIUM SERPL-MCNC: 8.6 MG/DL — SIGNIFICANT CHANGE UP (ref 8.4–10.5)
CHLORIDE SERPL-SCNC: 105 MMOL/L — SIGNIFICANT CHANGE UP (ref 96–108)
CO2 SERPL-SCNC: 23 MMOL/L — SIGNIFICANT CHANGE UP (ref 22–31)
CREAT SERPL-MCNC: 1.18 MG/DL — SIGNIFICANT CHANGE UP (ref 0.5–1.3)
CULTURE RESULTS: SIGNIFICANT CHANGE UP
CULTURE RESULTS: SIGNIFICANT CHANGE UP
GLUCOSE BLDC GLUCOMTR-MCNC: 114 MG/DL — HIGH (ref 70–99)
GLUCOSE BLDC GLUCOMTR-MCNC: 129 MG/DL — HIGH (ref 70–99)
GLUCOSE BLDC GLUCOMTR-MCNC: 141 MG/DL — HIGH (ref 70–99)
GLUCOSE BLDC GLUCOMTR-MCNC: 197 MG/DL — HIGH (ref 70–99)
GLUCOSE SERPL-MCNC: 117 MG/DL — HIGH (ref 70–99)
HCT VFR BLD CALC: 35.9 % — LOW (ref 39–50)
HGB BLD-MCNC: 11.8 G/DL — LOW (ref 13–17)
MAGNESIUM SERPL-MCNC: 2.2 MG/DL — SIGNIFICANT CHANGE UP (ref 1.6–2.6)
MCHC RBC-ENTMCNC: 28.9 PG — SIGNIFICANT CHANGE UP (ref 27–34)
MCHC RBC-ENTMCNC: 32.9 GM/DL — SIGNIFICANT CHANGE UP (ref 32–36)
MCV RBC AUTO: 87.8 FL — SIGNIFICANT CHANGE UP (ref 80–100)
NRBC # BLD: 0 /100 WBCS — SIGNIFICANT CHANGE UP (ref 0–0)
PHOSPHATE SERPL-MCNC: 3 MG/DL — SIGNIFICANT CHANGE UP (ref 2.5–4.5)
PLATELET # BLD AUTO: 193 K/UL — SIGNIFICANT CHANGE UP (ref 150–400)
POTASSIUM SERPL-MCNC: 5 MMOL/L — SIGNIFICANT CHANGE UP (ref 3.5–5.3)
POTASSIUM SERPL-SCNC: 5 MMOL/L — SIGNIFICANT CHANGE UP (ref 3.5–5.3)
RBC # BLD: 4.09 M/UL — LOW (ref 4.2–5.8)
RBC # FLD: 14.2 % — SIGNIFICANT CHANGE UP (ref 10.3–14.5)
SARS-COV-2 RNA SPEC QL NAA+PROBE: SIGNIFICANT CHANGE UP
SODIUM SERPL-SCNC: 140 MMOL/L — SIGNIFICANT CHANGE UP (ref 135–145)
SPECIMEN SOURCE: SIGNIFICANT CHANGE UP
SPECIMEN SOURCE: SIGNIFICANT CHANGE UP
WBC # BLD: 9.72 K/UL — SIGNIFICANT CHANGE UP (ref 3.8–10.5)
WBC # FLD AUTO: 9.72 K/UL — SIGNIFICANT CHANGE UP (ref 3.8–10.5)

## 2021-11-11 PROCEDURE — 99233 SBSQ HOSP IP/OBS HIGH 50: CPT | Mod: GC

## 2021-11-11 RX ADMIN — CEFTRIAXONE 100 MILLIGRAM(S): 500 INJECTION, POWDER, FOR SOLUTION INTRAMUSCULAR; INTRAVENOUS at 17:19

## 2021-11-11 RX ADMIN — SENNA PLUS 2 TABLET(S): 8.6 TABLET ORAL at 22:39

## 2021-11-11 RX ADMIN — DOCOSANOL 1 APPLICATION(S): 100 CREAM TOPICAL at 17:19

## 2021-11-11 RX ADMIN — INSULIN GLARGINE 6 UNIT(S): 100 INJECTION, SOLUTION SUBCUTANEOUS at 22:39

## 2021-11-11 RX ADMIN — APIXABAN 2.5 MILLIGRAM(S): 2.5 TABLET, FILM COATED ORAL at 11:05

## 2021-11-11 RX ADMIN — Medication 2 DROP(S): at 08:52

## 2021-11-11 RX ADMIN — Medication 2 DROP(S): at 22:39

## 2021-11-11 RX ADMIN — FINASTERIDE 5 MILLIGRAM(S): 5 TABLET, FILM COATED ORAL at 11:05

## 2021-11-11 RX ADMIN — TAMSULOSIN HYDROCHLORIDE 0.4 MILLIGRAM(S): 0.4 CAPSULE ORAL at 22:39

## 2021-11-11 RX ADMIN — CHLORHEXIDINE GLUCONATE 1 APPLICATION(S): 213 SOLUTION TOPICAL at 05:07

## 2021-11-11 RX ADMIN — POLYETHYLENE GLYCOL 3350 17 GRAM(S): 17 POWDER, FOR SOLUTION ORAL at 11:05

## 2021-11-11 RX ADMIN — APIXABAN 2.5 MILLIGRAM(S): 2.5 TABLET, FILM COATED ORAL at 22:39

## 2021-11-11 RX ADMIN — DOCOSANOL 1 APPLICATION(S): 100 CREAM TOPICAL at 05:08

## 2021-11-11 NOTE — DIETITIAN INITIAL EVALUATION ADULT. - NSPROEDALEARNPREF_GEN_A_NUR
Pt presents to the ED with complaints of feeling short of breath for past 2 years but over the last 10 days she has had worsening shortness of breath and chest heaviness. Pt awake and alert, skin w/d,resps reg/unlabored. verbal instruction

## 2021-11-11 NOTE — PROGRESS NOTE ADULT - PROBLEM SELECTOR PLAN 3
-JAMIE in setting of septic shock  - resolving, close to baseline  -renally dose drugs   -avoid nephrotoxins  -c/w strict I&os    Give fluids prior to CT with urogram with IV -JAMIE in setting of septic shock  - resolving, close to baseline  -renally dose drugs   -avoid nephrotoxins  -c/w strict I&os

## 2021-11-11 NOTE — DIETITIAN INITIAL EVALUATION ADULT. - ADD RECOMMEND
1. Will continue to monitor PO intake, weight, labs, skin, GI status, diet. 2. Gently encourage PO intake and obtain food preferences as able (pt did not have any at this time). 3. Recommend Multivitamin and Vitamin C if medically feasible to optimize nutrient intake and further aid with pressure ulcer healing. 4. Encouraged to continue adequate kcal and protein intake to help with skin healing. 5. Provide education on DM nutrition therapy to pt or family if feasible/requested.

## 2021-11-11 NOTE — PROGRESS NOTE ADULT - SUBJECTIVE AND OBJECTIVE BOX
PROGRESS NOTE:   Authored by Dr. Chris Benson MD  Pager [phone] [facility], [id number] [place],    Patient is a 89y old  Male who presents with a chief complaint of UTI with Sepsis (06 Nov 2021 15:46)      SUBJECTIVE / OVERNIGHT EVENTS:    ADDITIONAL REVIEW OF SYSTEMS:    MEDICATIONS  (STANDING):  apixaban 2.5 milliGRAM(s) Oral two times a day  artificial  tears Solution 2 Drop(s) Both EYES every 12 hours  cefTRIAXone   IVPB 1000 milliGRAM(s) IV Intermittent <User Schedule>  chlorhexidine 4% Liquid 1 Application(s) Topical <User Schedule>  dextrose 40% Gel 15 Gram(s) Oral once  dextrose 5%. 1000 milliLiter(s) (50 mL/Hr) IV Continuous <Continuous>  dextrose 5%. 1000 milliLiter(s) (100 mL/Hr) IV Continuous <Continuous>  dextrose 50% Injectable 25 Gram(s) IV Push once  dextrose 50% Injectable 12.5 Gram(s) IV Push once  dextrose 50% Injectable 25 Gram(s) IV Push once  docosanol 10% Cream 1 Application(s) Topical two times a day  finasteride 5 milliGRAM(s) Oral daily  glucagon  Injectable 1 milliGRAM(s) IntraMuscular once  insulin glargine Injectable (LANTUS) 6 Unit(s) SubCutaneous at bedtime  insulin lispro (ADMELOG) corrective regimen sliding scale   SubCutaneous three times a day before meals  insulin lispro (ADMELOG) corrective regimen sliding scale   SubCutaneous at bedtime  polyethylene glycol 3350 17 Gram(s) Oral daily  senna 2 Tablet(s) Oral at bedtime  tamsulosin 0.4 milliGRAM(s) Oral at bedtime    MEDICATIONS  (PRN):  acetaminophen    Suspension .. 650 milliGRAM(s) Oral every 6 hours PRN Mild Pain (1 - 3), Moderate Pain (4 - 6)      CAPILLARY BLOOD GLUCOSE      POCT Blood Glucose.: 164 mg/dL (10 Nov 2021 21:48)  POCT Blood Glucose.: 164 mg/dL (10 Nov 2021 17:42)  POCT Blood Glucose.: 136 mg/dL (10 Nov 2021 12:50)  POCT Blood Glucose.: 144 mg/dL (10 Nov 2021 09:27)    I&O's Summary    09 Nov 2021 07:01  -  10 Nov 2021 07:00  --------------------------------------------------------  IN: 650 mL / OUT: 2490 mL / NET: -1840 mL    10 Nov 2021 07:01  -  11 Nov 2021 06:27  --------------------------------------------------------  IN: 0 mL / OUT: 2700 mL / NET: -2700 mL        PHYSICAL EXAM:  Vital Signs Last 24 Hrs  T(C): 36.7 (11 Nov 2021 04:38), Max: 36.7 (10 Nov 2021 20:21)  T(F): 98 (11 Nov 2021 04:38), Max: 98.1 (10 Nov 2021 20:21)  HR: 78 (11 Nov 2021 04:38) (78 - 85)  BP: 150/71 (11 Nov 2021 04:38) (139/74 - 150/71)  BP(mean): --  RR: 18 (11 Nov 2021 04:38) (16 - 18)  SpO2: 95% (11 Nov 2021 04:38) (92% - 95%)    GENERAL: No acute distress, well-developed  HEAD:  Atraumatic, Normocephalic  EYES: EOMI, PERRLA, conjunctiva and sclera clear  NECK: Supple, no lymphadenopathy, no JVD  CHEST/LUNG: CTAB; No wheezes, rales, or rhonchi  HEART: Regular rate and rhythm; No murmurs, rubs, or gallops  ABDOMEN: Soft, non-tender, non-distended; normal bowel sounds, no organomegaly  EXTREMITIES:  2+ peripheral pulses b/l, No clubbing, cyanosis, or edema  NEUROLOGY: A&O x 3, no focal deficits  SKIN: No rashes or lesions    LABS:                        11.9   11.25 )-----------( 160      ( 10 Nov 2021 07:15 )             36.8     11-10    142  |  110<H>  |  43<H>  ----------------------------<  148<H>  4.7   |  23  |  1.19    Ca    8.5      10 Nov 2021 07:15  Phos  2.8     11-10  Mg     2.3     11-10    TPro  5.1<L>  /  Alb  2.4<L>  /  TBili  0.4  /  DBili  x   /  AST  20  /  ALT  35  /  AlkPhos  92  11-10    PT/INR - ( 10 Nov 2021 07:15 )   PT: 12.1 sec;   INR: 1.01 ratio         PTT - ( 10 Nov 2021 07:15 )  PTT:28.0 sec            RADIOLOGY & ADDITIONAL TESTS:  Results Reviewed:   Imaging Personally Reviewed:  Electrocardiogram Personally Reviewed:    COORDINATION OF CARE:  Care Discussed with Consultants/Other Providers [Y/N]:  Prior or Outpatient Records Reviewed [Y/N]:   PROGRESS NOTE:   Authored by Dr. Chris Benson MD  Pager [phone] [facility], [id number] [place],    Patient is a 89y old  Male who presents with a chief complaint of UTI with Sepsis (06 Nov 2021 15:46)      SUBJECTIVE / OVERNIGHT EVENTS: No events OV. Patient has no complaints this AM    ADDITIONAL REVIEW OF SYSTEMS: Denies fever, CP, SOB or other ROS    MEDICATIONS  (STANDING):  apixaban 2.5 milliGRAM(s) Oral two times a day  artificial  tears Solution 2 Drop(s) Both EYES every 12 hours  cefTRIAXone   IVPB 1000 milliGRAM(s) IV Intermittent <User Schedule>  chlorhexidine 4% Liquid 1 Application(s) Topical <User Schedule>  dextrose 40% Gel 15 Gram(s) Oral once  dextrose 5%. 1000 milliLiter(s) (50 mL/Hr) IV Continuous <Continuous>  dextrose 5%. 1000 milliLiter(s) (100 mL/Hr) IV Continuous <Continuous>  dextrose 50% Injectable 25 Gram(s) IV Push once  dextrose 50% Injectable 12.5 Gram(s) IV Push once  dextrose 50% Injectable 25 Gram(s) IV Push once  docosanol 10% Cream 1 Application(s) Topical two times a day  finasteride 5 milliGRAM(s) Oral daily  glucagon  Injectable 1 milliGRAM(s) IntraMuscular once  insulin glargine Injectable (LANTUS) 6 Unit(s) SubCutaneous at bedtime  insulin lispro (ADMELOG) corrective regimen sliding scale   SubCutaneous three times a day before meals  insulin lispro (ADMELOG) corrective regimen sliding scale   SubCutaneous at bedtime  polyethylene glycol 3350 17 Gram(s) Oral daily  senna 2 Tablet(s) Oral at bedtime  tamsulosin 0.4 milliGRAM(s) Oral at bedtime    MEDICATIONS  (PRN):  acetaminophen    Suspension .. 650 milliGRAM(s) Oral every 6 hours PRN Mild Pain (1 - 3), Moderate Pain (4 - 6)      CAPILLARY BLOOD GLUCOSE      POCT Blood Glucose.: 164 mg/dL (10 Nov 2021 21:48)  POCT Blood Glucose.: 164 mg/dL (10 Nov 2021 17:42)  POCT Blood Glucose.: 136 mg/dL (10 Nov 2021 12:50)  POCT Blood Glucose.: 144 mg/dL (10 Nov 2021 09:27)    I&O's Summary    09 Nov 2021 07:01  -  10 Nov 2021 07:00  --------------------------------------------------------  IN: 650 mL / OUT: 2490 mL / NET: -1840 mL    10 Nov 2021 07:01  -  11 Nov 2021 06:27  --------------------------------------------------------  IN: 0 mL / OUT: 2700 mL / NET: -2700 mL        PHYSICAL EXAM:  Vital Signs Last 24 Hrs  T(C): 36.7 (11 Nov 2021 04:38), Max: 36.7 (10 Nov 2021 20:21)  T(F): 98 (11 Nov 2021 04:38), Max: 98.1 (10 Nov 2021 20:21)  HR: 78 (11 Nov 2021 04:38) (78 - 85)  BP: 150/71 (11 Nov 2021 04:38) (139/74 - 150/71)  BP(mean): --  RR: 18 (11 Nov 2021 04:38) (16 - 18)  SpO2: 95% (11 Nov 2021 04:38) (92% - 95%)    GENERAL: No acute distress, well-developed  HEAD:  Atraumatic, Normocephalic  EYES: EOMI, PERRLA, conjunctiva and sclera clear  NECK: Supple, no lymphadenopathy, no JVD  CHEST/LUNG: CTAB; No wheezes, rales, or rhonchi  HEART: Regular rate and rhythm; No murmurs, rubs, or gallops  ABDOMEN: Soft, non-tender, non-distended; normal bowel sounds, no organomegaly  EXTREMITIES:  2+ peripheral pulses b/l, No clubbing, cyanosis, or edema  NEUROLOGY: A&O x 1-2  SKIN: No rashes or lesions    LABS:                        11.9   11.25 )-----------( 160      ( 10 Nov 2021 07:15 )             36.8     11-10    142  |  110<H>  |  43<H>  ----------------------------<  148<H>  4.7   |  23  |  1.19    Ca    8.5      10 Nov 2021 07:15  Phos  2.8     11-10  Mg     2.3     11-10    TPro  5.1<L>  /  Alb  2.4<L>  /  TBili  0.4  /  DBili  x   /  AST  20  /  ALT  35  /  AlkPhos  92  11-10    PT/INR - ( 10 Nov 2021 07:15 )   PT: 12.1 sec;   INR: 1.01 ratio         PTT - ( 10 Nov 2021 07:15 )  PTT:28.0 sec            RADIOLOGY & ADDITIONAL TESTS:  Results Reviewed:   Imaging Personally Reviewed:  Electrocardiogram Personally Reviewed:    < from: CT Abdomen and Pelvis Urogram w/wo IV Cont (11.10.21 @ 18:45) >  IMPRESSION:    Mild to moderate right hydroureteronephrosis. No renal stones. Segmental thickening of the very distal right ureter. This may be on the basis of inflammation. Please correlate for infection. A focal urothelial lesion cannot be excluded.  Thickened and irregular bladder wall, which may be due to chronic obstruction versus underdistention.    Enlarged prostate.    < end of copied text >      COORDINATION OF CARE:  Care Discussed with Consultants/Other Providers [Y/N]:  Prior or Outpatient Records Reviewed [Y/N]:

## 2021-11-11 NOTE — DIETITIAN INITIAL EVALUATION ADULT. - DIET TYPE
1. Recommend Consistent Carbohydrate with snack + low salt + soft and bite sized diet. Defer diet/fluid consistencies to medical team/SLP recommendations. Will continue to monitor as able and adjust as needed. 2. Recommend Glucerna Shake 240mls 3x daily (660kcals, 30g protein) to optimize kcal and protein intake. Spoke to Team 1.

## 2021-11-11 NOTE — PROGRESS NOTE ADULT - PROBLEM SELECTOR PLAN 2
New R moderate hydroureteronephrosis   -renal/abdomen US completed; 11/8 w/ Moderate righthydroureteronephrosis. B/l echogenic kidneys suggesting medical renal disease. Contracted gallbladder with sludge. No biliary ductal dilation. Hepatic steatosis. Prostatomegaly with associated trabeculated urinary bladder wall.   -urology called in micu regarding findings ; place escalona cath and  pending input   -Dr. ROLANDA Juárez = friend of family    Appreciate urology recs:  CT AP with urogram w/ & w/o IV contrast ordered  confirm w/ radiology if there's allergy  keep escalona inserted while hydronephrosis is present  c/w flomax and proscar New R moderate hydroureteronephrosis   -renal/abdomen US completed; 11/8 w/ Moderate righthydroureteronephrosis. B/l echogenic kidneys suggesting medical renal disease. Contracted gallbladder with sludge. No biliary ductal dilation. Hepatic steatosis. Prostatomegaly with associated trabeculated urinary bladder wall.   -urology called in micu regarding findings ; place escalona cath and  pending input   -Dr. ROLANDA Juárez = friend of family    Appreciate urology recs:  CT AP with urogram results above  Uro outpatient follow up  TOV tonight   c/w flomax and proscar

## 2021-11-11 NOTE — DIETITIAN INITIAL EVALUATION ADULT. - REASON FOR ADMISSION
Pt 89 y/o M with PMH as per chart: Atrial Fibrillation on Eliquis, implanted pacemaker, CAD with multiple stents, Hypertension, Hyperlipidemia, Benign Prostatic Hyperplasia, admitted with "unresponsiveness", found with UTI, acute hypoxic respiratory failure, JAMIE, in septic shock admitted to MICU - now in floors, bacteremia, new DM - plan to d/c with dietary and lifestyle modifications first. If A1c increases in 3 months can consider Tradjenta 5mg daily.

## 2021-11-11 NOTE — DIETITIAN INITIAL EVALUATION ADULT. - OTHER INFO
Confirmed information with RN. Pt reports good appetite and PO intake in house. Noted % PO intake as per flow sheets. Pt agreed to Glucerna. Denies difficulty chewing. Reports difficulty swallowing PTA but tolerating soft and bite sized diet here. Pt denies nausea, vomiting, diarrhea, or constipation, last BM yesterday (11/10).     Pt denies weight changes PTA, reports  pounds. Weight as per previous RD note (08/08/2020) 143.9 pounds. Weight as per flow sheets (11/05) 168 pounds -accuracy. Obtained bed weight (11/11) 159 pounds.     Encouraged to continue adequate kcal and protein intake to help with skin healing. Pt denies having further questions/concerns about diet and nutrition - kindly refused education on DM at this time. Pt made aware RD remains available.

## 2021-11-11 NOTE — DIETITIAN INITIAL EVALUATION ADULT. - ORAL INTAKE PTA/DIET HISTORY
Pt reports good appetite and PO intake at home. Confirms NKFA. Pt reports not following any type of diet or restriction at home. Reports taking vitamins and Ensure PTA (unable to specify); Vitamin B12 and Folic Acid as per H&P. Pt confirms being diagnosed with DM this admission.

## 2021-11-11 NOTE — DIETITIAN INITIAL EVALUATION ADULT. - ETIOLOGY
Increased demands for nutrients for pressure ulcer healing Limited exposure to nutrition therapy education

## 2021-11-11 NOTE — DIETITIAN INITIAL EVALUATION ADULT. - REASON INDICATOR FOR ASSESSMENT
Pt seen for length of stay initial assessment.  Information obtained from: medical record, previous RD note, pt, and RN.  Pt confused/disoriented as per documentation. Unable to reach reference contact -?accuracy of information obtained.

## 2021-11-11 NOTE — PROGRESS NOTE ADULT - ATTENDING COMMENTS
89M w/pmh Atrial Fibrillation on Eliquis, s/p ppm, CAD with multiple stents, Hypertension, Hyperlipidemia, Benign Prostatic Hyperplasia found unresponsive, admitted for septic shock 2/2 Proteus UTI and bacteremia.    - cont ceftriaxone while inpatient, once ready for DC can switch to an oral agent (probably augmentin) to complete 10 day course  - PT recs CHENG, however daughter prefers to bring the patient home  - CT urogram shows mild/mod right hydroureteronephrosis and thickening of very distal very ureter. Per urology - attempt TOV today  - maintain escalona for hydroureteronephrosis    Medically stable for discharge. 35 minutes spent coordinating discharge, pending availability of HHA, who will resume tomorrow.

## 2021-11-11 NOTE — PROGRESS NOTE ADULT - PROBLEM SELECTOR PLAN 1
Resolved, no longer in septic shock    septic shock 2/2 multifactorial proteus m bacteremia  and UTI   -repeat BC x 2 pending results   - MRSA negative   -s/p meropenem 11/5-11/8; based on sensitivity changed to ceftriaxone 11/8-11/12  - Weaned off midodrine

## 2021-11-11 NOTE — PROGRESS NOTE ADULT - ASSESSMENT
89 yo male with Proteus UTI and bacteremia found with moderate right hydronephrosis and trabeculated bladder; JAMIE resolving    -CT scan reviewed, prelim read no emergent findings.  follow up final read.   Voiding trial: Optimize with all of the following:  - Encourage Ambulation / Out of Bed  - Initiate / Continue Bowel Regimen  - Minimize Narcotics / Benzodiazepines  - Minimize anticholinergics / antihistimines  - If patient has prostate: give tamsulosin 0.4mg QHS    Once optimized for 2-3 days, d/c escalona for voiding trial  If fails, replace indwelling escalona  Discharge with catheter and flomax  Follow-up with Urology in 1-2 weeks    Chaz Suarez MD    Urology     [facility] Urology Pager #[phone]  [facility] Pager #[phone]  Reachable on Teams M-F 6am-6pm    [facility]  [address] [place] [address], [place] [address]  [phone]     89 yo male with Proteus UTI and bacteremia found with moderate right hydronephrosis and trabeculated bladder; JAMIE resolving    -CT scan shows mild R hydro segmental thickening of right distal ureter ?inflammation vs focal lesion.  Pt is clinically improved with resolved JAMIE.  Pt can be optimized for possible TOV vs following outpt for catheter removal.  Pt may follow urology as outpatient for possible cysto or further investigation.      Please call with any further questions    Voiding trial: Optimize with all of the following:  - Encourage Ambulation / Out of Bed  - Initiate / Continue Bowel Regimen  - Minimize Narcotics / Benzodiazepines  - Minimize anticholinergics / antihistimines  - If patient has prostate: give tamsulosin 0.4mg QHS    Once optimized for 2-3 days, d/c escalona for voiding trial  If fails, replace indwelling escalona  Discharge with catheter and flomax  Follow-up with Urology in 1-2 weeks    Chaz Suarez MD    Urology     [facility] Urology Pager #[phone]  [facility] Urology Pager #[phone]  Reachable on Teams M-F 6am-6pm    [facility]  [address] [place] [address] [id number] [phone]

## 2021-11-11 NOTE — PROGRESS NOTE ADULT - PROBLEM SELECTOR PLAN 4
A1c 7.4   -no hx of DM   -low ISS q AC & HS   -c/w Lantus 6 units at bedtime    Endocrine input appreciated .   -No need for tight glycemic control given age. Goal A1c <8% Goal Glucose 100-200  Can continue to monitor on Lantus 6 units qhs and low correction scale qac + bedtime  Outpt. PCP follow-up  Plan to d/c with dietary and lifestyle modifications first. If A1c increases in 3 months can consider tradjenta 5mg daily.

## 2021-11-11 NOTE — PROGRESS NOTE ADULT - SUBJECTIVE AND OBJECTIVE BOX
Subjective  pt seen and examined, feels well, sleeping comfortably in bed    Objective    Vital signs  T(F): , Max: 98.1 (11-10-21 @ 20:21)  HR: 78 (11-11-21 @ 04:38)  BP: 150/71 (11-11-21 @ 04:38)  SpO2: 95% (11-11-21 @ 04:38)  Wt(kg): --    Output     OUT:    Indwelling Catheter - Urethral (mL): 2700 mL  Total OUT: 2700 mL    Total NET: -2700 mL          Gen: NAD  Abd: SNN  : escalona in place clear yellow    Labs      11-10 @ 07:15    WBC 11.25 / Hct 36.8  / SCr 1.19       Urine Cx: ?  Blood Cx: ?    Imaging Subjective  pt seen and examined, feels well, sleeping comfortably in bed    Objective    Vital signs  T(F): , Max: 98.1 (11-10-21 @ 20:21)  HR: 78 (11-11-21 @ 04:38)  BP: 150/71 (11-11-21 @ 04:38)  SpO2: 95% (11-11-21 @ 04:38)  Wt(kg): --    Output     OUT:    Indwelling Catheter - Urethral (mL): 2700 mL  Total OUT: 2700 mL    Total NET: -2700 mL          Gen: NAD  Abd: SNN  : escalona in place clear yellow    Labs      11-10 @ 07:15    WBC 11.25 / Hct 36.8  / SCr 1.19       Urine Cx: ?  Blood Cx: ?    Imaging    < from: CT Abdomen and Pelvis Urogram w/wo IV Cont (11.10.21 @ 18:45) >    EXAM:  CT ABD PELV UROGRAM WAW IC                            PROCEDURE DATE:  11/10/2021            INTERPRETATION:  CLINICAL INFORMATION: Moderate right hydronephrosis. Assess for source.    COMPARISON: Abdominal ultrasound dated 11/8/2021 and CT abdomen and pelvis dated 8/13/2020.    CONTRAST/COMPLICATIONS:  IV Contrast: Omnipaque 350  90 cc administered   10 cc discarded  Oral Contrast: NONE  Complications: None reported at time of study completion    PROCEDURE:  CT of the Abdomen and Pelviswas performed.  Precontrast, Nephrographic and Excretory phases were acquired (CT UROGRAM).  10 mg of Lasix was administered.  Sagittal and coronal reformats were performed.    FINDINGS:    Study is limited by motion artifact.    LOWER CHEST: Small bilateral pleural effusions with adjacent compressive atelectasis. Partially imaged pacemaker leads within the right atrium and ventricle.    LIVER: Within normal limits.  BILE DUCTS: Normal caliber.  GALLBLADDER: Within normal limits.  SPLEEN: Within normal limits.  PANCREAS: Within normal limits.  ADRENALS: Within normal limits.  KIDNEYS/URETERS: Mild to moderate right hydroureteronephrosis with slight enhancement of the ureteral wall. Dense contrast material is seen throughout the bilateral ureters and emptying into the bladder. There is segmental thickening of the very distal right ureter. No renal stones.    BLADDER: Underdistended with Escalona balloon. Contrast material fills the bladder. Bladder wall appears thickened and irregular, which may be due to chronic obstruction versus underdistention.  REPRODUCTIVE ORGANS: Enlarged prostate with calcifications.    BOWEL: Colonic diverticulosis without evidence of diverticulitis. No bowel obstruction. Appendix is normal.  PERITONEUM: Bilateral perinephric free fluid.  VESSELS: Atherosclerotic changes.  RETROPERITONEUM/LYMPH NODES: No lymphadenopathy.  ABDOMINAL WALL: Within normal limits.  BONES: Degenerative changes.    IMPRESSION:    Mild to moderate right hydroureteronephrosis. No renal stones. Segmental thickening of the very distal right ureter. This may be on the basis of inflammation. Please correlate for infection. A focal urothelial lesion cannot be excluded.  Thickened and irregular bladder wall, which may be due to chronic obstruction versus underdistention.    Enlarged prostate.    --- End of Report ---              FAUZIA MITCHELL MD; Resident Radiologist  This document has been electronically signed.  NARCISA ROE MD; Attending Radiologist  This document has been electronically signed. Nov 11 2021 10:38AM    < end of copied text >

## 2021-11-11 NOTE — DIETITIAN INITIAL EVALUATION ADULT. - PHYSCIAL ASSESSMENT
Skin: pressure ulcer in syeda. buttocks suspected deep tissue injury, syeda. heel stage 1, and left buttock stage 2 as per documentation.  No visual signs of muscle/fat loss noted. well nourished

## 2021-11-12 ENCOUNTER — TRANSCRIPTION ENCOUNTER (OUTPATIENT)
Age: 86
End: 2021-11-12

## 2021-11-12 VITALS
SYSTOLIC BLOOD PRESSURE: 139 MMHG | RESPIRATION RATE: 18 BRPM | OXYGEN SATURATION: 93 % | HEART RATE: 71 BPM | DIASTOLIC BLOOD PRESSURE: 76 MMHG | TEMPERATURE: 98 F

## 2021-11-12 LAB
ANION GAP SERPL CALC-SCNC: 10 MMOL/L — SIGNIFICANT CHANGE UP (ref 5–17)
BUN SERPL-MCNC: 32 MG/DL — HIGH (ref 7–23)
CALCIUM SERPL-MCNC: 8.4 MG/DL — SIGNIFICANT CHANGE UP (ref 8.4–10.5)
CHLORIDE SERPL-SCNC: 106 MMOL/L — SIGNIFICANT CHANGE UP (ref 96–108)
CO2 SERPL-SCNC: 23 MMOL/L — SIGNIFICANT CHANGE UP (ref 22–31)
CREAT SERPL-MCNC: 1.28 MG/DL — SIGNIFICANT CHANGE UP (ref 0.5–1.3)
GLUCOSE BLDC GLUCOMTR-MCNC: 114 MG/DL — HIGH (ref 70–99)
GLUCOSE BLDC GLUCOMTR-MCNC: 121 MG/DL — HIGH (ref 70–99)
GLUCOSE BLDC GLUCOMTR-MCNC: 177 MG/DL — HIGH (ref 70–99)
GLUCOSE BLDC GLUCOMTR-MCNC: 204 MG/DL — HIGH (ref 70–99)
GLUCOSE SERPL-MCNC: 117 MG/DL — HIGH (ref 70–99)
HCT VFR BLD CALC: 35.6 % — LOW (ref 39–50)
HGB BLD-MCNC: 11.7 G/DL — LOW (ref 13–17)
MAGNESIUM SERPL-MCNC: 2.3 MG/DL — SIGNIFICANT CHANGE UP (ref 1.6–2.6)
MCHC RBC-ENTMCNC: 29.3 PG — SIGNIFICANT CHANGE UP (ref 27–34)
MCHC RBC-ENTMCNC: 32.9 GM/DL — SIGNIFICANT CHANGE UP (ref 32–36)
MCV RBC AUTO: 89.2 FL — SIGNIFICANT CHANGE UP (ref 80–100)
NRBC # BLD: 0 /100 WBCS — SIGNIFICANT CHANGE UP (ref 0–0)
PHOSPHATE SERPL-MCNC: 3 MG/DL — SIGNIFICANT CHANGE UP (ref 2.5–4.5)
PLATELET # BLD AUTO: 217 K/UL — SIGNIFICANT CHANGE UP (ref 150–400)
POTASSIUM SERPL-MCNC: 4.8 MMOL/L — SIGNIFICANT CHANGE UP (ref 3.5–5.3)
POTASSIUM SERPL-SCNC: 4.8 MMOL/L — SIGNIFICANT CHANGE UP (ref 3.5–5.3)
RBC # BLD: 3.99 M/UL — LOW (ref 4.2–5.8)
RBC # FLD: 14.3 % — SIGNIFICANT CHANGE UP (ref 10.3–14.5)
SODIUM SERPL-SCNC: 139 MMOL/L — SIGNIFICANT CHANGE UP (ref 135–145)
WBC # BLD: 9.31 K/UL — SIGNIFICANT CHANGE UP (ref 3.8–10.5)
WBC # FLD AUTO: 9.31 K/UL — SIGNIFICANT CHANGE UP (ref 3.8–10.5)

## 2021-11-12 PROCEDURE — 82248 BILIRUBIN DIRECT: CPT

## 2021-11-12 PROCEDURE — 84100 ASSAY OF PHOSPHORUS: CPT

## 2021-11-12 PROCEDURE — 85027 COMPLETE CBC AUTOMATED: CPT

## 2021-11-12 PROCEDURE — 87640 STAPH A DNA AMP PROBE: CPT

## 2021-11-12 PROCEDURE — 82435 ASSAY OF BLOOD CHLORIDE: CPT

## 2021-11-12 PROCEDURE — 84443 ASSAY THYROID STIM HORMONE: CPT

## 2021-11-12 PROCEDURE — 99291 CRITICAL CARE FIRST HOUR: CPT

## 2021-11-12 PROCEDURE — 85014 HEMATOCRIT: CPT

## 2021-11-12 PROCEDURE — 83036 HEMOGLOBIN GLYCOSYLATED A1C: CPT

## 2021-11-12 PROCEDURE — 97162 PT EVAL MOD COMPLEX 30 MIN: CPT

## 2021-11-12 PROCEDURE — 80074 ACUTE HEPATITIS PANEL: CPT

## 2021-11-12 PROCEDURE — 87641 MR-STAPH DNA AMP PROBE: CPT

## 2021-11-12 PROCEDURE — 85018 HEMOGLOBIN: CPT

## 2021-11-12 PROCEDURE — 85025 COMPLETE CBC W/AUTO DIFF WBC: CPT

## 2021-11-12 PROCEDURE — U0005: CPT

## 2021-11-12 PROCEDURE — 87186 SC STD MICRODIL/AGAR DIL: CPT

## 2021-11-12 PROCEDURE — U0003: CPT

## 2021-11-12 PROCEDURE — 84484 ASSAY OF TROPONIN QUANT: CPT

## 2021-11-12 PROCEDURE — 99239 HOSP IP/OBS DSCHRG MGMT >30: CPT

## 2021-11-12 PROCEDURE — 97530 THERAPEUTIC ACTIVITIES: CPT

## 2021-11-12 PROCEDURE — 87150 DNA/RNA AMPLIFIED PROBE: CPT

## 2021-11-12 PROCEDURE — 82330 ASSAY OF CALCIUM: CPT

## 2021-11-12 PROCEDURE — 96375 TX/PRO/DX INJ NEW DRUG ADDON: CPT

## 2021-11-12 PROCEDURE — 74178 CT ABD&PLV WO CNTR FLWD CNTR: CPT

## 2021-11-12 PROCEDURE — 80061 LIPID PANEL: CPT

## 2021-11-12 PROCEDURE — 83615 LACTATE (LD) (LDH) ENZYME: CPT

## 2021-11-12 PROCEDURE — 82962 GLUCOSE BLOOD TEST: CPT

## 2021-11-12 PROCEDURE — 87086 URINE CULTURE/COLONY COUNT: CPT

## 2021-11-12 PROCEDURE — 82947 ASSAY GLUCOSE BLOOD QUANT: CPT

## 2021-11-12 PROCEDURE — 76700 US EXAM ABDOM COMPLETE: CPT

## 2021-11-12 PROCEDURE — 87040 BLOOD CULTURE FOR BACTERIA: CPT

## 2021-11-12 PROCEDURE — 80053 COMPREHEN METABOLIC PANEL: CPT

## 2021-11-12 PROCEDURE — 0225U NFCT DS DNA&RNA 21 SARSCOV2: CPT

## 2021-11-12 PROCEDURE — 82570 ASSAY OF URINE CREATININE: CPT

## 2021-11-12 PROCEDURE — 85610 PROTHROMBIN TIME: CPT

## 2021-11-12 PROCEDURE — 84295 ASSAY OF SERUM SODIUM: CPT

## 2021-11-12 PROCEDURE — 80048 BASIC METABOLIC PNL TOTAL CA: CPT

## 2021-11-12 PROCEDURE — 82247 BILIRUBIN TOTAL: CPT

## 2021-11-12 PROCEDURE — 81001 URINALYSIS AUTO W/SCOPE: CPT

## 2021-11-12 PROCEDURE — 36415 COLL VENOUS BLD VENIPUNCTURE: CPT

## 2021-11-12 PROCEDURE — 85730 THROMBOPLASTIN TIME PARTIAL: CPT

## 2021-11-12 PROCEDURE — 83880 ASSAY OF NATRIURETIC PEPTIDE: CPT

## 2021-11-12 PROCEDURE — 96374 THER/PROPH/DIAG INJ IV PUSH: CPT

## 2021-11-12 PROCEDURE — 84132 ASSAY OF SERUM POTASSIUM: CPT

## 2021-11-12 PROCEDURE — 82565 ASSAY OF CREATININE: CPT

## 2021-11-12 PROCEDURE — 83735 ASSAY OF MAGNESIUM: CPT

## 2021-11-12 PROCEDURE — C8929: CPT

## 2021-11-12 PROCEDURE — 87077 CULTURE AEROBIC IDENTIFY: CPT

## 2021-11-12 PROCEDURE — 71045 X-RAY EXAM CHEST 1 VIEW: CPT

## 2021-11-12 PROCEDURE — 83605 ASSAY OF LACTIC ACID: CPT

## 2021-11-12 PROCEDURE — 84300 ASSAY OF URINE SODIUM: CPT

## 2021-11-12 PROCEDURE — 97116 GAIT TRAINING THERAPY: CPT

## 2021-11-12 PROCEDURE — 82803 BLOOD GASES ANY COMBINATION: CPT

## 2021-11-12 PROCEDURE — 82010 KETONE BODYS QUAN: CPT

## 2021-11-12 PROCEDURE — 83010 ASSAY OF HAPTOGLOBIN QUANT: CPT

## 2021-11-12 RX ORDER — RAMIPRIL 5 MG
1 CAPSULE ORAL
Qty: 0 | Refills: 0 | DISCHARGE

## 2021-11-12 RX ORDER — CEFPODOXIME PROXETIL 100 MG
1 TABLET ORAL
Qty: 6 | Refills: 0
Start: 2021-11-12 | End: 2021-11-14

## 2021-11-12 RX ADMIN — Medication 2 DROP(S): at 10:24

## 2021-11-12 RX ADMIN — DOCOSANOL 1 APPLICATION(S): 100 CREAM TOPICAL at 18:31

## 2021-11-12 RX ADMIN — Medication 1: at 12:49

## 2021-11-12 RX ADMIN — CHLORHEXIDINE GLUCONATE 1 APPLICATION(S): 213 SOLUTION TOPICAL at 05:11

## 2021-11-12 RX ADMIN — APIXABAN 2.5 MILLIGRAM(S): 2.5 TABLET, FILM COATED ORAL at 10:23

## 2021-11-12 RX ADMIN — FINASTERIDE 5 MILLIGRAM(S): 5 TABLET, FILM COATED ORAL at 12:49

## 2021-11-12 RX ADMIN — DOCOSANOL 1 APPLICATION(S): 100 CREAM TOPICAL at 05:11

## 2021-11-12 RX ADMIN — POLYETHYLENE GLYCOL 3350 17 GRAM(S): 17 POWDER, FOR SOLUTION ORAL at 12:49

## 2021-11-12 NOTE — DISCHARGE NOTE PROVIDER - NSDCFUADDAPPT_GEN_ALL_CORE_FT
Please follow up with urology specialists in 2 weeks    Please follow up with endocrinology specialists in 3 months Please follow up with urology specialists in 2 weeks.    Please follow up with opthalmology, nephrology, and podiatry within 2 weeks for newly diagnosed diabetes.    Please follow up with endocrinology specialists in 3 months.

## 2021-11-12 NOTE — PROGRESS NOTE ADULT - PROBLEM SELECTOR PLAN 2
New R moderate hydroureteronephrosis   -renal/abdomen US completed; 11/8 w/ Moderate righthydroureteronephrosis. B/l echogenic kidneys suggesting medical renal disease. Contracted gallbladder with sludge. No biliary ductal dilation. Hepatic steatosis. Prostatomegaly with associated trabeculated urinary bladder wall.   -urology called in micu regarding findings ; place escalona cath and  pending input   -Dr. ROLANDA Juárez = friend of family    Appreciate urology recs:  CT AP with urogram results above  Uro outpatient follow up  TOV tonight   c/w flomax and proscar New R moderate hydroureteronephrosis   -renal/abdomen US completed; 11/8 w/ Moderate righthydroureteronephrosis. B/l echogenic kidneys suggesting medical renal disease. Contracted gallbladder with sludge. No biliary ductal dilation. Hepatic steatosis. Prostatomegaly with associated trabeculated urinary bladder wall.   -urology called in micu   -Dr. ROLANDA Juárez = friend of family    Appreciate urology recs  CT AP with urogram results above  Uro outpatient follow up  TOV passed  c/w flomax and proscar

## 2021-11-12 NOTE — DISCHARGE NOTE PROVIDER - NSDCCPCAREPLAN_GEN_ALL_CORE_FT
PRINCIPAL DISCHARGE DIAGNOSIS  Diagnosis: Septic shock due to urinary tract infection  Assessment and Plan of Treatment:       SECONDARY DISCHARGE DIAGNOSES  Diagnosis: Hydronephrosis of right kidney  Assessment and Plan of Treatment:      PRINCIPAL DISCHARGE DIAGNOSIS  Diagnosis: Septic shock due to urinary tract infection  Assessment and Plan of Treatment: You were found to hve a serious urinary tract infection that led to a severe condition known as septic shock. This is when the infection is so significant that it dilates your peripheral blood vessels, leading to a significant drop in your blood pressure. A significant drop in blood pressure is very dangerous because it can lead to lack of blood flow to your vital organs. For this condition, you were cared for in the medical ICU with medications that constrict your blood vessels in order to increase the blood pressure. After you improved in the medical ICU, you were sent to the hospital floor. Please followup with your primary care doctor within 1-2 weeks of discharge. If you have any new or recurrent symptoms, such as fevers, chills, burning sensation with urination, or increase in urgency for urination. please go to the ED.      SECONDARY DISCHARGE DIAGNOSES  Diagnosis: Hydronephrosis of right kidney  Assessment and Plan of Treatment: On an ultrasound of your kidney, you were found to have hydronephosis of your right kidney, which is due to urine backing into the kidney. To work this up, we obtained a CT with urogram, and you were found to have segmental thickening of veral distal right ureter. As per the urology team, no intervention is to be pursued while you are in the hospital. You passed trial of void, which means you can urinate fine without using a escalona catheter. Please follow up with urology as an outpatient. If you have any new or recurrent symptoms, including fevers, chlls, burning sensation with urination, please call your doctor or go to the ED.     PRINCIPAL DISCHARGE DIAGNOSIS  Diagnosis: Septic shock due to urinary tract infection  Assessment and Plan of Treatment: You were found to hve a serious urinary tract infection that led to a severe condition known as septic shock. This is when the infection is so significant that it dilates your peripheral blood vessels, leading to a significant drop in your blood pressure. A significant drop in blood pressure is very dangerous because it can lead to lack of blood flow to your vital organs. For this condition, you were cared for in the medical ICU with medications that constrict your blood vessels in order to increase the blood pressure. After you improved in the medical ICU, you were sent to the hospital floor. Please followup with your primary care doctor within 1-2 weeks of discharge. If you have any new or recurrent symptoms, such as fevers, chills, burning sensation with urination, or increase in urgency for urination. please go to the ED.      SECONDARY DISCHARGE DIAGNOSES  Diagnosis: Hydronephrosis of right kidney  Assessment and Plan of Treatment: On an ultrasound of your kidney, you were found to have hydronephosis of your right kidney, which is due to urine backing into the kidney. To work this up, we obtained a CT with urogram, and you were found to have segmental thickening of veral distal right ureter. As per the urology team, no intervention is to be pursued while you are in the hospital. You passed trial of void, which means you can urinate fine without using a escalona catheter. Please follow up with urology as an outpatient. If you have any new or recurrent symptoms, including fevers, chlls, burning sensation with urination, please call your doctor or go to the ED.    Diagnosis: Uncontrolled type 2 diabetes mellitus  Assessment and Plan of Treatment: You were newly diagnosed with diabetes mellitus here in the hospital by an hemoglobin a1c level of 7.4. You were seen by the endocrinology team. After evaluation by the endocrinology team, you were recommended to go home without pharmacologic intervention. The inital approach is to start with diet and lifestyle modification. Please follow up with PCP, opthomology, podiatry, and nephrology. Please also follow up with endocrinology in 3 months to see if your A1C increases. If your A1c further increases in 3 months, can then consider tradjenta 5 mg daily at that time as per endocrinology. Please followup with your primary care doctor within 1-2 weeks of discharge. If you have any new or recurrent symptoms, please call your doctor or go to the ED.

## 2021-11-12 NOTE — PROGRESS NOTE ADULT - PROBLEM SELECTOR PLAN 1
Resolved, no longer in septic shock    septic shock 2/2 multifactorial proteus m bacteremia  and UTI   -repeat BC x 2 pending results   - MRSA negative   -s/p meropenem 11/5-11/8; based on sensitivity changed to ceftriaxone 11/8-11/12  - Weaned off midodrine Resolved, no longer in septic shock    septic shock 2/2 multifactorial proteus m bacteremia and UTI   -repeat BC ngtd  - MRSA negative   -s/p meropenem 11/5-11/8; based on sensitivity changed to ceftriaxone 11/8-11/12, will DC home with three more days of po augmentin  - Weaned off midodrine

## 2021-11-12 NOTE — DISCHARGE NOTE PROVIDER - NSDCMRMEDTOKEN_GEN_ALL_CORE_FT
acetaminophen 325 mg oral tablet: 3 tab(s) (975mg) orally every 4 hours, As Needed - 3), Moderate Pain (4 - 6)  aspirin 81 mg oral tablet, chewable: 1 tab(s) orally once a day  Eliquis 2.5 mg oral tablet: 1 tab(s) orally 2 times a day  finasteride 5 mg oral tablet: 1 tab(s) orally once a day  Flomax 0.4 mg oral capsule: 1 cap(s) orally once a day  Folbee Plus oral tablet: 1 tab(s) orally once a day  lactobacillus acidophilus oral capsule: 1 cap(s) orally once a day  Multiple Vitamins oral tablet: 1 tab(s) orally once a day  polyethylene glycol 3350 oral powder for reconstitution: 17 gram(s) orally 2 times a day  ramipril 10 mg oral capsule: 1 cap(s) orally once a day  senna oral tablet: 2 tab(s) orally once a day (at bedtime)  simvastatin 20 mg oral tablet: 1 tab(s) orally once a day (at bedtime)  torsemide 20 mg oral tablet: 1 tab(s) orally once a day

## 2021-11-12 NOTE — DISCHARGE NOTE PROVIDER - HOSPITAL COURSE
89 M w/ hx of  Atrial Fibrillation on eliquis, implanted pacemaker, CAD with multiple stents, Hypertension, Hyperlipidemia, Benign Prostatic Hyperplasia p/w "unresponsiveness". Pt was dx w/ a UTI with blood in the urine. Pt was started on bactrim, pt unable to provide a history at this time due to clinical condition. PT was bibems, since son in law went to house and noted to be unarousable to tactile stimulation. When medics arrived, he was hypoxic to 88, hypotensive 80/50s, he was started on IVF, and was given approx 1L PTA. Upon arrival to [facility] he was SBP in the 100s, sating 90-93 percent on RA, he has a cough. Pt is aaox2-3 at baseline uses a walker, has 24 hour aide.     In ED pt received 2 L NS vanc cefepime and was started on iv pressors to maintain MAP >65.     On 11/5, patient was admitted to MICU and was on IV levophed , started on Midodrine 10mg TID. Upgraded abx to meropenem with JAMIE improving. On 11/6; midnight weaned off levo gtt. BCx x 1 w/ proteus m , BC #2 GNR P speciation and Ucx w/ GNR pending speciation. C/w meropenem until sensitivity than adjust abx. C.w Midodrine 10 TID, wean as tolerated. Holding antihypertensives in setting of sepsis and while on midodrin . Restarted Eliquis 2.5 BID . Drugs renally dosed.  JAMIE ; little bump in Cr ; added home regimen flomax and finasteride ; bladder scan q 6 hrs for residual void. Pt has stress incontinence. On 11/7, patient was bedboarded, with midodrine stopped. On 11/8 per sensistivity, the antibiotic was changed from meropenem to ceftriaxone with repeat BC negative. 11/9 R hydrourethronephrosis was seen on imaging, and escalona placed, with urology consult called. family notified. Patient was followed by urology and patient had trial of void over night on 11/12. Patient successfully passed trial of void and will be able to follow up with Urology outpatient within 1-2 weeks of discharge. At the time of discharge, patient is medically optimized. Patient will continue on PO augmentin for 3 more days after discharge. 89 M w/ hx of  Atrial Fibrillation on eliquis, implanted pacemaker, CAD with multiple stents, Hypertension, Hyperlipidemia, Benign Prostatic Hyperplasia p/w "unresponsiveness". Pt was dx w/ a UTI with blood in the urine. Pt was started on bactrim, pt unable to provide a history at this time due to clinical condition. PT was bibems, since son in law went to house and noted to be unarousable to tactile stimulation. When medics arrived, he was hypoxic to 88, hypotensive 80/50s, he was started on IVF, and was given approx 1L PTA. Upon arrival to [facility] he was SBP in the 100s, sating 90-93 percent on RA, he has a cough. Pt is aaox2-3 at baseline uses a walker, has 24 hour aide.     In ED pt received 2 L NS vanc cefepime and was started on iv pressors to maintain MAP >65.     On 11/5, patient was admitted to MICU and was on IV levophed , started on Midodrine 10mg TID. Upgraded abx to meropenem with JAMEI improving. Pt was also found to have diabetes mellitus newly diagnosed and was put on insulin regimen. Endocrinology was consulted. On 11/6; midnight weaned off levo gtt. BCx x 1 w/ proteus m , BC #2 GNR P speciation and Ucx w/ GNR pending speciation. C/w meropenem until sensitivity than adjust abx. C.w Midodrine 10 TID, wean as tolerated. Holding antihypertensives in setting of sepsis and while on midodrin . Restarted Eliquis 2.5 BID . Drugs renally dosed.  JAMIE ; little bump in Cr ; added home regimen flomax and finasteride ; bladder scan q 6 hrs for residual void. Pt has stress incontinence. On 11/7, patient was bedboarded, with midodrine stopped. On 11/8 per sensistivity, the antibiotic was changed from meropenem to ceftriaxone with repeat BC negative.11/9 R hydrourethronephrosis was seen on imaging, and escalona placed, with urology consult called. family notified. Patient was followed by urology and patient had trial of void over night on 11/12. Patient successfully passed trial of void and will be able to follow up with Urology outpatient within 1-2 weeks of discharge. At the time of discharge, patient is medically optimized. Patient will continue on PO augmentin for 3 more days after discharge.

## 2021-11-12 NOTE — PROGRESS NOTE ADULT - PROBLEM SELECTOR PLAN 5
-restarted on home dose of eliqus   -telemetry with NSR 66  -c/ tele monitoring -restarted on home dose of eliquis   -telemetry with NSR 66  -c/ tele monitoring

## 2021-11-12 NOTE — PROGRESS NOTE ADULT - PROBLEM/PLAN-8
[] [facility]) - [facility] &  Therapy  [address]    [phone]  F: [phone]   [x] [address]  [facility] [address]   [address]  P: [phone]  F: [phone]  [] [address] &  Therapy  [address] [facility]  P: [phone]  F: [phone] [] [address]  P: [phone]  F: [phone]  [] [address]  [address] [facility] [address]   [address]   [place]: [phone]  F: [phone]   [] [facility]  [address] [facility] [address]  [place]: [phone]   F: [phone]     Physical Therapy Cancel/No Show note    Date: 2021  Patient: Igor Encarnacion  : 1960  MRN: [id number]    Cancels/No Shows to date: 3/1    For today's appointment patient:    [x]  Cancelled    [] Rescheduled appointment    [] No-show     Reason given by patient:    []  Patient ill    []  Conflicting appointment    [] No transportation      [] Conflict with work    [x] No reason given    [] Weather related    [] COVID-19    [] Other:      Comments:       [x] Next appointment was confirmed     Electronically signed by: Shahana Thompson PTA
DISPLAY PLAN FREE TEXT

## 2021-11-12 NOTE — PROGRESS NOTE ADULT - PROBLEM SELECTOR PROBLEM 4
Uncontrolled type 2 diabetes mellitus

## 2021-11-12 NOTE — PROGRESS NOTE ADULT - ATTENDING COMMENTS
89M w/pmh Atrial Fibrillation on Eliquis, s/p ppm, CAD with multiple stents, Hypertension, Hyperlipidemia, Benign Prostatic Hyperplasia found unresponsive, admitted for septic shock 2/2 Proteus UTI and bacteremia.    - cont ceftriaxone while inpatient, switch to augmentin as outpatient to complete 10 day course  - PT recs CHENG, however daughter prefers to bring the patient home  - CT urogram shows mild/mod right hydroureteronephrosis and thickening of very distal very ureter. Urology will f/u as outpatient.  - escalona removed, passed TOV    Medically stable for discharge. 35 minutes spent coordinating discharge.

## 2021-11-12 NOTE — DISCHARGE NOTE PROVIDER - NSFOLLOWUPCLINICS_GEN_ALL_ED_FT
[facility] - Urology  Urology  [address], 3rd & 4th [place] [address]  Phone: [phone]  Fax:   Follow Up Time: 2 weeks    [facility]ology  Endocrinology  [address] [place] [address]  Phone: [phone]  Fax:   Follow Up Time: Routine     [facility] - Urology  Urology  [address] [facility], 3rd & 4th [place] [address]  Phone: [phone]  Fax:   Follow Up Time: 2 weeks    [facility] Endocrinology  Endocrinology  [address] [place] [address]  Phone: [phone]  Fax:   Follow Up Time: Routine    [facility]  Podiatry  [address] [facility] - [address]rd [place] [address]  Phone: [phone]  Fax:   Follow Up Time: 2 weeks

## 2021-11-12 NOTE — PROGRESS NOTE ADULT - PROBLEM SELECTOR PLAN 8
Diet: Soft and bite sized, carb consistent, dash  DVT prophylaxis: Eliquis 2.5

## 2021-11-12 NOTE — PROGRESS NOTE ADULT - SUBJECTIVE AND OBJECTIVE BOX
AUBREY CARRERA  89y  MRN: [id number]    Patient is a 89y old  Male who presents with a chief complaint of Pt 89 y/o M with PMH as per chart: Atrial Fibrillation on Eliquis, implanted pacemaker, CAD with multiple stents, Hypertension, Hyperlipidemia, Benign Prostatic Hyperplasia, admitted with "unresponsiveness", found with UTI, acute hypoxic respiratory failure, JAMIE, in septic shock admitted to MICU - now in floors, bacteremia, new DM - plan to d/c with dietary and lifestyle modifications first. If A1c increases in 3 months can consider Tradjenta 5mg daily. (11 Nov 2021 14:59)      Subjective: no events ON. Denies fever, CP, SOB, abn pain, N/V, dysuria. Tolerating diet.      MEDICATIONS  (STANDING):  apixaban 2.5 milliGRAM(s) Oral two times a day  artificial  tears Solution 2 Drop(s) Both EYES every 12 hours  chlorhexidine 4% Liquid 1 Application(s) Topical <User Schedule>  dextrose 40% Gel 15 Gram(s) Oral once  dextrose 5%. 1000 milliLiter(s) (50 mL/Hr) IV Continuous <Continuous>  dextrose 5%. 1000 milliLiter(s) (100 mL/Hr) IV Continuous <Continuous>  dextrose 50% Injectable 25 Gram(s) IV Push once  dextrose 50% Injectable 12.5 Gram(s) IV Push once  dextrose 50% Injectable 25 Gram(s) IV Push once  docosanol 10% Cream 1 Application(s) Topical two times a day  finasteride 5 milliGRAM(s) Oral daily  glucagon  Injectable 1 milliGRAM(s) IntraMuscular once  insulin glargine Injectable (LANTUS) 6 Unit(s) SubCutaneous at bedtime  insulin lispro (ADMELOG) corrective regimen sliding scale   SubCutaneous three times a day before meals  insulin lispro (ADMELOG) corrective regimen sliding scale   SubCutaneous at bedtime  polyethylene glycol 3350 17 Gram(s) Oral daily  senna 2 Tablet(s) Oral at bedtime  tamsulosin 0.4 milliGRAM(s) Oral at bedtime    MEDICATIONS  (PRN):  acetaminophen    Suspension .. 650 milliGRAM(s) Oral every 6 hours PRN Mild Pain (1 - 3), Moderate Pain (4 - 6)      Objective:    Vitals: Vital Signs Last 24 Hrs  T(C): 36.7 (11-12-21 @ 04:45), Max: 37 (11-11-21 @ 13:51)  T(F): 98.1 (11-12-21 @ 04:45), Max: 98.6 (11-11-21 @ 13:51)  HR: 68 (11-12-21 @ 04:45) (68 - 80)  BP: 140/70 (11-12-21 @ 04:45) (131/70 - 140/70)  BP(mean): --  RR: 18 (11-12-21 @ 04:45) (18 - 18)  SpO2: 93% (11-12-21 @ 04:45) (93% - 93%)            I&O's Summary    11 Nov 2021 07:01  -  12 Nov 2021 07:00  --------------------------------------------------------  IN: 0 mL / OUT: 1100 mL / NET: -1100 mL        PHYSICAL EXAM:  GENERAL: NAD  HEAD:  Atraumatic, Normocephalic  EYES: EOMI, conjunctiva and sclera clear  CHEST/LUNG: Clear to percussion bilaterally; No rales, rhonchi, wheezing, or rubs  HEART: Regular rate and rhythm; No murmurs, rubs, or gallops  ABDOMEN: Soft, Nontender, Nondistended;   SKIN: No rashes or lesions  NERVOUS SYSTEM:  Alert & Oriented X3, no focal deficit    LABS:  11-12    139  |  106  |  32<H>  ----------------------------<  117<H>  4.8   |  23  |  1.28  11-11    140  |  105  |  36<H>  ----------------------------<  117<H>  5.0   |  23  |  1.18  11-10    142  |  110<H>  |  43<H>  ----------------------------<  148<H>  4.7   |  23  |  1.19    Ca    8.4      12 Nov 2021 06:58  Ca    8.6      11 Nov 2021 07:14  Ca    8.5      10 Nov 2021 07:15  Phos  3.0     11-12  Mg     2.3     11-12    TPro  5.1<L>  /  Alb  2.4<L>  /  TBili  0.4  /  DBili  x   /  AST  20  /  ALT  35  /  AlkPhos  92  11-10                                              11.7   9.31  )-----------( 217      ( 12 Nov 2021 07:18 )             35.6                         11.8   9.72  )-----------( 193      ( 11 Nov 2021 07:15 )             35.9                         11.9   11.25 )-----------( 160      ( 10 Nov 2021 07:15 )             36.8     CAPILLARY BLOOD GLUCOSE      POCT Blood Glucose.: 197 mg/dL (11 Nov 2021 22:23)  POCT Blood Glucose.: 114 mg/dL (11 Nov 2021 17:21)  POCT Blood Glucose.: 141 mg/dL (11 Nov 2021 12:48)  POCT Blood Glucose.: 129 mg/dL (11 Nov 2021 08:22)         AUBREY CARRERA  89y  MRN: [id number]    Patient is a 89y old  Male who presents with a chief complaint of Pt 89 y/o M with PMH as per chart: Atrial Fibrillation on Eliquis, implanted pacemaker, CAD with multiple stents, Hypertension, Hyperlipidemia, Benign Prostatic Hyperplasia, admitted with "unresponsiveness", found with UTI, acute hypoxic respiratory failure, JAMIE, in septic shock admitted to MICU - now in floors, bacteremia, new DM - plan to d/c with dietary and lifestyle modifications first. If A1c increases in 3 months can consider Tradjenta 5mg daily. (11 Nov 2021 14:59)      Subjective: no events ON. Denies fever, CP, SOB, abn pain, N/V, dysuria. Tolerating diet.      MEDICATIONS  (STANDING):  apixaban 2.5 milliGRAM(s) Oral two times a day  artificial  tears Solution 2 Drop(s) Both EYES every 12 hours  chlorhexidine 4% Liquid 1 Application(s) Topical <User Schedule>  dextrose 40% Gel 15 Gram(s) Oral once  dextrose 5%. 1000 milliLiter(s) (50 mL/Hr) IV Continuous <Continuous>  dextrose 5%. 1000 milliLiter(s) (100 mL/Hr) IV Continuous <Continuous>  dextrose 50% Injectable 25 Gram(s) IV Push once  dextrose 50% Injectable 12.5 Gram(s) IV Push once  dextrose 50% Injectable 25 Gram(s) IV Push once  docosanol 10% Cream 1 Application(s) Topical two times a day  finasteride 5 milliGRAM(s) Oral daily  glucagon  Injectable 1 milliGRAM(s) IntraMuscular once  insulin glargine Injectable (LANTUS) 6 Unit(s) SubCutaneous at bedtime  insulin lispro (ADMELOG) corrective regimen sliding scale   SubCutaneous three times a day before meals  insulin lispro (ADMELOG) corrective regimen sliding scale   SubCutaneous at bedtime  polyethylene glycol 3350 17 Gram(s) Oral daily  senna 2 Tablet(s) Oral at bedtime  tamsulosin 0.4 milliGRAM(s) Oral at bedtime    MEDICATIONS  (PRN):  acetaminophen    Suspension .. 650 milliGRAM(s) Oral every 6 hours PRN Mild Pain (1 - 3), Moderate Pain (4 - 6)      Objective:    Vitals: Vital Signs Last 24 Hrs  T(C): 36.7 (11-12-21 @ 04:45), Max: 37 (11-11-21 @ 13:51)  T(F): 98.1 (11-12-21 @ 04:45), Max: 98.6 (11-11-21 @ 13:51)  HR: 68 (11-12-21 @ 04:45) (68 - 80)  BP: 140/70 (11-12-21 @ 04:45) (131/70 - 140/70)  BP(mean): --  RR: 18 (11-12-21 @ 04:45) (18 - 18)  SpO2: 93% (11-12-21 @ 04:45) (93% - 93%)            I&O's Summary    11 Nov 2021 07:01  -  12 Nov 2021 07:00  --------------------------------------------------------  IN: 0 mL / OUT: 1100 mL / NET: -1100 mL        PHYSICAL EXAM:  GENERAL: NAD. Elderly.   HEAD:  Atraumatic, Normocephalic  EYES: EOMI, conjunctiva and sclera clear  CHEST/LUNG: Clear to percussion bilaterally; No rales, rhonchi, wheezing, or rubs  HEART: Regular rate and rhythm; No murmurs, rubs, or gallops  ABDOMEN: Soft, Nontender, Nondistended;   SKIN: No rashes or lesions  NERVOUS SYSTEM:  Alert & Oriented X2-3, no focal deficit    LABS:  11-12    139  |  106  |  32<H>  ----------------------------<  117<H>  4.8   |  23  |  1.28  11-11    140  |  105  |  36<H>  ----------------------------<  117<H>  5.0   |  23  |  1.18  11-10    142  |  110<H>  |  43<H>  ----------------------------<  148<H>  4.7   |  23  |  1.19    Ca    8.4      12 Nov 2021 06:58  Ca    8.6      11 Nov 2021 07:14  Ca    8.5      10 Nov 2021 07:15  Phos  3.0     11-12  Mg     2.3     11-12    TPro  5.1<L>  /  Alb  2.4<L>  /  TBili  0.4  /  DBili  x   /  AST  20  /  ALT  35  /  AlkPhos  92  11-10                                              11.7   9.31  )-----------( 217      ( 12 Nov 2021 07:18 )             35.6                         11.8   9.72  )-----------( 193      ( 11 Nov 2021 07:15 )             35.9                         11.9   11.25 )-----------( 160      ( 10 Nov 2021 07:15 )             36.8     CAPILLARY BLOOD GLUCOSE      POCT Blood Glucose.: 197 mg/dL (11 Nov 2021 22:23)  POCT Blood Glucose.: 114 mg/dL (11 Nov 2021 17:21)  POCT Blood Glucose.: 141 mg/dL (11 Nov 2021 12:48)  POCT Blood Glucose.: 129 mg/dL (11 Nov 2021 08:22)

## 2021-11-12 NOTE — DISCHARGE NOTE NURSING/CASE MANAGEMENT/SOCIAL WORK - NSDCFUADDAPPT_GEN_ALL_CORE_FT
Please follow up with urology specialists in 2 weeks.    Please follow up with opthalmology, nephrology, and podiatry within 2 weeks for newly diagnosed diabetes.    Please follow up with endocrinology specialists in 3 months.

## 2021-11-12 NOTE — PROGRESS NOTE ADULT - PROBLEM SELECTOR PLAN 6
-hx of BPH    -c/w Flomax and fenestrae home dose

## 2021-11-12 NOTE — DISCHARGE NOTE PROVIDER - NSFOLLOWUPCLINICSTOKEN_GEN_ALL_ED_FT
357102:2 weeks|| ||00\01||False;969272:Routine|| ||00\01||False; [id number]:2 weeks|| ||00\01||False;[id number]:Routine|| ||00\01||False;775524:2 weeks|| ||00\01||False;

## 2021-11-12 NOTE — DISCHARGE NOTE PROVIDER - NSDCCPTREATMENT_GEN_ALL_CORE_FT
PRINCIPAL PROCEDURE  Procedure: CT abdomen pelvis w con  Findings and Treatment: CT Abdomen Pelvis Urogram w/ w/o IV contrast  IMPRESSION:  Mild to moderate right hydroureteronephrosis. No renal stones. Segmental thickening of the very distal right ureter. This may be on the basis of inflammation. Please correlate for infection. A focal urothelial lesion cannot be excluded.  Thickened and irregular bladder wall, which may be due to chronic obstruction versus underdistention.  Enlarged prostate.      SECONDARY PROCEDURE  Procedure: Limited abdominal ultrasound  Findings and Treatment: IMPRESSION:  Moderate right-sided hydroureteronephrosis.  Bilateral echogenic kidneys suggesting medical renal disease.  Contracted gallbladder with sludge. No biliary ductal dilation.  Hepatic steatosis.  Prostatomegaly with associated trabeculated urinary bladder wall.

## 2021-11-12 NOTE — PROGRESS NOTE ADULT - PROBLEM SELECTOR PLAN 3
-JAMIE in setting of septic shock  - resolving, close to baseline  -renally dose drugs   -avoid nephrotoxins  -c/w strict I&os -JAMIE in setting of septic shock  - resolving, Cr close to baseline  -renally dose drugs   -avoid nephrotoxins  -c/w strict I&os

## 2021-11-12 NOTE — DISCHARGE NOTE NURSING/CASE MANAGEMENT/SOCIAL WORK - PATIENT PORTAL LINK FT
You can access the FollowMyHealth Patient Portal offered by [facility] by registering at the following website: [email or web address]. By joining [facility]’s FollowMyHealth portal, you will also be able to view your health information using other applications (apps) compatible with our system.

## 2021-11-14 ENCOUNTER — NON-APPOINTMENT (OUTPATIENT)
Age: 86
End: 2021-11-14

## 2021-11-14 ENCOUNTER — APPOINTMENT (OUTPATIENT)
Dept: HOME HEALTH SERVICES | Facility: HOME HEALTH | Age: 86
End: 2021-11-14

## 2021-11-14 RX ORDER — AMLODIPINE BESYLATE 5 MG/1
5 TABLET ORAL DAILY
Qty: 90 | Refills: 3 | Status: DISCONTINUED | COMMUNITY
Start: 2021-08-13 | End: 2021-11-14

## 2021-11-14 RX ORDER — TORSEMIDE 20 MG/1
20 TABLET ORAL
Qty: 90 | Refills: 0 | Status: DISCONTINUED | COMMUNITY
Start: 2021-08-13 | End: 2021-11-14

## 2021-11-15 RX ORDER — CEFPODOXIME PROXETIL 100 MG/1
100 TABLET, FILM COATED ORAL
Qty: 10 | Refills: 0 | Status: COMPLETED | COMMUNITY
Start: 2021-11-14 | End: 2021-11-16

## 2021-11-17 ENCOUNTER — APPOINTMENT (OUTPATIENT)
Dept: HOME HEALTH SERVICES | Facility: HOME HEALTH | Age: 86
End: 2021-11-17
Payer: MEDICARE

## 2021-11-17 VITALS
DIASTOLIC BLOOD PRESSURE: 78 MMHG | RESPIRATION RATE: 16 BRPM | HEART RATE: 84 BPM | SYSTOLIC BLOOD PRESSURE: 140 MMHG | TEMPERATURE: 99 F | OXYGEN SATURATION: 92 %

## 2021-11-17 DIAGNOSIS — N39.0 URINARY TRACT INFECTION, SITE NOT SPECIFIED: ICD-10-CM

## 2021-11-17 PROCEDURE — 99350 HOME/RES VST EST HIGH MDM 60: CPT

## 2021-11-18 ENCOUNTER — LABORATORY RESULT (OUTPATIENT)
Age: 86
End: 2021-11-18

## 2021-11-19 LAB
ALBUMIN SERPL ELPH-MCNC: 3.1 G/DL
ALP BLD-CCNC: 78 U/L
ALT SERPL-CCNC: 13 U/L
ANION GAP SERPL CALC-SCNC: 14 MMOL/L
AST SERPL-CCNC: 13 U/L
BASOPHILS # BLD AUTO: 0.12 K/UL
BASOPHILS NFR BLD AUTO: 1.5 %
BILIRUB SERPL-MCNC: 0.3 MG/DL
BUN SERPL-MCNC: 21 MG/DL
CALCIUM SERPL-MCNC: 8.4 MG/DL
CHLORIDE SERPL-SCNC: 101 MMOL/L
CO2 SERPL-SCNC: 23 MMOL/L
CREAT SERPL-MCNC: 1.24 MG/DL
EOSINOPHIL # BLD AUTO: 0.19 K/UL
EOSINOPHIL NFR BLD AUTO: 2.3 %
GLUCOSE SERPL-MCNC: 195 MG/DL
HCT VFR BLD CALC: 36.2 %
HGB BLD-MCNC: 11.8 G/DL
IMM GRANULOCYTES NFR BLD AUTO: 0.5 %
LYMPHOCYTES # BLD AUTO: 1.33 K/UL
LYMPHOCYTES NFR BLD AUTO: 16.4 %
MAN DIFF?: NORMAL
MCHC RBC-ENTMCNC: 29.8 PG
MCHC RBC-ENTMCNC: 32.6 GM/DL
MCV RBC AUTO: 91.4 FL
MONOCYTES # BLD AUTO: 0.58 K/UL
MONOCYTES NFR BLD AUTO: 7.1 %
NEUTROPHILS # BLD AUTO: 5.86 K/UL
NEUTROPHILS NFR BLD AUTO: 72.2 %
NT-PROBNP SERPL-MCNC: 796 PG/ML
PLATELET # BLD AUTO: 315 K/UL
POTASSIUM SERPL-SCNC: 5 MMOL/L
PROT SERPL-MCNC: 5.6 G/DL
RBC # BLD: 3.96 M/UL
RBC # FLD: 14.4 %
SODIUM SERPL-SCNC: 138 MMOL/L
TSH SERPL-ACNC: 3.84 UIU/ML
WBC # FLD AUTO: 8.12 K/UL

## 2021-11-22 ENCOUNTER — LABORATORY RESULT (OUTPATIENT)
Age: 86
End: 2021-11-22

## 2021-11-23 ENCOUNTER — LABORATORY RESULT (OUTPATIENT)
Age: 86
End: 2021-11-23

## 2021-11-24 ENCOUNTER — NON-APPOINTMENT (OUTPATIENT)
Age: 86
End: 2021-11-24

## 2021-11-24 LAB
APPEARANCE: CLEAR
BILIRUBIN URINE: NEGATIVE
BLOOD URINE: NEGATIVE
COLOR: NORMAL
GLUCOSE QUALITATIVE U: NEGATIVE
KETONES URINE: NEGATIVE
LEUKOCYTE ESTERASE URINE: ABNORMAL
NITRITE URINE: NEGATIVE
PH URINE: 5.5
PROTEIN URINE: NORMAL
SPECIFIC GRAVITY URINE: 1.01
UROBILINOGEN URINE: NORMAL

## 2021-11-29 ENCOUNTER — TRANSCRIPTION ENCOUNTER (OUTPATIENT)
Age: 86
End: 2021-11-29

## 2021-11-29 ENCOUNTER — NON-APPOINTMENT (OUTPATIENT)
Age: 86
End: 2021-11-29

## 2021-12-27 ENCOUNTER — NON-APPOINTMENT (OUTPATIENT)
Age: 86
End: 2021-12-27

## 2021-12-27 ENCOUNTER — TRANSCRIPTION ENCOUNTER (OUTPATIENT)
Age: 86
End: 2021-12-27

## 2021-12-28 ENCOUNTER — APPOINTMENT (OUTPATIENT)
Dept: HOME HEALTH SERVICES | Facility: HOME HEALTH | Age: 86
End: 2021-12-28
Payer: MEDICARE

## 2021-12-28 PROCEDURE — 0064A: CPT

## 2022-01-26 ENCOUNTER — NON-APPOINTMENT (OUTPATIENT)
Age: 87
End: 2022-01-26

## 2022-01-28 ENCOUNTER — APPOINTMENT (OUTPATIENT)
Dept: HOME HEALTH SERVICES | Facility: HOME HEALTH | Age: 87
End: 2022-01-28

## 2022-02-01 ENCOUNTER — NON-APPOINTMENT (OUTPATIENT)
Age: 87
End: 2022-02-01

## 2022-02-03 ENCOUNTER — APPOINTMENT (OUTPATIENT)
Dept: UROLOGY | Facility: CLINIC | Age: 87
End: 2022-02-03

## 2022-02-17 NOTE — PATIENT PROFILE ADULT - HARM RISK FACTORS
yes Detail Level: Detailed Render Note In Bullet Format When Appropriate: No Number Of Freeze-Thaw Cycles: 1 freeze-thaw cycle Post-Care Instructions: I reviewed with the patient in detail post-care instructions. Patient is to wear sunprotection, and avoid picking at any of the treated lesions. Pt may apply Vaseline to crusted or scabbing areas. Consent: The patient's consent was obtained including but not limited to risks of crusting, scabbing, blistering, scarring, darker or lighter pigmentary change, recurrence, incomplete removal and infection. Show Applicator Variable?: Yes Duration Of Freeze Thaw-Cycle (Seconds): 0

## 2022-03-14 ENCOUNTER — NON-APPOINTMENT (OUTPATIENT)
Age: 87
End: 2022-03-14

## 2022-03-14 DIAGNOSIS — R53.1 WEAKNESS: ICD-10-CM

## 2022-03-16 ENCOUNTER — LABORATORY RESULT (OUTPATIENT)
Age: 87
End: 2022-03-16

## 2022-03-18 ENCOUNTER — NON-APPOINTMENT (OUTPATIENT)
Age: 87
End: 2022-03-18

## 2022-03-21 ENCOUNTER — NON-APPOINTMENT (OUTPATIENT)
Age: 87
End: 2022-03-21

## 2022-03-23 ENCOUNTER — APPOINTMENT (OUTPATIENT)
Dept: HOME HEALTH SERVICES | Facility: HOME HEALTH | Age: 87
End: 2022-03-23
Payer: MEDICARE

## 2022-03-23 VITALS
DIASTOLIC BLOOD PRESSURE: 62 MMHG | SYSTOLIC BLOOD PRESSURE: 115 MMHG | TEMPERATURE: 98.7 F | RESPIRATION RATE: 16 BRPM | OXYGEN SATURATION: 95 % | HEART RATE: 78 BPM

## 2022-03-23 DIAGNOSIS — Z95.5 PRESENCE OF CORONARY ANGIOPLASTY IMPLANT AND GRAFT: ICD-10-CM

## 2022-03-23 DIAGNOSIS — R26.81 UNSTEADINESS ON FEET: ICD-10-CM

## 2022-03-23 DIAGNOSIS — Z95.0 PRESENCE OF CARDIAC PACEMAKER: ICD-10-CM

## 2022-03-23 PROCEDURE — 99350 HOME/RES VST EST HIGH MDM 60: CPT

## 2022-04-07 PROBLEM — Z95.0 PRESENCE OF CARDIAC PACEMAKER: Status: ACTIVE | Noted: 2021-08-13

## 2022-04-07 PROBLEM — Z95.5 HISTORY OF CORONARY ARTERY STENT PLACEMENT: Status: ACTIVE | Noted: 2021-08-13

## 2022-04-07 PROBLEM — R26.81 UNSTEADY GAIT: Status: ACTIVE | Noted: 2021-08-13

## 2022-04-25 ENCOUNTER — NON-APPOINTMENT (OUTPATIENT)
Age: 87
End: 2022-04-25

## 2022-05-20 ENCOUNTER — TRANSCRIPTION ENCOUNTER (OUTPATIENT)
Age: 87
End: 2022-05-20

## 2022-05-24 ENCOUNTER — NON-APPOINTMENT (OUTPATIENT)
Age: 87
End: 2022-05-24

## 2022-05-25 ENCOUNTER — APPOINTMENT (OUTPATIENT)
Dept: HOME HEALTH SERVICES | Facility: HOME HEALTH | Age: 87
End: 2022-05-25
Payer: MEDICARE

## 2022-05-25 VITALS
TEMPERATURE: 98.7 F | DIASTOLIC BLOOD PRESSURE: 80 MMHG | HEART RATE: 72 BPM | SYSTOLIC BLOOD PRESSURE: 140 MMHG | RESPIRATION RATE: 16 BRPM | OXYGEN SATURATION: 95 %

## 2022-05-25 PROCEDURE — 99349 HOME/RES VST EST MOD MDM 40: CPT

## 2022-05-26 ENCOUNTER — LABORATORY RESULT (OUTPATIENT)
Age: 87
End: 2022-05-26

## 2022-05-27 NOTE — MEDICAL STUDENT PROGRESS NOTE(EDUCATION) - NS MD HP STUD ASPLAN ASSES FT
Mr. Faith is a pleasant 89 year old male who has a history of Atrial Fibrillation with implanted pacemaker, CAD with multiple stents, Hypertension, Hyperlipidemia, BPH on flomax, who presents to the ED from his outpatient rehabilitation facility called [facility] with a fever and elevated WBC count, but the patient's chief complaint is "left shoulder pain" (s/p fall 2/2 sepsis last month). He was found to have sepsis likely 2/2 UTI. His urine culture grew E. coli and his blood cultures show no growth to date.  Chest CT showed an opacity in the LLL of uncertain etiology.  Bladder u/s showed bladder wall thickening and enlarged prostate.  Pelvic CT showed no evidence of calculi or hydronephrosis; no further workup for possible recurrent UTI is necessary. Total Number Of Lesions Treated: 1 Include Z78.9 (Other Specified Conditions Influencing Health Status) As An Associated Diagnosis?: No Anesthesia Type: 1% lidocaine with epinephrine Medical Necessity Clause: This procedure was medically necessary because the lesions that were treated were: Detail Level: Detailed Consent: Written consent obtained and the risks of skin tag removal was reviewed with the patient including but not limited to bleeding, pigmentary change, infection, pain, and remote possibility of scarring. Anesthesia Volume In Cc: 3 Medical Necessity Information: It is in your best interest to select a reason for this procedure from the list below. All of these items fulfill various CMS LCD requirements except the new and changing color options. Add Associated Diagnoses If Applicable When Selecting Medical Necessity: Yes

## 2022-06-01 ENCOUNTER — NON-APPOINTMENT (OUTPATIENT)
Age: 87
End: 2022-06-01

## 2022-06-01 LAB
ANION GAP SERPL CALC-SCNC: 11 MMOL/L
BASOPHILS # BLD AUTO: 0.09 K/UL
BASOPHILS NFR BLD AUTO: 1.2 %
BUN SERPL-MCNC: 28 MG/DL
CALCIUM SERPL-MCNC: 9 MG/DL
CHLORIDE SERPL-SCNC: 103 MMOL/L
CO2 SERPL-SCNC: 26 MMOL/L
CREAT SERPL-MCNC: 1.17 MG/DL
EGFR: 57 ML/MIN/1.73M2
EOSINOPHIL # BLD AUTO: 0.47 K/UL
EOSINOPHIL NFR BLD AUTO: 6.3 %
GLUCOSE SERPL-MCNC: 265 MG/DL
HCT VFR BLD CALC: 38.6 %
HGB BLD-MCNC: 12.9 G/DL
IMM GRANULOCYTES NFR BLD AUTO: 0.3 %
LYMPHOCYTES # BLD AUTO: 1.13 K/UL
LYMPHOCYTES NFR BLD AUTO: 15.2 %
MAN DIFF?: NORMAL
MCHC RBC-ENTMCNC: 29.7 PG
MCHC RBC-ENTMCNC: 33.4 GM/DL
MCV RBC AUTO: 88.9 FL
MONOCYTES # BLD AUTO: 0.5 K/UL
MONOCYTES NFR BLD AUTO: 6.7 %
NEUTROPHILS # BLD AUTO: 5.21 K/UL
NEUTROPHILS NFR BLD AUTO: 70.3 %
PLATELET # BLD AUTO: 169 K/UL
POTASSIUM SERPL-SCNC: 4.3 MMOL/L
RBC # BLD: 4.34 M/UL
RBC # FLD: 13.5 %
SODIUM SERPL-SCNC: 140 MMOL/L
WBC # FLD AUTO: 7.42 K/UL

## 2022-06-03 ENCOUNTER — NON-APPOINTMENT (OUTPATIENT)
Age: 87
End: 2022-06-03

## 2022-06-08 ENCOUNTER — TRANSCRIPTION ENCOUNTER (OUTPATIENT)
Age: 87
End: 2022-06-08

## 2022-06-09 ENCOUNTER — NON-APPOINTMENT (OUTPATIENT)
Age: 87
End: 2022-06-09

## 2022-06-09 LAB
APPEARANCE: CLEAR
BACTERIA: NEGATIVE
BILIRUBIN URINE: NEGATIVE
BLOOD URINE: NEGATIVE
COLOR: NORMAL
GLUCOSE QUALITATIVE U: NEGATIVE
HYALINE CASTS: 2 /LPF
KETONES URINE: NEGATIVE
LEUKOCYTE ESTERASE URINE: NEGATIVE
MICROSCOPIC-UA: NORMAL
NITRITE URINE: NEGATIVE
PH URINE: 5.5
PROTEIN URINE: ABNORMAL
RED BLOOD CELLS URINE: 1 /HPF
SPECIFIC GRAVITY URINE: 1.01
SQUAMOUS EPITHELIAL CELLS: 0 /HPF
UROBILINOGEN URINE: NORMAL
WHITE BLOOD CELLS URINE: 1 /HPF

## 2022-06-13 ENCOUNTER — NON-APPOINTMENT (OUTPATIENT)
Age: 87
End: 2022-06-13

## 2022-06-13 LAB — BACTERIA UR CULT: ABNORMAL

## 2022-09-12 ENCOUNTER — APPOINTMENT (OUTPATIENT)
Dept: HOME HEALTH SERVICES | Facility: HOME HEALTH | Age: 87
End: 2022-09-12

## 2022-09-12 VITALS
SYSTOLIC BLOOD PRESSURE: 140 MMHG | OXYGEN SATURATION: 92 % | RESPIRATION RATE: 16 BRPM | TEMPERATURE: 98.2 F | HEART RATE: 80 BPM | DIASTOLIC BLOOD PRESSURE: 80 MMHG

## 2022-09-12 RX ORDER — SULFAMETHOXAZOLE AND TRIMETHOPRIM 800; 160 MG/1; MG/1
800-160 TABLET ORAL
Qty: 28 | Refills: 0 | Status: COMPLETED | COMMUNITY
Start: 2022-06-13 | End: 2022-09-12

## 2022-11-01 ENCOUNTER — APPOINTMENT (OUTPATIENT)
Dept: HOME HEALTH SERVICES | Facility: HOME HEALTH | Age: 87
End: 2022-11-01

## 2022-11-01 VITALS
RESPIRATION RATE: 18 BRPM | OXYGEN SATURATION: 97 % | DIASTOLIC BLOOD PRESSURE: 80 MMHG | SYSTOLIC BLOOD PRESSURE: 140 MMHG | HEART RATE: 82 BPM | TEMPERATURE: 98 F

## 2022-11-01 DIAGNOSIS — Z87.448 PERSONAL HISTORY OF OTHER DISEASES OF URINARY SYSTEM: ICD-10-CM

## 2022-11-01 PROCEDURE — 90662 IIV NO PRSV INCREASED AG IM: CPT

## 2022-11-01 PROCEDURE — G0008: CPT

## 2022-11-01 PROCEDURE — 99349 HOME/RES VST EST MOD MDM 40: CPT | Mod: 25

## 2022-11-01 RX ORDER — LEVOFLOXACIN 750 MG/1
750 TABLET, FILM COATED ORAL
Qty: 4 | Refills: 0 | Status: COMPLETED | COMMUNITY
Start: 2021-12-02 | End: 2022-11-01

## 2022-11-01 NOTE — HISTORY OF PRESENT ILLNESS
[Patient] : patient [Family Member] : family member [Formal Caregiver] : formal caregiver [FreeTextEntry1] : unsteady gait  [FreeTextEntry2] : Patient denies fever, cough, trouble breathing, rash, vomiting or diarrhea. \par Patient has not been in close contact with someone who is COVID positive.\par N95 mask, gloves, eye wear and gown (if indicated) used during visit \par Total face to face time with patient is [40] min\par \par AUBREY CARRERA is a  89 year M with PMH significant for Afib (on eliquis), CAD (s/p 5 stent, s/p PM), Htn, HLD, CHF, BPH, Vertigo, hard of hearing (advanced despite hearing aid), chronic constipation, C1 stenosis, right shoulder arthropathy.  Admitted 3/2021 for bladder infection with fall, had fever, was confused, dx with UTI requiring IV abx, was at [facility] for 1 week, then had another UTI, hospitalized, stayed at [facility]. \par \par Patient being seen today for:followup.\par \par History obtained from: daughter on the phone and aide, patient  [place] uses hearing aide\par -Offers no new complaints\par - received flu vaccine today\par -  Would like COVID vaccine in a month\par -Cognition and memory:  Some mild short term Memory loss \par -Functional Status: patient semi-dependent for ADLs and iADLs \par -good appetite, eats 3 meals a day, self feeds\par -weight stable\par -BM pattern: stool is soft, no blood in it, no hx of hemorrhoids\par -Urinary: denies increased frequency, pain with urination, change to color or odor of urine\par -Skin: Denies bedsores/skin breakdown denies erythema, or new rashes\par -Sensory deficits: [place] despite hearing aids, wears glasses\par \par \par Medication compliance: patient states takes regularly, able to give self medications with prompting from aide, pill pack\par Medication reviewed and updated\par _____\par \par Support at home: aide, is , daughter is pharmacist\par DME: walker\par \par specialists\par cardio\par EP\par Uro *strong recommendation 11/2021\par podiatry \par Endo - if needed for new d of DM\par \par \par past hospitlaizaiotns\par admitted [facility]. He was admitted on 11/05/2021 for sepsis and discharged home on 11/12/2021.  urosepsis with JAMIE and hypoxia requiring ICU stay.  Also dx with DM interim, but not started on medication ( diet controlled)  in future if elevated A1c suggest tradjenta 5mg ( as perhospital d/c)  completed CEFPODOXIME course for UTI through 11/15, on miralax and senna now for bowel regimen.  Tylenol 975mg PRN q4h (max 3-4g/day)  recommended to f/up with urology

## 2022-11-01 NOTE — REASON FOR VISIT
[Follow-Up] : a follow-up visit [Formal Caregiver] : formal caregiver [Other: _____] : [unfilled] [Pre-Visit Preparation] : pre-visit preparation was done [FreeTextEntry2] : chart review

## 2022-11-01 NOTE — ASSESSMENT
[FreeTextEntry1] : Chronic hypertension (401.9) (I10)\par -at goal, cardio recently restarted torsemide 10mg, BP (taken daily by aide) has improved\par -f/u cardio ( pt would like to c.w)\par \par Atrial fibrillation, unspecified type (427.31) (I48.91)\par  · on eliquis, follows with cardio Dr. Brian\par     fall precaution, r/b discussed\par \par Presence of cardiac pacemaker (V45.01) (Z95.0)\par  · has pacemaker and extensive CAD s/p 5 stents c/b HTN which is now all under great\par     control as per daughter\par     f/u cardio\par     c/w BP management and statin\par \par Hematuria and Recurrent UTI in setting of BPH\par -hospitalized 11/2021 with MICU and pressor stay, completed abx, daughter requests repeat UA and labwork for peace of mind, have ordered\par -discussed extensively timed voids q3 hours, \par · in setting of enlarged prostate\par     has hx of recent urosepsis with ID consult needed\par     has info for [facility] for uro followup, daughter to book and understands importance and alliance with\par  GOC, dicussed recent home studies in detail, needs to see uro\par     asx today for urine infection, signs reviewed and encouraged to call if any concerns\par \par History of coronary artery stent placement (V45.82) (Z95.5) and Hyperlipidemia (272.4) (E78.5)\par c/w statin and eliquis\par \par Unsteady gait (781.2) (R26.81)\par  · fall preacution, has 24/7 live in aide\par     PT options discussed, still declines\par \par Pedal edema (782.3) (R60.0)\par  · chronic stable, recent exaccerbation, c.w torsemide\par

## 2022-11-01 NOTE — PHYSICAL EXAM
[No Acute Distress] : no acute distress [Well Nourished] : well nourished [Well Developed] : well developed [Normal Voice/Communication] : normal voice communication [Normal Sclera/Conjunctiva] : normal sclera/conjunctiva [EOMI] : extra ocular movement intact [Normal Outer Ear/Nose] : the ears and nose were normal in appearance [Normal Oropharynx] : the oropharynx was normal [No JVD] : no jugular venous distention [Supple] : the neck was supple [No Respiratory Distress] : no respiratory distress [Clear to Auscultation] : lungs were clear to auscultation bilaterally [No Accessory Muscle Use] : no accessory muscle use [Normal S1, S2] : normal S1 and S2 [No Murmurs] : no murmurs heard [Normal Bowel Sounds] : normal bowel sounds [Non Tender] : non-tender [Soft] : abdomen soft [Not Distended] : not distended [Normal Post Cervical Nodes] : no posterior cervical lymphadenopathy [Normal Anterior Cervical Nodes] : no anterior cervical lymphadenopathy [No CVA Tenderness] : no ~M costovertebral angle tenderness [No Spinal Tenderness] : no spinal tenderness [Normal Gait] : normal gait [Normal Strength/Tone] : muscle strength and tone were normal [No Rash] : no rash [Oriented x3] : oriented to person, place, and time [Normal Affect] : the affect was normal [Breast Exam Declined] : patient declined to have breast exam done [Patient Refused] : rectal exam was refused by the patient [de-identified] : able to make needs known, enthusiastic [de-identified] : hard of hearing, BL hearing aides [de-identified] : chronic pedal edema at baseline +1; irreg rate (a fib)

## 2022-11-01 NOTE — HEALTH RISK ASSESSMENT
[Independent] : feeding [Some assistance needed] : using telephone [Full assistance needed] : managing finances [No falls in past year] : Patient reported no falls in the past year

## 2022-11-22 ENCOUNTER — NON-APPOINTMENT (OUTPATIENT)
Age: 87
End: 2022-11-22

## 2022-11-29 ENCOUNTER — APPOINTMENT (OUTPATIENT)
Dept: HOME HEALTH SERVICES | Facility: HOME HEALTH | Age: 87
End: 2022-11-29

## 2022-11-29 ENCOUNTER — LABORATORY RESULT (OUTPATIENT)
Age: 87
End: 2022-11-29

## 2022-11-29 VITALS
TEMPERATURE: 98 F | RESPIRATION RATE: 18 BRPM | OXYGEN SATURATION: 95 % | DIASTOLIC BLOOD PRESSURE: 80 MMHG | SYSTOLIC BLOOD PRESSURE: 140 MMHG | HEART RATE: 80 BPM

## 2022-11-29 DIAGNOSIS — Z23 ENCOUNTER FOR IMMUNIZATION: ICD-10-CM

## 2022-11-29 DIAGNOSIS — R60.0 LOCALIZED EDEMA: ICD-10-CM

## 2022-11-29 DIAGNOSIS — Z86.69 PERSONAL HISTORY OF OTHER DISEASES OF THE NERVOUS SYSTEM AND SENSE ORGANS: ICD-10-CM

## 2022-11-29 PROCEDURE — 99349 HOME/RES VST EST MOD MDM 40: CPT | Mod: 25

## 2022-11-29 PROCEDURE — 0124A: CPT

## 2022-11-29 NOTE — HISTORY OF PRESENT ILLNESS
[Patient] : patient [Family Member] : family member [Formal Caregiver] : formal caregiver [FreeTextEntry1] : unsteady gait  [FreeTextEntry2] : Patient denies fever, cough, trouble breathing, rash, vomiting or diarrhea. \par Patient has not been in close contact with someone who is COVID positive.\par N95 mask, gloves, eye wear and gown (if indicated) used during visit \par Total face to face time with patient is [40] min\par \par AUBREY CARRERA is a  89 year M with PMH significant for Afib (on eliquis), CAD (s/p 5 stent, s/p PM), Htn, HLD, CHF, BPH, Vertigo, hard of hearing (advanced despite hearing aid), chronic constipation, C1 stenosis, right shoulder arthropathy.  Admitted 3/2021 for bladder infection with fall, had fever, was confused, dx with UTI requiring IV abx, was at [facility] for 1 week, then had another UTI, hospitalized, stayed at [facility]. \par \par Patient being seen today for:followup.\par History obtained from: Aide and patient  who is [place] and uses hearing aide\par -Offers no new complaints\par - received COVID booster vaccine today with no reaction\par -  order ua and urine culture secondary to history of recurret UTI and Aide requesting o test for a UTI\par - torsemide was increased to 20 mgs by community PCP\par \par -Cognition and memory:  Some mild short term Memory loss \par -Functional Status: patient semi-dependent for ADLs and iADLs \par -good appetite, eats 3 meals a day, self feeds\par -weight stable\par -BM pattern: stool is soft, no blood in it, no hx of hemorrhoids\par -Urinary: denies increased frequency, pain with urination, change to color or odor of urine\par -Skin: Denies bedsores/skin breakdown denies erythema, or new rashes\par -Sensory deficits: [place] despite hearing aids, wears glasses\par \par \par Medication compliance: patient states takes regularly, able to give self medications with prompting from aide, pill pack\par Medication reviewed and updated\par _____\par \par Support at home: aide, is , daughter is pharmacist\par DME: walker\par \par Patient denies fever, cough, trouble breathing, rash, vomiting, diarrhea. Patient has not been in close contact with someone who is COVID positive.\par N95 mask, gloves, eye wear and gown (if indicated) used during visit: Y. Total face to face time with patient:[]\par \par AUBREY BURGOS Mar 29 1925 being screened for COVID-19 vaccine administration visit. \par Information received by [patient]/[caregiver]/[proxy]\par \par AUBREY CARRERA denies moderate to severe acute illness with or without fever.\par AUBREY CARRERA denies having a positive test for COVID-19 and denies being told by a doctor that he has COVID-19 in the past 14 days.\par AUBREY CARRERA denies receiving passive antibody therapy (monoclonal antibodies or convalescent plasma) as a treatment for COVID-19 in the past 90 days (3 months).\par \par AUBREY CARRERA denies ever having an immediate allergic reaction of any severity to a prior vaccine or injectable therapy, and denies anaphylaxis from any cause, including polysorbate-80 or polyethylene glycol (Miralax).\par AUBREY CARRERA's legal representative is: [] /self (patient)\par \cara GODWIN educated AUBREY CARRERA or their legal representative about the Pfizer COVID-19 vaccine and provided the information sheet. \par \par STEVEN GODWIN educated AUBREY CARRERA or their legal representative that this vaccine booster is recommended to boost the vaccine's effectiveness.\par STEVEN GODWIN educated that there will be no cost to AUBREY CARRERA for this vaccine and that any monies or benefits for administering the vaccine will be assigned and transferred to the vaccinating provider, including benefits/monies from AUBREY CARRERA's  health plan, Medicare or other third parties who are financially responsible for their medical care. \par \cara AUBREY CARRERA or their legal representative consents to the release of all information needed (including but not limited to medical records, copies of claims and itemized bills) to verify payment and as needed for other public health purposes, including reporting to applicable vaccine registries.\par \par STEVEN GODWIN provided AUBREY CARRERA or their legal representative a chance to ask questions and reviewed the benefits and risks of the vaccination as described. AUBREY CARRERA or their legal representative consents to the administration of the COVID-19 vaccination. \par \par \par Patient screened as above.\par After consent verbally received, the Pfizer-BioNTech COVID19 vaccine vial was gently inverted 10 times, and in an upright position, using aseptic technique, a 0.3 mL dose was drawn up into a 1 mL syringe.  The [right] deltoid was prepped with alcohol swab, and vaccine was administered at a 90 degree angle.  A bandage was applied.\par Patient was monitored for [15] minutes afterwards.  Complications included: [none].\par

## 2022-11-29 NOTE — ASSESSMENT
[FreeTextEntry1] : Chronic hypertension (401.9) (I10)\par -at goal, cardio recently restarted torsemide 10mg, BP (taken daily by aide) has improved\par -f/u cardio ( pt would like to c.w)\par \par Atrial fibrillation, unspecified type (427.31) (I48.91)\par  · on eliquis, follows with cardio Dr. Brian\par     fall precaution, r/b discussed\par \par Presence of cardiac pacemaker (V45.01) (Z95.0)\par  · has pacemaker and extensive CAD s/p 5 stents c/b HTN which is now all under great\par     control as per daughter\par     f/u cardio\par     c/w BP management and statin\par \par Hematuria and Recurrent UTI in setting of BPH\par -hospitalized 11/2021 with MICU and pressor stay, completed abx, daughter requests repeat UA and labwork for peace of mind, have ordered\par -discussed extensively timed voids q3 hours, \par · in setting of enlarged prostate\par     has hx of recent urosepsis with ID consult needed\par     has info for [facility] for uro followup, daughter to book and understands importance and alliance with\par  GOC, dicussed recent home studies in detail, needs to see uro\par     asx today for urine infection, signs reviewed and encouraged to call if any concerns\par \par History of coronary artery stent placement (V45.82) (Z95.5) and Hyperlipidemia (272.4) (E78.5)\par c/w statin and eliquis\par \par Unsteady gait (781.2) (R26.81)\par  · fall preacution, has 24/7 live in aide\par     PT options discussed, still declines\par \par Pedal edema (782.3) (R60.0)\par  · chronic stable, recent exaccerbation, c.w torsemide\par

## 2022-11-29 NOTE — PHYSICAL EXAM
[No Acute Distress] : no acute distress [Well Nourished] : well nourished [Well Developed] : well developed [Normal Voice/Communication] : normal voice communication [Normal Sclera/Conjunctiva] : normal sclera/conjunctiva [EOMI] : extra ocular movement intact [Normal Outer Ear/Nose] : the ears and nose were normal in appearance [Normal Oropharynx] : the oropharynx was normal [No JVD] : no jugular venous distention [Supple] : the neck was supple [No Respiratory Distress] : no respiratory distress [Clear to Auscultation] : lungs were clear to auscultation bilaterally [No Accessory Muscle Use] : no accessory muscle use [Normal S1, S2] : normal S1 and S2 [No Murmurs] : no murmurs heard [Breast Exam Declined] : patient declined to have breast exam done [Normal Bowel Sounds] : normal bowel sounds [Non Tender] : non-tender [Soft] : abdomen soft [Not Distended] : not distended [Patient Refused] : rectal exam was refused by the patient [Normal Post Cervical Nodes] : no posterior cervical lymphadenopathy [Normal Anterior Cervical Nodes] : no anterior cervical lymphadenopathy [No CVA Tenderness] : no ~M costovertebral angle tenderness [No Spinal Tenderness] : no spinal tenderness [Normal Gait] : normal gait [Normal Strength/Tone] : muscle strength and tone were normal [No Rash] : no rash [Oriented x3] : oriented to person, place, and time [Normal Affect] : the affect was normal [de-identified] : able to make needs known, enthusiastic [de-identified] : hard of hearing, BL hearing aides [de-identified] : chronic pedal edema at baseline +1; irreg rate (a fib)

## 2022-12-01 ENCOUNTER — NON-APPOINTMENT (OUTPATIENT)
Age: 87
End: 2022-12-01

## 2023-01-03 ENCOUNTER — NON-APPOINTMENT (OUTPATIENT)
Age: 88
End: 2023-01-03

## 2023-01-12 ENCOUNTER — APPOINTMENT (OUTPATIENT)
Dept: HOME HEALTH SERVICES | Facility: HOME HEALTH | Age: 88
End: 2023-01-12

## 2023-01-12 VITALS
OXYGEN SATURATION: 93 % | DIASTOLIC BLOOD PRESSURE: 76 MMHG | SYSTOLIC BLOOD PRESSURE: 138 MMHG | TEMPERATURE: 98.5 F | RESPIRATION RATE: 18 BRPM | HEART RATE: 80 BPM

## 2023-01-17 ENCOUNTER — NON-APPOINTMENT (OUTPATIENT)
Age: 88
End: 2023-01-17

## 2023-02-06 NOTE — PATIENT PROFILE ADULT - PATIENT REPRESENTATIVE: ( YOU CAN CHOOSE ANY PERSON THAT CAN ASSIST YOU WITH YOUR HEALTH CARE PREFERENCES, DOES NOT HAVE TO BE A SPOUSE, IMMEDIATE FAMILY OR SIGNIFICANT OTHER/PARTNER)
What Type Of Note Output Would You Prefer (Optional)?: Bullet Format
Has Your Skin Lesion Been Treated?: not been treated
Is This A New Presentation, Or A Follow-Up?: Skin Lesions
information could not be obtained

## 2023-02-13 ENCOUNTER — NON-APPOINTMENT (OUTPATIENT)
Age: 88
End: 2023-02-13

## 2023-02-14 ENCOUNTER — NON-APPOINTMENT (OUTPATIENT)
Age: 88
End: 2023-02-14

## 2023-02-14 ENCOUNTER — APPOINTMENT (OUTPATIENT)
Dept: HOME HEALTH SERVICES | Facility: HOME HEALTH | Age: 88
End: 2023-02-14
Payer: MEDICARE

## 2023-02-14 DIAGNOSIS — L30.9 DERMATITIS, UNSPECIFIED: ICD-10-CM

## 2023-02-14 DIAGNOSIS — Z87.898 PERSONAL HISTORY OF OTHER SPECIFIED CONDITIONS: ICD-10-CM

## 2023-02-14 DIAGNOSIS — L02.91 CUTANEOUS ABSCESS, UNSPECIFIED: ICD-10-CM

## 2023-02-14 PROCEDURE — 99349 HOME/RES VST EST MOD MDM 40: CPT

## 2023-02-15 PROBLEM — Z87.898 HISTORY OF DYSURIA: Status: RESOLVED | Noted: 2022-11-29 | Resolved: 2023-02-15

## 2023-02-15 PROBLEM — L30.9 DERMATITIS: Status: ACTIVE | Noted: 2023-02-14

## 2023-02-15 PROBLEM — L02.91 ABSCESS: Status: ACTIVE | Noted: 2023-02-15

## 2023-02-15 NOTE — PHYSICAL EXAM
[de-identified] : able to make needs known, enthusiastic [de-identified] : hard of hearing, [place] hearing aides [de-identified] : chronic pedal edema at baseline +1; irreg rate (a fib) [de-identified] : Abscess to upper back in the middle

## 2023-02-15 NOTE — HISTORY OF PRESENT ILLNESS
[FreeTextEntry1] : unsteady gait  [FreeTextEntry2] : Patient denies fever, cough, trouble breathing, rash, vomiting or diarrhea. \par Patient has not been in close contact with someone who is COVID positive.\par N95 mask, gloves, eye wear and gown (if indicated) used during visit \par Total face to face time with patient is [40] min\par \par AUBREY CARRERA is a  89 year M with PMH significant for Afib (on eliquis), CAD (s/p 5 stent, s/p PM), Htn, HLD, CHF, BPH, Vertigo, hard of hearing (advanced despite hearing aid), chronic constipation, C1 stenosis, right shoulder arthropathy.  Admitted 3/2021 for bladder infection with fall, had fever, was confused, dx with UTI requiring IV abx, was at [facility] for 1 week, then had another UTI, hospitalized, stayed at [facility]. \par \par Patient being seen today for:followup. Pt seen alert/awake sitting in chair, able to make needs known.\par History obtained from: Aide and patient  who is [place] and uses hearing aide. daughter was called as well\par  -  Intact Abscess noted to upper back in the middle. pt's daughter reports that this has happened before and that it bursted open, drained and she applied bacitracin. Plan to continue warm compress, start doxycycline BID, apply bacitracin abscess opens with Dry Protective Dressing \par \par \par -Cognition and memory:  Some mild short term Memory loss \par -Functional Status: patient semi-dependent for ADLs and iADLs \par -good appetite, eats 3 meals a day, self feeds\par -weight stable\par -BM pattern: stool is soft, no blood in it, no hx of hemorrhoids\par -Urinary: denies increased frequency, pain with urination, change to color or odor of urine\par -Skin: Denies bedsores/skin breakdown denies erythema, or new rashes\par -Sensory deficits: [place] despite hearing aids, wears glasses\par \par \par Medication compliance: patient states takes regularly, able to give self medications with prompting from aide, pill pack\par Medication reviewed and updated\par _____\par \par Support at home: aide, is , daughter is pharmacist\par DME: walker\par \par

## 2023-02-15 NOTE — ASSESSMENT
[FreeTextEntry1] : Chronic hypertension (401.9) (I10)\par -at goal, cardio recently restarted torsemide 10mg, BP (taken daily by aide) has improved\par -f/u cardio ( pt would like to c.w)\par \par Atrial fibrillation, unspecified type (427.31) (I48.91)\par  · on eliquis, follows with cardio Dr. Brian\par     fall precaution, r/b discussed\par \par Presence of cardiac pacemaker (V45.01) (Z95.0)\par  · has pacemaker and extensive CAD s/p 5 stents c/b HTN which is now all under great\par     control as per daughter\par     f/u cardio\par     c/w BP management and statin\par \par Hematuria and Recurrent UTI in setting of BPH\par -hospitalized 11/2021 with MICU and pressor stay, completed abx, daughter requests repeat UA and labwork for peace of mind, have ordered\par -discussed extensively timed voids q3 hours, \par · in setting of enlarged prostate\par     has hx of recent urosepsis with ID consult needed\par     has info for [facility] for uro followup, daughter to book and understands importance and alliance with\par  GOC, dicussed recent home studies in detail, needs to see uro\par     asx today for urine infection, signs reviewed and encouraged to call if any concerns\par \par History of coronary artery stent placement (V45.82) (Z95.5) and Hyperlipidemia (272.4) (E78.5)\par c/w statin and eliquis\par \par Unsteady gait (781.2) (R26.81)\par  · fall preacution, has 24/7 live in aide\par     PT options discussed, still declines\par \par Pedal edema (782.3) (R60.0)\par  · chronic stable, recent exaccerbation, c.w torsemide\par

## 2023-04-19 NOTE — SWALLOW BEDSIDE ASSESSMENT ADULT - NS ASR SWALLOW FINDINGS DISCUS
Discharge instructions for Ablations    Post ablation restrictions, activity and pain management:                 Check your blood pressure, heart rate after your discharge and the next morning. If you have any symptoms, check your heart rate and blood pressure. Document these numbers and be ready to report these numbers to the nurse who will call you the day after your discharge. No excessive bending, squatting or lifting more than 10 pounds for 1 week. No sexual intercourse for 1 week. Hold base of hand to puncture sites with sneezing or hard coughing. No driving for 1 week (this includes lawn mowing). No excessive exercise or exertion for 1 month. Not adhering to above can place you at risk for bleeding at the puncture sites. No tub baths or hot tub until puncture sites are completely healed. You may remove all bandages and shower the day after the procedure. Keep wound sites clean and dry. Monitor puncture sites for signs of bleeding, such as increased swelling or bruising. Monitor puncture sites for signs of infection, such as drainage, redness, or warmth. Take temperature as needed for signs and symptoms of fever or chills. You may experience some minor soreness in the chest in the next week or so, that may worsen with deep breathing or leaning forward. You may have minor soreness and bruising at puncture sites. May use Tylenol as instructed for discomfort at puncture sites. How soon you can return to work depends upon your job duties. If you are not able to adhere to the above recommendations or your job is not able to make accommodations to follow these recommendations, you will then be asked to refrain from working for 1 week. If you do very heavy lifting, it is possible you may need to refrain from working for up to 4 weeks.           Electrophysiology  [address] [place] [address]      Home Instruction Sheet  ANESTHESIA for ADULT       What are the side effects? Side effects depend on the medication used, and may not even be present. Most Common Sometimes   Irritability  Poor Balance  Sleeping for Several Hours  Drowsiness  Fatigue  Difficulty Concentrating Change in Behavior  Hyperactivity  Nausea (upset stomach)  Gas (flatulence)  Dizziness  Hiccups  Constipation  Blurred Vision     The effects of sedation medicine can last up to 24 hours. You may be drowsy or irritable for 2 to 8 hours after receiving medicine. You may need to sleep after leaving the testing area. Sleeping after sedation will help reduce irritability. Diet:  Do not give anything to eat or drink until you are fully awake. Eat a light meal and advance to a normal diet unless instructed differently:   Do not drink alcohol beverages for the next 24 hours. Avoid greasy or spicy foods today. Activity:  You may be dizzy and/or unsteady. Ask for help walking, using the bathroom, or stairs to protect yourself from injury. You should not drive a vehicle, operate machinery or power tools for 24 hours because your reflexes may be slow and your vision may be blurry. Do not sign any legally binding documents or make important decisions for 24 hours after receiving sedation. Medications:   Unless told otherwise, do not take any non-prescription medications (cold medicine, etc.) for 24 hours, as these medications in combination with sedation medication, can cause increased drowsiness. If you feel that you need over the counter medication, discuss with your physician. When Should I Call the Doctor? Vomiting more than twice. Extreme irritability or unusual changes in behavior. Trouble arousing. Inability to urinate. Trouble breathing - call 911. We would like to take this opportunity to thank you.  Because your confidence in your caregivers is very important to us, it is our commitment to always treat you and your family with courtesy and respect. Our goal is to always answer any questions or worries or concerns you may have about the care you received. If you have any suggestions for improvement or worries or concerns please do not hesitate to let us know. Call placed to MD, awaiting call back, RN Joanne/Physician/Patient/Nursing

## 2023-08-28 ENCOUNTER — NON-APPOINTMENT (OUTPATIENT)
Age: 88
End: 2023-08-28

## 2023-08-29 ENCOUNTER — LABORATORY RESULT (OUTPATIENT)
Age: 88
End: 2023-08-29

## 2023-08-29 ENCOUNTER — APPOINTMENT (OUTPATIENT)
Dept: HOME HEALTH SERVICES | Facility: HOME HEALTH | Age: 88
End: 2023-08-29

## 2023-08-29 VITALS
SYSTOLIC BLOOD PRESSURE: 160 MMHG | HEART RATE: 94 BPM | OXYGEN SATURATION: 95 % | RESPIRATION RATE: 18 BRPM | TEMPERATURE: 99.8 F | DIASTOLIC BLOOD PRESSURE: 80 MMHG

## 2023-08-29 RX ORDER — DOXYCYCLINE HYCLATE 100 MG/1
100 CAPSULE ORAL
Qty: 14 | Refills: 0 | Status: COMPLETED | COMMUNITY
Start: 2023-02-14 | End: 2023-08-29

## 2023-08-30 ENCOUNTER — NON-APPOINTMENT (OUTPATIENT)
Age: 88
End: 2023-08-30

## 2023-08-30 RX ORDER — SULFAMETHOXAZOLE AND TRIMETHOPRIM 800; 160 MG/1; MG/1
800-160 TABLET ORAL TWICE DAILY
Qty: 20 | Refills: 0 | Status: COMPLETED | COMMUNITY
Start: 2021-11-04 | End: 2023-09-09

## 2023-09-06 DIAGNOSIS — Z79.899 OTHER LONG TERM (CURRENT) DRUG THERAPY: ICD-10-CM

## 2023-09-12 LAB
ALBUMIN SERPL ELPH-MCNC: 3.7 G/DL
ALP BLD-CCNC: 71 U/L
ALT SERPL-CCNC: 11 U/L
ANION GAP SERPL CALC-SCNC: 11 MMOL/L
AST SERPL-CCNC: 17 U/L
BILIRUB SERPL-MCNC: 0.3 MG/DL
BUN SERPL-MCNC: 30 MG/DL
CALCIUM SERPL-MCNC: 8.9 MG/DL
CHLORIDE SERPL-SCNC: 101 MMOL/L
CHOLEST SERPL-MCNC: 146 MG/DL
CO2 SERPL-SCNC: 26 MMOL/L
CREAT SERPL-MCNC: 1.96 MG/DL
EGFR: 30 ML/MIN/1.73M2
GLUCOSE SERPL-MCNC: 233 MG/DL
HCT VFR BLD CALC: 36.6 %
HDLC SERPL-MCNC: 65 MG/DL
HGB BLD-MCNC: 12.1 G/DL
LDLC SERPL CALC-MCNC: 59 MG/DL
MCHC RBC-ENTMCNC: 29.9 PG
MCHC RBC-ENTMCNC: 33.1 GM/DL
MCV RBC AUTO: 90.4 FL
NONHDLC SERPL-MCNC: 81 MG/DL
PLATELET # BLD AUTO: 162 K/UL
POTASSIUM SERPL-SCNC: 4.6 MMOL/L
PROT SERPL-MCNC: 5.9 G/DL
RBC # BLD: 4.05 M/UL
RBC # FLD: 14.1 %
SODIUM SERPL-SCNC: 139 MMOL/L
TRIGL SERPL-MCNC: 124 MG/DL
WBC # FLD AUTO: 6.62 K/UL

## 2023-09-28 LAB
ANION GAP SERPL CALC-SCNC: 7 MMOL/L
BUN SERPL-MCNC: 25 MG/DL
CALCIUM SERPL-MCNC: 9 MG/DL
CHLORIDE SERPL-SCNC: 104 MMOL/L
CO2 SERPL-SCNC: 31 MMOL/L
CREAT SERPL-MCNC: 1.23 MG/DL
EGFR: 53 ML/MIN/1.73M2
GLUCOSE SERPL-MCNC: 256 MG/DL
POTASSIUM SERPL-SCNC: 4.8 MMOL/L
SODIUM SERPL-SCNC: 142 MMOL/L

## 2023-10-05 ENCOUNTER — APPOINTMENT (OUTPATIENT)
Dept: HOME HEALTH SERVICES | Facility: HOME HEALTH | Age: 88
End: 2023-10-05
Payer: MEDICARE

## 2023-10-05 VITALS
DIASTOLIC BLOOD PRESSURE: 60 MMHG | HEART RATE: 88 BPM | SYSTOLIC BLOOD PRESSURE: 110 MMHG | OXYGEN SATURATION: 95 % | RESPIRATION RATE: 18 BRPM | TEMPERATURE: 96.6 F

## 2023-10-05 PROCEDURE — 90686 IIV4 VACC NO PRSV 0.5 ML IM: CPT

## 2023-10-05 PROCEDURE — 99349 HOME/RES VST EST MOD MDM 40: CPT | Mod: 25

## 2023-10-05 PROCEDURE — G0008: CPT

## 2023-10-24 ENCOUNTER — NON-APPOINTMENT (OUTPATIENT)
Age: 88
End: 2023-10-24

## 2023-12-10 ENCOUNTER — NON-APPOINTMENT (OUTPATIENT)
Age: 88
End: 2023-12-10

## 2023-12-12 ENCOUNTER — APPOINTMENT (OUTPATIENT)
Dept: HOME HEALTH SERVICES | Facility: HOME HEALTH | Age: 88
End: 2023-12-12

## 2023-12-12 VITALS
OXYGEN SATURATION: 96 % | SYSTOLIC BLOOD PRESSURE: 138 MMHG | DIASTOLIC BLOOD PRESSURE: 70 MMHG | HEART RATE: 81 BPM | RESPIRATION RATE: 18 BRPM

## 2023-12-20 ENCOUNTER — APPOINTMENT (OUTPATIENT)
Dept: HOME HEALTH SERVICES | Facility: HOME HEALTH | Age: 88
End: 2023-12-20
Payer: MEDICARE

## 2023-12-20 ENCOUNTER — MED ADMIN CHARGE (OUTPATIENT)
Age: 88
End: 2023-12-20

## 2023-12-20 VITALS
DIASTOLIC BLOOD PRESSURE: 70 MMHG | HEART RATE: 81 BPM | SYSTOLIC BLOOD PRESSURE: 138 MMHG | RESPIRATION RATE: 17 BRPM | OXYGEN SATURATION: 95 % | TEMPERATURE: 98.2 F

## 2023-12-20 PROCEDURE — 91322 SARSCOV2 VAC 50 MCG/0.5ML IM: CPT

## 2023-12-20 PROCEDURE — 90480 ADMN SARSCOV2 VAC 1/ONLY CMP: CPT

## 2024-02-01 NOTE — PROVIDER CONTACT NOTE (OTHER) - SITUATION
Returned call to patient. Patient concerned about referral to Dr. Bird with Orthopedics. Patient states he had seen Dr. Bird in the past and they didn't help him. Patient is requesting to have Anayeli Ayala to call him back at her earliest convenience to discuss. Will have anayeli return call as soon as she is able. Patient verbalized understanding.    Patient Bp is 146/72 w/ a heart rate 39. physical therapy unable to work with pt at this time due to bradycardia

## 2024-02-07 ENCOUNTER — NON-APPOINTMENT (OUTPATIENT)
Age: 89
End: 2024-02-07

## 2024-02-07 ENCOUNTER — APPOINTMENT (OUTPATIENT)
Dept: HOME HEALTH SERVICES | Facility: HOME HEALTH | Age: 89
End: 2024-02-07

## 2024-02-07 ENCOUNTER — TRANSCRIPTION ENCOUNTER (OUTPATIENT)
Age: 89
End: 2024-02-07

## 2024-02-07 DIAGNOSIS — R41.0 DISORIENTATION, UNSPECIFIED: ICD-10-CM

## 2024-02-09 ENCOUNTER — APPOINTMENT (OUTPATIENT)
Dept: HOME HEALTH SERVICES | Facility: HOME HEALTH | Age: 89
End: 2024-02-09
Payer: MEDICARE

## 2024-02-09 ENCOUNTER — LABORATORY RESULT (OUTPATIENT)
Age: 89
End: 2024-02-09

## 2024-02-09 ENCOUNTER — TRANSCRIPTION ENCOUNTER (OUTPATIENT)
Age: 89
End: 2024-02-09

## 2024-02-09 VITALS
HEART RATE: 78 BPM | RESPIRATION RATE: 18 BRPM | SYSTOLIC BLOOD PRESSURE: 120 MMHG | OXYGEN SATURATION: 99 % | DIASTOLIC BLOOD PRESSURE: 80 MMHG | TEMPERATURE: 98 F

## 2024-02-09 PROCEDURE — 99349 HOME/RES VST EST MOD MDM 40: CPT

## 2024-02-09 NOTE — ASSESSMENT
[FreeTextEntry1] : Chronic hypertension (401.9) (I10)\par  -at goal, cardio recently restarted torsemide 10mg, BP (taken daily by aide) has improved\par  -f/u cardio ( pt would like to c.w)\par  \par  Atrial fibrillation, unspecified type (427.31) (I48.91)\par    on eliquis, follows with cardio Dr. Brian\par      fall precaution, r/b discussed\par  \par  Presence of cardiac pacemaker (V45.01) (Z95.0)\par    has pacemaker and extensive CAD s/p 5 stents c/b HTN which is now all under great\par      control as per daughter\par      f/u cardio\par      c/w BP management and statin\par  \par  Hematuria and Recurrent UTI in setting of BPH\par  -hospitalized 11/2021 with MICU and pressor stay, completed abx, daughter requests repeat UA and labwork for peace of mind, have ordered\par  -discussed extensively timed voids q3 hours, \par   in setting of enlarged prostate\par      has hx of recent urosepsis with ID consult needed\par      has info for [facility] for uro followup, daughter to book and understands importance and alliance with\par   GOC, dicussed recent home studies in detail, needs to see uro\par      asx today for urine infection, signs reviewed and encouraged to call if any concerns\par  \par  History of coronary artery stent placement (V45.82) (Z95.5) and Hyperlipidemia (272.4) (E78.5)\par  c/w statin and eliquis\par  \par  Unsteady gait (781.2) (R26.81)\par    fall preacution, has 24/7 live in aide\par      PT options discussed, still declines\par  \par  Pedal edema (782.3) (R60.0)\par    chronic stable, recent exaccerbation, c.w torsemide\par

## 2024-02-09 NOTE — HISTORY OF PRESENT ILLNESS
[Patient is stable] : patient is stable [Education provided] : education provided [Patient] : patient [Family Member] : family member [Formal Caregiver] : formal caregiver [FreeTextEntry1] : unsteady gait  [FreeTextEntry2] : PMH significant for Afib (on eliquis), CAD (s/p 5 stent, s/p PM), Htn, HLD, CHF, BPH, Vertigo, hard of hearing (advanced despite hearing aid), chronic constipation, C1 stenosis, right shoulder arthropathy.    Patient being seen today for follow up. Pt seen alert/awake sitting in chair, able to make needs known. History obtained from: Aide and patient who is [place] and uses hearing aide.   Interval Events: - recent CP for increased confusion, Pt back to baseline, vitals stable - Renal, UA labs - pending - offer no acute complaints   Subjective:  -Appetite/weight: good appetite, eats 3 meals a day, self feeds,  -Gait/falls: weight unstable, no falls, patient semi-dependent for ADLs and iADLs -Pain: back pain intermittent. -Sleep: sleeps well. -BMs: regular. -Urine: no issue. -Skin: no lesions, no sores. -DME: walker. -Mood/memory: Some mild short term Memory loss. Good mood. -Sensory deficits: [place] despite hearing aids, wears glasses   Medical Issues:   - Afib: cont Eliquis 2.5 mg BID. - BPH: continue Finasteride 5 mg 1 tab a day, Tamsulosin 0.4 mg 1 cap a day. - Chronic constipation: Senna 8.6 mg 2 tab at bedtime PRN, Miralax 17 g BID PRN. - HTN: Torsemide 20 mg 1 tab daily. -CAD: aspirin 81 mg 1 tab daily. HLD: Continue Simvastatin 20 mg 1 tab at bedtime.   Support at home: aide, is , daughter is pharmacist DME: walker

## 2024-02-09 NOTE — PHYSICAL EXAM
[No Acute Distress] : no acute distress [Well Nourished] : well nourished [Well Developed] : well developed [Normal Voice/Communication] : normal voice communication [Normal Sclera/Conjunctiva] : normal sclera/conjunctiva [EOMI] : extra ocular movement intact [Normal Outer Ear/Nose] : the ears and nose were normal in appearance [Normal Oropharynx] : the oropharynx was normal [No JVD] : no jugular venous distention [Supple] : the neck was supple [No Respiratory Distress] : no respiratory distress [Clear to Auscultation] : lungs were clear to auscultation bilaterally [No Accessory Muscle Use] : no accessory muscle use [Normal S1, S2] : normal S1 and S2 [No Murmurs] : no murmurs heard [Breast Exam Declined] : patient declined to have breast exam done [Normal Bowel Sounds] : normal bowel sounds [Non Tender] : non-tender [Soft] : abdomen soft [Not Distended] : not distended [Patient Refused] : rectal exam was refused by the patient [Normal Post Cervical Nodes] : no posterior cervical lymphadenopathy [Normal Anterior Cervical Nodes] : no anterior cervical lymphadenopathy [No CVA Tenderness] : no ~M costovertebral angle tenderness [No Spinal Tenderness] : no spinal tenderness [Normal Gait] : normal gait [Normal Strength/Tone] : muscle strength and tone were normal [Oriented x3] : oriented to person, place, and time [Normal Affect] : the affect was normal [de-identified] : able to make needs known, enthusiastic [de-identified] : hard of hearing, [place] hearing aides [de-identified] : chronic pedal edema at baseline +1; irreg rate (a fib) [de-identified] : Abscess to upper back in the middle

## 2024-02-09 NOTE — REASON FOR VISIT
[Follow-Up] : a follow-up visit [Formal Caregiver] : formal caregiver [Pre-Visit Preparation] : pre-visit preparation was done [FreeTextEntry2] : chart review

## 2024-02-12 ENCOUNTER — TRANSCRIPTION ENCOUNTER (OUTPATIENT)
Age: 89
End: 2024-02-12

## 2024-02-12 ENCOUNTER — NON-APPOINTMENT (OUTPATIENT)
Age: 89
End: 2024-02-12

## 2024-02-12 DIAGNOSIS — R09.89 OTHER SPECIFIED SYMPTOMS AND SIGNS INVOLVING THE CIRCULATORY AND RESPIRATORY SYSTEMS: ICD-10-CM

## 2024-02-12 LAB
ALBUMIN SERPL ELPH-MCNC: 3.3 G/DL
ANION GAP SERPL CALC-SCNC: 10 MMOL/L
APPEARANCE: CLEAR
BILIRUBIN URINE: NEGATIVE
BLOOD URINE: NEGATIVE
BUN SERPL-MCNC: 23 MG/DL
CALCIUM SERPL-MCNC: 9 MG/DL
CHLORIDE SERPL-SCNC: 103 MMOL/L
CO2 SERPL-SCNC: 29 MMOL/L
COLOR: YELLOW
CREAT SERPL-MCNC: 1.13 MG/DL
EGFR: 59 ML/MIN/1.73M2
GLUCOSE QUALITATIVE U: NEGATIVE MG/DL
GLUCOSE SERPL-MCNC: 152 MG/DL
KETONES URINE: NEGATIVE MG/DL
LEUKOCYTE ESTERASE URINE: ABNORMAL
NITRITE URINE: NEGATIVE
PH URINE: 6.5
PHOSPHATE SERPL-MCNC: 2.9 MG/DL
POTASSIUM SERPL-SCNC: 4.5 MMOL/L
PROTEIN URINE: 30 MG/DL
SODIUM SERPL-SCNC: 143 MMOL/L
SPECIFIC GRAVITY URINE: 1.02
UROBILINOGEN URINE: 0.2 MG/DL

## 2024-02-13 ENCOUNTER — NON-APPOINTMENT (OUTPATIENT)
Age: 89
End: 2024-02-13

## 2024-02-13 ENCOUNTER — TRANSCRIPTION ENCOUNTER (OUTPATIENT)
Age: 89
End: 2024-02-13

## 2024-02-14 ENCOUNTER — TRANSCRIPTION ENCOUNTER (OUTPATIENT)
Age: 89
End: 2024-02-14

## 2024-02-22 ENCOUNTER — NON-APPOINTMENT (OUTPATIENT)
Age: 89
End: 2024-02-22

## 2024-02-22 DIAGNOSIS — R39.9 UNSPECIFIED SYMPTOMS AND SIGNS INVOLVING THE GENITOURINARY SYSTEM: ICD-10-CM

## 2024-02-22 DIAGNOSIS — R41.0 DISORIENTATION, UNSPECIFIED: ICD-10-CM

## 2024-02-24 ENCOUNTER — TRANSCRIPTION ENCOUNTER (OUTPATIENT)
Age: 89
End: 2024-02-24

## 2024-02-25 ENCOUNTER — NON-APPOINTMENT (OUTPATIENT)
Age: 89
End: 2024-02-25

## 2024-02-25 LAB
ALBUMIN SERPL ELPH-MCNC: 3.1 G/DL
ANION GAP SERPL CALC-SCNC: 10 MMOL/L
APPEARANCE: CLEAR
BACTERIA: NEGATIVE /HPF
BASOPHILS # BLD AUTO: 0.07 K/UL
BASOPHILS NFR BLD AUTO: 0.8 %
BILIRUBIN URINE: NEGATIVE
BLOOD URINE: NEGATIVE
BUN SERPL-MCNC: 31 MG/DL
CALCIUM SERPL-MCNC: 8.6 MG/DL
CAST: 2 /LPF
CHLORIDE SERPL-SCNC: 99 MMOL/L
CO2 SERPL-SCNC: 28 MMOL/L
COLOR: YELLOW
CREAT SERPL-MCNC: 1.51 MG/DL
EGFR: 41 ML/MIN/1.73M2
EOSINOPHIL # BLD AUTO: 0.42 K/UL
EOSINOPHIL NFR BLD AUTO: 4.9 %
EPITHELIAL CELLS: 0 /HPF
GLUCOSE QUALITATIVE U: NEGATIVE MG/DL
GLUCOSE SERPL-MCNC: 165 MG/DL
HCT VFR BLD CALC: 31.4 %
HGB BLD-MCNC: 10.8 G/DL
IMM GRANULOCYTES NFR BLD AUTO: 0.4 %
KETONES URINE: NEGATIVE MG/DL
LEUKOCYTE ESTERASE URINE: NEGATIVE
LYMPHOCYTES # BLD AUTO: 1.27 K/UL
LYMPHOCYTES NFR BLD AUTO: 14.9 %
MAN DIFF?: NORMAL
MCHC RBC-ENTMCNC: 29.7 PG
MCHC RBC-ENTMCNC: 34.4 GM/DL
MCV RBC AUTO: 86.3 FL
MICROSCOPIC-UA: NORMAL
MONOCYTES # BLD AUTO: 0.58 K/UL
MONOCYTES NFR BLD AUTO: 6.8 %
NEUTROPHILS # BLD AUTO: 6.14 K/UL
NEUTROPHILS NFR BLD AUTO: 72.2 %
NITRITE URINE: NEGATIVE
PH URINE: 6.5
PHOSPHATE SERPL-MCNC: 2.9 MG/DL
PLATELET # BLD AUTO: 289 K/UL
POTASSIUM SERPL-SCNC: 4.8 MMOL/L
PROTEIN URINE: 30 MG/DL
RBC # BLD: 3.64 M/UL
RBC # FLD: 13.6 %
RED BLOOD CELLS URINE: 0 /HPF
SODIUM SERPL-SCNC: 137 MMOL/L
SPECIFIC GRAVITY URINE: 1.02
UROBILINOGEN URINE: 1 MG/DL
WBC # FLD AUTO: 8.51 K/UL
WHITE BLOOD CELLS URINE: 0 /HPF

## 2024-02-26 ENCOUNTER — APPOINTMENT (OUTPATIENT)
Dept: HOME HEALTH SERVICES | Facility: HOME HEALTH | Age: 89
End: 2024-02-26

## 2024-02-26 VITALS
SYSTOLIC BLOOD PRESSURE: 132 MMHG | OXYGEN SATURATION: 95 % | HEART RATE: 61 BPM | DIASTOLIC BLOOD PRESSURE: 60 MMHG | TEMPERATURE: 98.5 F | RESPIRATION RATE: 17 BRPM

## 2024-02-26 LAB — BACTERIA UR CULT: NORMAL

## 2024-02-26 RX ORDER — CEFDINIR 300 MG/1
300 CAPSULE ORAL
Qty: 20 | Refills: 0 | Status: COMPLETED | COMMUNITY
Start: 2024-02-24 | End: 2024-02-26

## 2024-02-26 RX ORDER — SULFAMETHOXAZOLE AND TRIMETHOPRIM 800; 160 MG/1; MG/1
800-160 TABLET ORAL TWICE DAILY
Qty: 14 | Refills: 0 | Status: COMPLETED | COMMUNITY
Start: 2024-02-14 | End: 2024-02-26

## 2024-03-21 ENCOUNTER — NON-APPOINTMENT (OUTPATIENT)
Age: 89
End: 2024-03-21

## 2024-03-21 ENCOUNTER — TRANSCRIPTION ENCOUNTER (OUTPATIENT)
Age: 89
End: 2024-03-21

## 2024-03-22 ENCOUNTER — LABORATORY RESULT (OUTPATIENT)
Age: 89
End: 2024-03-22

## 2024-03-25 ENCOUNTER — LABORATORY RESULT (OUTPATIENT)
Age: 89
End: 2024-03-25

## 2024-03-25 ENCOUNTER — NON-APPOINTMENT (OUTPATIENT)
Age: 89
End: 2024-03-25

## 2024-03-25 ENCOUNTER — APPOINTMENT (OUTPATIENT)
Dept: HOME HEALTH SERVICES | Facility: HOME HEALTH | Age: 89
End: 2024-03-25

## 2024-03-25 VITALS
DIASTOLIC BLOOD PRESSURE: 60 MMHG | TEMPERATURE: 97.5 F | RESPIRATION RATE: 16 BRPM | OXYGEN SATURATION: 97 % | HEART RATE: 73 BPM | SYSTOLIC BLOOD PRESSURE: 122 MMHG

## 2024-03-25 LAB
ALBUMIN SERPL ELPH-MCNC: 3.4 G/DL
ALP BLD-CCNC: 74 U/L
ALT SERPL-CCNC: 10 U/L
ANION GAP SERPL CALC-SCNC: 9 MMOL/L
AST SERPL-CCNC: 15 U/L
BASOPHILS # BLD AUTO: 0.1 K/UL
BASOPHILS NFR BLD AUTO: 1.3 %
BILIRUB SERPL-MCNC: 0.2 MG/DL
BUN SERPL-MCNC: 26 MG/DL
CALCIUM SERPL-MCNC: 8.9 MG/DL
CHLORIDE SERPL-SCNC: 102 MMOL/L
CO2 SERPL-SCNC: 28 MMOL/L
CREAT SERPL-MCNC: 1.16 MG/DL
EGFR: 57 ML/MIN/1.73M2
EOSINOPHIL # BLD AUTO: 0.4 K/UL
EOSINOPHIL NFR BLD AUTO: 5.1 %
GLUCOSE SERPL-MCNC: 164 MG/DL
HCT VFR BLD CALC: 36 %
HGB BLD-MCNC: 11.7 G/DL
IMM GRANULOCYTES NFR BLD AUTO: 0.4 %
LYMPHOCYTES # BLD AUTO: 1.38 K/UL
LYMPHOCYTES NFR BLD AUTO: 17.4 %
MAN DIFF?: NORMAL
MCHC RBC-ENTMCNC: 28.6 PG
MCHC RBC-ENTMCNC: 32.5 GM/DL
MCV RBC AUTO: 88 FL
MONOCYTES # BLD AUTO: 0.57 K/UL
MONOCYTES NFR BLD AUTO: 7.2 %
NEUTROPHILS # BLD AUTO: 5.44 K/UL
NEUTROPHILS NFR BLD AUTO: 68.6 %
PLATELET # BLD AUTO: 191 K/UL
POTASSIUM SERPL-SCNC: 4.4 MMOL/L
PROT SERPL-MCNC: 5.5 G/DL
RBC # BLD: 4.09 M/UL
RBC # FLD: 14.4 %
SODIUM SERPL-SCNC: 139 MMOL/L
WBC # FLD AUTO: 7.92 K/UL

## 2024-04-05 ENCOUNTER — APPOINTMENT (OUTPATIENT)
Dept: HOME HEALTH SERVICES | Facility: HOME HEALTH | Age: 89
End: 2024-04-05

## 2024-04-05 VITALS
SYSTOLIC BLOOD PRESSURE: 150 MMHG | RESPIRATION RATE: 17 BRPM | DIASTOLIC BLOOD PRESSURE: 60 MMHG | OXYGEN SATURATION: 96 % | HEART RATE: 64 BPM | TEMPERATURE: 97.8 F

## 2024-04-07 ENCOUNTER — NON-APPOINTMENT (OUTPATIENT)
Age: 89
End: 2024-04-07

## 2024-04-10 ENCOUNTER — TRANSCRIPTION ENCOUNTER (OUTPATIENT)
Age: 89
End: 2024-04-10

## 2024-04-11 ENCOUNTER — NON-APPOINTMENT (OUTPATIENT)
Age: 89
End: 2024-04-11

## 2024-04-12 ENCOUNTER — NON-APPOINTMENT (OUTPATIENT)
Age: 89
End: 2024-04-12

## 2024-04-26 ENCOUNTER — APPOINTMENT (OUTPATIENT)
Dept: HOME HEALTH SERVICES | Facility: HOME HEALTH | Age: 89
End: 2024-04-26
Payer: MEDICARE

## 2024-04-26 VITALS
TEMPERATURE: 99.1 F | RESPIRATION RATE: 16 BRPM | OXYGEN SATURATION: 97 % | DIASTOLIC BLOOD PRESSURE: 50 MMHG | SYSTOLIC BLOOD PRESSURE: 126 MMHG | HEART RATE: 77 BPM

## 2024-04-26 PROCEDURE — 99349 HOME/RES VST EST MOD MDM 40: CPT

## 2024-05-09 ENCOUNTER — TRANSCRIPTION ENCOUNTER (OUTPATIENT)
Age: 89
End: 2024-05-09

## 2024-05-09 ENCOUNTER — RX RENEWAL (OUTPATIENT)
Age: 89
End: 2024-05-09

## 2024-05-09 ENCOUNTER — APPOINTMENT (OUTPATIENT)
Dept: AFTER HOURS CARE | Facility: EMERGENCY ROOM | Age: 89
End: 2024-05-09
Payer: MEDICARE

## 2024-05-10 ENCOUNTER — NON-APPOINTMENT (OUTPATIENT)
Age: 89
End: 2024-05-10

## 2024-05-10 ENCOUNTER — TRANSCRIPTION ENCOUNTER (OUTPATIENT)
Age: 89
End: 2024-05-10

## 2024-05-10 ENCOUNTER — APPOINTMENT (OUTPATIENT)
Dept: HOME HEALTH SERVICES | Facility: HOME HEALTH | Age: 89
End: 2024-05-10

## 2024-05-10 ENCOUNTER — EMERGENCY (EMERGENCY)
Facility: HOSPITAL | Age: 89
LOS: 1 days | Discharge: HOME | End: 2024-05-10
Attending: STUDENT IN AN ORGANIZED HEALTH CARE EDUCATION/TRAINING PROGRAM
Payer: MEDICARE

## 2024-05-10 VITALS
TEMPERATURE: 98 F | SYSTOLIC BLOOD PRESSURE: 143 MMHG | HEART RATE: 85 BPM | OXYGEN SATURATION: 95 % | HEIGHT: 67 IN | WEIGHT: 169.98 LBS | RESPIRATION RATE: 19 BRPM | DIASTOLIC BLOOD PRESSURE: 73 MMHG

## 2024-05-10 VITALS
RESPIRATION RATE: 16 BRPM | OXYGEN SATURATION: 98 % | DIASTOLIC BLOOD PRESSURE: 85 MMHG | HEART RATE: 84 BPM | SYSTOLIC BLOOD PRESSURE: 145 MMHG | TEMPERATURE: 98 F

## 2024-05-10 DIAGNOSIS — Z98.890 OTHER SPECIFIED POSTPROCEDURAL STATES: Chronic | ICD-10-CM

## 2024-05-10 PROCEDURE — 99205 OFFICE O/P NEW HI 60 MIN: CPT | Mod: NC

## 2024-05-10 PROCEDURE — 99284 EMERGENCY DEPT VISIT MOD MDM: CPT | Mod: 25

## 2024-05-10 PROCEDURE — 70450 CT HEAD/BRAIN W/O DYE: CPT | Mod: 26,MC

## 2024-05-10 PROCEDURE — 70450 CT HEAD/BRAIN W/O DYE: CPT | Mod: MC

## 2024-05-10 PROCEDURE — 99284 EMERGENCY DEPT VISIT MOD MDM: CPT

## 2024-05-10 RX ORDER — ASPIRIN ENTERIC COATED TABLETS 81 MG 81 MG/1
81 TABLET, DELAYED RELEASE ORAL
Refills: 0 | Status: ACTIVE | COMMUNITY
Start: 2021-08-13

## 2024-05-10 RX ORDER — LORATADINE 5 MG
17 TABLET,CHEWABLE ORAL
Qty: 1 | Refills: 0 | Status: ACTIVE | COMMUNITY
Start: 2021-11-14

## 2024-05-10 RX ORDER — TAMSULOSIN HYDROCHLORIDE 0.4 MG/1
0.4 CAPSULE ORAL
Qty: 90 | Refills: 2 | Status: ACTIVE | COMMUNITY
Start: 2021-08-13

## 2024-05-10 RX ORDER — L. ACIDOPHILUS/L.BULGARICUS 1MM CELL
TABLET ORAL
Qty: 30 | Refills: 0 | Status: ACTIVE | COMMUNITY
Start: 2021-08-13

## 2024-05-10 RX ORDER — ACETAMINOPHEN 325 MG/1
325 TABLET ORAL
Qty: 1 | Refills: 6 | Status: ACTIVE | COMMUNITY
Start: 2021-11-14

## 2024-05-10 RX ORDER — CYANOCOBALAMIN/FOLIC AC/VIT B6 1-2.5-25MG
2.5-25-1 TABLET ORAL DAILY
Refills: 0 | Status: ACTIVE | COMMUNITY
Start: 2021-08-13

## 2024-05-10 RX ORDER — METRONIDAZOLE 10 MG/G
1 GEL TOPICAL TWICE DAILY
Qty: 1 | Refills: 0 | Status: ACTIVE | COMMUNITY
Start: 2023-08-29

## 2024-05-10 RX ORDER — MULTIVITAMIN
TABLET ORAL DAILY
Qty: 90 | Refills: 2 | Status: ACTIVE | COMMUNITY
Start: 2021-08-13

## 2024-05-10 RX ORDER — FINASTERIDE 5 MG/1
5 TABLET, FILM COATED ORAL DAILY
Qty: 90 | Refills: 3 | Status: ACTIVE | COMMUNITY
Start: 2021-08-13

## 2024-05-10 RX ORDER — SIMVASTATIN 20 MG/1
20 TABLET, FILM COATED ORAL
Qty: 90 | Refills: 0 | Status: ACTIVE | COMMUNITY
Start: 2021-08-13

## 2024-05-10 RX ORDER — APIXABAN 2.5 MG/1
2.5 TABLET, FILM COATED ORAL
Qty: 180 | Refills: 0 | Status: ACTIVE | COMMUNITY
Start: 2021-08-13

## 2024-05-10 RX ORDER — TORSEMIDE 20 MG/1
20 TABLET ORAL DAILY
Refills: 0 | Status: ACTIVE | COMMUNITY
Start: 2021-11-17

## 2024-05-10 RX ORDER — SENNOSIDES 8.6 MG
8.6 TABLET ORAL
Qty: 90 | Refills: 2 | Status: ACTIVE | COMMUNITY
Start: 2021-11-14

## 2024-05-10 NOTE — ED CLERICAL - NS ED CLERK NOTE PRE-ARRIVAL INFORMATION; ADDITIONAL PRE-ARRIVAL INFORMATION

## 2024-05-10 NOTE — HISTORY OF PRESENT ILLNESS
[Home] : at home, [unfilled] , at the time of the visit. [Other Location: e.g. Home (Enter Location, City,State)___] : at [unfilled] [Family Member] : family member [FreeTextEntry3] : Catherine Steinberg [FreeTextEntry8] : This is an elderly patient with a history of A-fib on Eliquis, dementia, recurrent UTIs, frequent falls and unsteady gait, hypertension on torsemide, CAD on aspirin, dyslipidemia.  Typically ambulates with a walker.  Stated to have been ambulating in his home and had a witnessed fall in which he was trying to get out of bed, fell, hit his head on the bed frame, and remained on the floor awaiting EMS arrival.  Patient had some bleeding to the crown of his head which had ceased.  History obtained from patient's daughter, Catherine, who is a pharmacist.  Overall normal state of health today.  Normal behavior since the trauma.  No slurred speech, no motor weakness, no new instability, no chest pain or abdominal pain noted.  No vomiting.  No other obvious injuries patient himself is confused with questioning and does not provide history; his behavior stated to be at his baseline. Patient is full code with orders to hospitalize if needed No new medications

## 2024-05-10 NOTE — PHYSICAL EXAM
[de-identified] : CP eval: AAO x 0, pleasant, NAD, speaking inappropriate words and usually singing during evaluation Approximately 1 cm laceration to right side of occipital parietal scalp, not bleeding, no stated step-offs around.  No tenderness to palpation to C-spine.  Grossly EOMI. Midline trachea Clear auscultation bilaterally, no chest wall tenderness or ecchymosis throughout Irregular irregular No abdominal distention or obvious tenderness to palpation.  No abdominal wall bruising.   Grossly moving all extremities No lacerations except for noted above

## 2024-05-10 NOTE — ED PROVIDER NOTE - PHYSICAL EXAMINATION
GENERAL: Alert. No acute distress.   EYES: EOMI grossly normal. Anicteric.  small less than 1 cm laceration on right side of posterior head, with hematoma or surrounding 2 cm x 2 cm, no active bleeding  HENT: Moist mucous membranes.   RESP: No conversation dyspnea, no resp distress, CTAB   CARDIOVASCULAR: RRR, no m/g/r  ABDOMEN: soft, non distended, nttp  MSK: ROM grossly normal in all 4 extremities. No defomities  SKIN: warm and dry  NEUROLOGIC: Alert and oriented x1,   Unable to complete neuroexam as patient is dementia  PSYCHIATRIC: Cooperative. Appropriate mood and affect

## 2024-05-10 NOTE — ED PROVIDER NOTE - NSFOLLOWUPINSTRUCTIONS_ED_ALL_ED_FT
You come into the emergency department for fall.  Head CT showed that you had no intracranial hemorrhage.  Your wound does not require laceration repair.  You are ready for discharge.    If you have any severe increase in pain, fever, uncontrollable nausea or vomiting, or inability to tolerate eating and drinking you need to immediately return to the emergency room.

## 2024-05-10 NOTE — ED PROVIDER NOTE - ATTENDING CONTRIBUTION TO CARE
Attending (Paulino Perry D.O.):  I have personally seen and examined this patient. I have performed a substantive portion of the visit including all aspects of the medical decision making. Resident, fellow, student, and/or ACP note reviewed. I agree on the plan of care except where noted.    elderly male on AC here s/p mechanical fall, sustained head wound with small amount of bleeding. No change in mentation, no nausea/vomiting/vision changes/deformity/gait abnormality/other neuro deficit.    Hemodynam stable, abcs intact, NAD. awake, alert. scalp with scant ooze from scalp abrasion, no crepitations, no skull defect palpable. PERRL 4mm, eomi. clear lungs, irreg rate and rhythm but not tachycardic, benign abd, no peritoneal signs, neurovasc intact, soft compartments.    hx and exam warrant eval for ich. No spinal ttp to suggestive spinal injury. No neuro deficit. Plan for CTH.

## 2024-05-10 NOTE — ED PROVIDER NOTE - CLINICAL SUMMARY MEDICAL DECISION MAKING FREE TEXT BOX
89-year-old male with past medical history of A-fib on Eliquis, came here for mechanical fall with head strike.  Will head CT to ensure no intracranial hemorrhage, will washout wound.  Laceration repair as needed.

## 2024-05-10 NOTE — ED PROVIDER NOTE - PATIENT PORTAL LINK FT
You can access the FollowMyHealth Patient Portal offered by [facility] by registering at the following website: [email or web address]. By joining [employer]’s FollowMyHealth portal, you will also be able to view your health information using other applications (apps) compatible with our system.

## 2024-05-10 NOTE — ED ADULT NURSE NOTE - OBJECTIVE STATEMENT
pt is 89y M, pmhx afib on eliquis, dementia A&Ox1 person at baseline, BIBEMS for mechanical fall witnessed with headstrike, no LOC per son at bedside, pt fell while walking with cane, pt fell backwards, small 1Cm lack on occipital area, pt baseline mental status, denies any pain, pt ambulates with cane, pt updated on plan of care, bed at lowest position, siderails up

## 2024-05-10 NOTE — ED PROVIDER NOTE - PROGRESS NOTE DETAILS
Nelsy Fiore) [facility] PGY-2: Head CT showed no intracranial hemorrhage.  Washout wound, no gaping.  No active bleeding.  Will dress wound before discharge, does not require laceration repair.

## 2024-05-10 NOTE — ED PROVIDER NOTE - OBJECTIVE STATEMENT
89-year-old male with past medical history of A-fib on Eliquis, here for mechanical fall earlier tonight.  Per daughter, fall was witnessed.  patient has been more agitated during nighttime after his recent UTI.  Tonight, patient woke up around 11 PM, aide went into check on him.  Patient like all his walker, fell backwards, hitting the back of his head on the metal bed frame.  No loss of consciousness, currently mentation at baseline.

## 2024-05-27 ENCOUNTER — NON-APPOINTMENT (OUTPATIENT)
Age: 89
End: 2024-05-27

## 2024-06-10 ENCOUNTER — TRANSCRIPTION ENCOUNTER (OUTPATIENT)
Age: 89
End: 2024-06-10

## 2024-06-10 ENCOUNTER — NON-APPOINTMENT (OUTPATIENT)
Age: 89
End: 2024-06-10

## 2024-06-22 ENCOUNTER — RX RENEWAL (OUTPATIENT)
Age: 89
End: 2024-06-22

## 2024-06-22 RX ORDER — QUETIAPINE FUMARATE 25 MG/1
25 TABLET ORAL
Qty: 30 | Refills: 3 | Status: ACTIVE | COMMUNITY
Start: 2024-04-08 | End: 1900-01-01

## 2024-06-24 NOTE — HISTORY OF PRESENT ILLNESS
[Patient] : patient [Family Member] : family member [Formal Caregiver] : formal caregiver [FreeTextEntry1] : unsteady gait  [FreeTextEntry2] : Pt is a 89 yo man with PMH significant for Afib (on eliquis), CAD (s/p 5 stent, s/p PM), Htn, HLD, CHF, BPH, Vertigo, hard of hearing (advanced despite hearing aid), chronic constipation, C1 stenosis, right shoulder arthropathy, who is being seen today for follow up of chronic medical conditions.   Pt seen alert/awake sitting in chair, able to make needs known. History obtained from: Aide and patient who is [place] and uses hearing aide. daughter was called as well.   Subjective:  -Appetite/weight: good appetite, eats 3 meals a day, self feeds,  drinks plenty of fluids. -Gait/falls: weight unstable, no falls, patient semi-dependent for ADLs and iADLs -Pain: back pain intermittent. -Sleep: sleeps well; seroquel helps too. -BMs: regular, no BRBPR or melena; usually constipated but now regular x 3 weeks. -Urine: normal; no hematuria/dysuria. -Skin: no lesions, no sores. -DME: walker.  walks from bedroom to living room with walker; can transfer with walker in front of him.  will lift up and down the walker for exercise. -Mood/memory: Pt with dementia: Some mild short term Memory loss. Good mood.  Sometimes asks for  wife, usually in the evening.  Sometimes is confused "i have to go to work" "I have to go pay the staff"  also happens in the evening. has episodes of agitation at evening/night. -Sensory deficits: [place] despite hearing aids, wears glasses -Communication: good talker. -Health Maintenance:   -Hospitalizations in the past year:    Medical Issues:   re: Afib: taking  Eliquis 2.5 mg BID; no chest pain/palpitaitons. re: BPH: doing well; taking continue Finasteride 5 mg 1 tab a day, Tamsulosin 0.4 mg 1 cap a day.  no dysuria or other urinary sx's re: Chronic constipation: controlled with Senna 8.6 mg 2 tab at bedtime PRN, Miralax 17 g BID PRN. re: HTN: no issues with elevated BP and chest pain; taking Torsemide 20 mg 1 tab daily. re: CAD: aspirin 81 mg 1 tab daily. re: HLD: Continue Simvastatin 20 mg 1 tab at bedtime.  Medication compliance: patient states takes regularly, able to give self medications with prompting from aide, who uses his pharmacy pill pack. Medication reviewed and updated

## 2024-06-24 NOTE — HEALTH RISK ASSESSMENT
[HRA Reviewed] : Health risk assessment reviewed [Independent] : feeding [Some assistance needed] : walking [Full assistance needed] : managing finances [No falls in past year] : Patient reported no falls in the past year [Yes] : The patient does have visual impairment [TimeGetUpGo] : 0 [de-identified] : needs assistance with device

## 2024-06-24 NOTE — PHYSICAL EXAM
[No Acute Distress] : no acute distress [Normal Sclera/Conjunctiva] : normal sclera/conjunctiva [EOMI] : extra ocular movement intact [Normal Outer Ear/Nose] : the ears and nose were normal in appearance [No JVD] : no jugular venous distention [No Respiratory Distress] : no respiratory distress [Clear to Auscultation] : lungs were clear to auscultation bilaterally [No Accessory Muscle Use] : no accessory muscle use [Normal Rate] : heart rate was normal  [Normal S1, S2] : normal S1 and S2 [No Murmurs] : no murmurs heard [No Edema] : there was no peripheral edema [Normal Bowel Sounds] : normal bowel sounds [Non Tender] : non-tender [Soft] : abdomen soft [Not Distended] : not distended [Normal Post Cervical Nodes] : no posterior cervical lymphadenopathy [Normal Anterior Cervical Nodes] : no anterior cervical lymphadenopathy [No CVA Tenderness] : no ~M costovertebral angle tenderness [No Spinal Tenderness] : no spinal tenderness [Normal Gait] : normal gait [Normal Strength/Tone] : muscle strength and tone were normal [No Rash] : no rash [Oriented x3] : oriented to person, place, and time [Normal Affect] : the affect was normal [de-identified] : sitting in chair [de-identified] : irregularly irregular rhythm (chronic afib)

## 2024-06-25 ENCOUNTER — NON-APPOINTMENT (OUTPATIENT)
Age: 89
End: 2024-06-25

## 2024-06-27 ENCOUNTER — LABORATORY RESULT (OUTPATIENT)
Age: 89
End: 2024-06-27

## 2024-06-28 ENCOUNTER — APPOINTMENT (OUTPATIENT)
Dept: HOME HEALTH SERVICES | Facility: HOME HEALTH | Age: 89
End: 2024-06-28

## 2024-06-28 VITALS
HEART RATE: 79 BPM | OXYGEN SATURATION: 94 % | TEMPERATURE: 98.2 F | DIASTOLIC BLOOD PRESSURE: 55 MMHG | RESPIRATION RATE: 16 BRPM | SYSTOLIC BLOOD PRESSURE: 110 MMHG

## 2024-06-28 DIAGNOSIS — I25.10 ATHEROSCLEROTIC HEART DISEASE OF NATIVE CORONARY ARTERY W/OUT ANGINA PECTORIS: ICD-10-CM

## 2024-06-28 DIAGNOSIS — N40.0 BENIGN PROSTATIC HYPERPLASIA WITHOUT LOWER URINARY TRACT SYMPMS: ICD-10-CM

## 2024-06-28 DIAGNOSIS — E78.5 HYPERLIPIDEMIA, UNSPECIFIED: ICD-10-CM

## 2024-06-28 DIAGNOSIS — I48.91 UNSPECIFIED ATRIAL FIBRILLATION: ICD-10-CM

## 2024-06-28 DIAGNOSIS — F03.918 UNSPECIFIED DEMENTIA, UNSPECIFIED SEVERITY, WITH OTHER BEHAVIORAL DISTURBANCE: ICD-10-CM

## 2024-06-28 DIAGNOSIS — K59.09 OTHER CONSTIPATION: ICD-10-CM

## 2024-06-28 DIAGNOSIS — I10 ESSENTIAL (PRIMARY) HYPERTENSION: ICD-10-CM

## 2024-06-28 DIAGNOSIS — M25.431 EFFUSION, RIGHT WRIST: ICD-10-CM

## 2024-06-28 LAB
APPEARANCE: CLEAR
BILIRUBIN URINE: NEGATIVE
BLOOD URINE: NEGATIVE
COLOR: YELLOW
GLUCOSE QUALITATIVE U: NEGATIVE MG/DL
HCT VFR BLD CALC: 32.3 %
HGB BLD-MCNC: 10.8 G/DL
KETONES URINE: NEGATIVE MG/DL
LEUKOCYTE ESTERASE URINE: ABNORMAL
MCHC RBC-ENTMCNC: 30.8 PG
MCHC RBC-ENTMCNC: 33.4 GM/DL
MCV RBC AUTO: 92 FL
NITRITE URINE: NEGATIVE
PH URINE: 7
PLATELET # BLD AUTO: 146 K/UL
PROTEIN URINE: NEGATIVE MG/DL
RBC # BLD: 3.51 M/UL
RBC # FLD: 14 %
SPECIFIC GRAVITY URINE: 1.01
UROBILINOGEN URINE: 1 MG/DL
WBC # FLD AUTO: 6.06 K/UL

## 2024-06-28 PROCEDURE — 99348 HOME/RES VST EST LOW MDM 30: CPT

## 2024-07-01 LAB — BACTERIA UR CULT: NORMAL

## 2024-07-29 ENCOUNTER — NON-APPOINTMENT (OUTPATIENT)
Age: 89
End: 2024-07-29

## 2024-07-30 ENCOUNTER — APPOINTMENT (OUTPATIENT)
Dept: HOME HEALTH SERVICES | Facility: HOME HEALTH | Age: 89
End: 2024-07-30

## 2024-07-30 VITALS
DIASTOLIC BLOOD PRESSURE: 64 MMHG | RESPIRATION RATE: 16 BRPM | SYSTOLIC BLOOD PRESSURE: 124 MMHG | OXYGEN SATURATION: 95 % | HEART RATE: 78 BPM | TEMPERATURE: 98.8 F

## 2024-08-07 ENCOUNTER — APPOINTMENT (OUTPATIENT)
Dept: HOME HEALTH SERVICES | Facility: HOME HEALTH | Age: 89
End: 2024-08-07

## 2024-08-07 PROBLEM — L89.312 PRESSURE INJURY OF RIGHT BUTTOCK, STAGE 2: Status: ACTIVE | Noted: 2024-07-30

## 2024-08-07 PROCEDURE — 99348 HOME/RES VST EST LOW MDM 30: CPT

## 2024-08-07 NOTE — SURGICAL HISTORY
----- Message from Halle Gleason NP sent at 1/16/2019  8:52 AM CST -----  Metabolic panel looks much better, sodium is normal, potassium is normal, and creatinine has increased, but only mildly at 1.03, so noted mild decrease in kidney functioning. I am going to consult ID again. Patient is scheduled to see Dr Mccray on Feb 6, 2019. Most likely I will continue the Bactrim DS until he is seen by ID. He will complete this current course by the weekend. I will send another message about the antibiotics by the end of the week.  
Called patient to update on lab results and recommendations.  Patient verbalizes understanding and denies further questions a this time.    
[PSH Reviewed and Updated] : past surgical history reviewed and updated

## 2024-08-07 NOTE — REASON FOR VISIT
[Acute] : an acute visit [Pre-Visit Preparation] : pre-visit preparation was done [FreeTextEntry1] : cough and stage II R buttock pressure ulcer

## 2024-08-07 NOTE — REVIEW OF SYSTEMS
[Negative] : Heme/Lymph [Fever] : no fever [Chills] : no chills [Night Sweats] : no night sweats [Earache] : no earache [Nasal Discharge] : no nasal discharge [Sore Throat] : no sore throat [FreeTextEntry2] : daughter reported increasing weakness [de-identified] : Large lump on R wrist resolved You can access the FollowMyHealth Patient Portal offered by [facility] by registering at the following website: [email or web address]. By joining [employer]’s FollowMyHealth portal, you will also be able to view your health information using other applications (apps) compatible with our system.

## 2024-08-07 NOTE — PHYSICAL EXAM
[No Acute Distress] : no acute distress [Normal Sclera/Conjunctiva] : normal sclera/conjunctiva [EOMI] : extra ocular movement intact [Normal Oropharynx] : the oropharynx was normal [No JVD] : no jugular venous distention [Supple] : the neck was supple while waiting in line for [employer] [No Respiratory Distress] : no respiratory distress [Clear to Auscultation] : lungs were clear to auscultation bilaterally [Normal Rate] : heart rate was normal  [Normal S1, S2] : normal S1 and S2 [No Edema] : there was no peripheral edema [Normal Bowel Sounds] : normal bowel sounds [Non Tender] : non-tender [Soft] : abdomen soft [Not Distended] : not distended [No Rash] : no rash [No Motor Deficits] : the motor exam was normal [de-identified] : R wrist  mass resolved. Stage Ii r buttock pressure ulcer  measuring 1.1cm x 0.8cm, [de-identified] : pt is somnolent but easily arousable

## 2024-08-07 NOTE — HISTORY OF PRESENT ILLNESS
[FreeTextEntry2] : Pt is a 89 yo man with PMH significant for Afib (on eliquis), CAD (s/p 5 stent, s/p PM), Htn, HLD, CHF, BPH, Vertigo, hard of hearing (advanced despite hearing aid), chronic constipation, C1 stenosis, right shoulder arthropathy, who is being seen today for recent complaints of cough and congestion and stage II R buttock pressure ulcer and stage I lower back pressure ulcer.  Interval History: As per daughter and aide, pt had cough and congestion which now resolved. Pt has no SOB, phlegm production, fever or chills New small  R buttock pressure ulcer measuring 1.1cm x 0.8cm, R wrist lump  resolved  Subjective: -Appetite/weight: good appetite, eats 3 meals a day, self feeds, drinks plenty of fluids. -Gait/falls:  no falls, patient semi-dependent for ADLs and iADLs -Pain: back pain intermittent. -Sleep: sleeps well; takes Seroquel 25 mg hs -BMs: regular, no BRBPR or melena; usually constipated but now regular x 3 weeks. -Urine: normal; no hematuria/dysuria. -Skin: new R wrist localized swelling -DME: walker. walks from bedroom to living room with walker; can transfer with walker in front of him. will lift up and down the walker for exercise. -Mood/memory: Pt with dementia: Some mild short term Memory loss. Good mood. Has episodes of sundowning.  -Sensory deficits: [place] despite hearing aids, wears glasses  Medical Issues: re: Afib: taking Eliquis 2.5 mg BID; no chest pain/palpitations. re: BPH:  continue Finasteride 5 mg 1 tab a day, Tamsulosin 0.4 mg 1 cap a day. no dysuria or other urinary sx's re: Chronic constipation: controlled with Senna 8.6 mg 2 tab at bedtime PRN, Miralax 17 g BID PRN..Has BM's everyday re: HTN: no issues with elevated BP and chest pain; taking Torsemide 20 mg 1 tab daily. re: CAD: aspirin 81 mg 1 tab daily. re: HLD: Continue Simvastatin 20 mg 1 tab at bedtime.

## 2024-08-26 ENCOUNTER — APPOINTMENT (OUTPATIENT)
Dept: HOME HEALTH SERVICES | Facility: HOME HEALTH | Age: 89
End: 2024-08-26
Payer: MEDICARE

## 2024-08-26 VITALS
OXYGEN SATURATION: 95 % | HEART RATE: 86 BPM | RESPIRATION RATE: 16 BRPM | TEMPERATURE: 97.9 F | DIASTOLIC BLOOD PRESSURE: 64 MMHG | SYSTOLIC BLOOD PRESSURE: 118 MMHG

## 2024-08-26 DIAGNOSIS — I48.91 UNSPECIFIED ATRIAL FIBRILLATION: ICD-10-CM

## 2024-08-26 DIAGNOSIS — L89.312 PRESSURE ULCER OF RIGHT BUTTOCK, STAGE 2: ICD-10-CM

## 2024-08-26 DIAGNOSIS — F03.918 UNSPECIFIED DEMENTIA, UNSPECIFIED SEVERITY, WITH OTHER BEHAVIORAL DISTURBANCE: ICD-10-CM

## 2024-08-26 DIAGNOSIS — I25.10 ATHEROSCLEROTIC HEART DISEASE OF NATIVE CORONARY ARTERY W/OUT ANGINA PECTORIS: ICD-10-CM

## 2024-08-26 DIAGNOSIS — N40.0 BENIGN PROSTATIC HYPERPLASIA WITHOUT LOWER URINARY TRACT SYMPMS: ICD-10-CM

## 2024-08-26 DIAGNOSIS — I10 ESSENTIAL (PRIMARY) HYPERTENSION: ICD-10-CM

## 2024-08-26 PROCEDURE — 99348 HOME/RES VST EST LOW MDM 30: CPT

## 2024-08-26 NOTE — PHYSICAL EXAM
[No Acute Distress] : no acute distress [Normal Sclera/Conjunctiva] : normal sclera/conjunctiva [EOMI] : extra ocular movement intact [Normal Oropharynx] : the oropharynx was normal [No JVD] : no jugular venous distention [Supple] : the neck was supple [No Respiratory Distress] : no respiratory distress [Clear to Auscultation] : lungs were clear to auscultation bilaterally [Normal Rate] : heart rate was normal  [Normal S1, S2] : normal S1 and S2 [No Edema] : there was no peripheral edema [Normal Bowel Sounds] : normal bowel sounds [Non Tender] : non-tender [Soft] : abdomen soft [Not Distended] : not distended [No Rash] : no rash [No Motor Deficits] : the motor exam was normal [de-identified] : R wrist  mass resolved. Stage Ii r buttock pressure ulcer  measuring 1.1cm x 0.8cm, [de-identified] : pt is somnolent but easily arousable

## 2024-08-26 NOTE — REASON FOR VISIT
[Follow-Up] : a follow-up visit [Pre-Visit Preparation] : pre-visit preparation was done [FreeTextEntry1] : CAD, HTN, HlD, BPH, a/fib

## 2024-08-26 NOTE — REVIEW OF SYSTEMS
[Negative] : Heme/Lymph [Fever] : no fever [Chills] : no chills [Night Sweats] : no night sweats [Earache] : no earache [Nasal Discharge] : no nasal discharge [Sore Throat] : no sore throat [FreeTextEntry2] : daughter reported increasing weakness

## 2024-08-26 NOTE — HISTORY OF PRESENT ILLNESS
[FreeTextEntry2] : Pt is a 89 yo man with PMH significant for Afib (on eliquis), CAD (s/p 5 stent, s/p PM), Htn, HLD, CHF, BPH, Vertigo, hard of hearing (advanced despite hearing aid), chronic constipation, C1 stenosis, right shoulder arthropathy, who is being seen today for F/U.  Interval History: pt has been doing well R buttock wound resolved, Cough resolved with treatment of post nasal drip Pt is eating well, BM's regular Denies CP, SOB, abd pain, dizziness  Subjective: -Appetite/weight: good appetite, eats 3 meals a day, self feeds, drinks plenty of fluids. -Gait/falls:  no falls, patient semi-dependent for ADLs and iADLs -Pain: back pain intermittent. -Sleep: sleeps well; takes Seroquel 25 mg hs -BMs: regular, no BRBPR or melena; usually constipated but now regular x 3 weeks. -Urine: normal; no hematuria/dysuria. -Skin: new R wrist localized swelling -DME: walker. walks from bedroom to living room with walker; can transfer with walker in front of him. will lift up and down the walker for exercise. -Mood/memory: Pt with dementia: Some mild short term Memory loss. Good mood. Has episodes of sundowning.  -Sensory deficits: [place] despite hearing aids, wears glasses  Medical Issues: re: Afib: taking Eliquis 2.5 mg BID; no chest pain/palpitations. re: BPH:  continue Finasteride 5 mg 1 tab a day, Tamsulosin 0.4 mg 1 cap a day. no dysuria or other urinary sx's re: Chronic constipation: controlled with Senna 8.6 mg 2 tab at bedtime PRN, Miralax 17 g BID PRN..Has BM's everyday re: HTN: no issues with elevated BP and chest pain; taking Torsemide 20 mg 1 tab daily. re: CAD: aspirin 81 mg 1 tab daily. re: HLD: Continue Simvastatin 20 mg 1 tab at bedtime.

## 2024-09-06 ENCOUNTER — NON-APPOINTMENT (OUTPATIENT)
Age: 89
End: 2024-09-06

## 2024-09-06 DIAGNOSIS — G47.09 OTHER INSOMNIA: ICD-10-CM

## 2024-09-11 ENCOUNTER — APPOINTMENT (OUTPATIENT)
Dept: HOME HEALTH SERVICES | Facility: HOME HEALTH | Age: 89
End: 2024-09-11

## 2024-09-11 VITALS
DIASTOLIC BLOOD PRESSURE: 78 MMHG | HEART RATE: 80 BPM | OXYGEN SATURATION: 96 % | RESPIRATION RATE: 17 BRPM | TEMPERATURE: 97.5 F | SYSTOLIC BLOOD PRESSURE: 140 MMHG

## 2024-09-11 RX ORDER — CHLORHEXIDINE GLUCONATE 4 %
5 LIQUID (ML) TOPICAL
Qty: 90 | Refills: 3 | Status: ACTIVE | COMMUNITY
Start: 2024-09-06

## 2024-10-07 ENCOUNTER — TRANSCRIPTION ENCOUNTER (OUTPATIENT)
Age: 89
End: 2024-10-07

## 2024-10-29 ENCOUNTER — NON-APPOINTMENT (OUTPATIENT)
Age: 89
End: 2024-10-29

## 2024-10-29 RX ORDER — CEFDINIR 300 MG/1
300 CAPSULE ORAL
Qty: 20 | Refills: 0 | Status: ACTIVE | COMMUNITY
Start: 2024-10-29 | End: 1900-01-01

## 2024-11-13 ENCOUNTER — APPOINTMENT (OUTPATIENT)
Dept: HOME HEALTH SERVICES | Facility: HOME HEALTH | Age: 89
End: 2024-11-13
Payer: MEDICARE

## 2024-11-13 DIAGNOSIS — I48.91 UNSPECIFIED ATRIAL FIBRILLATION: ICD-10-CM

## 2024-11-13 DIAGNOSIS — I10 ESSENTIAL (PRIMARY) HYPERTENSION: ICD-10-CM

## 2024-11-13 DIAGNOSIS — Z23 ENCOUNTER FOR IMMUNIZATION: ICD-10-CM

## 2024-11-13 PROCEDURE — G0008: CPT

## 2024-11-13 PROCEDURE — 99349 HOME/RES VST EST MOD MDM 40: CPT

## 2024-11-13 PROCEDURE — 90662 IIV NO PRSV INCREASED AG IM: CPT

## 2024-12-22 ENCOUNTER — INPATIENT (INPATIENT)
Facility: HOSPITAL | Age: 88
LOS: 8 days | Discharge: HOSPICE | DRG: 179 | End: 2024-12-31
Attending: STUDENT IN AN ORGANIZED HEALTH CARE EDUCATION/TRAINING PROGRAM | Admitting: STUDENT IN AN ORGANIZED HEALTH CARE EDUCATION/TRAINING PROGRAM
Payer: MEDICARE

## 2024-12-22 VITALS — RESPIRATION RATE: 22 BRPM | OXYGEN SATURATION: 93 %

## 2024-12-22 DIAGNOSIS — Z98.890 OTHER SPECIFIED POSTPROCEDURAL STATES: Chronic | ICD-10-CM

## 2024-12-22 DIAGNOSIS — J69.0 PNEUMONITIS DUE TO INHALATION OF FOOD AND VOMIT: ICD-10-CM

## 2024-12-22 DIAGNOSIS — I25.10 ATHEROSCLEROTIC HEART DISEASE OF NATIVE CORONARY ARTERY WITHOUT ANGINA PECTORIS: ICD-10-CM

## 2024-12-22 DIAGNOSIS — J96.01 ACUTE RESPIRATORY FAILURE WITH HYPOXIA: ICD-10-CM

## 2024-12-22 DIAGNOSIS — I21.A1 MYOCARDIAL INFARCTION TYPE 2: ICD-10-CM

## 2024-12-22 DIAGNOSIS — I48.0 PAROXYSMAL ATRIAL FIBRILLATION: ICD-10-CM

## 2024-12-22 LAB
ADD ON TEST-SPECIMEN IN LAB: SIGNIFICANT CHANGE UP
ALBUMIN SERPL ELPH-MCNC: 3.3 G/DL — SIGNIFICANT CHANGE UP (ref 3.3–5)
ALP SERPL-CCNC: 82 U/L — SIGNIFICANT CHANGE UP (ref 40–120)
ALT FLD-CCNC: 11 U/L — SIGNIFICANT CHANGE UP (ref 10–45)
ANION GAP SERPL CALC-SCNC: 14 MMOL/L — SIGNIFICANT CHANGE UP (ref 5–17)
APTT BLD: 30.5 SEC — SIGNIFICANT CHANGE UP (ref 24.5–35.6)
AST SERPL-CCNC: 18 U/L — SIGNIFICANT CHANGE UP (ref 10–40)
BASOPHILS # BLD AUTO: 0.05 K/UL — SIGNIFICANT CHANGE UP (ref 0–0.2)
BASOPHILS NFR BLD AUTO: 0.5 % — SIGNIFICANT CHANGE UP (ref 0–2)
BILIRUB SERPL-MCNC: 0.6 MG/DL — SIGNIFICANT CHANGE UP (ref 0.2–1.2)
BUN SERPL-MCNC: 28 MG/DL — HIGH (ref 7–23)
CALCIUM SERPL-MCNC: 9.5 MG/DL — SIGNIFICANT CHANGE UP (ref 8.4–10.5)
CHLORIDE SERPL-SCNC: 106 MMOL/L — SIGNIFICANT CHANGE UP (ref 96–108)
CO2 SERPL-SCNC: 25 MMOL/L — SIGNIFICANT CHANGE UP (ref 22–31)
CREAT SERPL-MCNC: 1.3 MG/DL — SIGNIFICANT CHANGE UP (ref 0.5–1.3)
EGFR: 49 ML/MIN/1.73M2 — LOW
EOSINOPHIL # BLD AUTO: 0.04 K/UL — SIGNIFICANT CHANGE UP (ref 0–0.5)
EOSINOPHIL NFR BLD AUTO: 0.4 % — SIGNIFICANT CHANGE UP (ref 0–6)
FLUAV AG NPH QL: SIGNIFICANT CHANGE UP
FLUBV AG NPH QL: SIGNIFICANT CHANGE UP
GAS PNL BLDV: SIGNIFICANT CHANGE UP
GAS PNL BLDV: SIGNIFICANT CHANGE UP
GLUCOSE SERPL-MCNC: 250 MG/DL — HIGH (ref 70–99)
HCT VFR BLD CALC: 35 % — LOW (ref 39–50)
HGB BLD-MCNC: 11.1 G/DL — LOW (ref 13–17)
IMM GRANULOCYTES NFR BLD AUTO: 0.4 % — SIGNIFICANT CHANGE UP (ref 0–0.9)
INR BLD: 1.46 RATIO — HIGH (ref 0.85–1.16)
LACTATE SERPL-SCNC: 3 MMOL/L — HIGH (ref 0.5–2)
LYMPHOCYTES # BLD AUTO: 0.58 K/UL — LOW (ref 1–3.3)
LYMPHOCYTES # BLD AUTO: 5.8 % — LOW (ref 13–44)
MCHC RBC-ENTMCNC: 30.2 PG — SIGNIFICANT CHANGE UP (ref 27–34)
MCHC RBC-ENTMCNC: 31.7 G/DL — LOW (ref 32–36)
MCV RBC AUTO: 95.1 FL — SIGNIFICANT CHANGE UP (ref 80–100)
MONOCYTES # BLD AUTO: 0.47 K/UL — SIGNIFICANT CHANGE UP (ref 0–0.9)
MONOCYTES NFR BLD AUTO: 4.7 % — SIGNIFICANT CHANGE UP (ref 2–14)
NEUTROPHILS # BLD AUTO: 8.86 K/UL — HIGH (ref 1.8–7.4)
NEUTROPHILS NFR BLD AUTO: 88.2 % — HIGH (ref 43–77)
NRBC # BLD: 0 /100 WBCS — SIGNIFICANT CHANGE UP (ref 0–0)
NT-PROBNP SERPL-SCNC: 1992 PG/ML — HIGH (ref 0–300)
PLATELET # BLD AUTO: 212 K/UL — SIGNIFICANT CHANGE UP (ref 150–400)
POTASSIUM SERPL-MCNC: 4.3 MMOL/L — SIGNIFICANT CHANGE UP (ref 3.5–5.3)
POTASSIUM SERPL-SCNC: 4.3 MMOL/L — SIGNIFICANT CHANGE UP (ref 3.5–5.3)
PROT SERPL-MCNC: 6.3 G/DL — SIGNIFICANT CHANGE UP (ref 6–8.3)
PROTHROM AB SERPL-ACNC: 16.6 SEC — HIGH (ref 9.9–13.4)
RBC # BLD: 3.68 M/UL — LOW (ref 4.2–5.8)
RBC # FLD: 15 % — HIGH (ref 10.3–14.5)
RSV RNA NPH QL NAA+NON-PROBE: SIGNIFICANT CHANGE UP
SARS-COV-2 RNA SPEC QL NAA+PROBE: SIGNIFICANT CHANGE UP
SODIUM SERPL-SCNC: 145 MMOL/L — SIGNIFICANT CHANGE UP (ref 135–145)
TROPONIN T, HIGH SENSITIVITY RESULT: 108 NG/L — HIGH (ref 0–51)
WBC # BLD: 10.04 K/UL — SIGNIFICANT CHANGE UP (ref 3.8–10.5)
WBC # FLD AUTO: 10.04 K/UL — SIGNIFICANT CHANGE UP (ref 3.8–10.5)

## 2024-12-22 PROCEDURE — 93308 TTE F-UP OR LMTD: CPT | Mod: 26

## 2024-12-22 PROCEDURE — 99223 1ST HOSP IP/OBS HIGH 75: CPT

## 2024-12-22 PROCEDURE — 99291 CRITICAL CARE FIRST HOUR: CPT | Mod: GC

## 2024-12-22 PROCEDURE — 71045 X-RAY EXAM CHEST 1 VIEW: CPT | Mod: 26

## 2024-12-22 PROCEDURE — 99497 ADVNCD CARE PLAN 30 MIN: CPT | Mod: 25

## 2024-12-22 RX ORDER — ASPIRIN 81 MG
81 TABLET, DELAYED RELEASE (ENTERIC COATED) ORAL DAILY
Refills: 0 | Status: DISCONTINUED | OUTPATIENT
Start: 2024-12-22 | End: 2024-12-31

## 2024-12-22 RX ORDER — TAMSULOSIN HYDROCHLORIDE 0.4 MG/1
0.4 CAPSULE ORAL AT BEDTIME
Refills: 0 | Status: DISCONTINUED | OUTPATIENT
Start: 2024-12-22 | End: 2024-12-31

## 2024-12-22 RX ORDER — FINASTERIDE 5 MG
5 TABLET ORAL DAILY
Refills: 0 | Status: DISCONTINUED | OUTPATIENT
Start: 2024-12-22 | End: 2024-12-31

## 2024-12-22 RX ORDER — SODIUM CHLORIDE 9 MG/ML
250 INJECTION, SOLUTION INTRAMUSCULAR; INTRAVENOUS; SUBCUTANEOUS ONCE
Refills: 0 | Status: COMPLETED | OUTPATIENT
Start: 2024-12-22 | End: 2024-12-22

## 2024-12-22 RX ORDER — ERTAPENEM SODIUM 1 G/1
1000 INJECTION, POWDER, LYOPHILIZED, FOR SOLUTION INTRAMUSCULAR; INTRAVENOUS EVERY 24 HOURS
Refills: 0 | Status: COMPLETED | OUTPATIENT
Start: 2024-12-22 | End: 2024-12-28

## 2024-12-22 RX ORDER — SODIUM CHLORIDE 9 MG/ML
500 INJECTION, SOLUTION INTRAVENOUS
Refills: 0 | Status: COMPLETED | OUTPATIENT
Start: 2024-12-22 | End: 2024-12-23

## 2024-12-22 RX ORDER — QUETIAPINE FUMARATE 100 MG/1
1 TABLET, FILM COATED ORAL
Refills: 0 | DISCHARGE

## 2024-12-22 RX ORDER — IPRATROPIUM BROMIDE AND ALBUTEROL SULFATE .5; 2.5 MG/3ML; MG/3ML
3 SOLUTION RESPIRATORY (INHALATION) ONCE
Refills: 0 | Status: COMPLETED | OUTPATIENT
Start: 2024-12-22 | End: 2024-12-22

## 2024-12-22 RX ORDER — LACTOBACILLUS ACIDOPHILUS/PECT 75 MM-100
1 CAPSULE ORAL EVERY 12 HOURS
Refills: 0 | Status: DISCONTINUED | OUTPATIENT
Start: 2024-12-22 | End: 2024-12-31

## 2024-12-22 RX ORDER — IPRATROPIUM BROMIDE AND ALBUTEROL SULFATE .5; 2.5 MG/3ML; MG/3ML
3 SOLUTION RESPIRATORY (INHALATION) EVERY 6 HOURS
Refills: 0 | Status: DISCONTINUED | OUTPATIENT
Start: 2024-12-22 | End: 2024-12-31

## 2024-12-22 RX ORDER — VANCOMYCIN HYDROCHLORIDE 5 G/100ML
1250 INJECTION, POWDER, LYOPHILIZED, FOR SOLUTION INTRAVENOUS EVERY 24 HOURS
Refills: 0 | Status: DISCONTINUED | OUTPATIENT
Start: 2024-12-22 | End: 2024-12-23

## 2024-12-22 RX ORDER — APIXABAN 5 MG/1
2.5 TABLET, FILM COATED ORAL
Refills: 0 | Status: DISCONTINUED | OUTPATIENT
Start: 2024-12-22 | End: 2024-12-31

## 2024-12-22 RX ORDER — AZITHROMYCIN MONOHYDRATE 200 MG/5ML
500 POWDER, FOR SUSPENSION ORAL ONCE
Refills: 0 | Status: COMPLETED | OUTPATIENT
Start: 2024-12-22 | End: 2024-12-22

## 2024-12-22 RX ADMIN — Medication 100 MILLIGRAM(S): at 18:16

## 2024-12-22 RX ADMIN — SODIUM CHLORIDE 250 MILLILITER(S): 9 INJECTION, SOLUTION INTRAMUSCULAR; INTRAVENOUS; SUBCUTANEOUS at 20:19

## 2024-12-22 RX ADMIN — IPRATROPIUM BROMIDE AND ALBUTEROL SULFATE 3 MILLILITER(S): .5; 2.5 SOLUTION RESPIRATORY (INHALATION) at 19:10

## 2024-12-22 RX ADMIN — IPRATROPIUM BROMIDE AND ALBUTEROL SULFATE 3 MILLILITER(S): .5; 2.5 SOLUTION RESPIRATORY (INHALATION) at 18:00

## 2024-12-22 RX ADMIN — AZITHROMYCIN MONOHYDRATE 255 MILLIGRAM(S): 200 POWDER, FOR SUSPENSION ORAL at 20:20

## 2024-12-22 NOTE — H&P ADULT - NSHPLABSRESULTS_GEN_ALL_CORE
Personally reviewed old records.  Personally reviewed labs.  Personally reviewed imaging.  Personally reviewed EKG. Bifascic block. LAFB. TWI in L/V1-V6. TWI are new compared to 2020.                          11.1   10.04 )-----------( 212      ( 22 Dec 2024 17:55 )             35.0       12-22    145  |  106  |  28[H]  ----------------------------<  250[H]  4.3   |  25  |  1.30    Ca    9.5      22 Dec 2024 17:55    TPro  6.3  /  Alb  3.3  /  TBili  0.6  /  DBili  x   /  AST  18  /  ALT  11  /  AlkPhos  82  12-22            LIVER FUNCTIONS - ( 22 Dec 2024 17:55 )  Alb: 3.3 g/dL / Pro: 6.3 g/dL / ALK PHOS: 82 U/L / ALT: 11 U/L / AST: 18 U/L / GGT: x             PT/INR - ( 22 Dec 2024 17:55 )   PT: 16.6 sec;   INR: 1.46 ratio         PTT - ( 22 Dec 2024 17:55 )  PTT:30.5 sec    Urinalysis Basic - ( 22 Dec 2024 17:55 )    Color: x / Appearance: x / SG: x / pH: x  Gluc: 250 mg/dL / Ketone: x  / Bili: x / Urobili: x   Blood: x / Protein: x / Nitrite: x   Leuk Esterase: x / RBC: x / WBC x   Sq Epi: x / Non Sq Epi: x / Bacteria: x

## 2024-12-22 NOTE — ED CLERICAL - NS ED CLERK NOTE PRE-ARRIVAL INFORMATION; ADDITIONAL PRE-ARRIVAL INFORMATION

## 2024-12-22 NOTE — ED PROVIDER NOTE - OBJECTIVE STATEMENT
Attending Jelly Clemente: 89-year-old male with multiple medical issues including history of A-fib on Eliquis, coronary artery disease, hypertension, hyperlipidemia presenting with concern for difficulty breathing.  Per family noticed having some mild shortness of breath recently and then today after eating with worsening shortness of breath and difficulty breathing.  No falls or trauma.  Was coughing nonproductive sputum earlier.  Has been compliant with medications.  No reports of any weight gain.  Has been compliant with his medications.  Did not take any Tylenol or Motrin prior to arrival.

## 2024-12-22 NOTE — H&P ADULT - HISTORY OF PRESENT ILLNESS
89M c hx Atrial Fibrillation on eliquis, s/p PPM, CAD s/p multiple stents, HTN, BPH, vertigo, mild cognitive impairment, pw acute hypoxia, confusion.    History from chart and pt's daughter/surrogate Catherine Carrillo over phone.  At baseline pt transfers to wheelchair, requires assistance ambulating short distances, requires assistance eating. Pt lives alone with an aide. Pt still watches TV, able to identify family members, but over the past 9 mos, pt has been noted to be less verbal and intermittent hallucinations, calling for  family members. Also over the past 1 mo, pt has been having ?sinus congestion. Today pt ate breakfast and lunch as usual, but acutely after lunch, pt's aide noticed pt was having abnormal breathing and more lethargic. O2 sats checked at home were in the "high 40s" and reportedly "50s" by EMS.  Daughter is not aware of pt having any diarrhea, fevers, CP, abd pain.

## 2024-12-22 NOTE — ED ADULT NURSE NOTE - FINAL NURSING ELECTRONIC SIGNATURE
HISTORY OF PRESENT ILLNESS  Marya Gifford G1   presents for annual check. She wants to discuss her acne - deep cystic. Working w derm and on several meds already, but was recommended to add on estrogen. periods: they are regular, light and not too crampy.;   She was Sexually active in the past with condoms but no activity in past 6mo. Declines STD check. UPT neg in past. No pelvic pain. No d/c.  Never been on hormones in past. She is an athlete. Wants lowest dose to limit wt gain.    Present with her mother. Spoke with her privately, as well as along with her mother.  Her mother not aware she was active.  Patient has no oral contraceptive risks: no heart, gallbladder, liver, kidney problems, no migraines or hypertension, she does not smoke, and there is no personal or family history of venothrombotic event.      Menses regular Qmonth <5days in length with moderate/normal flow.  Patient's last menstrual period was 07/27/2020 (exact date).     GYN screening history: last Pap: never    Health Maintenance   Topic Date Due    Chlamydia Screening  05/12/2018       HISTORY  There is no problem list on file for this patient.      History reviewed. No pertinent past medical history.    Past Surgical History:   Procedure Laterality Date    TONSILLECTOMY, ADENOIDECTOMY         Family History   Problem Relation Age of Onset    Breast cancer Maternal Grandmother     Breast cancer Other     Colon cancer Neg Hx     Ovarian cancer Neg Hx        Social History     Socioeconomic History    Marital status: Single     Spouse name: Not on file    Number of children: Not on file    Years of education: Not on file    Highest education level: Not on file   Occupational History    Not on file   Social Needs    Financial resource strain: Not on file    Food insecurity     Worry: Not on file     Inability: Not on file    Transportation needs     Medical: Not on file     Non-medical: Not on file   Tobacco Use    Smoking  "status: Never Smoker   Substance and Sexual Activity    Alcohol use: Never     Frequency: Never    Drug use: Never    Sexual activity: Never   Lifestyle    Physical activity     Days per week: Not on file     Minutes per session: Not on file    Stress: Not on file   Relationships    Social connections     Talks on phone: Not on file     Gets together: Not on file     Attends Mosque service: Not on file     Active member of club or organization: Not on file     Attends meetings of clubs or organizations: Not on file     Relationship status: Not on file   Other Topics Concern    Not on file   Social History Narrative    Not on file       Current Outpatient Medications   Medication Sig Dispense Refill    clindamycin-benzoyl peroxide (BENZACLIN) gel APPLY TO FACE EVERY MORNING      clindamycin-tretinoin (ZIANA) gel APPLY A THIN COAT TO THE AFFECTED AREA EVERY NIGHT      doxycycline (VIBRAMYCIN) 100 MG Cap       norethindrone-ethinyl estradiol (JUNEL FE 1/20) 1 mg-20 mcg (21)/75 mg (7) per tablet Take 1 tablet by mouth once daily. 30 tablet 11     No current facility-administered medications for this visit.        Review of patient's allergies indicates:  No Known Allergies        PHYSICAL EXAM     Vitals:    08/06/20 1504   BP: 104/72   Weight: 57.8 kg (127 lb 6.8 oz)   Height: 5' 1" (1.549 m)   PainSc: 0-No pain            PAIN SCALE: 0/10 None    ROS:  GENERAL: No fever, chills, fatigability or weight loss.  ABDOMEN: Appetite fine. No weight loss. Denies diarrhea, abdominal pain, hematemesis or blood in stool.  No change in bowel movement pattern.  URINARY: No flank pain, dysuria or hematuria.  BREASTS: Breasts symmetric, nontender and no lumps detected.    PE:   APPEARANCE: Well nourished, well developed, in no acute distress.  ABDOMEN: Soft. No tenderness or masses.   No hernias.       DIAGNOSIS:   1. Well woman exam without gynecological exam     2. Acne, unspecified acne type            "     MEDICATIONS PRESCRIBED:   PNV   Medications Ordered This Encounter   Medications    norethindrone-ethinyl estradiol (JUNEL FE 1/20) 1 mg-20 mcg (21)/75 mg (7) per tablet     Sig: Take 1 tablet by mouth once daily.     Dispense:  30 tablet     Refill:  11          COUNSELING:  Reviewed all forms of contraception with patient including pills, patch, ring, Depo Provera, Nexplanon, Paraguard, and Mirena. The use of contraceptive options has been fully discussed with the patient. This includes the proper method to initiate and continue the pills, the need for regular compliance to ensure adequate contraceptive effect, the physiology which make the contraception effective, the instructions for what to do in event of a missed pill/ring/strings, and warnings about anticipated minor side effects such as breakthrough spotting, nausea, breast tenderness, weight changes, acne, headaches, etc.  She has been told of the more serious potential side effects such as MI, stroke, and deep vein thrombosis.  She has been asked to report any signs of such serious problems immediately.  She should back with a condom during the first month of use of any new contraception method. The need for additional protection, such as a condom, to prevent exposure to sexually transmitted diseases has also been discussed. She understands and wishes to take the congtraception as prescribed  Also reviewed: When sexually active, most important is to confirm she is not pregnant prior to starting the new method.   For pills, may start the Sunday after period begins, even if still bleeding on the Sunday.   If the period is irregular, then it is best to take 2 urine pregnancy tests (from the drug store), 10 days apart. On the day of the second of these negative tests, may start the pill.      Patient was counseled today on A.C.S. Pap guidelines and recommendations for yearly pelvic exams, mammograms and monthly self breast exams; to see her PCP for other  health maintenance.     Also, counseled on recommendation for sexual abstinence as best, abstinence from drugs and alcohol, use of seatbelts.           FOLLOW-UP:  2-3mo for a bp check and f/u of any side effects.  2mo w me - if no side effects,a nd response is not significant, will increase to 30mcg pill.        22-Dec-2024 20:35

## 2024-12-22 NOTE — ED ADULT NURSE NOTE - OBJECTIVE STATEMENT
Pt is 89 y/o male, presenting to the ED via EMS c/o SOB. As per EMS, @ scene pt noted to be 53% on RA. Pt placed on NBR w/ improve oxygenation to 100%. As per daughter, pt was eating w/ aid and since has been  having cough and worsening SOB. Pt noted to be tachypneic, w/ audible wheezing, Pt to be given neb, and RT called for hiflow.  Abdomen soft and nontender to palpation. EKG done, given to MD, pt placed on continuous cardiac monitor. Pt moves all extremities w/ = strength. Skin is warm, dry, and intact w/ + peripheral pulses. Safety and comfort measures provided- bed in lowest position, locked, and blanket given.

## 2024-12-22 NOTE — ED PROVIDER NOTE - CLINICAL SUMMARY MEDICAL DECISION MAKING FREE TEXT BOX
Attending Jelly Clemente: 89-year-old male with multiple medical issues including A-fib on Eliquis, CAD, dementia presenting with concern for difficulty breathing.  Upon arrival patient with increased work of breathing and shortness of breath as well as hypoxia.  Patient was reportedly hypoxic in the 60s at home and arrived on a nonrebreather.  Point-of-care ultrasound performed showing likely consolidations with scattered anterior B-lines and preserved ejection fraction.  No fevers or chills.  No reports of any falls or trauma.  Limited mental exam secondary to patient's current disease process as well as increased work of breathing.  Patient placed on high flow nasal cannula for hypoxia with improvement in respiratory saturation.  First VBG showed significant hypercarbia.  With poor mental status and concern for possible aspiration event is not a great candidate for BiPAP.  Patient's daughter here upon arrival and reviewed CODE STATUS.  Patient made DNR and DNI but okay for noninvasive.  Mental status changes could be secondary to delirium from hypoxia however consider CT head however at this time as patient with increased work of breathing and not a candidate for any invasive procedures we will hold off on CT head if patient is able to lie flat consider to further evaluate.  Will cover with broad-spectrum antibiotics.  Patient clinically appears significantly dehydrated upon arrival with dry mucous membranes.  Patient given IV fluids.  IVC approximately 2 cm with respiratory variation.  Unclear whether patient has a history of heart failure and will monitor patient's responsiveness to fluids.  Will cover with antibiotics for likely aspiration event, keep on high flow and monitor respiratory status.  Patient will need admission to the hospital.

## 2024-12-22 NOTE — H&P ADULT - NSHPPHYSICALEXAM_GEN_ALL_CORE
PHYSICAL EXAM:   GENERAL: Arousable, opens eyes only. does not track. nonverbal. No acute distress. +thin. Not cachectic. Not obese.  HEAD:  Atraumatic. Normocephalic.  EYES: Normal conjunctiva/sclera.  ENT: Neck supple. No JVD. Dry oral mucosa. Not edentulous. No thrush.  LYMPH: Normal supraclavicular/cervical lymph nodes.   CARDIAC: Not tachy, Not clyde. Regular rhythm. Not irregularly irregular. S1. S2. +distant heart sounds.  LUNG/CHEST: BS equal bilaterally. No wheezes. No rales. No rhonchi. +diminished breath sounds  ABDOMEN: Soft. No tenderness. No distension. No fluid wave. Normal bowel sounds.  VASCULAR: +2 b/l radial  EXTREMITIES:  No clubbing. No cyanosis. No edema.   NEUROLOGY: Nonverbal.   PSYCH: Inappropriate behavior. Flat affect.  SKIN: No jaundice. No erythema. No rash/lesion.    T: 36.6 (22 Dec 2024 20:26), Max: 36.8 (22 Dec 2024 18:13)  T(F): 97.8 (22 Dec 2024 20:26), Max: 98.3 (22 Dec 2024 18:13)  HR: 72 (22 Dec 2024 20:26) (72 - 98)  BP: 158/72 (22 Dec 2024 20:26) (147/74 - 158/72)  BP(mean): 94 (22 Dec 2024 20:26) (94 - 94)  ABP: --  ABP(mean): --  RR: 14 (22 Dec 2024 20:26) (14 - 24)  SpO2: 100% (22 Dec 2024 20:26) (93% - 100%)    O2 Parameters below as of 22 Dec 2024 20:26  Patient On (Oxygen Delivery Method): nasal cannula, high flow  O2 Flow (L/min): 80  O2 Concentration (%): 80      I&O's Summary

## 2024-12-22 NOTE — H&P ADULT - ASSESSMENT
89M c hx Atrial Fibrillation on eliquis, s/p PPM, CAD s/p multiple stents, HTN, BPH, vertigo, mild cognitive impairment, pw acute hypoxia, confusion.

## 2024-12-22 NOTE — H&P ADULT - PROBLEM SELECTOR PLAN 1
- given hx most likely 2/2 aspiration event  - initially mildly hypercapneic as well  - cont empiric vanc, ertapenem for now. narrow abx as pt's condition improves  - continuous pulse ox  - NPO, hob elevate, aspiration precaution  - cont high kvng NC for now, wean as lary  - need to call for PPM interrogation in AM - given hx most likely 2/2 aspiration event  - initially mildly hypercapneic as well  - cont empiric vanc, ertapenem for now. narrow abx as pt's condition improves  - continuous pulse ox  - NPO, hob elevate, aspiration precaution  - cont high kvng NC for now, wean as lary  - need to call for PPM interrogation in AM    - GOC as above. DNR/DNI. OK for trial of NIV. pt is NOT comfort care.

## 2024-12-22 NOTE — H&P ADULT - NSHPSOCIALHISTORY_GEN_ALL_CORE
Social History:    Marital Status: (  ) , (  ) Single, (  ) , ( x ) , (  )   # of Children: 1  Lives with: (x  ) alone, (  ) children, (  ) spouse, (  ) parents, (  ) siblings, (  ) friends, (  ) other:   Tobacco Usage: (  ) never smoked, ( x ) former smoker, (  ) current smoker and Total Pack-Years:   Last Alcohol Usage/Frequency/Amount/Withdrawal/Hx of Abuse:  none  Foreign travel:   Animal exposure:

## 2024-12-22 NOTE — ED PROVIDER NOTE - PROGRESS NOTE DETAILS
Attending Jelly Clemente: Point-of-care ultrasound performed with limited views.  Patient with bilateral rhonchi and crackles.  Discussed with family goals of care.  At this time patient is DNR unclear about DNI.  Concern for possible aspiration pneumonia.  Will cover with antibiotics and give breathing treatments.  Will place on high flow for respiratory distress.

## 2024-12-22 NOTE — ED ADULT TRIAGE NOTE - ADDITIONAL SAFETY/BANDS...
"José Antonio Sandra \"Michael\" is a 89 y.o. male on day 7 of admission presenting with Acute on chronic diastolic heart failure (CMS/HCC).      Subjective   Patient was seen and examined today in the morning.  No acute events overnight.  Patient tolerated the BiPAP at night.  He stated that his shortness of breath is improving and he has no more chest pain.       Objective     Last Recorded Vitals  /56 (BP Location: Left arm, Patient Position: Lying)   Pulse 74   Temp 36.7 °C (98.1 °F) (Temporal)   Resp 18   Wt 66.7 kg (147 lb 0.8 oz)   SpO2 98%   Intake/Output last 3 Shifts:    Intake/Output Summary (Last 24 hours) at 2/8/2024 0925  Last data filed at 2/8/2024 0510  Gross per 24 hour   Intake 240 ml   Output 1150 ml   Net -910 ml         Admission Weight  Weight: 69.4 kg (153 lb) (02/01/24 1253)    Daily Weight  02/08/24 : 66.7 kg (147 lb 0.8 oz)      Physical Exam:  General: ANO x 3, on 5 L nasal cannula, frail  HEENT: PERRLA, head intact and normocephalic  Neck: JVD  Lungs: Decreased air entry at the bases with respiratory crackles at the bases with no wheezes or rhonchi  Cardiac: Irregular rate with ejection systolic murmur and holosystolic murmur.  Abdomen: Soft nontender, positive bowel sounds  : Exam deferred  Skin: Intact  Musculoskeletal: No peripheral edema  Neurological: Alert awake oriented, no focal deficit, cranial nerves grossly intact  Psych: No suicidal ideation or homicidal ideation    Relevant Results  Scheduled medications  [Held by provider] amLODIPine, 10 mg, oral, Daily  aspirin, 81 mg, oral, Nightly  budesonide, 0.5 mg, nebulization, BID  cholecalciferol, 5,000 Units, oral, Daily  famotidine, 10 mg, oral, Nightly  ferrous sulfate (325 mg ferrous sulfate), 65 mg of iron, oral, Daily  formoterol, 20 mcg, nebulization, BID  heparin (porcine), 5,000 Units, subcutaneous, q8h  ipratropium-albuteroL, 3 mL, nebulization, 4x daily  lactulose, 20 g, oral, BID  [Held by provider] losartan, 50 " mg, oral, Daily  metoprolol succinate XL, 25 mg, oral, Daily  polyethylene glycol, 17 g, oral, Daily  potassium chloride CR, 10 mEq, oral, Nightly  sennosides, 1 tablet, oral, Nightly  simvastatin, 40 mg, oral, Nightly  tamsulosin, 0.4 mg, oral, Daily  torsemide, 10 mg, oral, Daily      Continuous medications     PRN medications  PRN medications: acetaminophen, albuterol, diclofenac sodium, oxygen, oxygen     Results for orders placed or performed during the hospital encounter of 02/01/24 (from the past 24 hour(s))   CBC   Result Value Ref Range    WBC 9.1 4.4 - 11.3 x10*3/uL    nRBC 0.0 0.0 - 0.0 /100 WBCs    RBC 2.91 (L) 4.50 - 5.90 x10*6/uL    Hemoglobin 8.3 (L) 13.5 - 17.5 g/dL    Hematocrit 27.0 (L) 41.0 - 52.0 %    MCV 93 80 - 100 fL    MCH 28.5 26.0 - 34.0 pg    MCHC 30.7 (L) 32.0 - 36.0 g/dL    RDW 13.1 11.5 - 14.5 %    Platelets 120 (L) 150 - 450 x10*3/uL   Basic Metabolic Panel   Result Value Ref Range    Glucose 129 (H) 74 - 99 mg/dL    Sodium 138 136 - 145 mmol/L    Potassium 5.1 3.5 - 5.3 mmol/L    Chloride 100 98 - 107 mmol/L    Bicarbonate 32 21 - 32 mmol/L    Anion Gap 11 10 - 20 mmol/L    Urea Nitrogen 56 (H) 6 - 23 mg/dL    Creatinine 1.70 (H) 0.50 - 1.30 mg/dL    eGFR 37 (L) >60 mL/min/1.73m*2    Calcium 8.5 (L) 8.6 - 10.3 mg/dL   Magnesium   Result Value Ref Range    Magnesium 2.32 1.60 - 2.40 mg/dL             ECG 12 Lead    Result Date: 2/5/2024  Sinus rhythm with Premature atrial complexes Voltage criteria for left ventricular hypertrophy Nonspecific T wave abnormality Abnormal ECG When compared with ECG of 01-FEB-2024 14:23, T wave amplitude has decreased in Lateral leads    XR chest 1 view    Result Date: 2/4/2024  Interpreted By:  Jamison Lagunas, STUDY: XR CHEST 1 VIEW;  2/3/2024 11:32 am   INDICATION: Signs/Symptoms:hypoxia.   COMPARISON: CT chest and chest radiograph 02/01/2024   ACCESSION NUMBER(S): [id number]   ORDERING CLINICIAN: EVELYNE CARRILLO   FINDINGS: AP radiograph of the chest  was provided.   DEVICES: None.   CARDIOMEDIASTINAL SILHOUETTE: Cardiomediastinal silhouette is stable in size and configuration.Atherosclerotic calcifications of the aortic arch.   LUNGS: Bilateral low lung volumes. Interval worsening interstitial thickening and airspace opacities predominantly in the left mid to lower lung zones. Moderate left pleural effusion, also new since prior. No pneumothorax.   BONES: No acute osseous changes.       1.  Interval worsening since 02/01/2024.     Signed by: Jamison Lagunas 2/4/2024 12:05 PM Dictation workstation:   [id number]    Electrocardiogram, 12-lead PRN ACS symptoms    Result Date: 2/3/2024  Sinus rhythm with occasional nonconducted premature atrial complexes Moderate voltage criteria for LVH, may be normal variant Late transition Borderline ECG When compared with ECG of 24-DEC-2023 15:58, (unconfirmed) Sinus rhythm has replaced Atrial fibrillation Non-specific change in ST segment in Lateral leads Confirmed by Michael Sharma ([id number]) on 2/3/2024 10:51:26 AM    ECG 12 lead    Result Date: 2/2/2024  Sinus rhythm with Premature atrial complexes Moderate voltage criteria for LVH, may be normal variant Borderline ECG When compared with ECG of 01-FEB-2024 12:49, (unconfirmed) Premature atrial complexes are now Present Confirmed by Avtar Cm ([id number]) on 2/2/2024 1:26:52 PM    ECG 12 lead    Result Date: 2/2/2024  Sinus rhythm with marked sinus arrhythmia Moderate voltage criteria for LVH, may be normal variant Borderline ECG When compared with ECG of 24-DEC-2023 15:58, (unconfirmed) Sinus rhythm has replaced Atrial fibrillation Non-specific change in ST segment in Lateral leads Confirmed by Avtar Cm ([id number]) on 2/2/2024 1:25:38 PM    Transthoracic Echo (TTE) Limited    Result Date: 2/2/2024            [facility] [id number] [id number]    Tel [phone] Fax [phone] TRANSTHORACIC ECHOCARDIOGRAM REPORT  Patient Name:      MICHELLE FAUST MELISSA     Reading Physician:   [id number] Armando Ricks MD Study Date:        2/2/2024            Ordering Provider:   [id number] TIFFANY JORDAN MRN/PID:           [id number]            Fellow: Accession#:        [id number]        Nurse: Date of Birth/Age: 1/10/1931 / 89      Sonographer:         Ana Laura Dalton RDCS                    years Gender:            M                   Additional Staff: Height:            167.64 cm           Admit Date:          2/1/2024 Weight:            72.12 kg            Admission Status:    Inpatient - Routine BSA:               1.81 m2             Department Location: [facility] Echo Lab Blood Pressure: 131 /69 mmHg Study Type:    TRANSTHORACIC ECHO (TTE) LIMITED Diagnosis/ICD: Chest pain, unspecified-R07.9; Acute on chronic diastolic                (congestive) heart failure (CHF)-I50.33 Indication:    CP, CHF CPT Codes:     Echo Limited-52942  Study Detail: The following Echo studies were performed: 2D, M-Mode, Doppler and               color flow.  PHYSICIAN INTERPRETATION: Left Ventricle: Left ventricular systolic function is moderately decreased, with an estimated ejection fraction of 35-40%. There is global hypokinesis of the left ventricle with minor regional variations. The left ventricular cavity size is normal. Spectral Doppler shows a pseudonormal pattern of left ventricular diastolic filling. Left Atrium: The left atrium was not assessed. Right Ventricle: The right ventricle is normal in size. There is normal right ventricular global systolic function. Right Atrium: The right atrium was not assessed. Aortic Valve: The aortic valve is probably trileaflet. There is moderate aortic valve cusp calcification. There is moderate aortic valve thickening. There is evidence of severe aortic valve stenosis. There is trivial aortic valve regurgitation. Probably severe AS. Recommend additional measurements if clinically indicated. Mitral Valve: The  mitral valve is normal in structure. There is moderate mitral valve regurgitation. Tricuspid Valve: The tricuspid valve is structurally normal. There is moderate tricuspid regurgitation. The Doppler estimated RVSP is severely elevated at 66.0 mmHg. Pulmonic Valve: The pulmonic valve is structurally normal. There is no indication of pulmonic valve regurgitation. Pericardium: There is no pericardial effusion noted. Aorta: The aortic root was not assessed. Systemic Veins: The inferior vena cava appears moderately dilated. In comparison to the previous echocardiogram(s): Prior examinations are available and were reviewed for comparison purposes. Compared to 10/2023 study the LVEF is worse.  CONCLUSIONS:  1. Left ventricular systolic function is moderately decreased with a 35-40% estimated ejection fraction.  2. Spectral Doppler shows a pseudonormal pattern of left ventricular diastolic filling.  3. Moderate mitral valve regurgitation.  4. Moderate tricuspid regurgitation.  5. Severely elevated right ventricular systolic pressure.  6. Probably severe AS. Recommend additional measurements if clinically indicated.  7. Severe aortic valve stenosis.  8. There is moderate aortic valve cusp calcification.  9. The inferior vena cava appears moderately dilated. 10. Compared to 10/2023 study the LVEF is worse. 11. There is global hypokinesis of the left ventricle with minor regional variations. QUANTITATIVE DATA SUMMARY: 2D MEASUREMENTS:                           Normal Ranges: IVSd:          1.10 cm    (0.6-1.1cm) LVPWd:         1.00 cm    (0.6-1.1cm) LVIDd:         5.10 cm    (3.9-5.9cm) LVIDs:         4.00 cm LV Mass Index: 110.7 g/m2 LV % FS        21.6 % LV SYSTOLIC FUNCTION BY 2D PLANIMETRY (MOD):                     Normal Ranges: EF-A4C View: 38.0 % (>=55%) EF-A2C View: 38.9 % EF-Biplane:  38.9 % LV DIASTOLIC FUNCTION:                        Normal Ranges: MV Peak E:    1.46 m/s (0.7-1.2 m/s) MV Peak A:    1.12 m/s  (0.42-0.7 m/s) E/A Ratio:    1.30     (1.0-2.2) MV e'         0.05 m/s (>8.0) MV lateral e' 0.06 m/s MV medial e'  0.05 m/s E/e' Ratio:   27.37    (<8.0) MITRAL VALVE:                 Normal Ranges: MV DT: 174 msec (150-240msec) AORTIC VALVE:                         Normal Ranges: LVOT Max Wilner:  0.86 m/s (<=1.1m/s) LVOT VTI:      18.70 cm LVOT Diameter: 2.10 cm  (1.8-2.4cm)  RIGHT VENTRICLE: TAPSE: 20.3 mm RV s'  0.08 m/s TRICUSPID VALVE/RVSP:                             Normal Ranges: Peak TR Velocity: 3.97 m/s RV Syst Pressure: 66.0 mmHg (< 30mmHg)  [id number] Armando Ricks MD Electronically signed on 2/2/2024 at 10:28:08 AM  ** Final **     CT chest abdomen pelvis wo IV contrast    Result Date: 2/1/2024  Interpreted By:  Noa Kwon, STUDY: CT CHEST ABDOMEN PELVIS WO CONTRAST;  2/1/2024 5:02 pm   INDICATION: Signs/Symptoms:chest pain, c/f free air on CXR.   COMPARISON: Chest x-ray performed on this day.   ACCESSION NUMBER(S): [id number]   ORDERING CLINICIAN: MICKEY HONEYCUTT   TECHNIQUE: CT of the chest, abdomen and pelvis was performed. Contiguous axial images were obtained at 3 mm slice thickness through the chest, abdomen and pelvis. Coronal and sagittal reconstructions at 3 mm slice thickness were performed.  No intravenous contrast was administered; positive oral contrast was given.   FINDINGS: Please note that the study is limited without intravenous contrast. The scan is also limited by artifacts arising from the patient's arms that are kept by his sides.   CHEST:   LUNG/PLEURA/LARGE AIRWAYS: Tracheobronchial tree this patent.  There are infiltrate/atelectasis in the posterior lower lobes. No pleural effusion. No suspicious pulmonary nodules..   VESSELS: Mild dilatation of the ascending aorta measured at 4.3 cm. Pulmonary arteries normal in caliber. Dense three-vessel coronary arterial calcifications. The scan is not optimized for coronary calcium scoring.   HEART: Heart is moderately enlarged. No pericardial  Additional Safety/Bands: effusion.   MEDIASTINUM AND GLORIA: No mediastinal lymphadenopathy.  No large hilar lymph nodes on this nonenhanced scan. Esophagus is unremarkable   CHEST WALL AND LOWER NECK: Severe thoracic kyphosis.. Thyroid is not clearly visualized.   ABDOMEN:   LIVER: Unremarkable.   BILE DUCTS: Unremarkable.   GALLBLADDER: Tiny gallstones but with no signs of acute cholecystitis..   PANCREAS: Atrophic pancreas.   SPLEEN: Unremarkable.   ADRENAL GLANDS: Unremarkable.   KIDNEYS AND URETERS: Normal in size. Multiple bilateral cysts measured up to 7 cm. No hydronephrosis. No urinary tract calculi or obstruction.   PELVIS:   BLADDER: Unremarkable.   REPRODUCTIVE ORGANS: Brachytherapy seeds in the prostate. No pelvic masses.   BOWEL: Colon interposition anteriorly to liver which is responsible for the questionable free air on the recent chest radiograph. Stomach and intestine are otherwise unremarkable. No intestinal wall thickening. Appendix is identified..   VESSELS: Arteriosclerotic changes. Abdominal aorta and IVC are normal in caliber.   PERITONEUM/RETROPERITONEUM/LYMPH NODES: No evidence of lymphadenopathy. Retroperitoneal is unremarkable. No free fluid or free air.   ABDOMINAL WALL: A small right inguinal hernia which contains fat and fluid.   BONES: Unremarkable. Grade 1 anterolisthesis of C4-C5. No suspicious bone lesions.       Chest 1. Infiltrate/atelectasis in the posterior lower lobes. Rule out pneumonia. No pleural effusion. 2. Mild dilatation of the ascending aorta measured at 4.3 cm in diameter. 3. Exaggerated thoracic kyphosis. 4. Cardiomegaly.   Abdomen-Pelvis 1.  No evidence of free air. Colon interposition anteriorly to the liver which corresponds to the findings noted on the recent chest radiograph. 2. Multiple renal cysts. No hydronephrosis. 3. Tiny gallstones but with no signs of acute cholecystitis 4. A small right inguinal hernia containing fat and fluid. 5. Brachytherapy seeds in the prostate.     Signed  "by: Noa Kwon 2/1/2024 5:59 PM Dictation workstation:   [id number]    XR chest 1 view    Result Date: 2/1/2024  Interpreted By:  Eze Ashley, STUDY: XR CHEST 1 VIEW;  2/1/2024 2:03 pm   INDICATION: Signs/Symptoms:Chest Pain.   COMPARISON: 12/24/2023   ACCESSION NUMBER(S): [id number]   ORDERING CLINICIAN: STEFANIA MACKEY   FINDINGS: Low lung volumes with vascular crowding. Borderline heart size. Atherosclerosis. Diffuse interstitial pulmonary opacities suggestive of pulmonary edema. Questionable small left pleural effusion. Gas seen deep to the right hemidiaphragm, free air versus bowel gas.       Gas deep to the right hemidiaphragm, free air versus bowel gas. Consider follow-up with decubitus radiographs and/or CT of the abdomen and pelvis as warranted.   Cardiomegaly and pulmonary edema.   Eze Ashley discussed the significance and urgency of this critical finding by telephone with  STEFANIA MACKEY on 2/1/2024 at 2:28 pm.  (**-RCF-**) Findings:  See findings.     Signed by: Eze Ashley 2/1/2024 2:29 PM Dictation workstation:   [id number]       José Antonio Sandra \"Michael\" is a 89 y.o. male on day 7 of admission presenting with Acute on chronic diastolic heart failure (CMS/HCC).  Assessment/Plan   This patient currently has cardiac telemetry ordered; if you would like to modify or discontinue the telemetry order, click here to go to the orders activity to modify/discontinue the order.  Principal Problem:    Acute on chronic diastolic heart failure (CMS/HCC)  Active Problems:    Aortic stenosis    Dyspnea on exertion    Gastroesophageal reflux disease    Hard of hearing    Malignant neoplasm of prostate (CMS/HCC)    Chronic respiratory failure with hypoxia (CMS/HCC)    Essential hypertension    Acute on chronic congestive heart failure, unspecified heart failure type (CMS/HCC)  Acute on chronic systolic/diastolic heart failure   Severe aortic stenosis  New HFrEF with EF 35 to 40%  Type II " MI  Troponinemia secondary to demand ischemia  Pulmonary hypertension  Left sided pleural effusion   Plan:    The patient's volume status is approaching baseline with the current use of 2 L NC.  Kidney function has shown improvement, decreasing from 1.85 to 1.7.  Agreement with the primary team to recommence diuresis.  Optimize medical management by continuing metoprolol succinate. Additionally, Farxiga will be started, with a plan to start Entresto tomorrow on 2/9  Awaiting evaluation by the structural heart team.    Thank you for the opportunity to participate in the care of your patient.  Please do not hesitate to call if you have any questions.    Assessment and plan discussed with my attending.   Aneesh Kyle MD   Internal Medicine, PGY-1 .

## 2024-12-22 NOTE — ED PROVIDER NOTE - PHYSICAL EXAMINATION
Attending Jelly Clemente: Gen: ill appearing, heent: atrauamtic, dry mucous membranes, uvula midline, neck; nttp, no nuchal rigidity, chest: nttp, no crepitus, cv: rrr, , lungs: diffuse crackles bl and wheezing abd: soft, nontender, nondistended, no peritoneal signs,  no guarding, ext: wwp, neg homans, skin: no rash, neuro: sleepy intermittently moaning

## 2024-12-22 NOTE — H&P ADULT - CONVERSATION DETAILS
pt's only child and daughter Catherine Carrillo is pt's surrogate.  Catherine Carrillo requests all medical therapy as necessary and indicated  Catherine Carrillo requests DNR and DNI at this time, with trial of NIV.  Catherine Carrillo states she will let us know if she decides to change her mind on intubation as she is still somewhat undecided.

## 2024-12-22 NOTE — ED PROVIDER NOTE - ATTENDING CONTRIBUTION TO CARE
Attending MD Jelly Clemente:  I personally have seen and examined this patient.  Resident note reviewed and agree on plan of care and except where noted.  See HPI, PE, and MDM for details.

## 2024-12-23 ENCOUNTER — NON-APPOINTMENT (OUTPATIENT)
Age: 88
End: 2024-12-23

## 2024-12-23 DIAGNOSIS — Z51.5 ENCOUNTER FOR PALLIATIVE CARE: ICD-10-CM

## 2024-12-23 DIAGNOSIS — Z91.89 OTHER SPECIFIED PERSONAL RISK FACTORS, NOT ELSEWHERE CLASSIFIED: ICD-10-CM

## 2024-12-23 DIAGNOSIS — Z71.89 OTHER SPECIFIED COUNSELING: ICD-10-CM

## 2024-12-23 DIAGNOSIS — D64.9 ANEMIA, UNSPECIFIED: ICD-10-CM

## 2024-12-23 LAB
ALBUMIN SERPL ELPH-MCNC: 2.4 G/DL — LOW (ref 3.3–5)
ALBUMIN SERPL ELPH-MCNC: 3.1 G/DL — LOW (ref 3.3–5)
ALP SERPL-CCNC: 58 U/L — SIGNIFICANT CHANGE UP (ref 40–120)
ALP SERPL-CCNC: 77 U/L — SIGNIFICANT CHANGE UP (ref 40–120)
ALT FLD-CCNC: 13 U/L — SIGNIFICANT CHANGE UP (ref 10–45)
ALT FLD-CCNC: 8 U/L — LOW (ref 10–45)
ANION GAP SERPL CALC-SCNC: 16 MMOL/L — SIGNIFICANT CHANGE UP (ref 5–17)
ANION GAP SERPL CALC-SCNC: 9 MMOL/L — SIGNIFICANT CHANGE UP (ref 5–17)
AST SERPL-CCNC: 14 U/L — SIGNIFICANT CHANGE UP (ref 10–40)
AST SERPL-CCNC: 17 U/L — SIGNIFICANT CHANGE UP (ref 10–40)
BASOPHILS # BLD AUTO: 0 K/UL — SIGNIFICANT CHANGE UP (ref 0–0.2)
BASOPHILS NFR BLD AUTO: 0 % — SIGNIFICANT CHANGE UP (ref 0–2)
BILIRUB SERPL-MCNC: 0.4 MG/DL — SIGNIFICANT CHANGE UP (ref 0.2–1.2)
BILIRUB SERPL-MCNC: 0.5 MG/DL — SIGNIFICANT CHANGE UP (ref 0.2–1.2)
BLD GP AB SCN SERPL QL: NEGATIVE — SIGNIFICANT CHANGE UP
BUN SERPL-MCNC: 22 MG/DL — SIGNIFICANT CHANGE UP (ref 7–23)
BUN SERPL-MCNC: 26 MG/DL — HIGH (ref 7–23)
BURR CELLS BLD QL SMEAR: PRESENT — SIGNIFICANT CHANGE UP
BURR CELLS BLD QL SMEAR: SIGNIFICANT CHANGE UP
CALCIUM SERPL-MCNC: 8.3 MG/DL — LOW (ref 8.4–10.5)
CALCIUM SERPL-MCNC: 9.1 MG/DL — SIGNIFICANT CHANGE UP (ref 8.4–10.5)
CHLORIDE SERPL-SCNC: 105 MMOL/L — SIGNIFICANT CHANGE UP (ref 96–108)
CHLORIDE SERPL-SCNC: 107 MMOL/L — SIGNIFICANT CHANGE UP (ref 96–108)
CK SERPL-CCNC: 47 U/L — SIGNIFICANT CHANGE UP (ref 30–200)
CO2 SERPL-SCNC: 22 MMOL/L — SIGNIFICANT CHANGE UP (ref 22–31)
CO2 SERPL-SCNC: 31 MMOL/L — SIGNIFICANT CHANGE UP (ref 22–31)
CREAT SERPL-MCNC: 0.89 MG/DL — SIGNIFICANT CHANGE UP (ref 0.5–1.3)
CREAT SERPL-MCNC: 1.09 MG/DL — SIGNIFICANT CHANGE UP (ref 0.5–1.3)
EGFR: 61 ML/MIN/1.73M2 — SIGNIFICANT CHANGE UP
EGFR: 77 ML/MIN/1.73M2 — SIGNIFICANT CHANGE UP
EOSINOPHIL # BLD AUTO: 0 K/UL — SIGNIFICANT CHANGE UP (ref 0–0.5)
EOSINOPHIL NFR BLD AUTO: 0 % — SIGNIFICANT CHANGE UP (ref 0–6)
GAS PNL BLDV: SIGNIFICANT CHANGE UP
GLUCOSE SERPL-MCNC: 102 MG/DL — HIGH (ref 70–99)
GLUCOSE SERPL-MCNC: 143 MG/DL — HIGH (ref 70–99)
HCT VFR BLD CALC: 19.5 % — CRITICAL LOW (ref 39–50)
HCT VFR BLD CALC: 38.7 % — LOW (ref 39–50)
HGB BLD-MCNC: 12.3 G/DL — LOW (ref 13–17)
HGB BLD-MCNC: 5.8 G/DL — CRITICAL LOW (ref 13–17)
LACTATE SERPL-SCNC: 10.7 MMOL/L — CRITICAL HIGH (ref 0.5–2)
LYMPHOCYTES # BLD AUTO: 0.38 K/UL — LOW (ref 1–3.3)
LYMPHOCYTES # BLD AUTO: 5.9 % — LOW (ref 13–44)
MAGNESIUM SERPL-MCNC: 1.6 MG/DL — SIGNIFICANT CHANGE UP (ref 1.6–2.6)
MANUAL SMEAR VERIFICATION: SIGNIFICANT CHANGE UP
MCHC RBC-ENTMCNC: 29.3 PG — SIGNIFICANT CHANGE UP (ref 27–34)
MCHC RBC-ENTMCNC: 29.4 PG — SIGNIFICANT CHANGE UP (ref 27–34)
MCHC RBC-ENTMCNC: 29.7 G/DL — LOW (ref 32–36)
MCHC RBC-ENTMCNC: 31.8 G/DL — LOW (ref 32–36)
MCV RBC AUTO: 92.6 FL — SIGNIFICANT CHANGE UP (ref 80–100)
MCV RBC AUTO: 98.5 FL — SIGNIFICANT CHANGE UP (ref 80–100)
MONOCYTES # BLD AUTO: 0.45 K/UL — SIGNIFICANT CHANGE UP (ref 0–0.9)
MONOCYTES NFR BLD AUTO: 6.9 % — SIGNIFICANT CHANGE UP (ref 2–14)
NEUTROPHILS # BLD AUTO: 5.68 K/UL — SIGNIFICANT CHANGE UP (ref 1.8–7.4)
NEUTROPHILS NFR BLD AUTO: 85.2 % — HIGH (ref 43–77)
NEUTS BAND # BLD: 2 % — SIGNIFICANT CHANGE UP (ref 0–8)
NRBC # BLD: 0 /100 WBCS — SIGNIFICANT CHANGE UP (ref 0–0)
NT-PROBNP SERPL-SCNC: 6732 PG/ML — HIGH (ref 0–300)
OVALOCYTES BLD QL SMEAR: SLIGHT — SIGNIFICANT CHANGE UP
PHOSPHATE SERPL-MCNC: 2.3 MG/DL — LOW (ref 2.5–4.5)
PLAT MORPH BLD: NORMAL — SIGNIFICANT CHANGE UP
PLATELET # BLD AUTO: 200 K/UL — SIGNIFICANT CHANGE UP (ref 150–400)
PLATELET # BLD AUTO: 98 K/UL — LOW (ref 150–400)
POIKILOCYTOSIS BLD QL AUTO: SIGNIFICANT CHANGE UP
POTASSIUM SERPL-MCNC: 3.8 MMOL/L — SIGNIFICANT CHANGE UP (ref 3.5–5.3)
POTASSIUM SERPL-MCNC: 4.3 MMOL/L — SIGNIFICANT CHANGE UP (ref 3.5–5.3)
POTASSIUM SERPL-SCNC: 3.8 MMOL/L — SIGNIFICANT CHANGE UP (ref 3.5–5.3)
POTASSIUM SERPL-SCNC: 4.3 MMOL/L — SIGNIFICANT CHANGE UP (ref 3.5–5.3)
PROT SERPL-MCNC: 4.6 G/DL — LOW (ref 6–8.3)
PROT SERPL-MCNC: 6.2 G/DL — SIGNIFICANT CHANGE UP (ref 6–8.3)
RBC # BLD: 1.98 M/UL — LOW (ref 4.2–5.8)
RBC # BLD: 4.18 M/UL — LOW (ref 4.2–5.8)
RBC # FLD: 14.6 % — HIGH (ref 10.3–14.5)
RBC # FLD: 14.6 % — HIGH (ref 10.3–14.5)
RBC BLD AUTO: ABNORMAL
RH IG SCN BLD-IMP: POSITIVE — SIGNIFICANT CHANGE UP
SCHISTOCYTES BLD QL AUTO: SLIGHT — SIGNIFICANT CHANGE UP
SODIUM SERPL-SCNC: 143 MMOL/L — SIGNIFICANT CHANGE UP (ref 135–145)
SODIUM SERPL-SCNC: 147 MMOL/L — HIGH (ref 135–145)
TROPONIN T, HIGH SENSITIVITY RESULT: 107 NG/L — HIGH (ref 0–51)
TROPONIN T, HIGH SENSITIVITY RESULT: 113 NG/L — HIGH (ref 0–51)
TSH SERPL-MCNC: 0.75 UIU/ML — SIGNIFICANT CHANGE UP (ref 0.27–4.2)
WBC # BLD: 11.01 K/UL — HIGH (ref 3.8–10.5)
WBC # BLD: 6.51 K/UL — SIGNIFICANT CHANGE UP (ref 3.8–10.5)
WBC # FLD AUTO: 11.01 K/UL — HIGH (ref 3.8–10.5)
WBC # FLD AUTO: 6.51 K/UL — SIGNIFICANT CHANGE UP (ref 3.8–10.5)

## 2024-12-23 PROCEDURE — 71045 X-RAY EXAM CHEST 1 VIEW: CPT | Mod: 26

## 2024-12-23 PROCEDURE — 99233 SBSQ HOSP IP/OBS HIGH 50: CPT

## 2024-12-23 PROCEDURE — 99222 1ST HOSP IP/OBS MODERATE 55: CPT

## 2024-12-23 PROCEDURE — 99221 1ST HOSP IP/OBS SF/LOW 40: CPT

## 2024-12-23 PROCEDURE — 71045 X-RAY EXAM CHEST 1 VIEW: CPT | Mod: 26,77

## 2024-12-23 PROCEDURE — 99497 ADVNCD CARE PLAN 30 MIN: CPT | Mod: 25

## 2024-12-23 RX ORDER — ACETAMINOPHEN 80 MG/.8ML
1000 SOLUTION/ DROPS ORAL ONCE
Refills: 0 | Status: COMPLETED | OUTPATIENT
Start: 2024-12-23 | End: 2024-12-23

## 2024-12-23 RX ORDER — SODIUM CHLORIDE 9 MG/ML
1000 INJECTION, SOLUTION INTRAVENOUS
Refills: 0 | Status: DISCONTINUED | OUTPATIENT
Start: 2024-12-23 | End: 2024-12-24

## 2024-12-23 RX ORDER — POLYSORBATE 80 100 MG/10ML
1 SOLUTION/ DROPS OPHTHALMIC THREE TIMES A DAY
Refills: 0 | Status: DISCONTINUED | OUTPATIENT
Start: 2024-12-23 | End: 2024-12-31

## 2024-12-23 RX ORDER — VANCOMYCIN HYDROCHLORIDE 5 G/100ML
1000 INJECTION, POWDER, LYOPHILIZED, FOR SOLUTION INTRAVENOUS EVERY 24 HOURS
Refills: 0 | Status: DISCONTINUED | OUTPATIENT
Start: 2024-12-23 | End: 2024-12-26

## 2024-12-23 RX ADMIN — ERTAPENEM SODIUM 120 MILLIGRAM(S): 1 INJECTION, POWDER, LYOPHILIZED, FOR SOLUTION INTRAMUSCULAR; INTRAVENOUS at 00:43

## 2024-12-23 RX ADMIN — SODIUM CHLORIDE 60 MILLILITER(S): 9 INJECTION, SOLUTION INTRAVENOUS at 14:04

## 2024-12-23 RX ADMIN — ACETAMINOPHEN 400 MILLIGRAM(S): 80 SOLUTION/ DROPS ORAL at 06:45

## 2024-12-23 RX ADMIN — TAMSULOSIN HYDROCHLORIDE 0.4 MILLIGRAM(S): 0.4 CAPSULE ORAL at 22:40

## 2024-12-23 RX ADMIN — ERTAPENEM SODIUM 120 MILLIGRAM(S): 1 INJECTION, POWDER, LYOPHILIZED, FOR SOLUTION INTRAMUSCULAR; INTRAVENOUS at 23:47

## 2024-12-23 RX ADMIN — IPRATROPIUM BROMIDE AND ALBUTEROL SULFATE 3 MILLILITER(S): .5; 2.5 SOLUTION RESPIRATORY (INHALATION) at 05:45

## 2024-12-23 RX ADMIN — SODIUM CHLORIDE 50 MILLILITER(S): 9 INJECTION, SOLUTION INTRAVENOUS at 22:00

## 2024-12-23 RX ADMIN — VANCOMYCIN HYDROCHLORIDE 250 MILLIGRAM(S): 5 INJECTION, POWDER, LYOPHILIZED, FOR SOLUTION INTRAVENOUS at 00:40

## 2024-12-23 RX ADMIN — POLYSORBATE 80 1 DROP(S): 100 SOLUTION/ DROPS OPHTHALMIC at 22:40

## 2024-12-23 NOTE — PROGRESS NOTE ADULT - PROBLEM SELECTOR PLAN 2
acute metabolic encephalopathy - likely due to aspiration pneumonia, infection  - NPO when lethargic, but would consider advancing if patient's mental status improves  - fall precaution  - holding seroquel for now  No focal changes, once titrated off high flow will consider CT head, although per daughter has perked up since this morning

## 2024-12-23 NOTE — CONSULT NOTE ADULT - CONVERSATION DETAILS
Spoke with Catherine at bedside. Introduced myself and the role of palliative care. Catherine identifies her GOC for her father to continue medical management. She confirmed DNR/I, but she is hopeful with ABX and supportive care his respiratory status will begin to improve.   She shared her father has been having difficulty with po intake over the past few years but he continues to enjoy to eating with a good appetite. I shared my concern in the setting of recurrent aspiration, and advanced age it may be harder for him in the setting of less reserve but understand the goals of ongoing management. Discussed option of symptom directed care if in line with GOC to which Catherine verbalized understanding but is not interested at this time. Palliative contact info provided. Emotional support provided.

## 2024-12-23 NOTE — PHYSICAL THERAPY INITIAL EVALUATION ADULT - ADDITIONAL COMMENTS
Pt [place] in a high ranch with 7 steps to enter, chair lift inside, with 24/7 aide. Pt has RW and w/c. Pt only leaves home for dr lamb. Pt can transfer bed to RW to W/C and reverse with Mod A x1. Pt was ambulating 10-15ft with RW and PT.

## 2024-12-23 NOTE — PROGRESS NOTE ADULT - PROBLEM SELECTOR PLAN 3
no s/s of fluid overload  - cont pt's asa, eliquis (PO meds on hold, but will restart)  troponin elevated - sees cardiology at [place]   Denies chest pain, no acute ST changes  - likely 2/2 hypoxia  - trend CE  - holding torsemide for now no s/s of fluid overload  - cont pt's asa, eliquis (PO meds on hold, but will restart)  troponin elevated - sees cardiology at [place]   Denies chest pain, no acute ST changes  - likely 2/2 hypoxia  - trend CE  - holding torsemide for now  BNP elevated but no s/s of fluid overload, holding torsemide, since NPO will give gentle hydration and monitor.

## 2024-12-23 NOTE — CONSULT NOTE ADULT - SUBJECTIVE AND OBJECTIVE BOX
Wound Surgery Consult Note:    HPI:  89M c hx Atrial Fibrillation on eliquis, s/p PPM, CAD s/p multiple stents, HTN, BPH, vertigo, mild cognitive impairment, pw acute hypoxia, confusion.    History from chart and pt's daughter/surrogate Catherine Carrillo over phone.  At baseline pt transfers to wheelchair, requires assistance ambulating short distances, requires assistance eating. Pt lives alone with an aide. Pt still watches TV, able to identify family members, but over the past 9 mos, pt has been noted to be less verbal and intermittent hallucinations, calling for  family members. Also over the past 1 mo, pt has been having ?sinus congestion. Today pt ate breakfast and lunch as usual, but acutely after lunch, pt's aide noticed pt was having abnormal breathing and more lethargic. O2 sats checked at home were in the "high 40s" and reportedly "50s" by EMS.  Daughter is not aware of pt having any diarrhea, fevers, CP, abd pain.  (22 Dec 2024 21:42)    Request for wound care evaluation of the sacrum and bilateral buttocks received from nursing. Mr. Faith was encountered on an alternating air with low air loss surface. He was seen with her clinical nurse.  He is grossly incontinent of stool and urine. His immobility, inactivity, incontinence of bowel and bladder in addition to poor nutritional status all contribute to his risk of pressure injury development and hinder healing.     PAST MEDICAL & SURGICAL HISTORY:  BPH (benign prostatic hyperplasia)  Hypertension  Hyperlipidemia  Coronary artery disease  Atrial fibrillation  H/O hernia repair, 2018  H/O removal of cyst, cyst removal off tailbone    REVIEW OF SYSTEMS  unable to obtain due to patient's mental status    MEDICATIONS  (STANDING):  apixaban 2.5 milliGRAM(s) Oral two times a day  aspirin  chewable 81 milliGRAM(s) Oral daily  ertapenem  IVPB 1000 milliGRAM(s) IV Intermittent every 24 hours  finasteride 5 milliGRAM(s) Oral daily  lactobacillus acidophilus 1 Tablet(s) Oral every 12 hours  sodium chloride 0.45%. 1000 milliLiter(s) (60 mL/Hr) IV Continuous <Continuous>  tamsulosin 0.4 milliGRAM(s) Oral at bedtime  vancomycin  IVPB 1000 milliGRAM(s) IV Intermittent every 24 hours    MEDICATIONS  (PRN):  albuterol/ipratropium for Nebulization 3 milliLiter(s) Nebulizer every 6 hours PRN Shortness of Breath and/or Wheezing    Allergies    penicillin (Rash)    Intolerances    SOCIAL HISTORY:  unable to obtain due to patient's mental status    FAMILY HISTORY:  No pertinent family history in first degree relatives    Vital Signs Last 24 Hrs  T(C): 34.8 (23 Dec 2024 07:04), Max: 36.8 (22 Dec 2024 18:13)  T(F): 94.7 (23 Dec 2024 07:04), Max: 98.3 (22 Dec 2024 18:13)  HR: 70 (23 Dec 2024 08:02) (66 - 98)  BP: 118/55 (23 Dec 2024 07:04) (118/55 - 158/72)  BP(mean): 94 (22 Dec 2024 20:26) (94 - 94)  RR: 20 (23 Dec 2024 08:02) (14 - 24)  SpO2: 98% (23 Dec 2024 08:02) (92% - 100%)    Parameters below as of 23 Dec 2024 08:02  Patient On (Oxygen Delivery Method): nasal cannula, high flow  O2 Flow (L/min): 60  O2 Concentration (%): 100    Physical Exam:  General: obtunded, thin  Ophthamology: sclera clear  ENMT: moist mucous membranes, trachea midline  Respiratory: equal chest rise with respirations  Gastrointestinal: soft NT/ND  Neurology: nonverbal, not following commands  Psych: calm, cooperative  Musculoskeletal: no contractures  Vascular: BLE edema equal  Skin:  Sacrum/bilateral buttocks deep maroon discolored intact skin L 3cm x 3cm x none, no drainage   No odor, increased warmth, tenderness, induration, fluctuance, erythema      LABS:      147[H]  |  107  |  26[H]  ----------------------------<  143[H]  4.3   |  31  |  1.09    Ca    9.1      23 Dec 2024 11:32  Phos  2.3       Mg     1.6         TPro  6.2  /  Alb  3.1[L]  /  TBili  0.5  /  DBili  x   /  AST  17  /  ALT  13  /  AlkPhos  77                            5.8    6.51  )-----------( 98       ( 23 Dec 2024 07:06 )             19.5     PT/INR - ( 22 Dec 2024 17:55 )   PT: 16.6 sec;   INR: 1.46 ratio         PTT - ( 22 Dec 2024 17:55 )  PTT:30.5 sec  Urinalysis Basic - ( 23 Dec 2024 11:32 )    Color: x / Appearance: x / SG: x / pH: x  Gluc: 143 mg/dL / Ketone: x  / Bili: x / Urobili: x   Blood: x / Protein: x / Nitrite: x   Leuk Esterase: x / RBC: x / WBC x   Sq Epi: x / Non Sq Epi: x / Bacteria: x      
Date of Service: 12-23-24 @ 16:35    HPI: 89M c hx Atrial Fibrillation on eliquis, s/p PPM, CAD s/p multiple stents, HTN, BPH, vertigo, mild cognitive impairment, pw acute hypoxic respiratory failure and metabolic encephalopathy, s/p RRT this am requiring high flow. Palliative consulted for GOC.       PERTINENT PM/SXH:   BPH (benign prostatic hyperplasia)    Hypertension    Hyperlipidemia    Coronary artery disease    Atrial fibrillation      H/O hernia repair    H/O removal of cyst      FAMILY HISTORY:  No pertinent family history in first degree relatives        ITEMS NOT CHECKED ARE NOT PRESENT    SOCIAL HISTORY:   Significant other/partner[ ]  Children[ x]  Synagogue/Spirituality:  Substance hx:  [ ]   Tobacco hx:  [ ]   Alcohol hx: [ ]   Home Opioid hx:  [ ] I-Stop Reference No:  Living Situation: [x ]Home  [ ]Long term care  [ ]Rehab [ ]Other    ADVANCE DIRECTIVES:    DNR/MOLST  [ x]  Living Will  [ ]   DECISION MAKER(s):  [ ] Health Care Proxy(s)  [ x] Surrogate(s)  [ ] Guardian           Name(s): Phone Number(s): Catherine Carrillo     BASELINE (I)ADL(s) (prior to admission):  [place]: [ ]Total  [ ] Moderate [ x]Dependent    Allergies    penicillin (Rash)    Intolerances    MEDICATIONS  (STANDING):  apixaban 2.5 milliGRAM(s) Oral two times a day  aspirin  chewable 81 milliGRAM(s) Oral daily  ertapenem  IVPB 1000 milliGRAM(s) IV Intermittent every 24 hours  finasteride 5 milliGRAM(s) Oral daily  lactobacillus acidophilus 1 Tablet(s) Oral every 12 hours  sodium chloride 0.45%. 1000 milliLiter(s) (60 mL/Hr) IV Continuous <Continuous>  tamsulosin 0.4 milliGRAM(s) Oral at bedtime  vancomycin  IVPB 1000 milliGRAM(s) IV Intermittent every 24 hours    MEDICATIONS  (PRN):  albuterol/ipratropium for Nebulization 3 milliLiter(s) Nebulizer every 6 hours PRN Shortness of Breath and/or Wheezing    PRESENT SYMPTOMS: [ x]Unable to self-report see CPOT, PAINADs, RDOS  Source if other than patient:  [ ]Family   [x ]Team     Pain: [ ]yes [ ]no  QOL impact -   Location -                    Aggravating factors -  Quality -  Radiation -  Timing-  Severity (0-10 scale):  Minimal acceptable level (0-10 scale):       Dyspnea:                           [ ]Mild [ ]Moderate [ ]Severe  Anxiety:                             [ ]Mild [ ]Moderate [ ]Severe  Fatigue:                             [ ]Mild [ ]Moderate [ ]Severe  Nausea:                             [ ]Mild [ ]Moderate [ ]Severe  Loss of appetite:              [ ]Mild [ ]Moderate [ ]Severe  Constipation:                    [ ]Mild [ ]Moderate [ ]Severe    PCSSQ [Palliative Care Spiritual Screening Question]   Severity (0-10):  Score of 4 or > indicate consideration of Chaplaincy referral.  Chaplaincy Referral: [ ] yes [ ] refused [ ] following    Caregiver [place]? : [ ] yes [ ] no [ ] deferred:  Social work referral [ ] Patient & Family Centered Care Referral [ ]     Anticipatory Grief Present?: [ ] yes [ ] no  [ ] deferred: Palliative Social work referral [ ]  Patient & Family Centered Care Referral [ ]       Other Symptoms:  [ ]All other review of systems negative   [ x] Unable to obtain due to poor mentation    PHYSICAL EXAM:  Vital Signs Last 24 Hrs  T(C): 36.6 (23 Dec 2024 16:00), Max: 36.8 (22 Dec 2024 18:13)  T(F): 97.9 (23 Dec 2024 16:00), Max: 98.3 (22 Dec 2024 18:13)  HR: 80 (23 Dec 2024 16:00) (66 - 98)  BP: 115/67 (23 Dec 2024 16:00) (111/67 - 158/72)  BP(mean): 94 (22 Dec 2024 20:26) (94 - 94)  RR: 18 (23 Dec 2024 16:00) (14 - 24)  SpO2: 100% (23 Dec 2024 16:00) (92% - 100%)    Parameters below as of 23 Dec 2024 16:00  Patient On (Oxygen Delivery Method): nasal cannula, high flow     I&O's Summary      GENERAL:  []Alert  []Oriented x  [x ]Lethargic  [ ]Cachexia  [ ]Unarousable  [x]Verbal  [ ]Non-Verbal  Behavioral:   [ ]Anxiety  [ ]Delirium [ ]Agitation [ ]Other  HEENT:  [x]Normal   [ x]Dry mouth   [ ]ET Tube/Trach  [ ]Oral lesions  PULMONARY:   [ ]Clear [ ]Tachypnea  [ ]Audible excessive secretions   [ ]Rhonchi        [ ]Right [ ]Left [ ]Bilateral  [ ]Crackles        [ ]Right [ ]Left [ ]Bilateral  [ ]Wheezing     [ ]Right [ ]Left [ ]Bilateral  [x ]Diminished BS [ ] Right [ ]Left [x ]Bilateral  CARDIOVASCULAR:    [x]Regular [ ]Irregular [ ]Tachy  [ ]Juan Carlos [ ]Murmur [ ]Other  GASTROINTESTINAL:  [x]Soft  [ ]Distended   [x]+BS  [x]Non tender [ ]Tender  [ ]PEG [ ]OGT/ NGT   Last BM:    GENITOURINARY:  [ ]Normal [x ]Incontinent   [ ]Oliguria/Anuria   [ ]Bautista  MUSCULOSKELETAL:   [ ]Normal   [x]Weakness  [x ]Bed/Wheelchair bound [ ]Edema  NEUROLOGIC:   [ ]No focal deficits  [x ] Cognitive impairment  [ ] Dysphagia [ ]Dysarthria [ ] Paresis [ ]Other   SKIN:   [ ]Normal  [ ]Rash   [ ]Pressure ulcer(s) [ ]y [ ]n present on admission    CRITICAL CARE:  [ ] Shock Present  [ ]Septic [ ]Cardiogenic [ ]Neurologic [ ]Hypovolemic  [ ]  Vasopressors [ ]  Inotropes   [ ]Respiratory failure present [ ]Mechanical ventilation [ ]Non-invasive ventilatory support [ ]High flow    [ ]Acute  [ ]Chronic [ ]Hypoxic  [ ]Hypercarbic [ ]Other  [ ]Other organ failure     LABS:                        12.3   11.01 )-----------( 200      ( 23 Dec 2024 15:00 )             38.7   12-23    147[H]  |  107  |  26[H]  ----------------------------<  143[H]  4.3   |  31  |  1.09    Ca    9.1      23 Dec 2024 11:32  Phos  2.3     12-23  Mg     1.6     12-23    TPro  6.2  /  Alb  3.1[L]  /  TBili  0.5  /  DBili  x   /  AST  17  /  ALT  13  /  AlkPhos  77  12-23  PT/INR - ( 22 Dec 2024 17:55 )   PT: 16.6 sec;   INR: 1.46 ratio         PTT - ( 22 Dec 2024 17:55 )  PTT:30.5 sec    Urinalysis Basic - ( 23 Dec 2024 11:32 )    Color: x / Appearance: x / SG: x / pH: x  Gluc: 143 mg/dL / Ketone: x  / Bili: x / Urobili: x   Blood: x / Protein: x / Nitrite: x   Leuk Esterase: x / RBC: x / WBC x   Sq Epi: x / Non Sq Epi: x / Bacteria: x      RADIOLOGY & ADDITIONAL STUDIES:  < from: Xray Chest 1 View- PORTABLE-Urgent (Xray Chest 1 View- PORTABLE-Urgent .) (12.23.24 @ 02:07) >  ACC: [id number] EXAM:  XR CHEST PORTABLE URGENT 1V   ORDERED BY: SANTIAGO JOHNSON     ACC: [id number] EXAM:  XR CHEST PORTABLE URGENT 1V   ORDERED BY: LATOYA GEE     PROCEDURE DATE:  12/22/2024          INTERPRETATION:  EXAMINATION: XR CHEST URGENT, XR CHEST URGENT 12/22/2024   6:26 PM    CLINICAL INDICATION: sob    TECHNIQUE: Single frontal, portable view of the chest was obtained.    COMPARISON: Chest x-ray 11/4/2021.    FINDINGS:  Left-sided pacemaker with leads terminating in the right atrium and right   ventricle.  Coronary stents.  9 mm lung nodule in the right mid lateral thorax.  The heart is normal in size. Coronary artery stents.  Retrocardiac atelectasis or infiltrate.  There is no pneumothorax or pleural effusion.    Chest one view 12/23/2024 1:19 AM  Compared to the prior study, the left base is clearer.    IMPRESSION:  Left base clearing.    --- End of Report ---          JOHN SPENCER MD; Resident Radiologist  This document has been electronically signed.  MICHA JACKSON MD; Attending Interventional Radiologist  This document has been electronically signed. Dec 23 2024  8:15AM    < end of copied text >    PROTEIN CALORIE MALNUTRITION PRESENT: [ ]mild [ ]moderate [ ]severe [ ]underweight [ ]morbid obesity  https://www.andeal.org/vault/2440/web/files/ONC/Table_Clinical%20Characteristics%20to%20Document%20Malnutrition-White%20JV%20et%20al%202012.pdf    Height (cm): 172.7 (12-22-24 @ 21:53), 170.2 (05-10-24 @ 01:05)  Weight (kg): 53.5 (12-22-24 @ 21:53), 77.1 (05-10-24 @ 01:05)  BMI (kg/m2): 17.9 (12-22-24 @ 21:53), 26.6 (05-10-24 @ 01:05)    [x ]PPSV2 < or = to 30% [ ]significant weight loss  [ ]poor nutritional intake  [ ]anasarca[ ]Artificial Nutrition      Other REFERRALS:  [ ]Hospice  [ ]Child Life  [ ]Social Work  [ ]Case management [ ]Holistic Therapy     Care Coordination Assessment 201 [C. Provider] (12-23-24 @ 13:32)      Palliative Performance Scale:  http://npcrc.org/files/news/palliative_performance_scale_ppsv2.pdf  (Ctrl +  left click to view)  Respiratory Distress Observation Tool:  https://homecareinformation.net/handouts/hen/Respiratory_Distress_Observation_Scale.pdf (Ctrl +  left click to view)  PAINAD Score:  http://geriatrictoolkit.[place].[facility]/cog/painad.pdf (Ctrl +  left click to view)

## 2024-12-23 NOTE — PROGRESS NOTE ADULT - PROBLEM SELECTOR PLAN 1
- given hx most likely 2/2 aspiration event  - with some hypercapnia as well  s/p RRT 12/23 am for hypoxia - requiring high flow NC  - cont empiric vanc, ertapenem for now. Will follow up vanc level prior to next dose   - continuous pulse ox  - NPO, hob elevate, aspiration precaution  - cont high kvng NC for now, wean as lary  - need PPM interrogation  SLP consult placed - d/w daughter - would not want PEG but will be good to know what diet patient should be on.    - DNR/DNI. OK for trial of NIV. d/w daughter at length - she would like to pursue treatment, has bounced back before - given hx most likely 2/2 aspiration event- sepsis - bands 2%, now improved with antibiotics  - with some hypercapnia as well  s/p RRT 12/23 am for hypoxia - requiring high flow NC  - cont empiric vanc, ertapenem for now. Will follow up vanc level prior to next dose   - continuous pulse ox  - NPO, hob elevate, aspiration precaution  - cont high kvng NC for now, wean as lary  - need PPM interrogation  SLP consult placed - d/w daughter - would not want PEG but will be good to know what diet patient should be on.  IVF 1/2 NS    - DNR/DNI. OK for trial of NIV. d/w daughter at length - she would like to pursue treatment, has bounced back before - given hx most likely 2/2 aspiration event- sepsis - bands 2%, now improved with antibiotics  - with some hypercapnia as well  s/p RRT 12/23 am for hypoxia - requiring high flow NC  - cont empiric vanc, ertapenem for now. Will follow up vanc level prior to next dose   follow up blood cultures  viral panel negative  - continuous pulse ox  - NPO, hob elevate, aspiration precaution  - cont high kvng NC for now, wean as lary  - need PPM interrogation  SLP consult placed - d/w daughter - would not want PEG but will be good to know what diet patient should be on.  IVF 1/2 NS    - DNR/DNI. OK for trial of NIV. d/w daughter at length - she would like to pursue treatment, has bounced back before

## 2024-12-23 NOTE — CONSULT NOTE ADULT - ASSESSMENT
Impression:    Sacral/bilateral Buttocks deep tissue injury present on admission  Incontinence of bowel and bladder  Incontinence Dermatitis    Recommend:  1.) topical therapy: sacral/buttock wound – cleanse with incontinence cleanser, pat dry, apply Vaishnavi ointment BID and PRN for incontinent episodes  2.) Incontinence Management - incontinence cleanser, pads, pericare BID  3.) Maintain on an alternating air with low air loss surface  4.) Turn and reposition Q 2 hours  5.) Nutrition optimization   6.) Offload heels/feet with complete cair air fluidized boots/pillows; ensure that the soles of the feet are not resting on the foot board of the bed.    Care as per medicine. Will not actively follow but will remain available. Please recall for new issues or deterioration.  Upon [facility] [address] [place] [address] [phone]  Thank you for this consult  Radha Park NP-C, CWOCN via TEAMS    Nights/ Weekends/ Holidays please call:  General Surgery Consult pager ([phone]) for emergencies  
89M c hx Atrial Fibrillation on eliquis, s/p PPM, CAD s/p multiple stents, HTN, BPH, vertigo, mild cognitive impairment, pw acute hypoxic respiratory failure and metabolic encephalopathy, s/p RRT this am requiring high flow. Palliative consulted for GOC.

## 2024-12-23 NOTE — PHYSICAL THERAPY INITIAL EVALUATION ADULT - NSPTDMEREC_GEN_A_CORE
No DME needed No DME needed -PT will continue to assess while pt is admitted (possible need for ankit lift if pt goes home)

## 2024-12-23 NOTE — PROGRESS NOTE ADULT - SUBJECTIVE AND OBJECTIVE BOX
PROGRESS NOTE:   Authored by Dr. Chayito Gomez MD  Pager [phone]     Patient is a 89y old  Male who presents with a chief complaint of hypoxia, confusion (23 Dec 2024 12:17)      SUBJECTIVE / OVERNIGHT EVENTS: Patient seen and examined at bedside. This am with RRT for hypoxia - requiring high flow, NPO. bedside POCUS with L consolidation, b lines.     ADDITIONAL REVIEW OF SYSTEMS: unable to obtain due to AMS    MEDICATIONS  (STANDING):  apixaban 2.5 milliGRAM(s) Oral two times a day  aspirin  chewable 81 milliGRAM(s) Oral daily  ertapenem  IVPB 1000 milliGRAM(s) IV Intermittent every 24 hours  finasteride 5 milliGRAM(s) Oral daily  lactobacillus acidophilus 1 Tablet(s) Oral every 12 hours  sodium chloride 0.45%. 1000 milliLiter(s) (60 mL/Hr) IV Continuous <Continuous>  tamsulosin 0.4 milliGRAM(s) Oral at bedtime  vancomycin  IVPB 1000 milliGRAM(s) IV Intermittent every 24 hours    MEDICATIONS  (PRN):  albuterol/ipratropium for Nebulization 3 milliLiter(s) Nebulizer every 6 hours PRN Shortness of Breath and/or Wheezing      CAPILLARY BLOOD GLUCOSE      POCT Blood Glucose.: 171 mg/dL (23 Dec 2024 01:11)    I&O's Summary      PHYSICAL EXAM:  Vital Signs Last 24 Hrs  T(C): 36.3 (23 Dec 2024 11:59), Max: 36.8 (22 Dec 2024 18:13)  T(F): 97.4 (23 Dec 2024 11:59), Max: 98.3 (22 Dec 2024 18:13)  HR: 83 (23 Dec 2024 14:13) (66 - 98)  BP: 111/67 (23 Dec 2024 11:59) (111/67 - 158/72)  BP(mean): 94 (22 Dec 2024 20:26) (94 - 94)  RR: 20 (23 Dec 2024 14:13) (14 - 24)  SpO2: 100% (23 Dec 2024 14:13) (92% - 100%)    Parameters below as of 23 Dec 2024 14:13  Patient On (Oxygen Delivery Method): nasal cannula, high flow  O2 Flow (L/min): 60  O2 Concentration (%): 100    CONSTITUTIONAL: NAD  RESPIRATORY: ronchi b/l  CARDIOVASCULAR: tachy; No lower extremity edema; Peripheral pulses are 2+ bilaterally  ABDOMEN: Nontender to palpation, normoactive bowel sounds, no rebound/guarding; No hepatosplenomegaly  MUSCLOSKELETAL: no clubbing or cyanosis of digits; no joint swelling or tenderness to palpation  PSYCH: A+O to person    LABS:                        12.3   11.01 )-----------( 200      ( 23 Dec 2024 15:00 )             38.7     12-23    147[H]  |  107  |  26[H]  ----------------------------<  143[H]  4.3   |  31  |  1.09    Ca    9.1      23 Dec 2024 11:32  Phos  2.3     12-23  Mg     1.6     12-23    TPro  6.2  /  Alb  3.1[L]  /  TBili  0.5  /  DBili  x   /  AST  17  /  ALT  13  /  AlkPhos  77  12-23    PT/INR - ( 22 Dec 2024 17:55 )   PT: 16.6 sec;   INR: 1.46 ratio         PTT - ( 22 Dec 2024 17:55 )  PTT:30.5 sec      Urinalysis Basic - ( 23 Dec 2024 11:32 )    Color: x / Appearance: x / SG: x / pH: x  Gluc: 143 mg/dL / Ketone: x  / Bili: x / Urobili: x   Blood: x / Protein: x / Nitrite: x   Leuk Esterase: x / RBC: x / WBC x   Sq Epi: x / Non Sq Epi: x / Bacteria: x          RADIOLOGY & ADDITIONAL TESTS:  Results Reviewed:   Imaging Personally Reviewed:  Electrocardiogram Personally Reviewed:    COORDINATION OF CARE:  Care Discussed with Consultants/Other Providers [Y/N]:  Prior or Outpatient Records Reviewed [Y/N]:

## 2024-12-23 NOTE — CONSULT NOTE ADULT - PROBLEM SELECTOR RECOMMENDATION 3
see [facility] note above  DNR/I Trial of NIV  ongoing medical management at this time  surrogate is pt's daughter Catherine

## 2024-12-23 NOTE — PHYSICAL THERAPY INITIAL EVALUATION ADULT - PERTINENT HX OF CURRENT PROBLEM, REHAB EVAL
89M c hx Atrial Fibrillation on eliquis, s/p PPM, CAD s/p multiple stents, HTN, BPH, vertigo, mild cognitive impairment, pw acute hypoxia, confusion. Dx: Acute respiratory failure with hypoxia, Acute metabolic encephalopathy, Type 2 myocardial infarction, PAF (paroxysmal atrial fibrillation), CAD (coronary artery disease). At baseline pt transfers to wheelchair, requires assistance ambulating short distances, requires assistance eating. Pt lives alone with an aide. Pt still watches TV, able to identify family members, but over the past 9 mos, pt has been noted to be less verbal and intermittent hallucinations, calling for  family members. Also over the past 1 mo, pt has been having ?sinus congestion. Today pt ate breakfast and lunch as usual, but acutely after lunch, pt's aide noticed pt was having abnormal breathing and more lethargic. O2 sats checked at home were in the "high 40s" and reportedly "50s" by EMS.

## 2024-12-23 NOTE — RAPID RESPONSE TEAM SUMMARY - NSSITUATIONBACKGROUNDRRT_GEN_ALL_CORE
89 year old male admitted for resp distress and possible aspiration PNA s/p MOLST w/DNR/DNI trial of NIV found to be desating while on HFNC and NRB mask. While sleeping pt noted to desat to 69%. On arrival pt is awake yet does not follow commands, this is his usual mental status. Family notified and they re-state they do not want pt on a respirator. On exam, HR and BP were stable. Afebrile. FS >100. Physical exam was notable for bibasilar rhonchi, hypovolemic vol status with cachexia and skin tent. Ddx included recurrent silent aspiration, CHF exacerbation, PE. CHF unlikely given hypovolemic status and PE unlikely as pt is on eliquis. CXR did not show any signs of consolidations or pulm edema. Likely dx was silent aspiration. Patient was given duonebs on top of HFNC. Given patient is DNR/DNI, highest level of respiration was achieved as patient was not a candidate for bipap given mental status. O2 increased to 98% when increasing HFNC and duonebs.     Recommendations:  - if desats again, trial duonebs and chest PT  - can consider gentle diuresis if suspect CHF exacerbation

## 2024-12-23 NOTE — PHYSICAL THERAPY INITIAL EVALUATION ADULT - GENERAL OBSERVATIONS, REHAB EVAL
Pt received semi-supine in bed in NAD, +IV, +HFNC. Pt AOx 1-2, pleasant and cooperative. Daughter bedside.

## 2024-12-24 DIAGNOSIS — Z71.89 OTHER SPECIFIED COUNSELING: ICD-10-CM

## 2024-12-24 DIAGNOSIS — E87.0 HYPEROSMOLALITY AND HYPERNATREMIA: ICD-10-CM

## 2024-12-24 LAB
ALBUMIN SERPL ELPH-MCNC: 3 G/DL — LOW (ref 3.3–5)
ALP SERPL-CCNC: 82 U/L — SIGNIFICANT CHANGE UP (ref 40–120)
ALT FLD-CCNC: 13 U/L — SIGNIFICANT CHANGE UP (ref 10–45)
ANION GAP SERPL CALC-SCNC: 14 MMOL/L — SIGNIFICANT CHANGE UP (ref 5–17)
ANION GAP SERPL CALC-SCNC: 9 MMOL/L — SIGNIFICANT CHANGE UP (ref 5–17)
AST SERPL-CCNC: 21 U/L — SIGNIFICANT CHANGE UP (ref 10–40)
BILIRUB SERPL-MCNC: 0.5 MG/DL — SIGNIFICANT CHANGE UP (ref 0.2–1.2)
BUN SERPL-MCNC: 26 MG/DL — HIGH (ref 7–23)
BUN SERPL-MCNC: 26 MG/DL — HIGH (ref 7–23)
CALCIUM SERPL-MCNC: 9.3 MG/DL — SIGNIFICANT CHANGE UP (ref 8.4–10.5)
CALCIUM SERPL-MCNC: 9.4 MG/DL — SIGNIFICANT CHANGE UP (ref 8.4–10.5)
CHLORIDE SERPL-SCNC: 108 MMOL/L — SIGNIFICANT CHANGE UP (ref 96–108)
CHLORIDE SERPL-SCNC: 110 MMOL/L — HIGH (ref 96–108)
CO2 SERPL-SCNC: 26 MMOL/L — SIGNIFICANT CHANGE UP (ref 22–31)
CO2 SERPL-SCNC: 31 MMOL/L — SIGNIFICANT CHANGE UP (ref 22–31)
CREAT SERPL-MCNC: 0.89 MG/DL — SIGNIFICANT CHANGE UP (ref 0.5–1.3)
CREAT SERPL-MCNC: 0.96 MG/DL — SIGNIFICANT CHANGE UP (ref 0.5–1.3)
EGFR: 71 ML/MIN/1.73M2 — SIGNIFICANT CHANGE UP
EGFR: 77 ML/MIN/1.73M2 — SIGNIFICANT CHANGE UP
GAS PNL BLDV: SIGNIFICANT CHANGE UP
GLUCOSE BLDC GLUCOMTR-MCNC: 145 MG/DL — HIGH (ref 70–99)
GLUCOSE SERPL-MCNC: 147 MG/DL — HIGH (ref 70–99)
GLUCOSE SERPL-MCNC: 152 MG/DL — HIGH (ref 70–99)
HCT VFR BLD CALC: 36.7 % — LOW (ref 39–50)
HGB BLD-MCNC: 11.7 G/DL — LOW (ref 13–17)
MAGNESIUM SERPL-MCNC: 2.4 MG/DL — SIGNIFICANT CHANGE UP (ref 1.6–2.6)
MCHC RBC-ENTMCNC: 29.5 PG — SIGNIFICANT CHANGE UP (ref 27–34)
MCHC RBC-ENTMCNC: 31.9 G/DL — LOW (ref 32–36)
MCV RBC AUTO: 92.4 FL — SIGNIFICANT CHANGE UP (ref 80–100)
NRBC # BLD: 0 /100 WBCS — SIGNIFICANT CHANGE UP (ref 0–0)
PHOSPHATE SERPL-MCNC: 2.4 MG/DL — LOW (ref 2.5–4.5)
PLATELET # BLD AUTO: 218 K/UL — SIGNIFICANT CHANGE UP (ref 150–400)
POTASSIUM SERPL-MCNC: 3.5 MMOL/L — SIGNIFICANT CHANGE UP (ref 3.5–5.3)
POTASSIUM SERPL-MCNC: 4 MMOL/L — SIGNIFICANT CHANGE UP (ref 3.5–5.3)
POTASSIUM SERPL-SCNC: 3.5 MMOL/L — SIGNIFICANT CHANGE UP (ref 3.5–5.3)
POTASSIUM SERPL-SCNC: 4 MMOL/L — SIGNIFICANT CHANGE UP (ref 3.5–5.3)
PROT SERPL-MCNC: 6.2 G/DL — SIGNIFICANT CHANGE UP (ref 6–8.3)
RBC # BLD: 3.97 M/UL — LOW (ref 4.2–5.8)
RBC # FLD: 14.6 % — HIGH (ref 10.3–14.5)
SODIUM SERPL-SCNC: 148 MMOL/L — HIGH (ref 135–145)
SODIUM SERPL-SCNC: 150 MMOL/L — HIGH (ref 135–145)
WBC # BLD: 11.02 K/UL — HIGH (ref 3.8–10.5)
WBC # FLD AUTO: 11.02 K/UL — HIGH (ref 3.8–10.5)

## 2024-12-24 PROCEDURE — 99233 SBSQ HOSP IP/OBS HIGH 50: CPT

## 2024-12-24 PROCEDURE — 93010 ELECTROCARDIOGRAM REPORT: CPT

## 2024-12-24 PROCEDURE — 99231 SBSQ HOSP IP/OBS SF/LOW 25: CPT

## 2024-12-24 RX ORDER — B COMPLEX, C NO.20/FOLIC ACID 1 MG
1 CAPSULE ORAL DAILY
Refills: 0 | Status: DISCONTINUED | OUTPATIENT
Start: 2024-12-24 | End: 2024-12-31

## 2024-12-24 RX ORDER — ASCORBIC ACID 1000 MG
500 TABLET ORAL DAILY
Refills: 0 | Status: DISCONTINUED | OUTPATIENT
Start: 2024-12-24 | End: 2024-12-31

## 2024-12-24 RX ORDER — OLANZAPINE 15 MG/1
2.5 TABLET ORAL ONCE
Refills: 0 | Status: COMPLETED | OUTPATIENT
Start: 2024-12-24 | End: 2024-12-24

## 2024-12-24 RX ORDER — SODIUM CHLORIDE 9 MG/ML
1000 INJECTION, SOLUTION INTRAVENOUS
Refills: 0 | Status: DISCONTINUED | OUTPATIENT
Start: 2024-12-24 | End: 2024-12-27

## 2024-12-24 RX ADMIN — Medication 5 MILLIGRAM(S): at 13:11

## 2024-12-24 RX ADMIN — Medication 1 TABLET(S): at 06:16

## 2024-12-24 RX ADMIN — VANCOMYCIN HYDROCHLORIDE 250 MILLIGRAM(S): 5 INJECTION, POWDER, LYOPHILIZED, FOR SOLUTION INTRAVENOUS at 23:34

## 2024-12-24 RX ADMIN — Medication 1 TABLET(S): at 17:08

## 2024-12-24 RX ADMIN — ERTAPENEM SODIUM 120 MILLIGRAM(S): 1 INJECTION, POWDER, LYOPHILIZED, FOR SOLUTION INTRAMUSCULAR; INTRAVENOUS at 23:44

## 2024-12-24 RX ADMIN — POLYSORBATE 80 1 DROP(S): 100 SOLUTION/ DROPS OPHTHALMIC at 06:16

## 2024-12-24 RX ADMIN — POLYSORBATE 80 1 DROP(S): 100 SOLUTION/ DROPS OPHTHALMIC at 13:10

## 2024-12-24 RX ADMIN — APIXABAN 2.5 MILLIGRAM(S): 5 TABLET, FILM COATED ORAL at 06:16

## 2024-12-24 RX ADMIN — OLANZAPINE 2.5 MILLIGRAM(S): 15 TABLET ORAL at 04:04

## 2024-12-24 RX ADMIN — VANCOMYCIN HYDROCHLORIDE 250 MILLIGRAM(S): 5 INJECTION, POWDER, LYOPHILIZED, FOR SOLUTION INTRAVENOUS at 00:58

## 2024-12-24 RX ADMIN — SODIUM CHLORIDE 100 MILLILITER(S): 9 INJECTION, SOLUTION INTRAVENOUS at 17:08

## 2024-12-24 RX ADMIN — Medication 81 MILLIGRAM(S): at 13:10

## 2024-12-24 RX ADMIN — APIXABAN 2.5 MILLIGRAM(S): 5 TABLET, FILM COATED ORAL at 17:07

## 2024-12-24 RX ADMIN — TAMSULOSIN HYDROCHLORIDE 0.4 MILLIGRAM(S): 0.4 CAPSULE ORAL at 21:21

## 2024-12-24 RX ADMIN — POLYSORBATE 80 1 DROP(S): 100 SOLUTION/ DROPS OPHTHALMIC at 21:20

## 2024-12-24 RX ADMIN — Medication 500 MILLIGRAM(S): at 13:10

## 2024-12-24 RX ADMIN — SODIUM CHLORIDE 60 MILLILITER(S): 9 INJECTION, SOLUTION INTRAVENOUS at 13:10

## 2024-12-24 RX ADMIN — Medication 1 TABLET(S): at 13:11

## 2024-12-24 NOTE — DIETITIAN INITIAL EVALUATION ADULT - PERTINENT LABORATORY DATA
12-23    147[H]  |  107  |  26[H]  ----------------------------<  143[H]  4.3   |  31  |  1.09    Ca    9.1      23 Dec 2024 11:32  Phos  2.3     12-23  Mg     1.6     12-23    TPro  6.2  /  Alb  3.1[L]  /  TBili  0.5  /  DBili  x   /  AST  17  /  ALT  13  /  AlkPhos  77  12-23  POCT Blood Glucose.: 145 mg/dL (12-24-24 @ 00:15)

## 2024-12-24 NOTE — ADVANCED PRACTICE NURSE CONSULT - ASSESSMENT
Midline Catheter Insertion Note    Catheter type: 4F  : Bard  Power injectable: Yes  LOT# [id number]                                                                                                                                                                                                                        Procedure assisted by: AVIVA Price RN  Time out was performed, confirming the patient's first and last name, date of birth, procedure, and correct site prior to state of procedure.    Patient was placed with HOB 30 degrees. A mask and cap provided for patient. Patient placement site was prepped with chlorhexidine solution, then draped using maximum sterile barrier protection. The area was injected with 2 ml of 1% lidocaine. Using the Bard Site Rite 8, the catheter was placed using the Modified Seldinger Technique. Strict adherence to outline aseptic technique including handwashing, glove and gown, utilizing mask and cap, plus draping the patient with a sterile drape was observed. Upon completion of line placement, the insertion site was covered with a sterile occlusive CHG dressing. Pt tolerated procedure well.     All materials used for catheter insertion, including the intact guide wires, were accounted for at the end of the procedure.  Number of attempts: 1  Complications/Comments: None  Emergency Placement: No    Site: New  Anatomical Site of insertion: Left basilic vein  Catheter size/length: 4F, 20cm  US guided Bard single lumen power midline placed.

## 2024-12-24 NOTE — PROGRESS NOTE ADULT - SUBJECTIVE AND OBJECTIVE BOX
PROGRESS NOTE:   Authored by Dr. Chayito Gomez MD  Pager [phone]     Patient is a 89y old  Male who presents with a chief complaint of hypoxia, confusion (24 Dec 2024 09:02)      SUBJECTIVE / OVERNIGHT EVENTS: Patient seen and examined at bedside. Patient more awake today, slightly agitated and aggressive overnight. Pulled out all his IVs. Was on high flow and able to wean to NC    ADDITIONAL REVIEW OF SYSTEMS: unable to obtain due to AMS, dementia    MEDICATIONS  (STANDING):  apixaban 2.5 milliGRAM(s) Oral two times a day  artificial tears (preservative free) Ophthalmic Solution 1 Drop(s) Both EYES three times a day  ascorbic acid 500 milliGRAM(s) Oral daily  aspirin  chewable 81 milliGRAM(s) Oral daily  ertapenem  IVPB 1000 milliGRAM(s) IV Intermittent every 24 hours  finasteride 5 milliGRAM(s) Oral daily  lactobacillus acidophilus 1 Tablet(s) Oral every 12 hours  multivitamin 1 Tablet(s) Oral daily  sodium chloride 0.45%. 1000 milliLiter(s) (60 mL/Hr) IV Continuous <Continuous>  tamsulosin 0.4 milliGRAM(s) Oral at bedtime  vancomycin  IVPB 1000 milliGRAM(s) IV Intermittent every 24 hours    MEDICATIONS  (PRN):  albuterol/ipratropium for Nebulization 3 milliLiter(s) Nebulizer every 6 hours PRN Shortness of Breath and/or Wheezing      CAPILLARY BLOOD GLUCOSE      POCT Blood Glucose.: 145 mg/dL (24 Dec 2024 00:15)    I&O's Summary    23 Dec 2024 07:01  -  24 Dec 2024 07:00  --------------------------------------------------------  IN: 0 mL / OUT: 250 mL / NET: -250 mL        PHYSICAL EXAM:  Vital Signs Last 24 Hrs  T(C): 36.5 (24 Dec 2024 11:02), Max: 36.6 (23 Dec 2024 16:00)  T(F): 97.7 (24 Dec 2024 11:02), Max: 97.9 (23 Dec 2024 16:00)  HR: 82 (24 Dec 2024 11:02) (74 - 88)  BP: 132/65 (24 Dec 2024 11:02) (107/60 - 158/70)  BP(mean): --  RR: 18 (24 Dec 2024 11:02) (18 - 20)  SpO2: 94% (24 Dec 2024 11:02) (94% - 100%)    Parameters below as of 24 Dec 2024 11:02  Patient On (Oxygen Delivery Method): nasal cannula        CONSTITUTIONAL: NAD, frail  RESPIRATORY: b/l ronchi  CARDIOVASCULAR: Regular rate and rhythm, No lower extremity edema; Peripheral pulses are 2+ bilaterally  ABDOMEN: Nontender to palpation, normoactive bowel sounds, no rebound/guarding; No hepatosplenomegaly  MUSCLOSKELETAL: no clubbing or cyanosis of digits; no joint swelling or tenderness to palpation  PSYCH: A+O to person    LABS:                        11.7   11.02 )-----------( 218      ( 24 Dec 2024 07:43 )             36.7     12-24    150[H]  |  110[H]  |  26[H]  ----------------------------<  147[H]  4.0   |  26  |  0.96    Ca    9.4      24 Dec 2024 07:43  Phos  2.4     12-24  Mg     2.4     12-24    TPro  6.2  /  Alb  3.0[L]  /  TBili  0.5  /  DBili  x   /  AST  21  /  ALT  13  /  AlkPhos  82  12-24    PT/INR - ( 22 Dec 2024 17:55 )   PT: 16.6 sec;   INR: 1.46 ratio         PTT - ( 22 Dec 2024 17:55 )  PTT:30.5 sec      Urinalysis Basic - ( 24 Dec 2024 07:43 )    Color: x / Appearance: x / SG: x / pH: x  Gluc: 147 mg/dL / Ketone: x  / Bili: x / Urobili: x   Blood: x / Protein: x / Nitrite: x   Leuk Esterase: x / RBC: x / WBC x   Sq Epi: x / Non Sq Epi: x / Bacteria: x        Culture - Blood (collected 22 Dec 2024 17:30)  Source: .Blood BLOOD  Preliminary Report (23 Dec 2024 22:01):    No growth at 24 hours    Culture - Blood (collected 22 Dec 2024 17:15)  Source: .Blood BLOOD  Preliminary Report (23 Dec 2024 22:01):    No growth at 24 hours        RADIOLOGY & ADDITIONAL TESTS:  Results Reviewed:   Imaging Personally Reviewed:  Electrocardiogram Personally Reviewed:    COORDINATION OF CARE:  Care Discussed with Consultants/Other Providers [Y/N]:  Prior or Outpatient Records Reviewed [Y/N]:

## 2024-12-24 NOTE — SWALLOW BEDSIDE ASSESSMENT ADULT - SPECIFY REASON(S)
to subjectively assess swallow safety/function; r/o dysphagia. Per consult reason: "a/w aspiration pneumonia."

## 2024-12-24 NOTE — PROGRESS NOTE ADULT - PROBLEM SELECTOR PLAN 3
spoke with pt's grandallison Lafleur, goals remain for conservative medical management  Catherine (surrogate) given palliative contact information if any further questions at this time

## 2024-12-24 NOTE — PROGRESS NOTE ADULT - SUBJECTIVE AND OBJECTIVE BOX
SUBJECTIVE AND OBJECTIVE: pt seen and examined at bedside. pt awake but disoriented at time of visit.   Indication for Geriatrics and Palliative Care Services/INTERVAL HPI: GOC     OVERNIGHT EVENTS: pt requiring zyprexa IM x1 for agitation     DNR on chart:DNI: Trial NIV  DNI: Trial NIV      Allergies    penicillin (Rash)    Intolerances    MEDICATIONS  (STANDING):  apixaban 2.5 milliGRAM(s) Oral two times a day  artificial tears (preservative free) Ophthalmic Solution 1 Drop(s) Both EYES three times a day  ascorbic acid 500 milliGRAM(s) Oral daily  aspirin  chewable 81 milliGRAM(s) Oral daily  ertapenem  IVPB 1000 milliGRAM(s) IV Intermittent every 24 hours  finasteride 5 milliGRAM(s) Oral daily  lactobacillus acidophilus 1 Tablet(s) Oral every 12 hours  multivitamin 1 Tablet(s) Oral daily  sodium chloride 0.45%. 1000 milliLiter(s) (60 mL/Hr) IV Continuous <Continuous>  tamsulosin 0.4 milliGRAM(s) Oral at bedtime  vancomycin  IVPB 1000 milliGRAM(s) IV Intermittent every 24 hours    MEDICATIONS  (PRN):  albuterol/ipratropium for Nebulization 3 milliLiter(s) Nebulizer every 6 hours PRN Shortness of Breath and/or Wheezing      ITEMS UNCHECKED ARE NOT PRESENT    PRESENT SYMPTOMS: [ x]Unable to self-report - see [ ] CPOT [ x] PAINADS [ x] RDOS  Source if other than patient:  [ ]Family   [x ]Team     Pain:  [ ]yes [ ]no  QOL impact -   Location -                    Aggravating factors -  Quality -  Radiation -  Timing-  Severity (0-10 scale):  Minimal acceptable level (0-10 scale):     CPOT:    https://www.sccm.org/getattachment/xxg33x22-4w1s-6v9b-5y9n-6035r4853t7w/Critical-Care-Pain-Observation-Tool-(CPOT)    PAINAD Score: See PAINAD tool and score below       Dyspnea:                           [ ]Mild [ ]Moderate [ ]Severe    RDOS: See RDOS tool and score below   0 to 2  minimal or no respiratory distress   3  mild distress  4 to 6 moderate distress  >7 severe distress      Anxiety:                             [ ]Mild [ ]Moderate [ ]Severe  Fatigue:                             [ ]Mild [ ]Moderate [ ]Severe  Nausea:                             [ ]Mild [ ]Moderate [ ]Severe  Loss of appetite:              [ ]Mild [ ]Moderate [ ]Severe  Constipation:                    [ ]Mild [ ]Moderate [ ]Severe    PCSSQ[Palliative Care Spiritual Screening Question]   Severity (0-10):  Score of 4 or > indicate consideration of Chaplaincy referral.  Chaplaincy Referral: [ ] yes [ ] refused [ ] following [ ] Deferred     Caregiver [place]? : [ ] yes [ ] no [ ] Deferred [ ] Declined             Social work referral [ ] Patient & Family Centered Care Referral [ ]     Anticipatory Grief present?:  [ ] yes [ ] no  [ ] Deferred                  Social work referral [ ] Chaplaincy Referral [ ]    		  Other Symptoms:  [x ]All other review of systems negative     Palliative Performance Status Version 2:   See PPSv2 tool and score below         PHYSICAL EXAM:  Vital Signs Last 24 Hrs  T(C): 36.3 (24 Dec 2024 07:15), Max: 36.6 (23 Dec 2024 16:00)  T(F): 97.3 (24 Dec 2024 07:15), Max: 97.9 (23 Dec 2024 16:00)  HR: 74 (24 Dec 2024 07:15) (74 - 88)  BP: 158/70 (24 Dec 2024 07:15) (107/60 - 158/70)  BP(mean): --  RR: 18 (24 Dec 2024 07:15) (18 - 20)  SpO2: 100% (24 Dec 2024 07:15) (96% - 100%)    Parameters below as of 24 Dec 2024 07:15  Patient On (Oxygen Delivery Method): nasal cannula, high flow     I&O's Summary    23 Dec 2024 07:01  -  24 Dec 2024 07:00  --------------------------------------------------------  IN: 0 mL / OUT: 250 mL / NET: -250 mL       GENERAL: [ ]Cachexia    [x ]Alert  [x ]Oriented x0   [ ]Lethargic  [ ]Unarousable  [ ]Verbal  [ ]Non-Verbal  Behavioral:   [ ]Anxiety  [ x]Delirium [ ]Agitation [ ]Other  HEENT:  [ ]Normal   [x ]Dry mouth   [ ]ET Tube/Trach  [ ]Oral lesions  PULMONARY:   [ ]Clear [ ]Tachypnea  [ ]Audible excessive secretions   [ ]Rhonchi        [ ]Right [ ]Left [ ]Bilateral  [ ]Crackles        [ ]Right [ ]Left [ ]Bilateral  [ ]Wheezing     [ ]Right [ ]Left [ ]Bilateral  [ x]Diminished BS [ ] Right [ ]Left [ x]Bilateral  CARDIOVASCULAR:    [ x]Regular [ ]Irregular [ ]Tachy  [ ]Juan Carlos [ ]Murmur [ ]Other  GASTROINTESTINAL:  [x ]Soft  [ ]Distended   [x]+BS  [x ]Non tender [ ]Tender  [ ]Other [ ]PEG [ ]OGT/ NGT   Last BM:   GENITOURINARY:  [ ]Normal [x ]Incontinent   [ ]Oliguria/Anuria   [ ]Bautista  MUSCULOSKELETAL:   [ ]Normal   [x ]Weakness  [ x]Bed/Wheelchair bound [ ]Edema  NEUROLOGIC:   [ ]No focal deficits  [ x] Cognitive impairment  [ ] Dysphagia [ ]Dysarthria [ ] Paresis [ ]Other   SKIN:   [ ]Normal  [ ]Rash  [ ]Other  [ x]Pressure ulcer(s) [x ]y [ ]n present on admission    CRITICAL CARE:  [ ]Shock Present  [ ]Septic [ ]Cardiogenic [ ]Neurologic [ ]Hypovolemic  [ ]Vasopressors [ ]Inotropes  [ ]Respiratory failure present [ ]Mechanical Ventilation [ ]Non-invasive ventilatory support [ ]High-Flow   [ ]Acute  [ ]Chronic [ ]Hypoxic  [ ]Hypercarbic [ ]Other  [ ]Other organ failure     LABS:                        11.7   11.02 )-----------( 218      ( 24 Dec 2024 07:43 )             36.7   12-24    150[H]  |  110[H]  |  26[H]  ----------------------------<  147[H]  4.0   |  26  |  0.96    Ca    9.4      24 Dec 2024 07:43  Phos  2.4     12-24  Mg     2.4     12-24    TPro  6.2  /  Alb  3.0[L]  /  TBili  0.5  /  DBili  x   /  AST  21  /  ALT  13  /  AlkPhos  82  12-24  PT/INR - ( 22 Dec 2024 17:55 )   PT: 16.6 sec;   INR: 1.46 ratio         PTT - ( 22 Dec 2024 17:55 )  PTT:30.5 sec    Urinalysis Basic - ( 24 Dec 2024 07:43 )    Color: x / Appearance: x / SG: x / pH: x  Gluc: 147 mg/dL / Ketone: x  / Bili: x / Urobili: x   Blood: x / Protein: x / Nitrite: x   Leuk Esterase: x / RBC: x / WBC x   Sq Epi: x / Non Sq Epi: x / Bacteria: x      RADIOLOGY & ADDITIONAL STUDIES:  < from: Xray Chest 1 View- PORTABLE-Urgent (Xray Chest 1 View- PORTABLE-Urgent .) (12.23.24 @ 02:07) >    ACC: [id number] EXAM:  XR CHEST PORTABLE URGENT 1V   ORDERED BY: SANTIAGO JOHNSON     ACC: [id number] EXAM:  XR CHEST PORTABLE URGENT 1V   ORDERED BY: LATOYA GEE     PROCEDURE DATE:  12/22/2024          INTERPRETATION:  EXAMINATION: XR CHEST URGENT, XR CHEST URGENT 12/22/2024   6:26 PM    CLINICAL INDICATION: sob    TECHNIQUE: Single frontal, portable view of the chest was obtained.    COMPARISON: Chest x-ray 11/4/2021.    FINDINGS:  Left-sided pacemaker with leads terminating in the right atrium and right   ventricle.  Coronary stents.  9 mm lung nodule in the right mid lateral thorax.  The heart is normal in size. Coronary artery stents.  Retrocardiac atelectasis or infiltrate.  There is no pneumothorax or pleural effusion.    Chest one view 12/23/2024 1:19 AM  Compared to the prior study, the left base is clearer.    IMPRESSION:  Left base clearing.    --- End of Report ---          JOHN SPENCER MD; Resident Radiologist  This document has been electronically signed.  MICHA JACKSON MD; Attending Interventional Radiologist  This document has been electronically signed. Dec 23 2024  8:15AM    < end of copied text >    Protein Calorie Malnutrition Present: [ ]mild [ ]moderate [ ]severe [ ]underweight [ ]morbid obesity  https://www.andeal.org/vault/2440/web/files/ONC/Table_Clinical%20Characteristics%20to%20Document%20Malnutrition-White%20JV%20et%20al%202012.pdf    Height (cm): 172.7 (12-22-24 @ 21:53), 170.2 (05-10-24 @ 01:05)  Weight (kg): 53.5 (12-22-24 @ 21:53), 77.1 (05-10-24 @ 01:05)  BMI (kg/m2): 17.9 (12-22-24 @ 21:53), 26.6 (05-10-24 @ 01:05)    [x ]PPSV2 < or = 30%  [ ]significant weight loss [ ]poor nutritional intake [ ]anasarca[ ]Artificial Nutrition    Other REFERRALS:  [ ]Hospice  [ ]Child Life  [ ]Social Work  [ ]Case management [ ]Holistic Therapy     Goals of Care Document:    Goals of Care:   GOALS OF CARE:  · Participants	Family  · Child(monse)	Catherine (daughter), Geronimo (JAMIA)    Advance Directives:  · Does patient have Advance Directive	Yes  · Indicate Type	Medical Orders for Life-Sustaining Treatment (MOLST)    Conversation Discussion:  · ADVANCED CARE PLANNING	Voluntary discussion occurred addressing advanced care planning  · Conversation	Diagnosis; Prognosis; Treatment Options  · Conversation Details	Spoke with Catherine at bedside. Introduced myself and the role of palliative care. Catherine identifies her GOC for her father to continue medical management. She confirmed DNR/I, but she is hopeful with ABX and supportive care his respiratory status will begin to improve.   She shared her father has been having difficulty with po intake over the past few years but he continues to enjoy to eating with a good appetite. I shared my concern in the setting of recurrent aspiration, and advanced age it may be harder for him in the setting of less reserve but understand the goals of ongoing management. Discussed option of symptom directed care if in line with GOC to which Catherine verbalized understanding but is not interested at this time. Palliative contact info provided. Emotional support provided.    Treatment Guidelines:  · Decision Maker	Surrogate    Location of Discussion:  · Location of discussion	Face to face    Time Spent on Advance Care Planning:  Attending or CYNDI Only.     I personally spent 25 minutes on advance care planning services with the patient. This time is separate and distinct from any other care management services provided on this date.

## 2024-12-24 NOTE — PROGRESS NOTE ADULT - PROBLEM SELECTOR PLAN 3
acute metabolic encephalopathy - likely due to aspiration pneumonia, infection  - NPO when lethargic, but would consider advancing if patient's mental status improves - mental status improved today, more awake  - fall precaution  - holding seroquel for now    Per grandson at beside, this is very close to baseline

## 2024-12-24 NOTE — SWALLOW BEDSIDE ASSESSMENT ADULT - ADDITIONAL RECOMMENDATIONS
Maintain good oral hygiene.    Swallow Goals: 1. Pt/family/caregiver will demonstrate understanding and carryover of dysphagia management (safe swallow guidelines, compensatory strategies, dysphagia diet).  2. Pt will tolerate recommended diet with no overt, clinical s/s of aspiration.

## 2024-12-24 NOTE — SWALLOW BEDSIDE ASSESSMENT ADULT - SWALLOW EVAL: PROGNOSIS
Dx cont: Therefore, objective testing offered to further visualize swallow mechanism; however, per d/w daughter, instrumental assessment unlikely to change clinical management, as alternate means of nutrition/hydration likely not within pt/family wishes. This service will follow to clinically monitor tolerance of diet and to consider diet upgrade pending improvement in respiratory status and overall clinical profile.

## 2024-12-24 NOTE — SWALLOW BEDSIDE ASSESSMENT ADULT - SWALLOW EVAL: THERAPY FREQUENCY
x1 to monitor tolerance of recommended diet and to provide education re: dysphagia management in preparation for discharge home.

## 2024-12-24 NOTE — DIETITIAN INITIAL EVALUATION ADULT - PROBLEM SELECTOR PLAN 1
- given hx most likely 2/2 aspiration event  - initially mildly hypercapneic as well  - cont empiric vanc, ertapenem for now. narrow abx as pt's condition improves  - continuous pulse ox  - NPO, hob elevate, aspiration precaution  - cont high kvng NC for now, wean as lary  - need to call for PPM interrogation in AM    - GOC as above. DNR/DNI. OK for trial of NIV. pt is NOT comfort care.

## 2024-12-24 NOTE — SWALLOW BEDSIDE ASSESSMENT ADULT - SWALLOW EVAL: PATIENT/FAMILY GOALS STATEMENT
Pt unable to state iso advanced dementia. Daughter at bedside available to provide collateral information. Daughter reports pt "loves" to eat/drink; consumes regular diet PTA. Daughter reports pt noted to cough occasionally, most notably w/ drinking liquids. No prior hx of PNA per daughter. Pt has 24/7 aide at home who assists pt with feeding; daughter feels as though aide may feed pt too quickly at times.

## 2024-12-24 NOTE — DIETITIAN INITIAL EVALUATION ADULT - PERTINENT MEDS FT
MEDICATIONS  (STANDING):  apixaban 2.5 milliGRAM(s) Oral two times a day  artificial tears (preservative free) Ophthalmic Solution 1 Drop(s) Both EYES three times a day  aspirin  chewable 81 milliGRAM(s) Oral daily  ertapenem  IVPB 1000 milliGRAM(s) IV Intermittent every 24 hours  finasteride 5 milliGRAM(s) Oral daily  lactobacillus acidophilus 1 Tablet(s) Oral every 12 hours  sodium chloride 0.45%. 1000 milliLiter(s) (60 mL/Hr) IV Continuous <Continuous>  tamsulosin 0.4 milliGRAM(s) Oral at bedtime  vancomycin  IVPB 1000 milliGRAM(s) IV Intermittent every 24 hours    MEDICATIONS  (PRN):  albuterol/ipratropium for Nebulization 3 milliLiter(s) Nebulizer every 6 hours PRN Shortness of Breath and/or Wheezing

## 2024-12-24 NOTE — PROGRESS NOTE ADULT - PROBLEM SELECTOR PLAN 1
pt remains on HFNC 40/60   defer to primary team for ongoing management  c/w ABX pt remains on HFNC 40/60-> on repeat exam pt tolerating NC    defer to primary team for ongoing management  c/w ABX

## 2024-12-24 NOTE — DIETITIAN INITIAL EVALUATION ADULT - NSFNSPHYEXAMSKINFT_GEN_A_CORE
Pressure Injury 1: Bilateral:, sacrum, Suspected deep tissue injury  Pressure Injury 2: coccyx, sacrum, none  Pressure Injury 3: back, thoracic, Stage II  Pressure Injury 4: none, none  Pressure Injury 5: none, none  Pressure Injury 6: none, none  Pressure Injury 7: none, none  Pressure Injury 8: none, none  Pressure Injury 9: none, none  Pressure Injury 10: none, none  Pressure Injury 11: none, none, Pressure Injury 1: none, none  Pressure Injury 2: coccyx, sacrum, Stage II  Pressure Injury 3: back, THoracic, Stage II  Pressure Injury 4: none, none  Pressure Injury 5: none, none  Pressure Injury 6: none, none  Pressure Injury 7: none, none  Pressure Injury 8: none, none  Pressure Injury 9: none, none  Pressure Injury 10: none, none  Pressure Injury 11: none, none, Pressure Injury 1: Bilateral:, sacrum, and buttocks, Suspected deep tissue injury  Pressure Injury 2: none, none  Pressure Injury 3: none, none  Pressure Injury 4: none, none  Pressure Injury 5: none, none  Pressure Injury 6: none, none  Pressure Injury 7: none, none  Pressure Injury 8: none, none  Pressure Injury 9: none, none  Pressure Injury 10: none, none  Pressure Injury 11: none, none

## 2024-12-24 NOTE — PROGRESS NOTE ADULT - PROBLEM SELECTOR PLAN 2
pt remains altered, increasingly awake but A&Ox0  o/n pt agitated requiring 1x zyprexa   can c/w prn zyprexa for agitation

## 2024-12-24 NOTE — SWALLOW BEDSIDE ASSESSMENT ADULT - COMMENTS
s/p RRT  am for hypoxia - requiring high flow NC. Ddx included recurrent silent aspiration, CHF exacerbation, PE. CHF unlikely given hypovolemic status and PE unlikely as pt is on eliquis. CXR did not show any signs of consolidations or pulm edema. Likely dx was silent aspiration. Acute respiratory failure w/ hypoxia; given hx most likely 2/2 aspiration event.   Per palliative care-->Per family, pt "has been having difficulty with po intake over the past few years but he continues to enjoy to eating with a good appetite. I shared my concern in the setting of recurrent aspiration, and advanced age it may be harder for him in the setting of less reserve but understand the goals of ongoing management."   : overnight pt agitated/aggressive requiring 1x zyprexa; transitioned to 6LNC  Acute metabolic encephalopathy-->NPO when lethargic, but would consider advancing if patient's mental status improves - mental status improved today, more awake. Per grandson at beside, this is very close to baseline. Ongoing discussions with daughter and grandson - understand that patient is 89 and frail, deconditioned, now w/ dysphagia, aspiration pneumonia, advanced dementia. For now NPO, we discussed that a feeding tube may not align with their goals. await SLP eval.  c/w ABX.     Imagin/24: CXR: Left base clearing.    Swallow Hx: Pt seen for bedside swallow evaluation during admission 2020, w/ rx for regular diet with thin liquid. "Can not rule out silent aspiration or micro-aspirations at the bedside. Discussion was held w/ the patient and his daughter, Catherine, with regards to possible penetration/aspiration and if instrumental assessment would be a route they would like to taking during h-course. As per daughter, instrumental assessment is not something they wish to pursue, nor do they wish to modify his diet." s/p RRT  am for hypoxia - requiring high flow NC. Ddx included recurrent silent aspiration, CHF exacerbation, PE. CHF unlikely given hypovolemic status and PE unlikely as pt is on eliquis. CXR did not show any signs of consolidations or pulm edema. Likely dx was silent aspiration. Acute respiratory failure w/ hypoxia; given hx most likely 2/2 aspiration event.   Per palliative care-->Per family, pt "has been having difficulty with po intake over the past few years but he continues to enjoy to eating with a good appetite. I shared my concern in the setting of recurrent aspiration, and advanced age it may be harder for him in the setting of less reserve but understand the goals of ongoing management."   : overnight pt agitated/aggressive requiring 1x zyprexa; transitioned to 6LNC  Acute metabolic encephalopathy-->NPO when lethargic, but would consider advancing if patient's mental status improves - mental status improved today, more awake. Per grandson at beside, this is very close to baseline. Ongoing discussions with daughter and grandson - understand that patient is 89 and frail, deconditioned, now w/ dysphagia, aspiration pneumonia, advanced dementia. For now NPO, we discussed that a feeding tube may not align with their goals. await SLP eval.  Hypernatremia: likely dehydration, decrease PO intake, sepsis.  c/w ABX.     Imagin/24: CXR: Left base clearing.    Swallow Hx: Pt seen for bedside swallow evaluation during admission 2020, w/ rx for regular diet with thin liquid. "Can not rule out silent aspiration or micro-aspirations at the bedside. Discussion was held w/ the patient and his daughter, Catherine, with regards to possible penetration/aspiration and if instrumental assessment would be a route they would like to taking during h-course. As per daughter, instrumental assessment is not something they wish to pursue, nor do they wish to modify his diet."

## 2024-12-24 NOTE — SWALLOW BEDSIDE ASSESSMENT ADULT - PHARYNGEAL PHASE
x2 swallows per bolus/Delayed pharyngeal swallow/Decreased laryngeal elevation Audible swallows; x2 swallows per bolus/Delayed pharyngeal swallow/Decreased laryngeal elevation/Wet vocal quality post oral intake Audible swallows; Significant cough post oral intake/Delayed pharyngeal swallow

## 2024-12-24 NOTE — SWALLOW BEDSIDE ASSESSMENT ADULT - SWALLOW EVAL: ORAL MUSCULATURE
Relevant Problems   ENDO   (+) Hypothyroidism     Anes H&P:  PAST MEDICAL HISTORY:   35 y.o. female who presents for Procedure(s):   SECTION, PRIMARY.  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   • Depression    • Gastroparesis    • Heart burn    • Hypothyroid    • Pain     Left knee 5/10   • PTSD (post-traumatic stress disorder)        SMOKING/ALCOHOL/RECREATIONAL DRUG USE:  Social History     Tobacco Use   • Smoking status: Former Smoker     Packs/day: 0.50     Years: 1.50     Pack years: 0.75     Quit date: 2009     Years since quittin.2   • Smokeless tobacco: Never Used   • Tobacco comment: one pack every 3months   Substance Use Topics   • Alcohol use: No   • Drug use: No     Social History     Substance and Sexual Activity   Drug Use No       PAST SURGICAL HISTORY:  Past Surgical History:   Procedure Laterality Date   • OTHER      laparoscopic cholecystectomy    • KNEE ARTHROSCOPY  2010    Performed by KRYSTIAN COSTA at SURGERY [facility]   • SYNOVECTOMY  2010    Performed by KRYSTIAN COSTA at [facility]   • MEDIAL MENISCECTOMY  2010    Performed by KRYSTIAN COSTA at [facility] OR[facility]   • KNEE ARTHROSCOPY  10/2009    left   • DENTAL EXTRACTION(S)  2007    wisdom teeth       ALLERGIES:   Allergies   Allergen Reactions   • Codeine    • Hydrocodone Itching   • Naprosyn [Naproxen] Hives   • Zoloft      Left hand swelling   • Fentanyl Photosensitivity     Reports blurred vision        MEDICATIONS:  No current facility-administered medications on file prior to encounter.      Current Outpatient Medications on File Prior to Encounter   Medication Sig Dispense Refill   •  ondansetron (ZOFRAN ODT) 4 MG TABLET DISPERSIBLE Take 1 Tab by mouth every 8 hours as needed. 20 Tab 0   • ziprasidone (GEODON) 20 MG Cap Take 80 mg by mouth every day.     • Prenatal Multivit-Min-Fe-FA (PRE-ANKUR PO) Take  by mouth.     • levothyroxine (SYNTHROID) 75 MCG Tab Take 88 mcg by mouth Every morning on an empty stomach.     • Gabapentin (NEURONTIN PO) Take 300 mg by mouth every bedtime.         LABS:  Lab Results   Component Value Date/Time    HEMOGLOBIN 12.2 2020 1800    HEMATOCRIT 37.2 2020 1800    WBC 9.2 2020 1800     Lab Results   Component Value Date/Time    SODIUM 134 (L) 2020 1220    POTASSIUM 3.4 (L) 2020 1220    CHLORIDE 100 2020 1220    CO2 20 2020 1220    GLUCOSE 90 2020 1220    BUN 10 2020 1220    CALCIUM 9.8 2020 1220       SARS-CoV2 result: Negative      PREVIOUS ANESTHETICS: See EMR  __________________________________________      Physical Exam    Airway   Mallampati: II  TM distance: >3 FB  Neck ROM: full       Cardiovascular - normal exam  Rhythm: regular  Rate: normal  (-) murmur     Dental - normal exam           Pulmonary - normal exam  Breath sounds clear to auscultation     Abdominal    Neurological - normal exam                 Anesthesia Plan    ASA 2- EMERGENT   ASA physical status emergent criteria: non-reassuring fetal heart tones    Plan - spinal   Neuraxial block will be primary anesthetic            Postoperative Plan: Postoperative administration of opioids is intended.    Pertinent diagnostic labs and testing reviewed    Informed Consent:    Anesthetic plan and risks discussed with patient.         unable to assess d/t reduced comprehension of simple directives

## 2024-12-24 NOTE — PROVIDER CONTACT NOTE (MEDICATION) - BACKGROUND
89 year old male, pmh afib on elquis, CAD, HTN, HLD, p/w difficulty breathing. admitted for pneumonitis

## 2024-12-24 NOTE — PROGRESS NOTE ADULT - PROBLEM SELECTOR PLAN 1
- given hx most likely 2/2 aspiration event- sepsis - bands 2%, now improved with antibiotics  - with some hypercapnia as well  s/p RRT 12/23 am for hypoxia - requiring high flow NC - 12/24 transitioned to nasal cannula  - cont empiric vanc, ertapenem for now. Will follow up vanc level prior to next dose   blood cultures negative; viral panel negative  - continuous pulse ox  - NPO, hob elevate, aspiration precaution, pending s/s - ongoing conversations with family about risks/benefits of eating/drinking  IVF 1/2 NS    - DNR/DNI. OK for trial of NIV. d/w daughter at length - she would like to pursue treatment, has bounced back before

## 2024-12-24 NOTE — SWALLOW BEDSIDE ASSESSMENT ADULT - ORAL PHASE
Intermittent bolus holding; suspected premature spillover/Delayed oral transit time Occasional bolus holding requiring cues to initiate swallow sequence/Delayed oral transit time suspected uncontrolled loss

## 2024-12-24 NOTE — SWALLOW BEDSIDE ASSESSMENT ADULT - ASPIRATION PRECAUTIONS
Monitor for s/s aspiration/laryngeal penetration. If noted:  D/C p.o. intake, provide non-oral nutrition/hydration/meds, and contact this service @ [id number]

## 2024-12-24 NOTE — SWALLOW BEDSIDE ASSESSMENT ADULT - SWALLOW EVAL: DIAGNOSIS
89yoM w/ hx of MCI, p/w AHRF likely 2/2 aspiration and metabolic encephalopathy, s/p RRT requiring HFNC, now on 6LNC. Pt p/w evidence of an oropharyngeal dysphagia. Oral phase notable for adequate oral grading/stripping from utensil, delayed oral transit time w/ intermittent bolus holding, and suspected premature spillover across textures w/ uncontrolled loss suspected for less viscous liquids (mildly thick and thin liquid). Pharyngeal phase notable for suspected delay in pharyngeal swallow trigger, reduced hyolaryngeal elevation upon palpation, audible swallows (suggestive of incoordination of swallow sequence) and wet vocal quality s/p mildly thick liquid as well as significant immediate cough s/p thin liquid via tsp, both c/f laryngeal penetration/aspiration. Though no overt s/s of laryngeal penetration/aspiration observed w/ puree and moderately thick liquid via tsp, silent aspiration cannot be ruled out.

## 2024-12-24 NOTE — SWALLOW BEDSIDE ASSESSMENT ADULT - SLP GENERAL OBSERVATIONS
Pt encountered in bed, awake, alert, cachetic appearing, severely contracted, on 6LNC, not oriented, reduced simple command following (requiring maximal multimodal cueing to increase success during tasks), +[place] (requiring information to be delivered at increased volume on L side). Intermittently verbal but speech is garbled and nonsensical. Daughter at bedside to provide collateral information.

## 2024-12-24 NOTE — SWALLOW BEDSIDE ASSESSMENT ADULT - SWALLOW EVAL: RECOMMENDED DIET
Initiate most conservative diet of PUREE AND MODERATELY THICK LIQUID VIA TEASPOON w/ Total Assistance and Supervision

## 2024-12-24 NOTE — PROGRESS NOTE ADULT - PROBLEM SELECTOR PLAN 2
likely dehydration, decrease PO intake, sepsis  Patient pulled out IV access, awaiting midline   c/w IVF d5 1/2NS, hopefully can advance diet

## 2024-12-25 DIAGNOSIS — R13.12 DYSPHAGIA, OROPHARYNGEAL PHASE: ICD-10-CM

## 2024-12-25 LAB
A1C WITH ESTIMATED AVERAGE GLUCOSE RESULT: 6.1 % — HIGH (ref 4–5.6)
ANION GAP SERPL CALC-SCNC: 11 MMOL/L — SIGNIFICANT CHANGE UP (ref 5–17)
BUN SERPL-MCNC: 24 MG/DL — HIGH (ref 7–23)
CALCIUM SERPL-MCNC: 9.3 MG/DL — SIGNIFICANT CHANGE UP (ref 8.4–10.5)
CHLORIDE SERPL-SCNC: 109 MMOL/L — HIGH (ref 96–108)
CO2 SERPL-SCNC: 26 MMOL/L — SIGNIFICANT CHANGE UP (ref 22–31)
CREAT SERPL-MCNC: 0.85 MG/DL — SIGNIFICANT CHANGE UP (ref 0.5–1.3)
EGFR: 78 ML/MIN/1.73M2 — SIGNIFICANT CHANGE UP
ESTIMATED AVERAGE GLUCOSE: 128 MG/DL — HIGH (ref 68–114)
GAS PNL BLDV: SIGNIFICANT CHANGE UP
GLUCOSE SERPL-MCNC: 187 MG/DL — HIGH (ref 70–99)
HCT VFR BLD CALC: 32.7 % — LOW (ref 39–50)
HGB BLD-MCNC: 10.5 G/DL — LOW (ref 13–17)
MCHC RBC-ENTMCNC: 29.6 PG — SIGNIFICANT CHANGE UP (ref 27–34)
MCHC RBC-ENTMCNC: 32.1 G/DL — SIGNIFICANT CHANGE UP (ref 32–36)
MCV RBC AUTO: 92.1 FL — SIGNIFICANT CHANGE UP (ref 80–100)
MRSA PCR RESULT.: SIGNIFICANT CHANGE UP
NRBC # BLD: 0 /100 WBCS — SIGNIFICANT CHANGE UP (ref 0–0)
PLATELET # BLD AUTO: 213 K/UL — SIGNIFICANT CHANGE UP (ref 150–400)
POTASSIUM SERPL-MCNC: 3.9 MMOL/L — SIGNIFICANT CHANGE UP (ref 3.5–5.3)
POTASSIUM SERPL-SCNC: 3.9 MMOL/L — SIGNIFICANT CHANGE UP (ref 3.5–5.3)
RBC # BLD: 3.55 M/UL — LOW (ref 4.2–5.8)
RBC # FLD: 14.6 % — HIGH (ref 10.3–14.5)
S AUREUS DNA NOSE QL NAA+PROBE: DETECTED
SODIUM SERPL-SCNC: 146 MMOL/L — HIGH (ref 135–145)
VANCOMYCIN TROUGH SERPL-MCNC: 10.2 UG/ML — SIGNIFICANT CHANGE UP (ref 10–20)
WBC # BLD: 8.71 K/UL — SIGNIFICANT CHANGE UP (ref 3.8–10.5)
WBC # FLD AUTO: 8.71 K/UL — SIGNIFICANT CHANGE UP (ref 3.8–10.5)

## 2024-12-25 PROCEDURE — 99233 SBSQ HOSP IP/OBS HIGH 50: CPT

## 2024-12-25 RX ORDER — CHLORHEXIDINE GLUCONATE 1.2 MG/ML
1 RINSE ORAL DAILY
Refills: 0 | Status: DISCONTINUED | OUTPATIENT
Start: 2024-12-25 | End: 2024-12-31

## 2024-12-25 RX ADMIN — Medication 5 MILLIGRAM(S): at 12:07

## 2024-12-25 RX ADMIN — Medication 1 TABLET(S): at 17:20

## 2024-12-25 RX ADMIN — POLYSORBATE 80 1 DROP(S): 100 SOLUTION/ DROPS OPHTHALMIC at 12:07

## 2024-12-25 RX ADMIN — VANCOMYCIN HYDROCHLORIDE 250 MILLIGRAM(S): 5 INJECTION, POWDER, LYOPHILIZED, FOR SOLUTION INTRAVENOUS at 23:51

## 2024-12-25 RX ADMIN — POLYSORBATE 80 1 DROP(S): 100 SOLUTION/ DROPS OPHTHALMIC at 05:28

## 2024-12-25 RX ADMIN — POLYSORBATE 80 1 DROP(S): 100 SOLUTION/ DROPS OPHTHALMIC at 21:36

## 2024-12-25 RX ADMIN — APIXABAN 2.5 MILLIGRAM(S): 5 TABLET, FILM COATED ORAL at 05:28

## 2024-12-25 RX ADMIN — CHLORHEXIDINE GLUCONATE 1 APPLICATION(S): 1.2 RINSE ORAL at 12:07

## 2024-12-25 RX ADMIN — TAMSULOSIN HYDROCHLORIDE 0.4 MILLIGRAM(S): 0.4 CAPSULE ORAL at 21:36

## 2024-12-25 RX ADMIN — Medication 81 MILLIGRAM(S): at 12:07

## 2024-12-25 RX ADMIN — ERTAPENEM SODIUM 120 MILLIGRAM(S): 1 INJECTION, POWDER, LYOPHILIZED, FOR SOLUTION INTRAMUSCULAR; INTRAVENOUS at 23:51

## 2024-12-25 RX ADMIN — Medication 1 TABLET(S): at 05:28

## 2024-12-25 RX ADMIN — SODIUM CHLORIDE 60 MILLILITER(S): 9 INJECTION, SOLUTION INTRAVENOUS at 10:17

## 2024-12-25 RX ADMIN — Medication 500 MILLIGRAM(S): at 12:07

## 2024-12-25 RX ADMIN — Medication 1 TABLET(S): at 12:07

## 2024-12-25 RX ADMIN — APIXABAN 2.5 MILLIGRAM(S): 5 TABLET, FILM COATED ORAL at 17:20

## 2024-12-25 NOTE — PROGRESS NOTE ADULT - SUBJECTIVE AND OBJECTIVE BOX
PROGRESS NOTE:   Authored by Dr. Chayito Gomez MD  Pager [phone]     Patient is a 89y old  Male who presents with a chief complaint of hypoxia, confusion (24 Dec 2024 13:03)      SUBJECTIVE / OVERNIGHT EVENTS: Patient seen and examined at bedside. Overall patient seems improved, on NC 6L, tolerating feeding, at all dinner last night, urinating    ADDITIONAL REVIEW OF SYSTEMS: unable to obtain due to dementia, AMS    MEDICATIONS  (STANDING):  apixaban 2.5 milliGRAM(s) Oral two times a day  artificial tears (preservative free) Ophthalmic Solution 1 Drop(s) Both EYES three times a day  ascorbic acid 500 milliGRAM(s) Oral daily  aspirin  chewable 81 milliGRAM(s) Oral daily  dextrose 5% + sodium chloride 0.45%. 1000 milliLiter(s) (100 mL/Hr) IV Continuous <Continuous>  ertapenem  IVPB 1000 milliGRAM(s) IV Intermittent every 24 hours  finasteride 5 milliGRAM(s) Oral daily  lactobacillus acidophilus 1 Tablet(s) Oral every 12 hours  multivitamin 1 Tablet(s) Oral daily  tamsulosin 0.4 milliGRAM(s) Oral at bedtime  vancomycin  IVPB 1000 milliGRAM(s) IV Intermittent every 24 hours    MEDICATIONS  (PRN):  albuterol/ipratropium for Nebulization 3 milliLiter(s) Nebulizer every 6 hours PRN Shortness of Breath and/or Wheezing      CAPILLARY BLOOD GLUCOSE        I&O's Summary      PHYSICAL EXAM:  Vital Signs Last 24 Hrs  T(C): 36.4 (25 Dec 2024 09:26), Max: 36.5 (24 Dec 2024 11:02)  T(F): 97.6 (25 Dec 2024 09:26), Max: 97.7 (24 Dec 2024 11:02)  HR: 58 (25 Dec 2024 09:26) (58 - 90)  BP: 123/62 (25 Dec 2024 09:26) (123/62 - 145/64)  BP(mean): --  RR: 18 (25 Dec 2024 09:26) (18 - 18)  SpO2: 100% (25 Dec 2024 09:26) (94% - 100%)    Parameters below as of 25 Dec 2024 09:26  Patient On (Oxygen Delivery Method): nasal cannula  O2 Flow (L/min): 6      CONSTITUTIONAL: NAD, frail  RESPIRATORY: b/l ronchi, improved  CARDIOVASCULAR: Regular rate and rhythm, normal S1 and S2, No lower extremity edema; Peripheral pulses are 2+ bilaterally  ABDOMEN: Nontender to palpation, normoactive bowel sounds, no rebound/guarding; No hepatosplenomegaly  MUSCLOSKELETAL: no clubbing or cyanosis of digits; no joint swelling or tenderness to palpation  PSYCH: A+O to person    LABS:                        10.5   8.71  )-----------( 213      ( 25 Dec 2024 07:15 )             32.7     12-25    146[H]  |  109[H]  |  24[H]  ----------------------------<  187[H]  3.9   |  26  |  0.85    Ca    9.3      25 Dec 2024 07:13  Phos  2.4     12-24  Mg     2.4     12-24    TPro  6.2  /  Alb  3.0[L]  /  TBili  0.5  /  DBili  x   /  AST  21  /  ALT  13  /  AlkPhos  82  12-24          Urinalysis Basic - ( 25 Dec 2024 07:13 )    Color: x / Appearance: x / SG: x / pH: x  Gluc: 187 mg/dL / Ketone: x  / Bili: x / Urobili: x   Blood: x / Protein: x / Nitrite: x   Leuk Esterase: x / RBC: x / WBC x   Sq Epi: x / Non Sq Epi: x / Bacteria: x        Culture - Blood (collected 22 Dec 2024 17:30)  Source: .Blood BLOOD  Preliminary Report (24 Dec 2024 22:02):    No growth at 48 Hours    Culture - Blood (collected 22 Dec 2024 17:15)  Source: .Blood BLOOD  Preliminary Report (24 Dec 2024 22:02):    No growth at 48 Hours

## 2024-12-25 NOTE — PROGRESS NOTE ADULT - PROBLEM SELECTOR PLAN 1
due to aspiration pneumonia initially requiring hi flow oxygen, s/p RRT  oxygen requirement improving now tolerating NC 6L, will titrate as possible  c/w IV abx ertapenem and vancomycin (vanc level 12/25 10.2) - day 3 - will need 7 day course (but can switch to PO when clinically ready)   blood cultures negative; viral panel negative  - continuous pulse ox  lactate on VBG 3.0 - clinically improving, c/w IVF, monitor    - DNR/DNI. OK for trial of NIV. d/w daughter at length - she would like to pursue treatment

## 2024-12-25 NOTE — PROGRESS NOTE ADULT - PROBLEM SELECTOR PLAN 3
acute metabolic encephalopathy - likely due to aspiration pneumonia, infection  mental status improving  - fall precaution  - holding seroquel for now    Per grandson at beside, this is very close to baseline

## 2024-12-25 NOTE — PROGRESS NOTE ADULT - PROBLEM SELECTOR PLAN 2
likely dehydration, decrease PO intake, sepsis  Patient pulled out IV access, now with midline  Na 146; c/w IVF d5 1/2NS for another day   tolerating oral diet puree with mod thick

## 2024-12-26 LAB
ANION GAP SERPL CALC-SCNC: 11 MMOL/L — SIGNIFICANT CHANGE UP (ref 5–17)
BUN SERPL-MCNC: 19 MG/DL — SIGNIFICANT CHANGE UP (ref 7–23)
CALCIUM SERPL-MCNC: 8.9 MG/DL — SIGNIFICANT CHANGE UP (ref 8.4–10.5)
CHLORIDE SERPL-SCNC: 109 MMOL/L — HIGH (ref 96–108)
CO2 SERPL-SCNC: 24 MMOL/L — SIGNIFICANT CHANGE UP (ref 22–31)
CREAT SERPL-MCNC: 0.78 MG/DL — SIGNIFICANT CHANGE UP (ref 0.5–1.3)
EGFR: 80 ML/MIN/1.73M2 — SIGNIFICANT CHANGE UP
GAS PNL BLDV: SIGNIFICANT CHANGE UP
GLUCOSE SERPL-MCNC: 133 MG/DL — HIGH (ref 70–99)
HCT VFR BLD CALC: 33.8 % — LOW (ref 39–50)
HGB BLD-MCNC: 10.8 G/DL — LOW (ref 13–17)
MCHC RBC-ENTMCNC: 30.1 PG — SIGNIFICANT CHANGE UP (ref 27–34)
MCHC RBC-ENTMCNC: 32 G/DL — SIGNIFICANT CHANGE UP (ref 32–36)
MCV RBC AUTO: 94.2 FL — SIGNIFICANT CHANGE UP (ref 80–100)
NRBC # BLD: 0 /100 WBCS — SIGNIFICANT CHANGE UP (ref 0–0)
PLATELET # BLD AUTO: 220 K/UL — SIGNIFICANT CHANGE UP (ref 150–400)
POTASSIUM SERPL-MCNC: 3.7 MMOL/L — SIGNIFICANT CHANGE UP (ref 3.5–5.3)
POTASSIUM SERPL-SCNC: 3.7 MMOL/L — SIGNIFICANT CHANGE UP (ref 3.5–5.3)
RBC # BLD: 3.59 M/UL — LOW (ref 4.2–5.8)
RBC # FLD: 14.5 % — SIGNIFICANT CHANGE UP (ref 10.3–14.5)
SODIUM SERPL-SCNC: 144 MMOL/L — SIGNIFICANT CHANGE UP (ref 135–145)
WBC # BLD: 8.49 K/UL — SIGNIFICANT CHANGE UP (ref 3.8–10.5)
WBC # FLD AUTO: 8.49 K/UL — SIGNIFICANT CHANGE UP (ref 3.8–10.5)

## 2024-12-26 PROCEDURE — 99233 SBSQ HOSP IP/OBS HIGH 50: CPT

## 2024-12-26 RX ORDER — INFLUENZA A VIRUS A/WISCONSIN/588/2019 (H1N1) RECOMBINANT HEMAGGLUTININ ANTIGEN, INFLUENZA A VIRUS A/DARWIN/6/2021 (H3N2) RECOMBINANT HEMAGGLUTININ ANTIGEN, INFLUENZA B VIRUS B/AUSTRIA/1359417/2021 RECOMBINANT HEMAGGLUTININ ANTIGEN, AND INFLUENZA B VIRUS B/PHUKET/3073/2013 RECOMBINANT HEMAGGLUTININ ANTIGEN 45; 45; 45; 45 UG/.5ML; UG/.5ML; UG/.5ML; UG/.5ML
0.5 INJECTION INTRAMUSCULAR ONCE
Refills: 0 | Status: DISCONTINUED | OUTPATIENT
Start: 2024-12-26 | End: 2024-12-31

## 2024-12-26 RX ORDER — MUPIROCIN 2 %
1 OINTMENT (GRAM) TOPICAL
Refills: 0 | Status: COMPLETED | OUTPATIENT
Start: 2024-12-26 | End: 2024-12-30

## 2024-12-26 RX ORDER — GINKGO BILOBA 40 MG
3 CAPSULE ORAL ONCE
Refills: 0 | Status: COMPLETED | OUTPATIENT
Start: 2024-12-26 | End: 2024-12-26

## 2024-12-26 RX ADMIN — POLYSORBATE 80 1 DROP(S): 100 SOLUTION/ DROPS OPHTHALMIC at 05:18

## 2024-12-26 RX ADMIN — POLYSORBATE 80 1 DROP(S): 100 SOLUTION/ DROPS OPHTHALMIC at 22:11

## 2024-12-26 RX ADMIN — POLYSORBATE 80 1 DROP(S): 100 SOLUTION/ DROPS OPHTHALMIC at 13:51

## 2024-12-26 RX ADMIN — APIXABAN 2.5 MILLIGRAM(S): 5 TABLET, FILM COATED ORAL at 05:18

## 2024-12-26 RX ADMIN — ERTAPENEM SODIUM 120 MILLIGRAM(S): 1 INJECTION, POWDER, LYOPHILIZED, FOR SOLUTION INTRAMUSCULAR; INTRAVENOUS at 23:45

## 2024-12-26 RX ADMIN — Medication 1 APPLICATION(S): at 17:20

## 2024-12-26 RX ADMIN — Medication 1 TABLET(S): at 05:17

## 2024-12-26 RX ADMIN — TAMSULOSIN HYDROCHLORIDE 0.4 MILLIGRAM(S): 0.4 CAPSULE ORAL at 22:12

## 2024-12-26 RX ADMIN — APIXABAN 2.5 MILLIGRAM(S): 5 TABLET, FILM COATED ORAL at 17:20

## 2024-12-26 RX ADMIN — Medication 1 TABLET(S): at 17:19

## 2024-12-26 RX ADMIN — Medication 1 APPLICATION(S): at 06:32

## 2024-12-26 RX ADMIN — CHLORHEXIDINE GLUCONATE 1 APPLICATION(S): 1.2 RINSE ORAL at 13:27

## 2024-12-26 RX ADMIN — Medication 3 MILLIGRAM(S): at 23:35

## 2024-12-26 NOTE — PROGRESS NOTE ADULT - PROBLEM SELECTOR PLAN 1
due to aspiration pneumonia initially requiring hi flow oxygen, s/p RRT  oxygen requirement improving now tolerating NC 6L, will titrate as possible  c/w IV abx ertapenem - d/c vanc since staph is MSSA  blood cultures negative; viral panel negative  - continuous pulse ox  lactate on VBG 2.2 - clinically improving, c/w IVF, monitor    - DNR/DNI. OK for trial of NIV. d/w daughter at length - she would like to pursue treatment

## 2024-12-26 NOTE — PROGRESS NOTE ADULT - CONVERSATION DETAILS
Discussed at length with daughter at bedside - patient is more lethargic, clinically improving but not taking much PO. If does not improve, can consider d/c home (that is the goal) with hospice. Daughter is hopeful that he continues to improve but aware that his advanced age and comorbidities make him vulnerable. She understands that pursuing feeding tube for artificial nutrition is unlikely to improve quality of life.  Time spent 30min

## 2024-12-26 NOTE — PROGRESS NOTE ADULT - PROBLEM SELECTOR PLAN 3
acute metabolic encephalopathy - likely due to aspiration pneumonia, infection  mental status was improving, he alternates between hyper and hypo active delirium.   - fall precaution  - holding seroquel for now

## 2024-12-26 NOTE — PROGRESS NOTE ADULT - SUBJECTIVE AND OBJECTIVE BOX
PROGRESS NOTE:   Authored by Dr. Chayito Gomez MD  Pager [phone]     Patient is a 89y old  Male who presents with a chief complaint of hypoxia, confusion (25 Dec 2024 09:54)      SUBJECTIVE / OVERNIGHT EVENTS: Patient seen and examined at bedside. Patient more sleepy in last 24 hours, does wake up when spoken to, but goes back to sleep. Did tolerate his diet yesterday, this morning not taking much.     ADDITIONAL REVIEW OF SYSTEMS: unable to obtain due to dementia and AMS    MEDICATIONS  (STANDING):  apixaban 2.5 milliGRAM(s) Oral two times a day  artificial tears (preservative free) Ophthalmic Solution 1 Drop(s) Both EYES three times a day  ascorbic acid 500 milliGRAM(s) Oral daily  aspirin  chewable 81 milliGRAM(s) Oral daily  chlorhexidine 2% Cloths 1 Application(s) Topical daily  dextrose 5% + sodium chloride 0.45%. 1000 milliLiter(s) (60 mL/Hr) IV Continuous <Continuous>  ertapenem  IVPB 1000 milliGRAM(s) IV Intermittent every 24 hours  finasteride 5 milliGRAM(s) Oral daily  lactobacillus acidophilus 1 Tablet(s) Oral every 12 hours  multivitamin 1 Tablet(s) Oral daily  mupirocin 2% Nasal 1 Application(s) Both Nostrils two times a day  tamsulosin 0.4 milliGRAM(s) Oral at bedtime    MEDICATIONS  (PRN):  albuterol/ipratropium for Nebulization 3 milliLiter(s) Nebulizer every 6 hours PRN Shortness of Breath and/or Wheezing      CAPILLARY BLOOD GLUCOSE        I&O's Summary      PHYSICAL EXAM:  Vital Signs Last 24 Hrs  T(C): 36.3 (26 Dec 2024 08:00), Max: 37 (26 Dec 2024 00:00)  T(F): 97.3 (26 Dec 2024 08:00), Max: 98.6 (26 Dec 2024 00:00)  HR: 60 (26 Dec 2024 08:00) (60 - 81)  BP: 149/62 (26 Dec 2024 08:00) (103/59 - 161/66)  BP(mean): --  RR: 18 (26 Dec 2024 08:00) (18 - 18)  SpO2: 96% (26 Dec 2024 08:08) (93% - 98%)    Parameters below as of 26 Dec 2024 08:08  Patient On (Oxygen Delivery Method): nasal cannula  O2 Flow (L/min): 5      CONSTITUTIONAL: NAD, frail, lethargic  RESPIRATORY: b/l ronchi, improved  CARDIOVASCULAR: Regular rate and rhythm, normal S1 and S2, No lower extremity edema; Peripheral pulses are 2+ bilaterally  ABDOMEN: Nontender to palpation, normoactive bowel sounds, no rebound/guarding; No hepatosplenomegaly  MUSCLOSKELETAL: no clubbing or cyanosis of digits; no joint swelling or tenderness to palpation  PSYCH: A+O to person    LABS:                        10.8   8.49  )-----------( 220      ( 26 Dec 2024 07:08 )             33.8     12-26    144  |  109[H]  |  19  ----------------------------<  133[H]  3.7   |  24  |  0.78    Ca    8.9      26 Dec 2024 07:08            Urinalysis Basic - ( 26 Dec 2024 07:08 )    Color: x / Appearance: x / SG: x / pH: x  Gluc: 133 mg/dL / Ketone: x  / Bili: x / Urobili: x   Blood: x / Protein: x / Nitrite: x   Leuk Esterase: x / RBC: x / WBC x   Sq Epi: x / Non Sq Epi: x / Bacteria: x          RADIOLOGY & ADDITIONAL TESTS:  Results Reviewed:   Imaging Personally Reviewed:  Electrocardiogram Personally Reviewed:    COORDINATION OF CARE:  Care Discussed with Consultants/Other Providers [Y/N]:  Prior or Outpatient Records Reviewed [Y/N]:

## 2024-12-27 DIAGNOSIS — G30.9 ALZHEIMER'S DISEASE, UNSPECIFIED: ICD-10-CM

## 2024-12-27 LAB
ANION GAP SERPL CALC-SCNC: 7 MMOL/L — SIGNIFICANT CHANGE UP (ref 5–17)
BUN SERPL-MCNC: 20 MG/DL — SIGNIFICANT CHANGE UP (ref 7–23)
CALCIUM SERPL-MCNC: 8.7 MG/DL — SIGNIFICANT CHANGE UP (ref 8.4–10.5)
CHLORIDE SERPL-SCNC: 110 MMOL/L — HIGH (ref 96–108)
CO2 SERPL-SCNC: 25 MMOL/L — SIGNIFICANT CHANGE UP (ref 22–31)
CREAT SERPL-MCNC: 0.79 MG/DL — SIGNIFICANT CHANGE UP (ref 0.5–1.3)
CULTURE RESULTS: SIGNIFICANT CHANGE UP
CULTURE RESULTS: SIGNIFICANT CHANGE UP
EGFR: 80 ML/MIN/1.73M2 — SIGNIFICANT CHANGE UP
GLUCOSE SERPL-MCNC: 154 MG/DL — HIGH (ref 70–99)
HCT VFR BLD CALC: 34 % — LOW (ref 39–50)
HGB BLD-MCNC: 10.8 G/DL — LOW (ref 13–17)
MCHC RBC-ENTMCNC: 30.3 PG — SIGNIFICANT CHANGE UP (ref 27–34)
MCHC RBC-ENTMCNC: 31.8 G/DL — LOW (ref 32–36)
MCV RBC AUTO: 95.5 FL — SIGNIFICANT CHANGE UP (ref 80–100)
NRBC # BLD: 0 /100 WBCS — SIGNIFICANT CHANGE UP (ref 0–0)
PLATELET # BLD AUTO: 180 K/UL — SIGNIFICANT CHANGE UP (ref 150–400)
POTASSIUM SERPL-MCNC: 4 MMOL/L — SIGNIFICANT CHANGE UP (ref 3.5–5.3)
POTASSIUM SERPL-SCNC: 4 MMOL/L — SIGNIFICANT CHANGE UP (ref 3.5–5.3)
RBC # BLD: 3.56 M/UL — LOW (ref 4.2–5.8)
RBC # FLD: 14.4 % — SIGNIFICANT CHANGE UP (ref 10.3–14.5)
SODIUM SERPL-SCNC: 142 MMOL/L — SIGNIFICANT CHANGE UP (ref 135–145)
SPECIMEN SOURCE: SIGNIFICANT CHANGE UP
SPECIMEN SOURCE: SIGNIFICANT CHANGE UP
WBC # BLD: 7.96 K/UL — SIGNIFICANT CHANGE UP (ref 3.8–10.5)
WBC # FLD AUTO: 7.96 K/UL — SIGNIFICANT CHANGE UP (ref 3.8–10.5)

## 2024-12-27 PROCEDURE — 99233 SBSQ HOSP IP/OBS HIGH 50: CPT

## 2024-12-27 RX ORDER — QUETIAPINE FUMARATE 100 MG/1
25 TABLET, FILM COATED ORAL AT BEDTIME
Refills: 0 | Status: DISCONTINUED | OUTPATIENT
Start: 2024-12-27 | End: 2024-12-31

## 2024-12-27 RX ORDER — GINKGO BILOBA 40 MG
3 CAPSULE ORAL AT BEDTIME
Refills: 0 | Status: DISCONTINUED | OUTPATIENT
Start: 2024-12-27 | End: 2024-12-31

## 2024-12-27 RX ORDER — GUAIFENESIN 100 MG/5ML
100 SYRUP ORAL EVERY 6 HOURS
Refills: 0 | Status: DISCONTINUED | OUTPATIENT
Start: 2024-12-27 | End: 2024-12-31

## 2024-12-27 RX ORDER — OLANZAPINE 15 MG/1
2.5 TABLET ORAL EVERY 6 HOURS
Refills: 0 | Status: DISCONTINUED | OUTPATIENT
Start: 2024-12-27 | End: 2024-12-31

## 2024-12-27 RX ADMIN — OLANZAPINE 2.5 MILLIGRAM(S): 15 TABLET ORAL at 16:39

## 2024-12-27 RX ADMIN — Medication 5 MILLIGRAM(S): at 08:44

## 2024-12-27 RX ADMIN — Medication 1 APPLICATION(S): at 05:50

## 2024-12-27 RX ADMIN — Medication 1 TABLET(S): at 08:44

## 2024-12-27 RX ADMIN — POLYSORBATE 80 1 DROP(S): 100 SOLUTION/ DROPS OPHTHALMIC at 14:54

## 2024-12-27 RX ADMIN — QUETIAPINE FUMARATE 25 MILLIGRAM(S): 100 TABLET, FILM COATED ORAL at 21:53

## 2024-12-27 RX ADMIN — Medication 3 MILLIGRAM(S): at 21:52

## 2024-12-27 RX ADMIN — TAMSULOSIN HYDROCHLORIDE 0.4 MILLIGRAM(S): 0.4 CAPSULE ORAL at 21:52

## 2024-12-27 RX ADMIN — POLYSORBATE 80 1 DROP(S): 100 SOLUTION/ DROPS OPHTHALMIC at 05:50

## 2024-12-27 RX ADMIN — Medication 1 TABLET(S): at 05:50

## 2024-12-27 RX ADMIN — POLYSORBATE 80 1 DROP(S): 100 SOLUTION/ DROPS OPHTHALMIC at 21:52

## 2024-12-27 RX ADMIN — ERTAPENEM SODIUM 120 MILLIGRAM(S): 1 INJECTION, POWDER, LYOPHILIZED, FOR SOLUTION INTRAMUSCULAR; INTRAVENOUS at 23:48

## 2024-12-27 RX ADMIN — Medication 81 MILLIGRAM(S): at 08:45

## 2024-12-27 RX ADMIN — CHLORHEXIDINE GLUCONATE 1 APPLICATION(S): 1.2 RINSE ORAL at 12:00

## 2024-12-27 RX ADMIN — OLANZAPINE 2.5 MILLIGRAM(S): 15 TABLET ORAL at 09:35

## 2024-12-27 RX ADMIN — APIXABAN 2.5 MILLIGRAM(S): 5 TABLET, FILM COATED ORAL at 05:50

## 2024-12-27 RX ADMIN — Medication 500 MILLIGRAM(S): at 08:45

## 2024-12-27 NOTE — PROGRESS NOTE ADULT - PROBLEM SELECTOR PLAN 2
advanced dementia  at home on seroquel 25mg qhs and melatonin  will restart   zyprexa PRN    Daughter considering home with hospice, referral made

## 2024-12-27 NOTE — PROGRESS NOTE ADULT - PROBLEM SELECTOR PLAN 3
acute metabolic encephalopathy - likely due to aspiration pneumonia, infection  mental status was improving, he alternates between hyper and hypo active delirium.   - fall precaution

## 2024-12-27 NOTE — PROGRESS NOTE ADULT - PROBLEM SELECTOR PLAN 1
due to aspiration pneumonia initially requiring hi flow oxygen, s/p RRT  Now on RA  c/w IV abx ertapenem (12/22-) will complete 7 day course.   - d/c vanc since staph is MSSA  blood cultures negative; viral panel negative  - continuous pulse ox  d/c IVF    - DNR/DNI. OK for trial of NIV. d/w daughter at length - she would like to pursue treatment

## 2024-12-27 NOTE — PROGRESS NOTE ADULT - SUBJECTIVE AND OBJECTIVE BOX
PROGRESS NOTE:   Authored by Dr. Chayito Gomez MD  Pager [phone]     Patient is a 89y old  Male who presents with a chief complaint of hypoxia, confusion (26 Dec 2024 11:32)      SUBJECTIVE / OVERNIGHT EVENTS: Patient seen and examined at bedside. Overnight with agitation, combativeness. This am, more awake, ate breakfast and lunch.     ADDITIONAL REVIEW OF SYSTEMS: unable to obtain due to dementia    MEDICATIONS  (STANDING):  apixaban 2.5 milliGRAM(s) Oral two times a day  artificial tears (preservative free) Ophthalmic Solution 1 Drop(s) Both EYES three times a day  ascorbic acid 500 milliGRAM(s) Oral daily  aspirin  chewable 81 milliGRAM(s) Oral daily  chlorhexidine 2% Cloths 1 Application(s) Topical daily  ertapenem  IVPB 1000 milliGRAM(s) IV Intermittent every 24 hours  finasteride 5 milliGRAM(s) Oral daily  influenza  Vaccine (HIGH DOSE) 0.5 milliLiter(s) IntraMuscular once  lactobacillus acidophilus 1 Tablet(s) Oral every 12 hours  multivitamin 1 Tablet(s) Oral daily  mupirocin 2% Nasal 1 Application(s) Both Nostrils two times a day  tamsulosin 0.4 milliGRAM(s) Oral at bedtime    MEDICATIONS  (PRN):  albuterol/ipratropium for Nebulization 3 milliLiter(s) Nebulizer every 6 hours PRN Shortness of Breath and/or Wheezing  OLANZapine Disintegrating Tablet 2.5 milliGRAM(s) Oral every 6 hours PRN severe agitation      CAPILLARY BLOOD GLUCOSE        I&O's Summary      PHYSICAL EXAM:  Vital Signs Last 24 Hrs  T(C): 36.3 (27 Dec 2024 11:30), Max: 36.5 (26 Dec 2024 20:00)  T(F): 97.3 (27 Dec 2024 11:30), Max: 97.7 (26 Dec 2024 20:00)  HR: 59 (27 Dec 2024 11:30) (59 - 124)  BP: 148/69 (27 Dec 2024 11:30) (126/72 - 148/69)  BP(mean): --  RR: 18 (27 Dec 2024 11:30) (18 - 18)  SpO2: 93% (27 Dec 2024 11:30) (93% - 100%)    Parameters below as of 27 Dec 2024 11:30  Patient On (Oxygen Delivery Method): room air        CONSTITUTIONAL: NAD, frail, cachectic  RESPIRATORY: Normal respiratory effort; lungs are clear to auscultation bilaterally  CARDIOVASCULAR: Regular rate and rhythm, normal S1 and S2, no murmur/rub/gallop; No lower extremity edema; Peripheral pulses are 2+ bilaterally  ABDOMEN: Nontender to palpation, normoactive bowel sounds, no rebound/guarding; No hepatosplenomegaly  MUSCLOSKELETAL: no clubbing or cyanosis of digits; no joint swelling or tenderness to palpation  PSYCH: A+O to person    LABS:                        10.8   7.96  )-----------( 180      ( 27 Dec 2024 07:03 )             34.0     12-27    142  |  110[H]  |  20  ----------------------------<  154[H]  4.0   |  25  |  0.79    Ca    8.7      27 Dec 2024 07:03

## 2024-12-28 PROCEDURE — 99233 SBSQ HOSP IP/OBS HIGH 50: CPT

## 2024-12-28 RX ADMIN — CHLORHEXIDINE GLUCONATE 1 APPLICATION(S): 1.2 RINSE ORAL at 12:22

## 2024-12-28 RX ADMIN — ERTAPENEM SODIUM 120 MILLIGRAM(S): 1 INJECTION, POWDER, LYOPHILIZED, FOR SOLUTION INTRAMUSCULAR; INTRAVENOUS at 23:53

## 2024-12-28 RX ADMIN — IPRATROPIUM BROMIDE AND ALBUTEROL SULFATE 3 MILLILITER(S): .5; 2.5 SOLUTION RESPIRATORY (INHALATION) at 18:14

## 2024-12-28 RX ADMIN — Medication 1 APPLICATION(S): at 18:12

## 2024-12-28 RX ADMIN — POLYSORBATE 80 1 DROP(S): 100 SOLUTION/ DROPS OPHTHALMIC at 21:08

## 2024-12-28 RX ADMIN — APIXABAN 2.5 MILLIGRAM(S): 5 TABLET, FILM COATED ORAL at 18:13

## 2024-12-28 RX ADMIN — Medication 500 MILLIGRAM(S): at 12:12

## 2024-12-28 RX ADMIN — TAMSULOSIN HYDROCHLORIDE 0.4 MILLIGRAM(S): 0.4 CAPSULE ORAL at 21:07

## 2024-12-28 RX ADMIN — Medication 100 MILLIGRAM(S): at 18:14

## 2024-12-28 RX ADMIN — Medication 3 MILLIGRAM(S): at 21:07

## 2024-12-28 RX ADMIN — APIXABAN 2.5 MILLIGRAM(S): 5 TABLET, FILM COATED ORAL at 05:41

## 2024-12-28 RX ADMIN — Medication 1 APPLICATION(S): at 05:42

## 2024-12-28 RX ADMIN — POLYSORBATE 80 1 DROP(S): 100 SOLUTION/ DROPS OPHTHALMIC at 05:41

## 2024-12-28 RX ADMIN — POLYSORBATE 80 1 DROP(S): 100 SOLUTION/ DROPS OPHTHALMIC at 12:12

## 2024-12-28 RX ADMIN — Medication 5 MILLIGRAM(S): at 12:12

## 2024-12-28 RX ADMIN — Medication 1 TABLET(S): at 12:12

## 2024-12-28 RX ADMIN — Medication 1 TABLET(S): at 18:12

## 2024-12-28 RX ADMIN — Medication 81 MILLIGRAM(S): at 12:12

## 2024-12-28 RX ADMIN — QUETIAPINE FUMARATE 25 MILLIGRAM(S): 100 TABLET, FILM COATED ORAL at 21:07

## 2024-12-28 RX ADMIN — Medication 1 TABLET(S): at 05:37

## 2024-12-28 NOTE — PROGRESS NOTE ADULT - SUBJECTIVE AND OBJECTIVE BOX
PROGRESS NOTE:   Authored by Dr. Chayito Gomez MD  Pager [phone]     Patient is a 89y old  Male who presents with a chief complaint of hypoxia, confusion (27 Dec 2024 13:19)      SUBJECTIVE / OVERNIGHT EVENTS: Patient seen and examined at bedside. More awake, less agitated, eating well    ADDITIONAL REVIEW OF SYSTEMS: unable to obtain due to AMS    MEDICATIONS  (STANDING):  apixaban 2.5 milliGRAM(s) Oral two times a day  artificial tears (preservative free) Ophthalmic Solution 1 Drop(s) Both EYES three times a day  ascorbic acid 500 milliGRAM(s) Oral daily  aspirin  chewable 81 milliGRAM(s) Oral daily  chlorhexidine 2% Cloths 1 Application(s) Topical daily  ertapenem  IVPB 1000 milliGRAM(s) IV Intermittent every 24 hours  finasteride 5 milliGRAM(s) Oral daily  influenza  Vaccine (HIGH DOSE) 0.5 milliLiter(s) IntraMuscular once  lactobacillus acidophilus 1 Tablet(s) Oral every 12 hours  melatonin 3 milliGRAM(s) Oral at bedtime  multivitamin 1 Tablet(s) Oral daily  mupirocin 2% Nasal 1 Application(s) Both Nostrils two times a day  QUEtiapine 25 milliGRAM(s) Oral at bedtime  tamsulosin 0.4 milliGRAM(s) Oral at bedtime    MEDICATIONS  (PRN):  albuterol/ipratropium for Nebulization 3 milliLiter(s) Nebulizer every 6 hours PRN Shortness of Breath and/or Wheezing  guaiFENesin Oral Liquid (Sugar-Free) 100 milliGRAM(s) Oral every 6 hours PRN Cough  OLANZapine Disintegrating Tablet 2.5 milliGRAM(s) Oral every 6 hours PRN severe agitation      CAPILLARY BLOOD GLUCOSE        I&O's Summary      PHYSICAL EXAM:  Vital Signs Last 24 Hrs  T(C): 36.4 (28 Dec 2024 08:24), Max: 36.6 (28 Dec 2024 00:19)  T(F): 97.5 (28 Dec 2024 08:24), Max: 97.8 (28 Dec 2024 00:19)  HR: 69 (28 Dec 2024 08:24) (59 - 98)  BP: 163/74 (28 Dec 2024 08:24) (131/60 - 163/74)  BP(mean): --  RR: 18 (28 Dec 2024 11:27) (18 - 19)  SpO2: 95% (28 Dec 2024 11:27) (95% - 98%)    Parameters below as of 28 Dec 2024 11:27  Patient On (Oxygen Delivery Method): room air        CONSTITUTIONAL: NAD, frail, cachectic  RESPIRATORY: Normal respiratory effort; lungs are clear to auscultation bilaterally  CARDIOVASCULAR: Regular rate and rhythm, normal S1 and S2, no murmur/rub/gallop; No lower extremity edema; Peripheral pulses are 2+ bilaterally  ABDOMEN: Nontender to palpation, normoactive bowel sounds, no rebound/guarding; No hepatosplenomegaly  MUSCLOSKELETAL: no clubbing or cyanosis of digits; no joint swelling or tenderness to palpation  PSYCH: A+O to person      LABS:                        10.8   7.96  )-----------( 180      ( 27 Dec 2024 07:03 )             34.0     12-27    142  |  110[H]  |  20  ----------------------------<  154[H]  4.0   |  25  |  0.79    Ca    8.7      27 Dec 2024 07:03            Urinalysis Basic - ( 27 Dec 2024 07:03 )    Color: x / Appearance: x / SG: x / pH: x  Gluc: 154 mg/dL / Ketone: x  / Bili: x / Urobili: x   Blood: x / Protein: x / Nitrite: x   Leuk Esterase: x / RBC: x / WBC x   Sq Epi: x / Non Sq Epi: x / Bacteria: x       PROGRESS NOTE:   Authored by Dr. Chayito Gomez MD  Pager [phone]     Patient is a 89y old  Male who presents with a chief complaint of hypoxia, confusion (27 Dec 2024 13:19)      SUBJECTIVE / OVERNIGHT EVENTS: Patient seen and examined at bedside. More awake, less agitated, eating well    ADDITIONAL REVIEW OF SYSTEMS: unable to obtain due to AMS    MEDICATIONS  (STANDING):  apixaban 2.5 milliGRAM(s) Oral two times a day  artificial tears (preservative free) Ophthalmic Solution 1 Drop(s) Both EYES three times a day  ascorbic acid 500 milliGRAM(s) Oral daily  aspirin  chewable 81 milliGRAM(s) Oral daily  chlorhexidine 2% Cloths 1 Application(s) Topical daily  ertapenem  IVPB 1000 milliGRAM(s) IV Intermittent every 24 hours  finasteride 5 milliGRAM(s) Oral daily  influenza  Vaccine (HIGH DOSE) 0.5 milliLiter(s) IntraMuscular once  lactobacillus acidophilus 1 Tablet(s) Oral every 12 hours  melatonin 3 milliGRAM(s) Oral at bedtime  multivitamin 1 Tablet(s) Oral daily  mupirocin 2% Nasal 1 Application(s) Both Nostrils two times a day  QUEtiapine 25 milliGRAM(s) Oral at bedtime  tamsulosin 0.4 milliGRAM(s) Oral at bedtime    MEDICATIONS  (PRN):  albuterol/ipratropium for Nebulization 3 milliLiter(s) Nebulizer every 6 hours PRN Shortness of Breath and/or Wheezing  guaiFENesin Oral Liquid (Sugar-Free) 100 milliGRAM(s) Oral every 6 hours PRN Cough  OLANZapine Disintegrating Tablet 2.5 milliGRAM(s) Oral every 6 hours PRN severe agitation      CAPILLARY BLOOD GLUCOSE        I&O's Summary      PHYSICAL EXAM:  Vital Signs Last 24 Hrs  T(C): 36.4 (28 Dec 2024 08:24), Max: 36.6 (28 Dec 2024 00:19)  T(F): 97.5 (28 Dec 2024 08:24), Max: 97.8 (28 Dec 2024 00:19)  HR: 69 (28 Dec 2024 08:24) (59 - 98)  BP: 163/74 (28 Dec 2024 08:24) (131/60 - 163/74)  BP(mean): --  RR: 18 (28 Dec 2024 11:27) (18 - 19)  SpO2: 95% (28 Dec 2024 11:27) (95% - 98%)    Parameters below as of 28 Dec 2024 11:27  Patient On (Oxygen Delivery Method): room air        CONSTITUTIONAL: NAD, frail, cachectic  RESPIRATORY: Normal respiratory effort; lungs are clear to auscultation bilaterally  CARDIOVASCULAR: Regular rate and rhythm, normal S1 and S2, no murmur/rub/gallop; No lower extremity edema; Peripheral pulses are 2+ bilaterally  ABDOMEN: Nontender to palpation, normoactive bowel sounds, no rebound/guarding; No hepatosplenomegaly  MUSCLOSKELETAL: no clubbing or cyanosis of digits; no joint swelling or tenderness to palpation  PSYCH: A+O to person  L eye ecchymosis      LABS:                        10.8   7.96  )-----------( 180      ( 27 Dec 2024 07:03 )             34.0     12-27    142  |  110[H]  |  20  ----------------------------<  154[H]  4.0   |  25  |  0.79    Ca    8.7      27 Dec 2024 07:03            Urinalysis Basic - ( 27 Dec 2024 07:03 )    Color: x / Appearance: x / SG: x / pH: x  Gluc: 154 mg/dL / Ketone: x  / Bili: x / Urobili: x   Blood: x / Protein: x / Nitrite: x   Leuk Esterase: x / RBC: x / WBC x   Sq Epi: x / Non Sq Epi: x / Bacteria: x

## 2024-12-28 NOTE — PROGRESS NOTE ADULT - PROBLEM SELECTOR PLAN 1
due to aspiration pneumonia initially requiring hi flow oxygen, s/p RRT  Now on 2L NC and RA  c/w IV abx ertapenem (12/22-) will complete 7 day course - last day 12/29  - d/c vanc since staph is MSSA  blood cultures negative; viral panel negative  - continuous pulse ox  d/c IVF    - DNR/DNI. OK for trial of NIV. d/w daughter at length - she would like to pursue treatment

## 2024-12-28 NOTE — PROGRESS NOTE ADULT - PROBLEM SELECTOR PLAN 2
advanced dementia  at home on seroquel 25mg qhs and melatonin - initially held for lethargy, now back on it    zyprexa PRN    Daughter considering home with hospice, referral made advanced dementia  at home on seroquel 25mg qhs and melatonin - initially held for lethargy, now back on it    zyprexa PRN  12/26 overnight, very agitated, moving around aggressively in bed, likely bumped head on bed, now with small L eye bruise. Staff aware, restarted home meds - seroquel/melatonin and added zyprexa PRN    Daughter considering home with hospice, referral made

## 2024-12-29 LAB
ALBUMIN SERPL ELPH-MCNC: 2.6 G/DL — LOW (ref 3.3–5)
ALP SERPL-CCNC: 80 U/L — SIGNIFICANT CHANGE UP (ref 40–120)
ALT FLD-CCNC: 18 U/L — SIGNIFICANT CHANGE UP (ref 10–45)
ANION GAP SERPL CALC-SCNC: 10 MMOL/L — SIGNIFICANT CHANGE UP (ref 5–17)
ANION GAP SERPL CALC-SCNC: 7 MMOL/L — SIGNIFICANT CHANGE UP (ref 5–17)
APTT BLD: 22.1 SEC — LOW (ref 24.5–35.6)
AST SERPL-CCNC: 26 U/L — SIGNIFICANT CHANGE UP (ref 10–40)
BASOPHILS # BLD AUTO: 0.03 K/UL — SIGNIFICANT CHANGE UP (ref 0–0.2)
BASOPHILS NFR BLD AUTO: 0.3 % — SIGNIFICANT CHANGE UP (ref 0–2)
BILIRUB SERPL-MCNC: 0.4 MG/DL — SIGNIFICANT CHANGE UP (ref 0.2–1.2)
BLD GP AB SCN SERPL QL: NEGATIVE — SIGNIFICANT CHANGE UP
BUN SERPL-MCNC: 18 MG/DL — SIGNIFICANT CHANGE UP (ref 7–23)
BUN SERPL-MCNC: 19 MG/DL — SIGNIFICANT CHANGE UP (ref 7–23)
CALCIUM SERPL-MCNC: 9 MG/DL — SIGNIFICANT CHANGE UP (ref 8.4–10.5)
CALCIUM SERPL-MCNC: 9 MG/DL — SIGNIFICANT CHANGE UP (ref 8.4–10.5)
CHLORIDE SERPL-SCNC: 109 MMOL/L — HIGH (ref 96–108)
CHLORIDE SERPL-SCNC: 111 MMOL/L — HIGH (ref 96–108)
CO2 SERPL-SCNC: 25 MMOL/L — SIGNIFICANT CHANGE UP (ref 22–31)
CO2 SERPL-SCNC: 27 MMOL/L — SIGNIFICANT CHANGE UP (ref 22–31)
CREAT SERPL-MCNC: 0.69 MG/DL — SIGNIFICANT CHANGE UP (ref 0.5–1.3)
CREAT SERPL-MCNC: 0.72 MG/DL — SIGNIFICANT CHANGE UP (ref 0.5–1.3)
EGFR: 82 ML/MIN/1.73M2 — SIGNIFICANT CHANGE UP
EGFR: 83 ML/MIN/1.73M2 — SIGNIFICANT CHANGE UP
EOSINOPHIL # BLD AUTO: 0.25 K/UL — SIGNIFICANT CHANGE UP (ref 0–0.5)
EOSINOPHIL NFR BLD AUTO: 2.6 % — SIGNIFICANT CHANGE UP (ref 0–6)
GAS PNL BLDA: SIGNIFICANT CHANGE UP
GLUCOSE BLDC GLUCOMTR-MCNC: 133 MG/DL — HIGH (ref 70–99)
GLUCOSE SERPL-MCNC: 118 MG/DL — HIGH (ref 70–99)
GLUCOSE SERPL-MCNC: 123 MG/DL — HIGH (ref 70–99)
HCT VFR BLD CALC: 33.1 % — LOW (ref 39–50)
HCT VFR BLD CALC: 33.2 % — LOW (ref 39–50)
HGB BLD-MCNC: 10.7 G/DL — LOW (ref 13–17)
HGB BLD-MCNC: 10.8 G/DL — LOW (ref 13–17)
IMM GRANULOCYTES NFR BLD AUTO: 0.4 % — SIGNIFICANT CHANGE UP (ref 0–0.9)
INR BLD: 1.01 RATIO — SIGNIFICANT CHANGE UP (ref 0.85–1.16)
LYMPHOCYTES # BLD AUTO: 1.32 K/UL — SIGNIFICANT CHANGE UP (ref 1–3.3)
LYMPHOCYTES # BLD AUTO: 13.6 % — SIGNIFICANT CHANGE UP (ref 13–44)
MAGNESIUM SERPL-MCNC: 2.3 MG/DL — SIGNIFICANT CHANGE UP (ref 1.6–2.6)
MCHC RBC-ENTMCNC: 29.6 PG — SIGNIFICANT CHANGE UP (ref 27–34)
MCHC RBC-ENTMCNC: 29.7 PG — SIGNIFICANT CHANGE UP (ref 27–34)
MCHC RBC-ENTMCNC: 32.3 G/DL — SIGNIFICANT CHANGE UP (ref 32–36)
MCHC RBC-ENTMCNC: 32.5 G/DL — SIGNIFICANT CHANGE UP (ref 32–36)
MCV RBC AUTO: 91.2 FL — SIGNIFICANT CHANGE UP (ref 80–100)
MCV RBC AUTO: 91.4 FL — SIGNIFICANT CHANGE UP (ref 80–100)
MONOCYTES # BLD AUTO: 0.53 K/UL — SIGNIFICANT CHANGE UP (ref 0–0.9)
MONOCYTES NFR BLD AUTO: 5.5 % — SIGNIFICANT CHANGE UP (ref 2–14)
NEUTROPHILS # BLD AUTO: 7.54 K/UL — HIGH (ref 1.8–7.4)
NEUTROPHILS NFR BLD AUTO: 77.6 % — HIGH (ref 43–77)
NRBC # BLD: 0 /100 WBCS — SIGNIFICANT CHANGE UP (ref 0–0)
NRBC # BLD: 0 /100 WBCS — SIGNIFICANT CHANGE UP (ref 0–0)
PHOSPHATE SERPL-MCNC: 2.3 MG/DL — LOW (ref 2.5–4.5)
PLATELET # BLD AUTO: 214 K/UL — SIGNIFICANT CHANGE UP (ref 150–400)
PLATELET # BLD AUTO: 246 K/UL — SIGNIFICANT CHANGE UP (ref 150–400)
POTASSIUM SERPL-MCNC: 4.6 MMOL/L — SIGNIFICANT CHANGE UP (ref 3.5–5.3)
POTASSIUM SERPL-MCNC: 4.7 MMOL/L — SIGNIFICANT CHANGE UP (ref 3.5–5.3)
POTASSIUM SERPL-SCNC: 4.6 MMOL/L — SIGNIFICANT CHANGE UP (ref 3.5–5.3)
POTASSIUM SERPL-SCNC: 4.7 MMOL/L — SIGNIFICANT CHANGE UP (ref 3.5–5.3)
PROT SERPL-MCNC: 5.3 G/DL — LOW (ref 6–8.3)
PROTHROM AB SERPL-ACNC: 11.6 SEC — SIGNIFICANT CHANGE UP (ref 9.9–13.4)
RBC # BLD: 3.62 M/UL — LOW (ref 4.2–5.8)
RBC # BLD: 3.64 M/UL — LOW (ref 4.2–5.8)
RBC # FLD: 14.6 % — HIGH (ref 10.3–14.5)
RBC # FLD: 14.6 % — HIGH (ref 10.3–14.5)
RH IG SCN BLD-IMP: POSITIVE — SIGNIFICANT CHANGE UP
SODIUM SERPL-SCNC: 144 MMOL/L — SIGNIFICANT CHANGE UP (ref 135–145)
SODIUM SERPL-SCNC: 145 MMOL/L — SIGNIFICANT CHANGE UP (ref 135–145)
WBC # BLD: 8.88 K/UL — SIGNIFICANT CHANGE UP (ref 3.8–10.5)
WBC # BLD: 9.71 K/UL — SIGNIFICANT CHANGE UP (ref 3.8–10.5)
WBC # FLD AUTO: 8.88 K/UL — SIGNIFICANT CHANGE UP (ref 3.8–10.5)
WBC # FLD AUTO: 9.71 K/UL — SIGNIFICANT CHANGE UP (ref 3.8–10.5)

## 2024-12-29 PROCEDURE — 99233 SBSQ HOSP IP/OBS HIGH 50: CPT

## 2024-12-29 PROCEDURE — 93971 EXTREMITY STUDY: CPT | Mod: 26,LT

## 2024-12-29 RX ORDER — POTASSIUM PHOSPHATE, MONOBASIC AND POTASSIUM PHOSPHATE, DIBASIC 224; 236 MG/ML; MG/ML
15 INJECTION, SOLUTION INTRAVENOUS ONCE
Refills: 0 | Status: COMPLETED | OUTPATIENT
Start: 2024-12-29 | End: 2024-12-29

## 2024-12-29 RX ADMIN — Medication 500 MILLIGRAM(S): at 11:18

## 2024-12-29 RX ADMIN — POLYSORBATE 80 1 DROP(S): 100 SOLUTION/ DROPS OPHTHALMIC at 05:50

## 2024-12-29 RX ADMIN — POLYSORBATE 80 1 DROP(S): 100 SOLUTION/ DROPS OPHTHALMIC at 21:08

## 2024-12-29 RX ADMIN — APIXABAN 2.5 MILLIGRAM(S): 5 TABLET, FILM COATED ORAL at 17:16

## 2024-12-29 RX ADMIN — Medication 1 TABLET(S): at 17:16

## 2024-12-29 RX ADMIN — QUETIAPINE FUMARATE 25 MILLIGRAM(S): 100 TABLET, FILM COATED ORAL at 21:03

## 2024-12-29 RX ADMIN — CHLORHEXIDINE GLUCONATE 1 APPLICATION(S): 1.2 RINSE ORAL at 11:19

## 2024-12-29 RX ADMIN — POTASSIUM PHOSPHATE, MONOBASIC AND POTASSIUM PHOSPHATE, DIBASIC 62.5 MILLIMOLE(S): 224; 236 INJECTION, SOLUTION INTRAVENOUS at 17:10

## 2024-12-29 RX ADMIN — APIXABAN 2.5 MILLIGRAM(S): 5 TABLET, FILM COATED ORAL at 05:50

## 2024-12-29 RX ADMIN — Medication 1 TABLET(S): at 05:51

## 2024-12-29 RX ADMIN — Medication 3 MILLIGRAM(S): at 21:06

## 2024-12-29 RX ADMIN — Medication 81 MILLIGRAM(S): at 11:18

## 2024-12-29 RX ADMIN — Medication 1 TABLET(S): at 11:18

## 2024-12-29 RX ADMIN — Medication 1 APPLICATION(S): at 05:51

## 2024-12-29 RX ADMIN — Medication 5 MILLIGRAM(S): at 11:18

## 2024-12-29 RX ADMIN — Medication 1 APPLICATION(S): at 17:16

## 2024-12-29 RX ADMIN — TAMSULOSIN HYDROCHLORIDE 0.4 MILLIGRAM(S): 0.4 CAPSULE ORAL at 21:08

## 2024-12-29 RX ADMIN — POLYSORBATE 80 1 DROP(S): 100 SOLUTION/ DROPS OPHTHALMIC at 13:10

## 2024-12-29 NOTE — STROKE CODE NOTE - NSMDCONSULT QTN_Y FT
Code stroke called for altered mental status as patient nonverbal not following commands. Neurologic exam is nonfocal. Per primary hospitalist, patient with advanced dementia with waxing/waning mental status. Current episode is consistent with patient's baseline. Neurology and primary teams agree to cancel code stroke.

## 2024-12-29 NOTE — DISCHARGE NOTE PROVIDER - NSDCCPCAREPLAN_GEN_ALL_CORE_FT
PRINCIPAL DISCHARGE DIAGNOSIS  Diagnosis: Pneumonia, aspiration  Assessment and Plan of Treatment: Pneumonia is a lung infection that can cause a fever, cough, and trouble breathing.  You completed a course of antibiotics    Nutrition is important, eat small frequent meals.  Get lots of rest and drink fluids.  Call your health care provider upon arrival home from hospital and make a follow up appointment for one week.  If your cough worsens, you develop fever greater than 101', you have shaking chills, a fast heartbeat, trouble breathing and/or feel your are breathing much faster than usual, call your healthcare provider.  Make sure you wash your hands frequently.        SECONDARY DISCHARGE DIAGNOSES  Diagnosis: Hypernatremia  Assessment and Plan of Treatment: Likely due to a combination of dehydration, decreased oral intake, and sepsis. Please maintain adequate nutrition and hydration orally    Diagnosis: Acute respiratory failure with hypoxia  Assessment and Plan of Treatment: Initially requiring high-flow oxygen  likely in setting of aspiration pneumonia. You were treated with ertapenem, and weaned off of oxygen to room air    Diagnosis: Alzheimer dementia with behavioral disturbance  Assessment and Plan of Treatment: HOME CARE INSTRUCTIONS  The care of individuals with dementia is varied and dependent upon the progression of the dementia. The following suggestions are intended for the person living with, or caring for, the person with dementia.  Create a safe environment.  Remove the locks on bathroom doors to prevent the person from accidentally locking himself or herself in.  Use childproof latches on kitchen cabinets and any place where cleaning supplies, chemicals, or alcohol are kept.  Use childproof covers in unused electrical outlets.  Install childproof devices to keep doors and windows secured.  Remove stove knobs or install safety knobs and an automatic shut-off on the stove.  Lower the temperature on water heaters.  Label medicines and keep them locked up.  Secure knives, lighters, matches, power tools, and guns, and keep these items out of reach.  Keep the house free from clutter. Remove rugs or anything that might contribute to a fall.  Remove objects that might break and hurt the person.  Make sure lighting is good, both inside and outside.  Install grab rails as needed.  Use a monitoring device to alert you to falls or other needs for help.   Reduce confusion.  Keep familiar objects and people around.  Use night lights or dim lights at night.  Label items or areas.  Use reminders, notes, or directions for daily activities or tasks.Keep a simple, consistent routine for waking, meals, bathing, dressing, and bedtime  Create a calm, quiet environment.  Place large clocks and calendars prominently.  Display emergency numbers and home address near all telephones..  Have a consistent nighttime routine.  Ensure a regular walking or physical activity schedule. Involve the person in daily activities as much as possible.  Limit napping during the day.  Limit caffeine.  Attend social events that stimulate rather than overwhelm the senses.  Encourage good nutrition and hydration.  Reduce distractions during meal times and snacks..  Monitor chewing and swallowing ability.  Continue with routine vision, hearin    Diagnosis: Type 2 myocardial infarction  Assessment and Plan of Treatment: You were noted with elevated troponin, likely secondary to hypoxia, without associated chest pain or new EKG changes.   Please follow up with your PCP on when to resume torsemide   continue home aspirin and Eliquis.

## 2024-12-29 NOTE — PROVIDER CONTACT NOTE (CRITICAL VALUE NOTIFICATION) - BACKGROUND
Dx pneumonitis d/t inhalation of food or vomitus. PMHx a-fib, HTN, BPH.
Pt being seen for Aspiration PNA. on High Flow NC 50/100.
Pt being seen for Aspiration PNA, on High flow NC. On Abx ertapenem and vanco
patient admitted for pneumonia, RRT today for lethargy, altered mental status.
Pts being seen for Aspiration PNA. ON Hiflow NC 60/100

## 2024-12-29 NOTE — DISCHARGE NOTE PROVIDER - HOSPITAL COURSE
HPI:  89M c hx Atrial Fibrillation on eliquis, s/p PPM, CAD s/p multiple stents, HTN, BPH, vertigo, mild cognitive impairment, pw acute hypoxia, confusion.    History from chart and pt's daughter/surrogate Catherine Carrillo over phone.  At baseline pt transfers to wheelchair, requires assistance ambulating short distances, requires assistance eating. Pt lives alone with an aide. Pt still watches TV, able to identify family members, but over the past 9 mos, pt has been noted to be less verbal and intermittent hallucinations, calling for  family members. Also over the past 1 mo, pt has been having ?sinus congestion. Today pt ate breakfast and lunch as usual, but acutely after lunch, pt's aide noticed pt was having abnormal breathing and more lethargic. O2 sats checked at home were in the "high 40s" and reportedly "50s" by EMS.  Daughter is not aware of pt having any diarrhea, fevers, CP, abd pain.  (22 Dec 2024 21:42)    Hospital Course:  Problem/Plan - 1:  ·  Problem: Acute respiratory failure with hypoxia.   ·  Plan: due to aspiration pneumonia initially requiring hi flow oxygen, s/p RRT  oxygen requirement improving now tolerating NC 6L, will titrate as possible  c/w IV abx ertapenem and vancomycin (vanc level  10.2) - day 3 - will need 7 day course (but can switch to PO when clinically ready)   blood cultures negative; viral panel negative  - continuous pulse ox  lactate on VBG 3.0 - clinically improving, c/w IVF, monitor    - DNR/DNI. OK for trial of NIV. d/w daughter at length - she would like to pursue treatment.     Problem/Plan - 2:  ·  Problem: Hypernatremia.   ·  Plan: likely dehydration, decrease PO intake, sepsis  Patient pulled out IV access, now with midline  Na 146; c/w IVF d5 1/2NS for another day   tolerating oral diet puree with mod thick.     Problem/Plan - 3:  ·  Problem: Acute metabolic encephalopathy.   ·  Plan: acute metabolic encephalopathy - likely due to aspiration pneumonia, infection  mental status improving  - fall precaution  - holding seroquel for now    Per grandson at beside, this is very close to baseline.     Problem/Plan - 4:  ·  Problem: Type 2 myocardial infarction.   ·  Plan: no s/s of fluid overload  - cont pt's asa, eliquis  troponin elevated - sees cardiology at [place]   Denies chest pain, no acute ST changes  - likely 2/2 hypoxia  - holding torsemide for now  BNP elevated but no s/s of fluid overload, holding torsemide.     Problem/Plan - 5:  ·  Problem: Oropharyngeal dysphagia.   ·  Plan: advanced oral and pharyngeal dysphagia   on clinical exam tolerated puree and mod thick - tolerating  given age and goals of care, will not pursue instrumentation   family in agreement with plan to continue feeding despite risk of aspiration  they expressed that they would not want to pursue artificial nutrition.     Problem/Plan - 6:  ·  Problem: PAF (paroxysmal atrial fibrillation).   ·  Plan: - cont eliquis.   rate controlled.     Problem/Plan - 7:  ·  Problem: CAD (coronary artery disease).   ·  Plan: - cont asa  - restart statin when clinically improved.     Problem/Plan - 8:  ·  Problem: Anemia.   ·  Plan: hg stable, no s/s of acute bleed, will continue to monitor, plat stable.     Problem/Plan - 9:  ·  Problem: At high risk for skin breakdown.   ·  Plan: DTI on buttock - present on admission - likely older age and chronic multimorbidity, now with acute illness  wound care input appreciated.     Problem/Plan - 10:  ·  Problem: Goals of care, counseling/discussion.   ·  Plan; Ongoing discussions with daughter and grandson - understand that patient is 89 and frail, deconditioned, now with dysphagia, aspiration pneumonia, advanced dementia (FAST stage 7).   patient is DNR/DNI  Palliative input appreciated, goals of care known at this time - pursue medical management but DNR/DNI.    Important Medication Changes and Reason:    Active or Pending Issues Requiring Follow-up:    Advanced Directives:   [ ] Full code  [x] DNR  [ ] Hospice    Discharge Diagnoses:         HPI:  89M c hx Atrial Fibrillation on eliquis, s/p PPM, CAD s/p multiple stents, HTN, BPH, vertigo, mild cognitive impairment, pw acute hypoxia, confusion.    History from chart and pt's daughter/surrogate Catherine Carrillo over phone.  At baseline pt transfers to wheelchair, requires assistance ambulating short distances, requires assistance eating. Pt lives alone with an aide. Pt still watches TV, able to identify family members, but over the past 9 mos, pt has been noted to be less verbal and intermittent hallucinations, calling for  family members. Also over the past 1 mo, pt has been having ?sinus congestion. Today pt ate breakfast and lunch as usual, but acutely after lunch, pt's aide noticed pt was having abnormal breathing and more lethargic. O2 sats checked at home were in the "high 40s" and reportedly "50s" by EMS.  Daughter is not aware of pt having any diarrhea, fevers, CP, abd pain.  (22 Dec 2024 21:42)    Hospital Course: Patient was admitted for acute hypoxic respiratory failure and metabolic encephalopathy. He was also found to be severely protein-calorie malnourished and underweight. A nutritional plan was initiated, including a pureed diet with moderately thick liquids, supplemented with two cans of Ensure Plus High Protein daily.    His respiratory failure, initially requiring high-flow oxygen and subsequently respiratory support, was attributed to aspiration pneumonia. He was treated with ertapenem, and blood and viral cultures were negative. He is now on room air and continuous pulse oximetry. His IV fluids have been discontinued. He is DNR/DNI. While a trial of non-invasive ventilation (NIV) was considered, after extensive discussion with his daughter, the decision was made to pursue treatment.    The patient's Alzheimer's dementia with behavioral disturbances was exacerbated during his hospitalization. He is on home medications of quetiapine (Seroquel) and melatonin, which were initially held for lethargy but later restarted. He also received as-needed olanzapine (Zyprexa). On , he experienced an episode of agitation and likely struck his head on the bed, resulting in a small left eye bruise. Staff were made aware, and his home medications were restarted, with the addition of PRN Zyprexa.    His acute metabolic encephalopathy, likely secondary to the aspiration pneumonia and infection, showed some improvement, though he continued to alternate between hyperactive and hypoactive delirium. Fall precautions were implemented. He also experienced hypernatremia, likely due to a combination of dehydration, decreased oral intake, and sepsis. His ability to maintain adequate nutrition and hydration orally was a concern, but he is currently tolerating his prescribed diet.    The patient also had an elevated troponin, likely secondary to hypoxia, without associated chest pain or acute EKG changes. His torsemide was held due to the absence of fluid overload symptoms, despite an elevated BNP. He will continue his home aspirin and Eliquis.    His oropharyngeal dysphagia was managed with a pureed diet and moderately thick liquids, which he tolerated. Given his age and goals of care, further instrumental evaluation was deferred, and the family agreed to continue oral feeding despite the risk of aspiration, declining artificial nutrition. His paroxysmal atrial fibrillation (PAF) was rate-controlled, and he will continue Eliquis.     Discharge/Dispo/Med rec discussed with attending Dr. Meyer. Patient medically cleared for discharge home with outpatient follow up     Important Medication Changes and Reason: see med rec  - f/u with PCP as outpatient on when to resume torsemide     Active or Pending Issues Requiring Follow-up:  Follow-up with primary care physician within 1 week.        Advanced Directives:   [ ] Full code  [x] DNR  [ ] Hospice    Discharge Diagnoses:         HPI:  89M c hx Atrial Fibrillation on eliquis, s/p PPM, CAD s/p multiple stents, HTN, BPH, vertigo, mild cognitive impairment, pw acute hypoxia, confusion.    History from chart and pt's daughter/surrogate Catherine Carrillo over phone.  At baseline pt transfers to wheelchair, requires assistance ambulating short distances, requires assistance eating. Pt lives alone with an aide. Pt still watches TV, able to identify family members, but over the past 9 mos, pt has been noted to be less verbal and intermittent hallucinations, calling for  family members. Also over the past 1 mo, pt has been having ?sinus congestion. Today pt ate breakfast and lunch as usual, but acutely after lunch, pt's aide noticed pt was having abnormal breathing and more lethargic. O2 sats checked at home were in the "high 40s" and reportedly "50s" by EMS.  Daughter is not aware of pt having any diarrhea, fevers, CP, abd pain.  (22 Dec 2024 21:42)    Hospital Course: Patient was admitted for acute hypoxic respiratory failure and metabolic encephalopathy. He was also found to be severely protein-calorie malnourished and underweight. A nutritional plan was initiated, including a pureed diet with moderately thick liquids, supplemented with two cans of Ensure Plus High Protein daily.    His respiratory failure, initially requiring high-flow oxygen and subsequently respiratory support, was attributed to aspiration pneumonia. He was treated with ertapenem, and blood and viral cultures were negative. He is now on room air and continuous pulse oximetry. His IV fluids have been discontinued. He is DNR/DNI. While a trial of non-invasive ventilation (NIV) was considered, after extensive discussion with his daughter, the decision was made to pursue treatment.    The patient's Alzheimer's dementia with behavioral disturbances was exacerbated during his hospitalization. He is on home medications of quetiapine (Seroquel) and melatonin, which were initially held for lethargy but later restarted. He also received as-needed olanzapine (Zyprexa). On , he experienced an episode of agitation and likely struck his head on the bed, resulting in a small left eye bruise. Staff were made aware, and his home medications were restarted, with the addition of PRN Zyprexa.    His acute metabolic encephalopathy, likely secondary to the aspiration pneumonia and infection, showed some improvement, though he continued to alternate between hyperactive and hypoactive delirium. Fall precautions were implemented. He also experienced hypernatremia, likely due to a combination of dehydration, decreased oral intake, and sepsis. His ability to maintain adequate nutrition and hydration orally was a concern, but he is currently tolerating his prescribed diet.    The patient also had an elevated troponin, likely secondary to hypoxia, without associated chest pain or acute EKG changes. His torsemide was held due to the absence of fluid overload symptoms, despite an elevated BNP. He will continue his home aspirin and Eliquis.    His oropharyngeal dysphagia was managed with a pureed diet and moderately thick liquids, which he tolerated. Given his age and goals of care, further instrumental evaluation was deferred, and the family agreed to continue oral feeding despite the risk of aspiration, declining artificial nutrition. His paroxysmal atrial fibrillation (PAF) was rate-controlled, and he will continue Eliquis.     Discharge/Dispo/Med rec discussed with attending Dr. Meyer. Patient medically cleared for discharge home with outpatient follow up     Important Medication Changes and Reason: see med rec  - f/u with PCP as outpatient on when to resume torsemide     Active or Pending Issues Requiring Follow-up:  Follow-up with primary care physician within 1 week.        Advanced Directives:   [ ] Full code  [x] DNR  [ ] Hospice    Discharge Diagnoses:  Hypoxic respiratory failure  Chronic atrial fibrillation  Alzheimer dementia  Hypernatremia

## 2024-12-29 NOTE — PROVIDER CONTACT NOTE (CRITICAL VALUE NOTIFICATION) - ASSESSMENT
PT AxO 0-1 on High flow NC. No visible signs of respiratory distress.
Pt A/Ox1. Pt a-paced on tele. Pt on 6LNC. All Vital Signs Stable.
Pt AxO0 on Hiflow spo2 100%, coarse crackles FARIBA. No complaints offered no distress noted.
PT AxOO on highflow now s/s of distress. Lethargic
axox0, VSS, lethargic, see more info on RRT PCN

## 2024-12-29 NOTE — DISCHARGE NOTE PROVIDER - NSDCHOSPICE_GEN_A_CORE
Well , 10 Years Old  Well-child exams are visits with a health care provider to track your child's growth and development at certain ages. The following information tells you what to expect during this visit and gives you some helpful tips about caring for your child.  What immunizations does my child need?  Influenza vaccine, also called a flu shot. A yearly (annual) flu shot is recommended.  Other vaccines may be suggested to catch up on any missed vaccines or if your child has certain high-risk conditions.  For more information about vaccines, talk to your child's health care provider or go to the Centers for Disease Control and Prevention website for immunization schedules: www.cdc.gov/vaccines/schedules  What tests does my child need?  Physical exam  Your child's health care provider will complete a physical exam of your child.  Your child's health care provider will measure your child's height, weight, and head size. The health care provider will compare the measurements to a growth chart to see how your child is growing.  Vision    Have your child's vision checked every 2 years if he or she does not have symptoms of vision problems. Finding and treating eye problems early is important for your child's learning and development.  If an eye problem is found, your child may need to have his or her vision checked every year instead of every 2 years. Your child may also:  Be prescribed glasses.  Have more tests done.  Need to visit an eye specialist.  If your child is female:  Your child's health care provider may ask:  Whether she has begun menstruating.  The start date of her last menstrual cycle.  Other tests  Your child's blood sugar (glucose) and cholesterol will be checked.  Have your child's blood pressure checked at least once a year.  Your child's body mass index (BMI) will be measured to screen for obesity.  Talk with your child's health care provider about the need for certain screenings.  Depending on your child's risk factors, the health care provider may screen for:  Hearing problems.  Anxiety.  Low red blood cell count (anemia).  Lead poisoning.  Tuberculosis (TB).  Caring for your child  Parenting tips  Even though your child is more independent, he or she still needs your support. Be a positive role model for your child, and stay actively involved in his or her life.  Talk to your child about:  Peer pressure and making good decisions.  Bullying. Tell your child to let you know if he or she is bullied or feels unsafe.  Handling conflict without violence. Teach your child that everyone gets angry and that talking is the best way to handle anger. Make sure your child knows to stay calm and to try to understand the feelings of others.  The physical and emotional changes of puberty, and how these changes occur at different times in different children.  Sex. Answer questions in clear, correct terms.  Feeling sad. Let your child know that everyone feels sad sometimes and that life has ups and downs. Make sure your child knows to tell you if he or she feels sad a lot.  His or her daily events, friends, interests, challenges, and worries.  Talk with your child's teacher regularly to see how your child is doing in school. Stay involved in your child's school and school activities.  Give your child chores to do around the house.  Set clear behavioral boundaries and limits. Discuss the consequences of good behavior and bad behavior.  Correct or discipline your child in private. Be consistent and fair with discipline.  Do not hit your child or let your child hit others.  Acknowledge your child's accomplishments and growth. Encourage your child to be proud of his or her achievements.  Teach your child how to handle money. Consider giving your child an allowance and having your child save his or her money for something that he or she chooses.  You may consider leaving your child at home for brief periods  during the day. If you leave your child at home, give him or her clear instructions about what to do if someone comes to the door or if there is an emergency.  Oral health    Check your child's toothbrushing and encourage regular flossing.  Schedule regular dental visits. Ask your child's dental care provider if your child needs:  Sealants on his or her permanent teeth.  Treatment to correct his or her bite or to straighten his or her teeth.  Give fluoride supplements as told by your child's health care provider.  Sleep  Children this age need 9-12 hours of sleep a day. Your child may want to stay up later but still needs plenty of sleep.  Watch for signs that your child is not getting enough sleep, such as tiredness in the morning and lack of concentration at school.  Keep bedtime routines. Reading every night before bedtime may help your child relax.  Try not to let your child watch TV or have screen time before bedtime.  General instructions  Talk with your child's health care provider if you are worried about access to food or housing.  What's next?  Your next visit will take place when your child is 11 years old.  Summary  Talk with your child's dental care provider about dental sealants and whether your child may need braces.  Your child's blood sugar (glucose) and cholesterol will be checked.  Children this age need 9-12 hours of sleep a day. Your child may want to stay up later but still needs plenty of sleep. Watch for tiredness in the morning and lack of concentration at school.  Talk with your child about his or her daily events, friends, interests, challenges, and worries.  This information is not intended to replace advice given to you by your health care provider. Make sure you discuss any questions you have with your health care provider.  Document Revised: 12/19/2022 Document Reviewed: 12/19/2022  Elsevier Patient Education © 2023 [employer].     No

## 2024-12-29 NOTE — DISCHARGE NOTE PROVIDER - NSDCMRMEDTOKEN_GEN_ALL_CORE_FT
acetaminophen 325 mg oral tablet: 1 tab(s) orally as needed for pain  aspirin 81 mg oral tablet, chewable: 1 tab(s) orally once a day  Eliquis 2.5 mg oral tablet: 1 tab(s) orally 2 times a day  finasteride 5 mg oral tablet: 1 tab(s) orally once a day  Flomax 0.4 mg oral capsule: 1 cap(s) orally once a day  lactobacillus acidophilus oral capsule: 1 cap(s) orally once a day  Multiple Vitamins oral tablet: 1 tab(s) orally once a day  polyethylene glycol 3350 oral powder for reconstitution: 17 gram(s) orally once a day as needed for  constipation  QUEtiapine 25 mg oral tablet: 1 tab(s) orally once a day  senna oral tablet: 2 tab(s) orally once a day (at bedtime) as needed for  constipation  simvastatin 20 mg oral tablet: 1 tab(s) orally once a day (at bedtime)  torsemide 20 mg oral tablet: 1 tab(s) orally once a day   aspirin 81 mg oral tablet, chewable: 1 tab(s) orally once a day  Eliquis 2.5 mg oral tablet: 1 tab(s) orally 2 times a day  finasteride 5 mg oral tablet: 1 tab(s) orally once a day  Flomax 0.4 mg oral capsule: 1 cap(s) orally once a day  lactobacillus acidophilus oral capsule: 1 cap(s) orally once a day  Multiple Vitamins oral tablet: 1 tab(s) orally once a day  polyethylene glycol 3350 oral powder for reconstitution: 17 gram(s) orally once a day as needed for  constipation  QUEtiapine 25 mg oral tablet: 1 tab(s) orally once a day  senna oral tablet: 2 tab(s) orally once a day (at bedtime) as needed for  constipation  simvastatin 20 mg oral tablet: 1 tab(s) orally once a day (at bedtime)   ascorbic acid 500 mg oral tablet: 1 tab(s) orally once a day  aspirin 81 mg oral tablet, chewable: 1 tab(s) orally once a day  Eliquis 2.5 mg oral tablet: 1 tab(s) orally 2 times a day  finasteride 5 mg oral tablet: 1 tab(s) orally once a day  Flomax 0.4 mg oral capsule: 1 cap(s) orally once a day  lactobacillus acidophilus oral capsule: 1 cap(s) orally once a day  Multiple Vitamins oral tablet: 1 tab(s) orally once a day  ocular lubricant preservative-free ophthalmic solution: 1 drop(s) to each affected eye 3 times a day as needed for  dry eyes  polyethylene glycol 3350 oral powder for reconstitution: 17 gram(s) orally once a day as needed for  constipation  QUEtiapine 25 mg oral tablet: 1 tab(s) orally once a day  senna oral tablet: 2 tab(s) orally once a day (at bedtime) as needed for  constipation  simvastatin 20 mg oral tablet: 1 tab(s) orally once a day (at bedtime)

## 2024-12-29 NOTE — PROVIDER CONTACT NOTE (CRITICAL VALUE NOTIFICATION) - ACTION/TREATMENT ORDERED:
Provider contacted will ordere repeat CBC and T&S.
Provider aware.
Provider contacted, no new orders at this time.
Notified ACP
provider contacted, no new orders at this time.

## 2024-12-29 NOTE — DISCHARGE NOTE PROVIDER - INSTRUCTIONS
Diet Pureed-  Moderately Thick Liquids (MODTHICKLIQS)  Supplement Feeding Modality:  Oral  Ensure Plus High Protein Cans or Servings Per Day:  2       Frequency:  Daily

## 2024-12-29 NOTE — DISCHARGE NOTE PROVIDER - NSDCFUADDAPPT_GEN_ALL_CORE_FT
APPTS ARE READY TO BE MADE: [X] YES    Best Family or Patient Contact (if needed):    Additional Information about above appointments (if needed):    1:   2:   3:     Other comments or requests:    APPTS ARE READY TO BE MADE: [X] YES    Best Family or Patient Contact (if needed):    Additional Information about above appointments (if needed):    1: PCP  2:   3:     Other comments or requests:    APPTS ARE READY TO BE MADE: [X] YES    Best Family or Patient Contact (if needed):    Additional Information about above appointments (if needed):    1: PCP  2:   3:     Other comments or requests:   Patient is being discharged to hospice at home. Patient/caregiver will arrange follow up appointments.

## 2024-12-29 NOTE — RAPID RESPONSE TEAM SUMMARY - NSSITUATIONBACKGROUNDRRT_GEN_ALL_CORE
89M c hx Atrial Fibrillation on eliquis, s/p PPM, CAD s/p multiple stents, HTN, BPH, vertigo, mild cognitive impairment p/w acute hypoxic respiratory failure and metabolic encephalopathy, s/p RRT requiring high flow previously, now on RA. RRT called due to AMS, lethargy, concern for facial droop.    Pt moaning and groaning however began speaking during rapid. No pronounced facial droop, moving extremities spontaneously. temp 96.8, bp 152/74, hr 87, rr 20, o2 sat 90%, fingerstick 133. Per primary attending pt at mental baseline upon examination, AOx0-1 waxing and waning mental status. Initially code stroke called however no concern for stroke, no c/f new infection. Pt originally desat to 88% but 96% on NC. Code stroke called off, rapid ended, pt stable at mental baseline. Wean O2 as tolerated.

## 2024-12-29 NOTE — STROKE CODE NOTE - NIH STROKE SCALE: 9. BEST LANGUAGE, QM
172.72
(2) Severe aphasia; all communication is through fragmentary expression; great need for inference, questioning, and guessing by the listener. Range of information that can be exchanged is limited; listener carries burden of communication. Examiner cannot identify materials provided from patient response.

## 2024-12-29 NOTE — PROVIDER CONTACT NOTE (CRITICAL VALUE NOTIFICATION) - SITUATION
Patients lactate is 10.7
Pts PCO2 71
pts hemoglobin 5.8
pO2 arterial is 38
Pt had critical venous PCO2 value of 70.

## 2024-12-29 NOTE — PROGRESS NOTE ADULT - PROBLEM SELECTOR PLAN 2
advanced dementia  at home on seroquel 25mg qhs and melatonin - initially held for lethargy, now back on it    zyprexa PRN  12/26 overnight, very agitated, moving around aggressively in bed, likely bumped head on bed, now with small L eye bruise. Staff aware, restarted home meds - seroquel/melatonin and added zyprexa PRN    Daughter considering home with hospice, referral made on Friday, follow up on Monday

## 2024-12-29 NOTE — DISCHARGE NOTE PROVIDER - CARE PROVIDER_API CALL
Imer Lakhani  Family Medicine  [address] [place] [address], [address]  [place] [address]  Phone: [phone]  Fax: [phone]  Follow Up Time: 1 week

## 2024-12-29 NOTE — PROGRESS NOTE ADULT - SUBJECTIVE AND OBJECTIVE BOX
PROGRESS NOTE:   Authored by Dr. Chayito Gomez MD  Pager [phone]     Patient is a 89y old  Male who presents with a chief complaint of hypoxia, confusion (29 Dec 2024 00:31)      SUBJECTIVE / OVERNIGHT EVENTS: Patient seen and examined at bedside. Patient sleeping, but arousable, taking some PO (not much).     ADDITIONAL REVIEW OF SYSTEMS: unable to obtain due to dementia    MEDICATIONS  (STANDING):  apixaban 2.5 milliGRAM(s) Oral two times a day  artificial tears (preservative free) Ophthalmic Solution 1 Drop(s) Both EYES three times a day  ascorbic acid 500 milliGRAM(s) Oral daily  aspirin  chewable 81 milliGRAM(s) Oral daily  chlorhexidine 2% Cloths 1 Application(s) Topical daily  finasteride 5 milliGRAM(s) Oral daily  influenza  Vaccine (HIGH DOSE) 0.5 milliLiter(s) IntraMuscular once  lactobacillus acidophilus 1 Tablet(s) Oral every 12 hours  melatonin 3 milliGRAM(s) Oral at bedtime  multivitamin 1 Tablet(s) Oral daily  mupirocin 2% Nasal 1 Application(s) Both Nostrils two times a day  QUEtiapine 25 milliGRAM(s) Oral at bedtime  tamsulosin 0.4 milliGRAM(s) Oral at bedtime    MEDICATIONS  (PRN):  albuterol/ipratropium for Nebulization 3 milliLiter(s) Nebulizer every 6 hours PRN Shortness of Breath and/or Wheezing  guaiFENesin Oral Liquid (Sugar-Free) 100 milliGRAM(s) Oral every 6 hours PRN Cough  OLANZapine Disintegrating Tablet 2.5 milliGRAM(s) Oral every 6 hours PRN severe agitation      CAPILLARY BLOOD GLUCOSE        I&O's Summary      PHYSICAL EXAM:  Vital Signs Last 24 Hrs  T(C): 36.3 (29 Dec 2024 07:33), Max: 36.9 (28 Dec 2024 12:37)  T(F): 97.4 (29 Dec 2024 07:33), Max: 98.5 (28 Dec 2024 12:37)  HR: 65 (29 Dec 2024 07:33) (64 - 90)  BP: 105/53 (29 Dec 2024 07:33) (105/53 - 165/63)  BP(mean): --  RR: 18 (29 Dec 2024 07:33) (18 - 19)  SpO2: 96% (29 Dec 2024 07:33) (92% - 97%)    Parameters below as of 29 Dec 2024 07:33  Patient On (Oxygen Delivery Method): room air        CONSTITUTIONAL: NAD, frail, cachectic  RESPIRATORY: Normal respiratory effort; lungs are clear to auscultation bilaterally  CARDIOVASCULAR: Regular rate and rhythm, normal S1 and S2, no murmur/rub/gallop; No lower extremity edema; Peripheral pulses are 2+ bilaterally  ABDOMEN: Nontender to palpation, normoactive bowel sounds, no rebound/guarding; No hepatosplenomegaly  MUSCLOSKELETAL: no clubbing or cyanosis of digits; no joint swelling or tenderness to palpation  PSYCH: A+O to person  L eye ecchymosis    LABS:                        10.8   8.88  )-----------( 214      ( 29 Dec 2024 06:57 )             33.2     12-29    144  |  109[H]  |  19  ----------------------------<  118[H]  4.7   |  25  |  0.69    Ca    9.0      29 Dec 2024 06:57            Urinalysis Basic - ( 29 Dec 2024 06:57 )    Color: x / Appearance: x / SG: x / pH: x  Gluc: 118 mg/dL / Ketone: x  / Bili: x / Urobili: x   Blood: x / Protein: x / Nitrite: x   Leuk Esterase: x / RBC: x / WBC x   Sq Epi: x / Non Sq Epi: x / Bacteria: x          RADIOLOGY & ADDITIONAL TESTS:  Results Reviewed:   Imaging Personally Reviewed:  Electrocardiogram Personally Reviewed:    COORDINATION OF CARE:  Care Discussed with Consultants/Other Providers [Y/N]:  Prior or Outpatient Records Reviewed [Y/N]:

## 2024-12-29 NOTE — DISCHARGE NOTE PROVIDER - DETAILS OF MALNUTRITION DIAGNOSIS/DIAGNOSES
This patient has been assessed with a concern for Malnutrition and was treated during this hospitalization for the following Nutrition diagnosis/diagnoses:     -  12/24/2024: Severe protein-calorie malnutrition   -  12/24/2024: Underweight (BMI < 19)

## 2024-12-30 LAB
ANION GAP SERPL CALC-SCNC: 8 MMOL/L — SIGNIFICANT CHANGE UP (ref 5–17)
BUN SERPL-MCNC: 18 MG/DL — SIGNIFICANT CHANGE UP (ref 7–23)
CALCIUM SERPL-MCNC: 8.6 MG/DL — SIGNIFICANT CHANGE UP (ref 8.4–10.5)
CHLORIDE SERPL-SCNC: 111 MMOL/L — HIGH (ref 96–108)
CO2 SERPL-SCNC: 28 MMOL/L — SIGNIFICANT CHANGE UP (ref 22–31)
CREAT SERPL-MCNC: 0.82 MG/DL — SIGNIFICANT CHANGE UP (ref 0.5–1.3)
EGFR: 79 ML/MIN/1.73M2 — SIGNIFICANT CHANGE UP
GLUCOSE SERPL-MCNC: 101 MG/DL — HIGH (ref 70–99)
PHOSPHATE SERPL-MCNC: 3 MG/DL — SIGNIFICANT CHANGE UP (ref 2.5–4.5)
POTASSIUM SERPL-MCNC: 4.3 MMOL/L — SIGNIFICANT CHANGE UP (ref 3.5–5.3)
POTASSIUM SERPL-SCNC: 4.3 MMOL/L — SIGNIFICANT CHANGE UP (ref 3.5–5.3)
SODIUM SERPL-SCNC: 147 MMOL/L — HIGH (ref 135–145)

## 2024-12-30 PROCEDURE — 99232 SBSQ HOSP IP/OBS MODERATE 35: CPT

## 2024-12-30 RX ADMIN — CHLORHEXIDINE GLUCONATE 1 APPLICATION(S): 1.2 RINSE ORAL at 12:18

## 2024-12-30 RX ADMIN — Medication 81 MILLIGRAM(S): at 13:52

## 2024-12-30 RX ADMIN — Medication 1 APPLICATION(S): at 18:02

## 2024-12-30 RX ADMIN — POLYSORBATE 80 1 DROP(S): 100 SOLUTION/ DROPS OPHTHALMIC at 13:52

## 2024-12-30 RX ADMIN — Medication 500 MILLIGRAM(S): at 13:51

## 2024-12-30 RX ADMIN — TAMSULOSIN HYDROCHLORIDE 0.4 MILLIGRAM(S): 0.4 CAPSULE ORAL at 22:27

## 2024-12-30 RX ADMIN — APIXABAN 2.5 MILLIGRAM(S): 5 TABLET, FILM COATED ORAL at 05:42

## 2024-12-30 RX ADMIN — Medication 1 TABLET(S): at 18:01

## 2024-12-30 RX ADMIN — POLYSORBATE 80 1 DROP(S): 100 SOLUTION/ DROPS OPHTHALMIC at 05:45

## 2024-12-30 RX ADMIN — Medication 3 MILLIGRAM(S): at 22:27

## 2024-12-30 RX ADMIN — QUETIAPINE FUMARATE 25 MILLIGRAM(S): 100 TABLET, FILM COATED ORAL at 22:28

## 2024-12-30 RX ADMIN — POLYSORBATE 80 1 DROP(S): 100 SOLUTION/ DROPS OPHTHALMIC at 22:28

## 2024-12-30 RX ADMIN — Medication 5 MILLIGRAM(S): at 13:52

## 2024-12-30 RX ADMIN — APIXABAN 2.5 MILLIGRAM(S): 5 TABLET, FILM COATED ORAL at 18:02

## 2024-12-30 RX ADMIN — Medication 1 TABLET(S): at 05:39

## 2024-12-30 RX ADMIN — Medication 1 APPLICATION(S): at 05:42

## 2024-12-30 RX ADMIN — IPRATROPIUM BROMIDE AND ALBUTEROL SULFATE 3 MILLILITER(S): .5; 2.5 SOLUTION RESPIRATORY (INHALATION) at 05:44

## 2024-12-30 NOTE — PROGRESS NOTE ADULT - TIME BILLING
Review of tests, imaging, labs, documents, medical management, coordination of care and counseling.
Time spent reviewing inpatient chart, performing history and physical, medication reconciliation, medical decision making, ordering labs/tests, discussion with patient/family at bedside, and documentation.
Time spent reviewing outpatient/inpatient chart, performing history and physical, medication reconciliation, medical decision making, ordering labs/tests, discussion with patient/family (daughter) at bedside, and documentation.
Time spent reviewing inpatient chart, performing history and physical, medication reconciliation, medical decision making, ordering labs/tests, discussion with patient/family at bedside, and documentation.
Symptom assessment and management, supportive counseling, coordination of care
Time spent reviewing inpatient chart, performing history and physical, medication reconciliation, medical decision making, ordering labs/tests, discussion with patient/family at bedside, and documentation.

## 2024-12-30 NOTE — PROGRESS NOTE ADULT - PROBLEM SELECTOR PLAN 2
advanced dementia  at home on seroquel 25mg qhs and melatonin - initially held for lethargy, now back on it    zyprexa PRN  12/26 overnight, very agitated, moving around aggressively in bed, likely bumped head on bed, now with small L eye bruise. Staff aware, restarted home meds - seroquel/melatonin and added zyprexa PRN

## 2024-12-30 NOTE — PROGRESS NOTE ADULT - SUBJECTIVE AND OBJECTIVE BOX
[facility] Division of Hospital Medicine  Darwin Meyer DO  Reachable on [employer] Teams    Patient is a 89y old  Male who presents with a chief complaint of hypoxia, confusion (29 Dec 2024 11:12)    SUBJECTIVE / OVERNIGHT EVENTS: No acute events overnight. Patient seen and examined at bedside this morning, unable to obtain ROS due to dementia.    ADDITIONAL REVIEW OF SYSTEMS: Unable to obtain as above.    MEDICATIONS  (STANDING):  apixaban 2.5 milliGRAM(s) Oral two times a day  artificial tears (preservative free) Ophthalmic Solution 1 Drop(s) Both EYES three times a day  ascorbic acid 500 milliGRAM(s) Oral daily  aspirin  chewable 81 milliGRAM(s) Oral daily  chlorhexidine 2% Cloths 1 Application(s) Topical daily  finasteride 5 milliGRAM(s) Oral daily  influenza  Vaccine (HIGH DOSE) 0.5 milliLiter(s) IntraMuscular once  lactobacillus acidophilus 1 Tablet(s) Oral every 12 hours  melatonin 3 milliGRAM(s) Oral at bedtime  multivitamin 1 Tablet(s) Oral daily  mupirocin 2% Nasal 1 Application(s) Both Nostrils two times a day  QUEtiapine 25 milliGRAM(s) Oral at bedtime  tamsulosin 0.4 milliGRAM(s) Oral at bedtime    MEDICATIONS  (PRN):  albuterol/ipratropium for Nebulization 3 milliLiter(s) Nebulizer every 6 hours PRN Shortness of Breath and/or Wheezing  guaiFENesin Oral Liquid (Sugar-Free) 100 milliGRAM(s) Oral every 6 hours PRN Cough  OLANZapine Disintegrating Tablet 2.5 milliGRAM(s) Oral every 6 hours PRN severe agitation      CAPILLARY BLOOD GLUCOSE        I&O's Summary      PHYSICAL EXAM:  Vital Signs Last 24 Hrs  T(C): 36.5 (30 Dec 2024 14:46), Max: 36.9 (30 Dec 2024 09:56)  T(F): 97.7 (30 Dec 2024 14:46), Max: 98.4 (30 Dec 2024 09:56)  HR: 93 (30 Dec 2024 14:46) (68 - 99)  BP: 121/68 (30 Dec 2024 14:46) (108/67 - 127/71)  BP(mean): --  RR: 18 (30 Dec 2024 14:46) (18 - 18)  SpO2: 93% (30 Dec 2024 14:46) (93% - 96%)    Parameters below as of 30 Dec 2024 14:46  Patient On (Oxygen Delivery Method): room air      CONSTITUTIONAL: NAD, frail, cachectic  RESPIRATORY: Normal respiratory effort; lungs are clear to auscultation bilaterally  CARDIOVASCULAR: Regular rate and rhythm, normal S1 and S2, no murmur/rub/gallop  ABDOMEN: Nontender to palpation, nondistended, soft  PSYCH: Awake, not alert or oriented    LABS:                        10.7   9.71  )-----------( 246      ( 29 Dec 2024 14:12 )             33.1     12-30    147[H]  |  111[H]  |  18  ----------------------------<  101[H]  4.3   |  28  |  0.82    Ca    8.6      30 Dec 2024 07:38  Phos  3.0     12-30  Mg     2.3     12-29    TPro  5.3[L]  /  Alb  2.6[L]  /  TBili  0.4  /  DBili  x   /  AST  26  /  ALT  18  /  AlkPhos  80  12-29    PT/INR - ( 29 Dec 2024 14:12 )   PT: 11.6 sec;   INR: 1.01 ratio         PTT - ( 29 Dec 2024 14:12 )  PTT:22.1 sec      Urinalysis Basic - ( 30 Dec 2024 07:38 )    Color: x / Appearance: x / SG: x / pH: x  Gluc: 101 mg/dL / Ketone: x  / Bili: x / Urobili: x   Blood: x / Protein: x / Nitrite: x   Leuk Esterase: x / RBC: x / WBC x   Sq Epi: x / Non Sq Epi: x / Bacteria: x

## 2024-12-30 NOTE — PROGRESS NOTE ADULT - PROBLEM SELECTOR PLAN 1
Geri Parks (:  1946) is a 77 y.o. female,Established patient, here for evaluation of the following chief complaint(s):  Shoulder Pain (Right shoulder pain, radiates down arm, numbness Right hand. )         ASSESSMENT/PLAN:  1. Acute pain of right shoulder  -     predniSONE (DELTASONE) 20 MG tablet; 3 tablets daily x 3 days then 2 tablets daily x 2 days then 1 tablet daily x 2 days., Disp-15 tablet, R-0Normal  2. Primary hypertension  -     furosemide (LASIX) 20 MG tablet; Take 1 tablet by mouth daily, Disp-30 tablet, R-5Normal      Plan:  1. Acute pain of right shoulder  Appears to be secondary to a muscle strain.    Take the prednisone taper as directed on the prescription.  The prednisone is very bitter so swallow the tablets quickly to prevent  dissolving in the mouth.  After taking, don't lay  down for 2 hours to help prevent reflux.    - predniSONE (DELTASONE) 20 MG tablet; 3 tablets daily x 3 days then 2 tablets daily x 2 days then 1 tablet daily x 2 days.  Dispense: 15 tablet; Refill: 0    2. Primary hypertension  Controled on the current medication.   - furosemide (LASIX) 20 MG tablet; Take 1 tablet by mouth daily  Dispense: 30 tablet; Refill: 5      Geri is instructed to return to the clinic if the symptoms continue or worsen.  Geri  was also instructed to go to the emergency room department if the symptoms significantly worsen before an appointment can be made.             Subjective   SUBJECTIVE/OBJECTIVE:  Geri states she woke up with right shoulder pain 3 days ago.  The pain seems to radiate down the right arm.  Geri denies any known injury or trauma.  She does not have a history of shoulder pain.  Tylenol helps some for pain.  ROM is intact but worse pain with lifting or reaching behind her back.  Resting does help some.        Review of Systems   Constitutional: Negative.    HENT:  Negative for congestion, ear pain, rhinorrhea, sneezing and sore throat.    Eyes:  Negative for  due to aspiration pneumonia initially requiring hi flow oxygen, s/p RRT  Now on RA  completed Ertapenem  blood cultures negative; viral panel negative  - continuous pulse ox  d/c IVF    - DNR/DNI. OK for trial of NIV. d/w daughter at length - she would like to pursue treatment

## 2024-12-31 ENCOUNTER — NON-APPOINTMENT (OUTPATIENT)
Age: 88
End: 2024-12-31

## 2024-12-31 ENCOUNTER — TRANSCRIPTION ENCOUNTER (OUTPATIENT)
Age: 88
End: 2024-12-31

## 2024-12-31 VITALS
DIASTOLIC BLOOD PRESSURE: 68 MMHG | OXYGEN SATURATION: 93 % | TEMPERATURE: 98 F | RESPIRATION RATE: 18 BRPM | HEART RATE: 83 BPM | SYSTOLIC BLOOD PRESSURE: 131 MMHG

## 2024-12-31 LAB
ANION GAP SERPL CALC-SCNC: 6 MMOL/L — SIGNIFICANT CHANGE UP (ref 5–17)
BUN SERPL-MCNC: 22 MG/DL — SIGNIFICANT CHANGE UP (ref 7–23)
CALCIUM SERPL-MCNC: 8.2 MG/DL — LOW (ref 8.4–10.5)
CHLORIDE SERPL-SCNC: 113 MMOL/L — HIGH (ref 96–108)
CO2 SERPL-SCNC: 27 MMOL/L — SIGNIFICANT CHANGE UP (ref 22–31)
CREAT SERPL-MCNC: 0.92 MG/DL — SIGNIFICANT CHANGE UP (ref 0.5–1.3)
EGFR: 75 ML/MIN/1.73M2 — SIGNIFICANT CHANGE UP
GLUCOSE SERPL-MCNC: 113 MG/DL — HIGH (ref 70–99)
POTASSIUM SERPL-MCNC: 4.2 MMOL/L — SIGNIFICANT CHANGE UP (ref 3.5–5.3)
POTASSIUM SERPL-SCNC: 4.2 MMOL/L — SIGNIFICANT CHANGE UP (ref 3.5–5.3)
SODIUM SERPL-SCNC: 146 MMOL/L — HIGH (ref 135–145)

## 2024-12-31 PROCEDURE — 83735 ASSAY OF MAGNESIUM: CPT

## 2024-12-31 PROCEDURE — 85014 HEMATOCRIT: CPT

## 2024-12-31 PROCEDURE — 99239 HOSP IP/OBS DSCHRG MGMT >30: CPT

## 2024-12-31 PROCEDURE — 93971 EXTREMITY STUDY: CPT

## 2024-12-31 PROCEDURE — 71045 X-RAY EXAM CHEST 1 VIEW: CPT

## 2024-12-31 PROCEDURE — 99285 EMERGENCY DEPT VISIT HI MDM: CPT | Mod: 25

## 2024-12-31 PROCEDURE — 87640 STAPH A DNA AMP PROBE: CPT

## 2024-12-31 PROCEDURE — 96374 THER/PROPH/DIAG INJ IV PUSH: CPT

## 2024-12-31 PROCEDURE — 84443 ASSAY THYROID STIM HORMONE: CPT

## 2024-12-31 PROCEDURE — 86901 BLOOD TYPING SEROLOGIC RH(D): CPT

## 2024-12-31 PROCEDURE — 80053 COMPREHEN METABOLIC PANEL: CPT

## 2024-12-31 PROCEDURE — C1751: CPT

## 2024-12-31 PROCEDURE — 84484 ASSAY OF TROPONIN QUANT: CPT

## 2024-12-31 PROCEDURE — 92610 EVALUATE SWALLOWING FUNCTION: CPT

## 2024-12-31 PROCEDURE — 36569 INSJ PICC 5 YR+ W/O IMAGING: CPT

## 2024-12-31 PROCEDURE — 82550 ASSAY OF CK (CPK): CPT

## 2024-12-31 PROCEDURE — 93005 ELECTROCARDIOGRAM TRACING: CPT

## 2024-12-31 PROCEDURE — 85018 HEMOGLOBIN: CPT

## 2024-12-31 PROCEDURE — 80048 BASIC METABOLIC PNL TOTAL CA: CPT

## 2024-12-31 PROCEDURE — 97530 THERAPEUTIC ACTIVITIES: CPT

## 2024-12-31 PROCEDURE — 82435 ASSAY OF BLOOD CHLORIDE: CPT

## 2024-12-31 PROCEDURE — 36415 COLL VENOUS BLD VENIPUNCTURE: CPT

## 2024-12-31 PROCEDURE — 82330 ASSAY OF CALCIUM: CPT

## 2024-12-31 PROCEDURE — 87641 MR-STAPH DNA AMP PROBE: CPT

## 2024-12-31 PROCEDURE — 80202 ASSAY OF VANCOMYCIN: CPT

## 2024-12-31 PROCEDURE — 85025 COMPLETE CBC W/AUTO DIFF WBC: CPT

## 2024-12-31 PROCEDURE — 87040 BLOOD CULTURE FOR BACTERIA: CPT

## 2024-12-31 PROCEDURE — 85730 THROMBOPLASTIN TIME PARTIAL: CPT

## 2024-12-31 PROCEDURE — 85610 PROTHROMBIN TIME: CPT

## 2024-12-31 PROCEDURE — 93308 TTE F-UP OR LMTD: CPT

## 2024-12-31 PROCEDURE — 86850 RBC ANTIBODY SCREEN: CPT

## 2024-12-31 PROCEDURE — 84100 ASSAY OF PHOSPHORUS: CPT

## 2024-12-31 PROCEDURE — 83880 ASSAY OF NATRIURETIC PEPTIDE: CPT

## 2024-12-31 PROCEDURE — 97162 PT EVAL MOD COMPLEX 30 MIN: CPT

## 2024-12-31 PROCEDURE — 82962 GLUCOSE BLOOD TEST: CPT

## 2024-12-31 PROCEDURE — 82947 ASSAY GLUCOSE BLOOD QUANT: CPT

## 2024-12-31 PROCEDURE — 87637 SARSCOV2&INF A&B&RSV AMP PRB: CPT

## 2024-12-31 PROCEDURE — 84132 ASSAY OF SERUM POTASSIUM: CPT

## 2024-12-31 PROCEDURE — 92526 ORAL FUNCTION THERAPY: CPT

## 2024-12-31 PROCEDURE — 94640 AIRWAY INHALATION TREATMENT: CPT

## 2024-12-31 PROCEDURE — 83036 HEMOGLOBIN GLYCOSYLATED A1C: CPT

## 2024-12-31 PROCEDURE — 86900 BLOOD TYPING SEROLOGIC ABO: CPT

## 2024-12-31 PROCEDURE — 84295 ASSAY OF SERUM SODIUM: CPT

## 2024-12-31 PROCEDURE — 82803 BLOOD GASES ANY COMBINATION: CPT

## 2024-12-31 PROCEDURE — 85027 COMPLETE CBC AUTOMATED: CPT

## 2024-12-31 PROCEDURE — 83605 ASSAY OF LACTIC ACID: CPT

## 2024-12-31 PROCEDURE — 0241U: CPT

## 2024-12-31 RX ORDER — ASCORBIC ACID 1000 MG
1 TABLET ORAL
Qty: 0 | Refills: 0 | DISCHARGE
Start: 2024-12-31

## 2024-12-31 RX ORDER — TORSEMIDE 10 MG/1
1 TABLET ORAL
Refills: 0 | DISCHARGE

## 2024-12-31 RX ORDER — ACETAMINOPHEN 80 MG/.8ML
1 SOLUTION/ DROPS ORAL
Refills: 0 | DISCHARGE

## 2024-12-31 RX ORDER — POLYSORBATE 80 100 MG/10ML
1 SOLUTION/ DROPS OPHTHALMIC
Qty: 0 | Refills: 0 | DISCHARGE
Start: 2024-12-31

## 2024-12-31 RX ADMIN — CHLORHEXIDINE GLUCONATE 1 APPLICATION(S): 1.2 RINSE ORAL at 11:31

## 2024-12-31 RX ADMIN — POLYSORBATE 80 1 DROP(S): 100 SOLUTION/ DROPS OPHTHALMIC at 05:06

## 2024-12-31 RX ADMIN — POLYSORBATE 80 1 DROP(S): 100 SOLUTION/ DROPS OPHTHALMIC at 14:42

## 2024-12-31 RX ADMIN — APIXABAN 2.5 MILLIGRAM(S): 5 TABLET, FILM COATED ORAL at 05:05

## 2024-12-31 RX ADMIN — QUETIAPINE FUMARATE 25 MILLIGRAM(S): 100 TABLET, FILM COATED ORAL at 21:10

## 2024-12-31 RX ADMIN — Medication 1 TABLET(S): at 04:59

## 2024-12-31 RX ADMIN — Medication 1 TABLET(S): at 17:03

## 2024-12-31 RX ADMIN — TAMSULOSIN HYDROCHLORIDE 0.4 MILLIGRAM(S): 0.4 CAPSULE ORAL at 21:10

## 2024-12-31 RX ADMIN — Medication 3 MILLIGRAM(S): at 21:10

## 2024-12-31 RX ADMIN — APIXABAN 2.5 MILLIGRAM(S): 5 TABLET, FILM COATED ORAL at 17:03

## 2024-12-31 RX ADMIN — POLYSORBATE 80 1 DROP(S): 100 SOLUTION/ DROPS OPHTHALMIC at 21:10

## 2024-12-31 NOTE — PROGRESS NOTE ADULT - REASON FOR ADMISSION
hypoxia, confusion

## 2024-12-31 NOTE — DISCHARGE NOTE NURSING/CASE MANAGEMENT/SOCIAL WORK - FINANCIAL ASSISTANCE
[facility] provides services at a reduced cost to those who are determined to be eligible through [facility]’s financial assistance program. Information regarding [facility]’s financial assistance program can be found by going to [email or web address] or by calling [phone].

## 2024-12-31 NOTE — PROGRESS NOTE ADULT - ASSESSMENT
89M c hx Atrial Fibrillation on eliquis, s/p PPM, CAD s/p multiple stents, HTN, BPH, vertigo, mild cognitive impairment, pw acute hypoxic respiratory failure and metabolic encephalopathy, s/p RRT this am requiring high flow 
89M c hx Atrial Fibrillation on eliquis, s/p PPM, CAD s/p multiple stents, HTN, BPH, vertigo, mild cognitive impairment p/w acute hypoxic respiratory failure and metabolic encephalopathy, s/p RRT  requiring high flow 
89M c hx Atrial Fibrillation on eliquis, s/p PPM, CAD s/p multiple stents, HTN, BPH, vertigo, mild cognitive impairment p/w acute hypoxic respiratory failure and metabolic encephalopathy.
89M c hx Atrial Fibrillation on eliquis, s/p PPM, CAD s/p multiple stents, HTN, BPH, vertigo, mild cognitive impairment, pw acute hypoxic respiratory failure and metabolic encephalopathy, s/p RRT  requiring high flow 
89M c hx Atrial Fibrillation on eliquis, s/p PPM, CAD s/p multiple stents, HTN, BPH, vertigo, mild cognitive impairment, pw acute hypoxic respiratory failure and metabolic encephalopathy, s/p RRT this am requiring high flow. Palliative consulted for GOC. 
89M c hx Atrial Fibrillation on eliquis, s/p PPM, CAD s/p multiple stents, HTN, BPH, vertigo, mild cognitive impairment p/w acute hypoxic respiratory failure and metabolic encephalopathy.
89M c hx Atrial Fibrillation on eliquis, s/p PPM, CAD s/p multiple stents, HTN, BPH, vertigo, mild cognitive impairment, pw acute hypoxic respiratory failure and metabolic encephalopathy, s/p RRT  requiring high flow 
89M c hx Atrial Fibrillation on eliquis, s/p PPM, CAD s/p multiple stents, HTN, BPH, vertigo, mild cognitive impairment, pw acute hypoxic respiratory failure and metabolic encephalopathy, s/p RRT this am requiring high flow 
89M c hx Atrial Fibrillation on eliquis, s/p PPM, CAD s/p multiple stents, HTN, BPH, vertigo, mild cognitive impairment p/w acute hypoxic respiratory failure and metabolic encephalopathy, s/p RRT  requiring high flow 
89M c hx Atrial Fibrillation on eliquis, s/p PPM, CAD s/p multiple stents, HTN, BPH, vertigo, mild cognitive impairment p/w acute hypoxic respiratory failure and metabolic encephalopathy, s/p RRT  requiring high flow

## 2024-12-31 NOTE — PROGRESS NOTE ADULT - PROBLEM SELECTOR PLAN 8
hg stable, no s/s of acute bleed, will continue to monitor, plat stable
- cont asa  - restart statin when clinically improved and if still in line with goals of care (unlikely to change course of his disease or improve quality of life)
hg stable, no s/s of acute bleed, will continue to monitor, plat stable
- cont asa  - restart statin when clinically improved and if still in line with goals of care (unlikely to change course of his disease or improve quality of life)
- cont asa  - restart statin when clinically improved
DTI on buttock - present on admission - likely older age and chronic multimorbidity, now with acute illness  wound care input appreciated
- cont asa  - restart statin when clinically improved
- cont asa  - restart statin when clinically improved and if still in line with goals of care (unlikely to change course of his disease or improve quality of life)

## 2024-12-31 NOTE — DISCHARGE NOTE NURSING/CASE MANAGEMENT/SOCIAL WORK - NSDCPEFALRISK_GEN_ALL_CORE
For information on Fall & Injury Prevention, visit: [email or web address] OR  [email or web address] OR  https://www.cdc.gov/steadi/patient.html

## 2024-12-31 NOTE — PROGRESS NOTE ADULT - PROBLEM SELECTOR PLAN 4
no s/s of fluid overload  - cont pt's asa, eliquis  troponin elevated - sees cardiology at [place]   Denies chest pain, no acute ST changes  - likely 2/2 hypoxia  - holding torsemide for now  BNP elevated but no s/s of fluid overload, holding torsemide
no s/s of fluid overload  - cont pt's asa, eliquis  troponin elevated - sees cardiology at [place]   Denies chest pain, no acute ST changes  - likely 2/2 hypoxia  - holding torsemide for now  BNP elevated but no s/s of fluid overload, holding torsemide
will continue to follow for goc  Can be reached by TEAMS M-F 9-5 Ashley Hidalgo Any other time please page [phone] if needed
- cont eliquis. not on rate control
likely dehydration, decrease PO intake, sepsis  Patient pulled out IV access, now with midline  resolved, discontinued IVF  tolerating oral diet puree with mod thick  Recheck labs in am to monitor Na
likely dehydration, decrease PO intake, sepsis  Patient pulled out IV access, now with midline  resolved, c/w IVF   tolerating oral diet puree with mod thick
likely dehydration, decrease PO intake, sepsis  Patient pulled out IV access, now with midline  resolved, discontinued IVF, Na within normal range but increasing. The question is whether he can take in enough PO to maintain nutrition/hydration  tolerating oral diet puree with mod thick  Recheck labs in am to monitor Na
likely dehydration, decrease PO intake, sepsis  The question is whether he can take in enough PO to maintain nutrition/hydration  tolerating oral diet puree with mod thick
likely dehydration, decrease PO intake, sepsis  The question is whether he can take in enough PO to maintain nutrition/hydration  tolerating oral diet puree with mod thick
no s/s of fluid overload  - cont pt's asa, eliquis (PO meds on hold, but will restart)  troponin elevated - sees cardiology at [place]   Denies chest pain, no acute ST changes  - likely 2/2 hypoxia  - trend CE  - holding torsemide for now  BNP elevated but no s/s of fluid overload, holding torsemide, since NPO will give gentle hydration and monitor.

## 2024-12-31 NOTE — PROGRESS NOTE ADULT - PROBLEM SELECTOR PROBLEM 6
Oropharyngeal dysphagia
PAF (paroxysmal atrial fibrillation)
PAF (paroxysmal atrial fibrillation)
Oropharyngeal dysphagia
Anemia
CAD (coronary artery disease)
Oropharyngeal dysphagia

## 2024-12-31 NOTE — PROGRESS NOTE ADULT - PROBLEM SELECTOR PROBLEM 4
Hypernatremia
Type 2 myocardial infarction
Encounter for palliative care
Hypernatremia
PAF (paroxysmal atrial fibrillation)
Hypernatremia
Hypernatremia
Type 2 myocardial infarction
Type 2 myocardial infarction
Hypernatremia

## 2024-12-31 NOTE — PROGRESS NOTE ADULT - PROBLEM SELECTOR PLAN 11
Ongoing discussions with daughter and grandson - understand that patient is 89 and frail, deconditioned, now with dysphagia, aspiration pneumonia, advanced dementia (FAST stage 7).   patient is DNR/DNI  Hospice referral made for possible d/c home with home hospice

## 2024-12-31 NOTE — PROGRESS NOTE ADULT - PROBLEM SELECTOR PROBLEM 8
CAD (coronary artery disease)
Anemia
At high risk for skin breakdown
Anemia
CAD (coronary artery disease)
CAD (coronary artery disease)

## 2024-12-31 NOTE — PROGRESS NOTE ADULT - PROVIDER SPECIALTY LIST ADULT
Hospitalist
Palliative Care
Hospitalist

## 2024-12-31 NOTE — PROGRESS NOTE ADULT - NUTRITIONAL ASSESSMENT
This patient has been assessed with a concern for Malnutrition and has been determined to have a diagnosis/diagnoses of Severe protein-calorie malnutrition and Underweight (BMI < 19).    This patient is being managed with:   Diet Pureed-  Moderately Thick Liquids (MODTHICKLIQS)  Supplement Feeding Modality:  Oral  Ensure Plus High Protein Cans or Servings Per Day:  2       Frequency:  Daily  Entered: Dec 29 2024  4:02PM  
This patient has been assessed with a concern for Malnutrition and has been determined to have a diagnosis/diagnoses of Severe protein-calorie malnutrition and Underweight (BMI < 19).    This patient is being managed with:   Diet Pureed-  Moderately Thick Liquids (MODTHICKLIQS)  Supplement Feeding Modality:  Oral  Ensure Plus High Protein Cans or Servings Per Day:  2       Frequency:  Daily  Entered: Dec 29 2024  4:02PM  
This patient has been assessed with a concern for Malnutrition and has been determined to have a diagnosis/diagnoses of Severe protein-calorie malnutrition and Underweight (BMI < 19).    This patient is being managed with:   Diet Pureed-  Moderately Thick Liquids (MODTHICKLIQS)  Supplement Feeding Modality:  Oral  Ensure Clear Cans or Servings Per Day:  2       Frequency:  Two Times a day  Entered: Dec 24 2024  2:47PM  
This patient has been assessed with a concern for Malnutrition and has been determined to have a diagnosis/diagnoses of Severe protein-calorie malnutrition and Underweight (BMI < 19).    This patient is being managed with:   Diet NPO-  Except Medications  Entered: Dec 23 2024  4:08PM  
This patient has been assessed with a concern for Malnutrition and has been determined to have a diagnosis/diagnoses of Severe protein-calorie malnutrition and Underweight (BMI < 19).    This patient is being managed with:   Diet Pureed-  Moderately Thick Liquids (MODTHICKLIQS)  Supplement Feeding Modality:  Oral  Ensure Clear Cans or Servings Per Day:  2       Frequency:  Two Times a day  Entered: Dec 24 2024  2:47PM  
This patient has been assessed with a concern for Malnutrition and has been determined to have a diagnosis/diagnoses of Severe protein-calorie malnutrition and Underweight (BMI < 19).    This patient is being managed with:   Diet Pureed-  Moderately Thick Liquids (MODTHICKLIQS)  Supplement Feeding Modality:  Oral  Ensure Clear Cans or Servings Per Day:  2       Frequency:  Two Times a day  Entered: Dec 24 2024  2:47PM  
This patient has been assessed with a concern for Malnutrition and has been determined to have a diagnosis/diagnoses of Severe protein-calorie malnutrition and Underweight (BMI < 19).    This patient is being managed with:   Diet Pureed-  Moderately Thick Liquids (MODTHICKLIQS)  Supplement Feeding Modality:  Oral  Ensure Clear Cans or Servings Per Day:  2       Frequency:  Two Times a day  Entered: Dec 24 2024  2:47PM    The following pending diet order is being considered for treatment of Severe protein-calorie malnutrition and Underweight (BMI < 19):  Diet Pureed-  Moderately Thick Liquids (MODTHICKLIQS)  Supplement Feeding Modality:  Oral  Ensure Plus High Protein Cans or Servings Per Day:  2       Frequency:  Daily  Entered: Dec 27 2024  1:55PM  
This patient has been assessed with a concern for Malnutrition and has been determined to have a diagnosis/diagnoses of Severe protein-calorie malnutrition and Underweight (BMI < 19).    This patient is being managed with:   Diet Pureed-  Moderately Thick Liquids (MODTHICKLIQS)  Supplement Feeding Modality:  Oral  Ensure Clear Cans or Servings Per Day:  2       Frequency:  Two Times a day  Entered: Dec 24 2024  2:47PM    The following pending diet order is being considered for treatment of Severe protein-calorie malnutrition and Underweight (BMI < 19):  Diet Pureed-  Moderately Thick Liquids (MODTHICKLIQS)  Supplement Feeding Modality:  Oral  Ensure Plus High Protein Cans or Servings Per Day:  2       Frequency:  Daily  Entered: Dec 27 2024  1:55PM

## 2024-12-31 NOTE — PROGRESS NOTE ADULT - PROBLEM SELECTOR PLAN 10
DTI on buttock - present on admission - likely older age and chronic multimorbidity, now with acute illness  wound care input appreciated
DTI on buttock - present on admission - likely older age and chronic multimorbidity, now with acute illness  wound care input appreciated
Ongoing discussions with daughter and grandson - understand that patient is 89 and frail, deconditioned, now with dysphagia, aspiration pneumonia, advanced dementia (FAST stage 7).   patient is DNR/DNI  Palliative input appreciated, goals of care known at this time - pursue medical management but DNR/DNI
DTI on buttock - present on admission - likely older age and chronic multimorbidity, now with acute illness  wound care input appreciated
Ongoing discussions with daughter and grandson - understand that patient is 89 and frail, deconditioned, now with dysphagia, aspiration pneumonia, advanced dementia (FAST stage 7).   patient is DNR/DNI
DTI on buttock - present on admission - likely older age and chronic multimorbidity, now with acute illness  wound care input appreciated
DTI on buttock - present on admission - likely older age and chronic multimorbidity, now with acute illness  wound care input appreciated

## 2024-12-31 NOTE — PROGRESS NOTE ADULT - PROBLEM SELECTOR PROBLEM 2
Alzheimer dementia with behavioral disturbance
Alzheimer dementia with behavioral disturbance
Hypernatremia
Acute metabolic encephalopathy
Alzheimer dementia with behavioral disturbance
Hypernatremia
Alzheimer dementia with behavioral disturbance
Acute metabolic encephalopathy
Hypernatremia
Alzheimer dementia with behavioral disturbance

## 2024-12-31 NOTE — PROGRESS NOTE ADULT - PROBLEM SELECTOR PROBLEM 5
Oropharyngeal dysphagia
Type 2 myocardial infarction
CAD (coronary artery disease)
Type 2 myocardial infarction
PAF (paroxysmal atrial fibrillation)
Type 2 myocardial infarction
Oropharyngeal dysphagia

## 2024-12-31 NOTE — PROGRESS NOTE ADULT - PROBLEM SELECTOR PROBLEM 7
CAD (coronary artery disease)
PAF (paroxysmal atrial fibrillation)
CAD (coronary artery disease)
PAF (paroxysmal atrial fibrillation)
At high risk for skin breakdown
Anemia
PAF (paroxysmal atrial fibrillation)

## 2024-12-31 NOTE — PROGRESS NOTE ADULT - PROBLEM SELECTOR PLAN 9
hg stable, no s/s of acute bleed, will continue to monitor, plat stable
Ongoing discussions with daughter and grandson - understand that patient is 89 and frail, deconditioned, now with dysphagia, aspiration pneumonia, advanced dementia (FAST stage 7).   Palliative consult placed on admission.   Patient is DNR/DNI  For now NPO, we discussed that a feeding tube may not align with their goals. await SLP eval
DTI on buttock - present on admission - likely older age and chronic multimorbidity, now with acute illness  wound care input appreciated
DTI on buttock - present on admission - likely older age and chronic multimorbidity, now with acute illness  wound care input appreciated
hg stable, no s/s of acute bleed, will continue to monitor, plat stable

## 2024-12-31 NOTE — PROGRESS NOTE ADULT - PROBLEM SELECTOR PLAN 6
advanced oral and pharyngeal dysphagia   on clinical exam tolerated puree and mod thick - tolerating  given age and goals of care, will not pursue instrumentation   family in agreement with plan to continue feeding despite risk of aspiration  they expressed that they would not want to pursue artificial nutrition
- cont eliquis.   rate controlled
- cont asa  - restart statin when clinically improved
- cont eliquis.   rate controlled
advanced oral and pharyngeal dysphagia   on clinical exam tolerated puree and mod thick - tolerating  given age and goals of care, will not pursue instrumentation   family in agreement with plan to continue feeding despite risk of aspiration  they expressed that they would not want to pursue artificial nutrition
advanced oral and pharyngeal dysphagia   on clinical exam tolerated puree and mod thick - tolerating  given age and goals of care, will not pursue instrumentation   family in agreement with plan to continue feeding despite risk of aspiration  they expressed that they would not want to pursue artificial nutrition
hg stable, no s/s of acute bleed, will continue to monitor, plat stable
advanced oral and pharyngeal dysphagia   on clinical exam tolerated puree and mod thick - tolerating  given age and goals of care, will not pursue instrumentation   family in agreement with plan to continue feeding despite risk of aspiration  they expressed that they would not want to pursue artificial nutrition
advanced oral and pharyngeal dysphagia   on clinical exam tolerated puree and mod thick - tolerating  given age and goals of care, will not pursue instrumentation   family in agreement with plan to continue feeding despite risk of aspiration  they expressed that they would not want to pursue artificial nutrition

## 2024-12-31 NOTE — PROGRESS NOTE ADULT - PROBLEM SELECTOR PLAN 7
- cont eliquis.   rate controlled
- cont eliquis.   rate controlled
hg stable, no s/s of acute bleed, will continue to monitor, plat stable
- cont asa  - restart statin when clinically improved
- cont eliquis.   rate controlled
- cont asa  - restart statin when clinically improved
- cont eliquis.   rate controlled
DTI on buttock - present on admission - likely older age and chronic multimorbidity, now with acute illness  wound care input appreciated
- cont eliquis.   rate controlled

## 2024-12-31 NOTE — PROGRESS NOTE ADULT - PROBLEM SELECTOR PLAN 1
due to aspiration pneumonia initially requiring hi flow oxygen, s/p RRT  Now on RA  completed Ertapenem  blood cultures negative; viral panel negative  -continuous pulse ox  d/c IVF  - DNR/DNI. OK for trial of NIV.

## 2024-12-31 NOTE — CHART NOTE - NSCHARTNOTESELECT_GEN_ALL_CORE
Event Note
Nutrition Services
Discharge/Event Note
Event Note
Nutrition Services
Palliative Medicine/Event Note
Wound Care Team Note:/Event Note

## 2024-12-31 NOTE — PROGRESS NOTE ADULT - NSPROGADDITIONALINFOA_GEN_ALL_CORE
Lives at home with 24 hour care
Stable for DC home with private hire aide and hospice. Discussed with patient's daughter and 5 [facility] ACP. Total time spent discharge planning 45 minutes.
Lives at home with 24 hour care
Lives at home with 24 hour care
Discussed with patient, daughter, 5 ACE ACP.
Lives at home with 24 hour care

## 2024-12-31 NOTE — PROGRESS NOTE ADULT - SUBJECTIVE AND OBJECTIVE BOX
[facility] Division of Hospital Medicine  Darwin Meyer DO  Reachable on [employer] Teams    Patient is a 89y old  Male who presents with a chief complaint of hypoxia, confusion (30 Dec 2024 17:07)    SUBJECTIVE / OVERNIGHT EVENTS: No acute events overnight. Patient seen and examined at bedside this morning, unable to obtain review of systems due to patient's dementia.    ADDITIONAL REVIEW OF SYSTEMS: Unable to obtain as above.    MEDICATIONS  (STANDING):  apixaban 2.5 milliGRAM(s) Oral two times a day  artificial tears (preservative free) Ophthalmic Solution 1 Drop(s) Both EYES three times a day  ascorbic acid 500 milliGRAM(s) Oral daily  aspirin  chewable 81 milliGRAM(s) Oral daily  chlorhexidine 2% Cloths 1 Application(s) Topical daily  finasteride 5 milliGRAM(s) Oral daily  influenza  Vaccine (HIGH DOSE) 0.5 milliLiter(s) IntraMuscular once  lactobacillus acidophilus 1 Tablet(s) Oral every 12 hours  melatonin 3 milliGRAM(s) Oral at bedtime  multivitamin 1 Tablet(s) Oral daily  QUEtiapine 25 milliGRAM(s) Oral at bedtime  tamsulosin 0.4 milliGRAM(s) Oral at bedtime    MEDICATIONS  (PRN):  albuterol/ipratropium for Nebulization 3 milliLiter(s) Nebulizer every 6 hours PRN Shortness of Breath and/or Wheezing  guaiFENesin Oral Liquid (Sugar-Free) 100 milliGRAM(s) Oral every 6 hours PRN Cough  OLANZapine Disintegrating Tablet 2.5 milliGRAM(s) Oral every 6 hours PRN severe agitation      CAPILLARY BLOOD GLUCOSE        I&O's Summary    30 Dec 2024 07:01  -  31 Dec 2024 07:00  --------------------------------------------------------  IN: 236 mL / OUT: 0 mL / NET: 236 mL        PHYSICAL EXAM:  Vital Signs Last 24 Hrs  T(C): 36.6 (31 Dec 2024 12:22), Max: 36.8 (30 Dec 2024 22:22)  T(F): 97.8 (31 Dec 2024 12:22), Max: 98.3 (30 Dec 2024 22:22)  HR: 87 (31 Dec 2024 11:08) (65 - 87)  BP: 147/69 (31 Dec 2024 11:08) (106/61 - 147/69)  BP(mean): --  RR: 18 (31 Dec 2024 11:08) (18 - 18)  SpO2: 95% (31 Dec 2024 11:08) (95% - 97%)    Parameters below as of 31 Dec 2024 11:08  Patient On (Oxygen Delivery Method): room air    CONSTITUTIONAL: NAD, frail, cachectic  RESPIRATORY: Normal respiratory effort; lungs are clear to auscultation bilaterally  CARDIOVASCULAR: Regular rate and rhythm, normal S1 and S2, no murmur/rub/gallop  ABDOMEN: Nontender to palpation, nondistended, soft  PSYCH: Awake, not alert or oriented    LABS:    12-31    146[H]  |  113[H]  |  22  ----------------------------<  113[H]  4.2   |  27  |  0.92    Ca    8.2[L]      31 Dec 2024 07:04  Phos  3.0     12-30            Urinalysis Basic - ( 31 Dec 2024 07:04 )    Color: x / Appearance: x / SG: x / pH: x  Gluc: 113 mg/dL / Ketone: x  / Bili: x / Urobili: x   Blood: x / Protein: x / Nitrite: x   Leuk Esterase: x / RBC: x / WBC x   Sq Epi: x / Non Sq Epi: x / Bacteria: x

## 2024-12-31 NOTE — PROGRESS NOTE ADULT - PROBLEM SELECTOR PROBLEM 9
At high risk for skin breakdown
Goals of care, counseling/discussion
Anemia
At high risk for skin breakdown
Anemia
Anemia

## 2024-12-31 NOTE — PROGRESS NOTE ADULT - PROBLEM SELECTOR PROBLEM 10
Goals of care, counseling/discussion
At high risk for skin breakdown
Goals of care, counseling/discussion
At high risk for skin breakdown
At high risk for skin breakdown

## 2024-12-31 NOTE — DISCHARGE NOTE NURSING/CASE MANAGEMENT/SOCIAL WORK - PATIENT PORTAL LINK FT
You can access the FollowMyHealth Patient Portal offered by [facility] by registering at the following website: [email or web address]. By joining [facility] [employer] [facility] [employer]’s FollowMyHealth portal, you will also be able to view your health information using other applications (apps) compatible with our system.

## 2024-12-31 NOTE — PROGRESS NOTE ADULT - PROBLEM SELECTOR PLAN 5
advanced oral and pharyngeal dysphagia   on clinical exam tolerated puree and mod thick - tolerating  given age and goals of care, will not pursue instrumentation   family in agreement with plan to continue feeding despite risk of aspiration  they expressed that they would not want to pursue artificial nutrition
- cont asa  - restart statin when clinically improved
no s/s of fluid overload  - cont pt's asa, eliquis  troponin elevated - sees cardiology at [place]   Denies chest pain, no acute ST changes  - likely 2/2 hypoxia  - holding torsemide for now  BNP elevated but no s/s of fluid overload, holding torsemide
no s/s of fluid overload  - cont pt's asa, eliquis  troponin elevated - sees cardiology at [place]   Denies chest pain, no acute ST changes  - likely 2/2 hypoxia  - holding torsemide for now  BNP elevated but no s/s of fluid overload, holding torsemide
no s/s of fluid overload  - cont pt's asa, eliquis  troponin elevated - sees cardiology at [place]   Denies chest pain, no acute ST changes  - likely 2/2 hypoxia  - holding torsemide for now  BNP elevated but no s/s of fluid overload, holding torsemide
no s/s of fluid overload  - cont pt's asa, eliquis  troponin elevated - sees cardiology at [place]   Denies chest pain, no acute ST changes  - likely 2/2 hypoxia  - holding torsemide for now  BNP elevated but no s/s of fluid overload, holding torsemide
- cont eliquis. not on rate control
no s/s of fluid overload  - cont pt's asa, eliquis  troponin elevated - sees cardiology at [place]   Denies chest pain, no acute ST changes  - likely 2/2 hypoxia  - holding torsemide for now  BNP elevated but no s/s of fluid overload, holding torsemide
advanced oral and pharyngeal dysphagia   on clinical exam tolerated puree and mod thick - tolerating  given age and goals of care, will not pursue instrumentation   family in agreement with plan to continue feeding despite risk of aspiration  they expressed that they would not want to pursue artificial nutrition

## 2024-12-31 NOTE — PROGRESS NOTE ADULT - PROBLEM SELECTOR PROBLEM 3
Acute metabolic encephalopathy
Acute metabolic encephalopathy
Type 2 myocardial infarction
Acute metabolic encephalopathy
Advanced care planning/counseling discussion
Acute metabolic encephalopathy

## 2025-01-01 ENCOUNTER — TRANSCRIPTION ENCOUNTER (OUTPATIENT)
Age: 89
End: 2025-01-01

## 2025-01-01 ENCOUNTER — NON-APPOINTMENT (OUTPATIENT)
Age: 89
End: 2025-01-01

## 2025-01-02 ENCOUNTER — NON-APPOINTMENT (OUTPATIENT)
Age: 89
End: 2025-01-02

## 2025-05-17 NOTE — CHART NOTE - NSCHARTNOTEFT_GEN_A_CORE
89 year old male admitted for resp distress and possible aspiration PNA s/p MOLST w/DNR/DNI trial of NIV found to be desating while on HFNC and NRB mask. While sleeping pt noted to desat to 69%. On arrival pt is awake yet does not follow commands, this is his usual mental status. Family notified and they re-state they do not want pt on a respirator. RRT called for further guidance. Family is thinking comfort care however at this time the decision is not there. Pt currently on HFNC 60/100 and NRB at 15 l/min. Respiratory called, cannot do trial of NIV as pt is aspiration risk.    Vital Signs Last 24 Hrs  T(C): 36.4 (23 Dec 2024 00:00), Max: 36.8 (22 Dec 2024 18:13)  T(F): 97.6 (23 Dec 2024 00:00), Max: 98.3 (22 Dec 2024 18:13)  HR: 70 (22 Dec 2024 21:53) (70 - 98)  BP: 121/68 (23 Dec 2024 00:00) (121/68 - 158/72)  BP(mean): 94 (22 Dec 2024 20:26) (94 - 94)  RR: 16 (23 Dec 2024 00:00) (14 - 24)  SpO2: 99% (23 Dec 2024 00:00) (93% - 100%)    Physical Exam:   GENERAL: does not respond to questions or to name called, appears in slight respiratory distress   HEAD:  Atraumatic, Normocephalic  EYES: EOMI, PERRLA, conjunctiva and sclera clear  ENMT:  Moist mucous membranes, suctioned with thick clear secretions  NECK: Supple, No JVD   NERVOUS SYSTEM:  Alert however does not respond, this is his normal mental status  CHEST/LUNG: CTA bilaterally; No rales, rhonchi, wheezing, or rubs  HEART: RRR  ABDOMEN: Soft, Nontender, Nondistended; Bowel sounds present  EXTREMITIES:  2+ Peripheral Pulses, no edema, purple bruising bilaterally       A/P: Hypoxic  Family called; they are aware and updated. They confirm no intubation  RRT called: pt given duoneb via nebulizer and sat improved to 100%  Pt removed off non-rebreather and suctioned. Now at 100%  Family updated.   Will continue w/duoneb q6h
Palliative team following for GOC (see GOC note 12/24).  Follow up GOC discussion held by hospitalist and goals are established. Pt is DNR/DNI, trial NIV with continued medical management. Option for transition to hospice also discussed with daughter should patient's condition worsen.  No other palliative needs identified at this time. GaP team will sign off. Case discussed with Dr. Gomez.    Please reconsult as needed.     Rupinder Yo MD  Geriatrics and Palliative Medicine Attending  [facility] pager: [phone]
REGISTERED DIETITIAN QUICK NOTE:    Pt noted to be ordered for Ensure Clear 2x/day in-house. Would recommend changing oral nutrition supplement to Ensure Plus High Protein 2x/day to supplement po intake as Ensure Clear unable to be thickened in-house.    RD remains available as needed and will follow up as able per protocol.   Minna Garvin, LAILAN, CDN  TEAMS
Request from Dr. Meyer to facilitate patient discharge. Medication reconciliation reviewed, revised, and resolved with Dr. Meyer who had medically cleared patient for discharge with follow-up as advised. Please refer to discharge note for detailed hospital course. Patient is currently stable for discharge to home at this time.
Wound Care Team Note:    Repeat request for a wound care consult for the posterior pelvis/torso skin changes received overnight. Patient seen yesterday. Please refer to my note and orders from 12.23.24 for recommendations.     Radha Park NP-C, CWOCN via TEAMS
89M c hx Atrial Fibrillation on eliquis, s/p PPM, CAD s/p multiple stents, HTN, BPH, vertigo, mild cognitive impairment p/w acute hypoxic respiratory failure and metabolic encephalopathy, s/p RRT  requiring high flow   RRT called due to AMS, lethargy, during RRT patient woke up, started talking, at baseline. RRT called off.  Patient waxes and wanes between agitation and lethargy - delirium superimposed on advanced dementia.  Aspiration precautions, frequent turning, careful hand feeding, OOB when possible  home hospice eval tomorrow.  Patient is DNR/DNI  d/w nurse and ACP
NUTRITION FOLLOW UP NOTE    PATIENT SEEN FOR: malnutrition follow-up    SOURCE: [] Patient  [x] Current Medical Record  [x] RN  [] Family/support person at bedside  [x] Patient unavailable/inappropriate  [] Other:    CHART REVIEWED/EVENTS NOTED.  [] No changes to nutrition care plan to note  [x] Nutrition Status:  -pt seen by Speech Language Pathologist on , recommended for "Initiate most conservative diet of PUREE AND MODERATELY THICK LIQUID VIA TEASPOON w/ Total Assistance and Supervision"  -Pt seen by palliative care for GOC, pt noted with lethargy, decreased PO intake. Family declining artifical nutrition support. Daughter open to hospice if condition worsens or does not improve.     DIET ORDER:   Diet, Pureed:   Moderately Thick Liquids (MODTHICKLIQS)  Supplement Feeding Modality:  Oral  Ensure Clear Cans or Servings Per Day:  2       Frequency:  Two Times a day (-)      CURRENT DIET ORDER IS:  [] Appropriate:  [] Inadequate:  [x] Other: recommend change nutrition supplement to Ensure Plus High Protein to supplement PO intake (ensure clear unable to be thickened in-house)    NUTRITION INTAKE/PROVISION:  [x] PO: RN reports pt with some improvement in PO intake today and yesterday. Consumed 1/2 of eggs this morning. Reports certain items seem to be too thick for him such as potatoes, will update food preferences.   [] Enteral Nutrition:  [] Parenteral Nutrition:    ANTHROPOMETRICS:  Drug Dosing Weight  Height (cm): 172.7 (22 Dec 2024 21:53)  Weight (kg): 53.5 (22 Dec 2024 21:53)  BMI (kg/m2): 17.9 (22 Dec 2024 21:53)  BSA (m2): 1.63 (22 Dec 2024 21:53)  Weights:   Daily Weight in k.5 (-)     MEDICATIONS:  MEDICATIONS  (STANDING):  apixaban 2.5 milliGRAM(s) Oral two times a day  artificial tears (preservative free) Ophthalmic Solution 1 Drop(s) Both EYES three times a day  ascorbic acid 500 milliGRAM(s) Oral daily  aspirin  chewable 81 milliGRAM(s) Oral daily  chlorhexidine 2% Cloths 1 Application(s) Topical daily  ertapenem  IVPB 1000 milliGRAM(s) IV Intermittent every 24 hours  finasteride 5 milliGRAM(s) Oral daily  influenza  Vaccine (HIGH DOSE) 0.5 milliLiter(s) IntraMuscular once  lactobacillus acidophilus 1 Tablet(s) Oral every 12 hours  multivitamin 1 Tablet(s) Oral daily  mupirocin 2% Nasal 1 Application(s) Both Nostrils two times a day  tamsulosin 0.4 milliGRAM(s) Oral at bedtime    MEDICATIONS  (PRN):  albuterol/ipratropium for Nebulization 3 milliLiter(s) Nebulizer every 6 hours PRN Shortness of Breath and/or Wheezing  OLANZapine Disintegrating Tablet 2.5 milliGRAM(s) Oral every 6 hours PRN severe agitation      NUTRITIONALLY PERTINENT LABS:   Na142 mmol/L Glu 154 mg/dL[H] K+ 4.0 mmol/L Cr  0.79 mg/dL BUN 20 mg/dL  Phos 2.4 mg/dL[L]  Alb 3.0 g/dL[L] ALT 13 U/L AST 21 U/L Alkaline Phosphatase 82 U/L  24 @ 07:14 a1c 6.1    A1C with Estimated Average Glucose Result: 6.1 % (24 @ 07:14)    Finger Sticks:      NUTRITIONALLY PERTINENT MEDICATIONS/LABS:  [x] Reviewed  [] Relevant notes on medications/labs:    EDEMA:  [x] Reviewed  [] Relevant notes:    GI/ I&O:  [x] Reviewed  [] Relevant notes:  [x] Other: last bowel movement     SKIN:   [] No pressure injuries documented, per nursing flowsheet  [x] Pressure injury previously noted: stage 2 sacrum, stage 2 thoracic back   [] Change in pressure injury documentation:  [] Other:    ESTIMATED NEEDS:  [] No change:  [x] Updated:  Energy: 3465-8305  kcal/day (30-35 kcal/kg)  Protein: 64-74.9  g/day (1.2-1.4g/kg)  Fluid:   ml/day or [x] defer to team  Based on: based on dosing weight 53.5Kg     NUTRITION DIAGNOSIS:  [x] Prior Dx: Malnutrition (severe in the context of acute illness)  [] New Dx:    EDUCATION:  [] Yes:  [x] Not appropriate/warranted    NUTRITION CARE PLAN:  1. Diet: Defer diet texture/consistency to team/ Speech Language Pathologist   2. Supplements: Recommend change supplement to Ensure Plus High Protein 2x daily to supplement PO intake.   3. Multivitamin/mineral supplementation: continue multivitamin, ascorbic acid   4: Monitor GOC     MONITORING AND EVALUATION:   RD remains available upon request and will follow up per protocol.    Nazanin Momin RD, CDN, [facility], Available on Teams
plan as above  f/u Cultures

## 2025-06-12 NOTE — ED ADULT NURSE NOTE - NSFALLRSKPSTHSTOCCUR_ED_ALL_ED
Problem: DEPRESSION  Goal: Will be euthymic at discharge  Description: INTERVENTIONS:  - Administer medication as ordered  - Provide emotional support via 1:1 interaction with staff  - Encourage involvement in milieu/groups/activities  - Monitor for social isolation  Outcome: Progressing     Problem: PSYCHOSIS  Goal: Will report no hallucinations or delusions  Description: Interventions:  - Administer medication as  ordered  - Every waking shifts and PRN assess for the presence of hallucinations and or delusions  - Assist with reality testing to support increasing orientation  - Assess if patient's hallucinations or delusions are encouraging self-harm or harm to others and intervene as appropriate  Outcome: Progressing     Problem: ANXIETY  Goal: Will report anxiety at manageable levels  Description: INTERVENTIONS:  - Administer medication as ordered  - Teach and encourage coping skills  - Provide emotional support  - Assess patient/family for anxiety and ability to cope  Outcome: Progressing  Goal: By discharge: Patient will verbalize 2 strategies to deal with anxiety  Description: Interventions:  - Identify any obvious source/trigger to anxiety  - Staff will assist patient in applying identified coping technique/skills  - Encourage attendance of scheduled groups and activities  Outcome: Not Progressing     Problem: DISCHARGE PLANNING - CARE MANAGEMENT  Goal: Discharge to post-acute care or home with appropriate resources  Description: INTERVENTIONS:  - Conduct assessment to determine patient/family and health care team treatment goals, and need for post-acute services based on payer coverage, community resources, and patient preferences, and barriers to discharge  - Address psychosocial, clinical, and financial barriers to discharge as identified in assessment in conjunction with the patient/family and health care team  - Arrange appropriate level of post-acute services according to patient’s   needs and  preference and payer coverage in collaboration with the physician and health care team  - Communicate with and update the patient/family, physician, and health care team regarding progress on the discharge plan  - Arrange appropriate transportation to post-acute venues  Outcome: Progressing     Problem: Ineffective Coping  Goal: Identifies ineffective coping skills  Outcome: Progressing  Goal: Identifies healthy coping skills  Outcome: Progressing  Goal: Demonstrates healthy coping skills  Outcome: Progressing  Goal: Participates in unit activities  Description: Interventions:  - Provide therapeutic environment   - Provide required programming   - Redirect inappropriate behaviors   Outcome: Progressing     Problem: Risk for Self Injury/Neglect  Goal: Treatment Goal: Remain safe during length of stay, learn and adopt new coping skills, and be free of self-injurious ideation, impulses and acts at the time of discharge  Outcome: Progressing  Goal: Verbalize thoughts and feelings  Description: Interventions:  - Assess and re-assess patient's lethality and potential for self-injury  - Engage patient in 1:1 interactions, daily, for a minimum of 15 minutes  - Encourage patient to express feelings, fears, frustrations, hopes  - Establish rapport/trust with patient   Outcome: Progressing  Goal: Refrain from harming self  Description: Interventions:  - Monitor patient closely, per order  - Develop a trusting relationship  - Supervise medication ingestion, monitor effects and side effects   Outcome: Progressing      Single Mechanical/Accidental Fall